# Patient Record
Sex: MALE | Race: BLACK OR AFRICAN AMERICAN | Employment: FULL TIME | URBAN - METROPOLITAN AREA
[De-identification: names, ages, dates, MRNs, and addresses within clinical notes are randomized per-mention and may not be internally consistent; named-entity substitution may affect disease eponyms.]

---

## 2021-10-20 ENCOUNTER — TELEPHONE (OUTPATIENT)
Dept: FAMILY MEDICINE CLINIC | Facility: CLINIC | Age: 51
End: 2021-10-20

## 2021-10-28 ENCOUNTER — OFFICE VISIT (OUTPATIENT)
Dept: FAMILY MEDICINE CLINIC | Facility: CLINIC | Age: 51
End: 2021-10-28
Payer: COMMERCIAL

## 2021-10-28 VITALS
WEIGHT: 315 LBS | HEART RATE: 63 BPM | SYSTOLIC BLOOD PRESSURE: 120 MMHG | DIASTOLIC BLOOD PRESSURE: 80 MMHG | BODY MASS INDEX: 39.17 KG/M2 | HEIGHT: 75 IN

## 2021-10-28 DIAGNOSIS — G47.33 OBSTRUCTIVE SLEEP APNEA: ICD-10-CM

## 2021-10-28 DIAGNOSIS — I10 BENIGN ESSENTIAL HYPERTENSION: ICD-10-CM

## 2021-10-28 DIAGNOSIS — M19.90 ARTHRITIS: ICD-10-CM

## 2021-10-28 DIAGNOSIS — I10 PRIMARY HYPERTENSION: Primary | ICD-10-CM

## 2021-10-28 DIAGNOSIS — E66.01 OBESITY, MORBID, BMI 40.0-49.9 (HCC): ICD-10-CM

## 2021-10-28 DIAGNOSIS — E78.2 MIXED HYPERLIPIDEMIA: ICD-10-CM

## 2021-10-28 PROCEDURE — 3008F BODY MASS INDEX DOCD: CPT | Performed by: FAMILY MEDICINE

## 2021-10-28 PROCEDURE — 3725F SCREEN DEPRESSION PERFORMED: CPT | Performed by: FAMILY MEDICINE

## 2021-10-28 PROCEDURE — 99204 OFFICE O/P NEW MOD 45 MIN: CPT | Performed by: FAMILY MEDICINE

## 2021-10-28 RX ORDER — SPIRONOLACTONE 25 MG/1
25 TABLET ORAL DAILY
Qty: 30 TABLET | Refills: 5 | Status: SHIPPED | OUTPATIENT
Start: 2021-10-28 | End: 2021-10-28 | Stop reason: SDUPTHER

## 2021-10-28 RX ORDER — LOSARTAN POTASSIUM 100 MG/1
100 TABLET ORAL 2 TIMES DAILY
Qty: 60 TABLET | Refills: 5 | Status: SHIPPED | OUTPATIENT
Start: 2021-10-28 | End: 2021-10-28 | Stop reason: SDUPTHER

## 2021-10-28 RX ORDER — SPIRONOLACTONE 25 MG/1
25 TABLET ORAL DAILY
COMMUNITY
End: 2021-10-28 | Stop reason: SDUPTHER

## 2021-10-28 RX ORDER — LOSARTAN POTASSIUM 100 MG/1
25 TABLET ORAL DAILY
COMMUNITY
End: 2021-10-28 | Stop reason: SDUPTHER

## 2021-10-28 RX ORDER — PRAVASTATIN SODIUM 20 MG
20 TABLET ORAL DAILY
Qty: 30 TABLET | Refills: 5 | Status: SHIPPED | OUTPATIENT
Start: 2021-10-28 | End: 2021-10-28 | Stop reason: SDUPTHER

## 2021-10-28 RX ORDER — DILTIAZEM HYDROCHLORIDE 240 MG/1
240 CAPSULE, COATED, EXTENDED RELEASE ORAL 2 TIMES DAILY
Qty: 60 CAPSULE | Refills: 5 | Status: SHIPPED | OUTPATIENT
Start: 2021-10-28 | End: 2021-11-17 | Stop reason: SDUPTHER

## 2021-10-28 RX ORDER — METOPROLOL SUCCINATE 25 MG/1
TABLET, EXTENDED RELEASE ORAL
COMMUNITY
Start: 2014-06-27 | End: 2021-10-28 | Stop reason: SDUPTHER

## 2021-10-28 RX ORDER — PRAVASTATIN SODIUM 20 MG
20 TABLET ORAL DAILY
COMMUNITY
End: 2021-10-28 | Stop reason: SDUPTHER

## 2021-10-28 RX ORDER — DILTIAZEM HYDROCHLORIDE 240 MG/1
240 CAPSULE, COATED, EXTENDED RELEASE ORAL 2 TIMES DAILY
Qty: 60 CAPSULE | Refills: 5 | Status: SHIPPED | OUTPATIENT
Start: 2021-10-28 | End: 2021-10-28 | Stop reason: SDUPTHER

## 2021-10-28 RX ORDER — METOPROLOL SUCCINATE 50 MG/1
50 TABLET, EXTENDED RELEASE ORAL DAILY
Qty: 30 TABLET | Refills: 5 | Status: SHIPPED | OUTPATIENT
Start: 2021-10-28 | End: 2021-10-28 | Stop reason: SDUPTHER

## 2021-10-28 RX ORDER — LOSARTAN POTASSIUM 100 MG/1
100 TABLET ORAL 2 TIMES DAILY
Qty: 60 TABLET | Refills: 5 | Status: SHIPPED | OUTPATIENT
Start: 2021-10-28 | End: 2021-11-17 | Stop reason: SDUPTHER

## 2021-10-28 RX ORDER — SPIRONOLACTONE 25 MG/1
25 TABLET ORAL DAILY
Qty: 30 TABLET | Refills: 5 | Status: SHIPPED | OUTPATIENT
Start: 2021-10-28 | End: 2021-11-17 | Stop reason: SDUPTHER

## 2021-10-28 RX ORDER — DILTIAZEM HYDROCHLORIDE 240 MG/1
240 CAPSULE, COATED, EXTENDED RELEASE ORAL DAILY
COMMUNITY
End: 2021-10-28 | Stop reason: SDUPTHER

## 2021-10-28 RX ORDER — PRAVASTATIN SODIUM 20 MG
20 TABLET ORAL DAILY
Qty: 30 TABLET | Refills: 5 | Status: SHIPPED | OUTPATIENT
Start: 2021-10-28 | End: 2021-11-17 | Stop reason: SDUPTHER

## 2021-10-28 RX ORDER — METOPROLOL SUCCINATE 50 MG/1
50 TABLET, EXTENDED RELEASE ORAL DAILY
Qty: 30 TABLET | Refills: 5 | Status: SHIPPED | OUTPATIENT
Start: 2021-10-28 | End: 2021-11-17 | Stop reason: SDUPTHER

## 2021-10-28 RX ORDER — METOPROLOL SUCCINATE 50 MG/1
50 TABLET, EXTENDED RELEASE ORAL DAILY
COMMUNITY
End: 2021-10-28

## 2021-11-04 ENCOUNTER — APPOINTMENT (OUTPATIENT)
Dept: LAB | Facility: HOSPITAL | Age: 51
End: 2021-11-04
Payer: COMMERCIAL

## 2021-11-04 LAB
25(OH)D3 SERPL-MCNC: 15.8 NG/ML (ref 30–100)
ALBUMIN SERPL BCP-MCNC: 3 G/DL (ref 3.5–5)
ALP SERPL-CCNC: 103 U/L (ref 46–116)
ALT SERPL W P-5'-P-CCNC: 22 U/L (ref 12–78)
ANION GAP SERPL CALCULATED.3IONS-SCNC: 3 MMOL/L (ref 4–13)
AST SERPL W P-5'-P-CCNC: 12 U/L (ref 5–45)
BASOPHILS # BLD AUTO: 0.05 THOUSANDS/ΜL (ref 0–0.1)
BASOPHILS NFR BLD AUTO: 1 % (ref 0–1)
BILIRUB SERPL-MCNC: 0.66 MG/DL (ref 0.2–1)
BILIRUB UR QL STRIP: NEGATIVE
BUN SERPL-MCNC: 11 MG/DL (ref 5–25)
CALCIUM ALBUM COR SERPL-MCNC: 10.1 MG/DL (ref 8.3–10.1)
CALCIUM SERPL-MCNC: 9.3 MG/DL (ref 8.3–10.1)
CHLORIDE SERPL-SCNC: 105 MMOL/L (ref 100–108)
CHOLEST SERPL-MCNC: 171 MG/DL (ref 50–200)
CLARITY UR: CLEAR
CO2 SERPL-SCNC: 26 MMOL/L (ref 21–32)
COLOR UR: YELLOW
CREAT SERPL-MCNC: 0.89 MG/DL (ref 0.6–1.3)
EOSINOPHIL # BLD AUTO: 0.21 THOUSAND/ΜL (ref 0–0.61)
EOSINOPHIL NFR BLD AUTO: 2 % (ref 0–6)
ERYTHROCYTE [DISTWIDTH] IN BLOOD BY AUTOMATED COUNT: 14.9 % (ref 11.6–15.1)
EST. AVERAGE GLUCOSE BLD GHB EST-MCNC: 120 MG/DL
GFR SERPL CREATININE-BSD FRML MDRD: 113 ML/MIN/1.73SQ M
GLUCOSE P FAST SERPL-MCNC: 109 MG/DL (ref 65–99)
GLUCOSE UR STRIP-MCNC: NEGATIVE MG/DL
HBA1C MFR BLD: 5.8 %
HCT VFR BLD AUTO: 42 % (ref 36.5–49.3)
HDLC SERPL-MCNC: 38 MG/DL
HGB BLD-MCNC: 13.4 G/DL (ref 12–17)
HGB UR QL STRIP.AUTO: NEGATIVE
IMM GRANULOCYTES # BLD AUTO: 0.04 THOUSAND/UL (ref 0–0.2)
IMM GRANULOCYTES NFR BLD AUTO: 0 % (ref 0–2)
KETONES UR STRIP-MCNC: NEGATIVE MG/DL
LDLC SERPL CALC-MCNC: 116 MG/DL (ref 0–100)
LEUKOCYTE ESTERASE UR QL STRIP: NEGATIVE
LYMPHOCYTES # BLD AUTO: 2.48 THOUSANDS/ΜL (ref 0.6–4.47)
LYMPHOCYTES NFR BLD AUTO: 27 % (ref 14–44)
MCH RBC QN AUTO: 27.7 PG (ref 26.8–34.3)
MCHC RBC AUTO-ENTMCNC: 31.9 G/DL (ref 31.4–37.4)
MCV RBC AUTO: 87 FL (ref 82–98)
MONOCYTES # BLD AUTO: 0.76 THOUSAND/ΜL (ref 0.17–1.22)
MONOCYTES NFR BLD AUTO: 8 % (ref 4–12)
NEUTROPHILS # BLD AUTO: 5.53 THOUSANDS/ΜL (ref 1.85–7.62)
NEUTS SEG NFR BLD AUTO: 62 % (ref 43–75)
NITRITE UR QL STRIP: NEGATIVE
NONHDLC SERPL-MCNC: 133 MG/DL
NRBC BLD AUTO-RTO: 0 /100 WBCS
PH UR STRIP.AUTO: 6 [PH]
PLATELET # BLD AUTO: 357 THOUSANDS/UL (ref 149–390)
PMV BLD AUTO: 9.6 FL (ref 8.9–12.7)
POTASSIUM SERPL-SCNC: 4 MMOL/L (ref 3.5–5.3)
PROT SERPL-MCNC: 7.7 G/DL (ref 6.4–8.2)
PROT UR STRIP-MCNC: NEGATIVE MG/DL
RBC # BLD AUTO: 4.84 MILLION/UL (ref 3.88–5.62)
SODIUM SERPL-SCNC: 134 MMOL/L (ref 136–145)
SP GR UR STRIP.AUTO: 1.02 (ref 1–1.03)
TRIGL SERPL-MCNC: 83 MG/DL
TSH SERPL DL<=0.05 MIU/L-ACNC: 1.23 UIU/ML (ref 0.36–3.74)
URATE SERPL-MCNC: 5.5 MG/DL (ref 4.2–8)
UROBILINOGEN UR QL STRIP.AUTO: 1 E.U./DL
WBC # BLD AUTO: 9.07 THOUSAND/UL (ref 4.31–10.16)

## 2021-11-04 PROCEDURE — 80053 COMPREHEN METABOLIC PANEL: CPT | Performed by: FAMILY MEDICINE

## 2021-11-04 PROCEDURE — 80061 LIPID PANEL: CPT | Performed by: FAMILY MEDICINE

## 2021-11-04 PROCEDURE — 85025 COMPLETE CBC W/AUTO DIFF WBC: CPT | Performed by: FAMILY MEDICINE

## 2021-11-04 PROCEDURE — 36415 COLL VENOUS BLD VENIPUNCTURE: CPT | Performed by: FAMILY MEDICINE

## 2021-11-04 PROCEDURE — 82306 VITAMIN D 25 HYDROXY: CPT | Performed by: FAMILY MEDICINE

## 2021-11-04 PROCEDURE — 83036 HEMOGLOBIN GLYCOSYLATED A1C: CPT | Performed by: FAMILY MEDICINE

## 2021-11-04 PROCEDURE — 84443 ASSAY THYROID STIM HORMONE: CPT | Performed by: FAMILY MEDICINE

## 2021-11-04 PROCEDURE — 81003 URINALYSIS AUTO W/O SCOPE: CPT | Performed by: FAMILY MEDICINE

## 2021-11-04 PROCEDURE — 84550 ASSAY OF BLOOD/URIC ACID: CPT | Performed by: FAMILY MEDICINE

## 2021-11-17 ENCOUNTER — TELEPHONE (OUTPATIENT)
Dept: FAMILY MEDICINE CLINIC | Facility: CLINIC | Age: 51
End: 2021-11-17

## 2021-11-17 DIAGNOSIS — I10 PRIMARY HYPERTENSION: ICD-10-CM

## 2021-11-17 DIAGNOSIS — E78.2 MIXED HYPERLIPIDEMIA: ICD-10-CM

## 2021-11-17 RX ORDER — METOPROLOL SUCCINATE 50 MG/1
50 TABLET, EXTENDED RELEASE ORAL DAILY
Qty: 30 TABLET | Refills: 5 | Status: SHIPPED | OUTPATIENT
Start: 2021-11-17 | End: 2021-12-07 | Stop reason: SDUPTHER

## 2021-11-17 RX ORDER — SPIRONOLACTONE 25 MG/1
25 TABLET ORAL DAILY
Qty: 30 TABLET | Refills: 5 | Status: SHIPPED | OUTPATIENT
Start: 2021-11-17 | End: 2021-12-07 | Stop reason: SDUPTHER

## 2021-11-17 RX ORDER — LOSARTAN POTASSIUM 100 MG/1
100 TABLET ORAL 2 TIMES DAILY
Qty: 60 TABLET | Refills: 5 | Status: SHIPPED | OUTPATIENT
Start: 2021-11-17 | End: 2021-11-29

## 2021-11-17 RX ORDER — DILTIAZEM HYDROCHLORIDE 240 MG/1
240 CAPSULE, COATED, EXTENDED RELEASE ORAL 2 TIMES DAILY
Qty: 60 CAPSULE | Refills: 5 | Status: SHIPPED | OUTPATIENT
Start: 2021-11-17 | End: 2021-12-07 | Stop reason: SDUPTHER

## 2021-11-17 RX ORDER — PRAVASTATIN SODIUM 20 MG
20 TABLET ORAL DAILY
Qty: 30 TABLET | Refills: 5 | Status: SHIPPED | OUTPATIENT
Start: 2021-11-17 | End: 2021-12-07 | Stop reason: SDUPTHER

## 2021-11-29 DIAGNOSIS — I10 PRIMARY HYPERTENSION: ICD-10-CM

## 2021-11-29 RX ORDER — LOSARTAN POTASSIUM 100 MG/1
TABLET ORAL
Qty: 60 TABLET | Refills: 5 | Status: SHIPPED | OUTPATIENT
Start: 2021-11-29 | End: 2021-12-07 | Stop reason: SDUPTHER

## 2021-12-07 ENCOUNTER — OFFICE VISIT (OUTPATIENT)
Dept: FAMILY MEDICINE CLINIC | Facility: CLINIC | Age: 51
End: 2021-12-07
Payer: COMMERCIAL

## 2021-12-07 VITALS
WEIGHT: 315 LBS | HEART RATE: 90 BPM | TEMPERATURE: 97 F | SYSTOLIC BLOOD PRESSURE: 130 MMHG | BODY MASS INDEX: 39.17 KG/M2 | DIASTOLIC BLOOD PRESSURE: 90 MMHG | OXYGEN SATURATION: 98 % | HEIGHT: 75 IN

## 2021-12-07 DIAGNOSIS — I10 PRIMARY HYPERTENSION: ICD-10-CM

## 2021-12-07 DIAGNOSIS — Z00.00 ANNUAL PHYSICAL EXAM: ICD-10-CM

## 2021-12-07 DIAGNOSIS — Z23 FLU VACCINE NEED: Primary | ICD-10-CM

## 2021-12-07 DIAGNOSIS — Z12.12 SCREENING FOR COLORECTAL CANCER: ICD-10-CM

## 2021-12-07 DIAGNOSIS — Z11.59 NEED FOR HEPATITIS C SCREENING TEST: ICD-10-CM

## 2021-12-07 DIAGNOSIS — G25.81 RESTLESS LEG: ICD-10-CM

## 2021-12-07 DIAGNOSIS — N40.1 BENIGN PROSTATIC HYPERPLASIA WITH URINARY FREQUENCY: ICD-10-CM

## 2021-12-07 DIAGNOSIS — E78.2 MIXED HYPERLIPIDEMIA: ICD-10-CM

## 2021-12-07 DIAGNOSIS — E55.9 VITAMIN D DEFICIENCY: ICD-10-CM

## 2021-12-07 DIAGNOSIS — G47.33 OBSTRUCTIVE SLEEP APNEA: ICD-10-CM

## 2021-12-07 DIAGNOSIS — R35.0 BENIGN PROSTATIC HYPERPLASIA WITH URINARY FREQUENCY: ICD-10-CM

## 2021-12-07 DIAGNOSIS — Z11.4 SCREENING FOR HIV (HUMAN IMMUNODEFICIENCY VIRUS): ICD-10-CM

## 2021-12-07 DIAGNOSIS — Z12.11 SCREENING FOR COLORECTAL CANCER: ICD-10-CM

## 2021-12-07 PROCEDURE — 99396 PREV VISIT EST AGE 40-64: CPT | Performed by: FAMILY MEDICINE

## 2021-12-07 PROCEDURE — 3075F SYST BP GE 130 - 139MM HG: CPT | Performed by: FAMILY MEDICINE

## 2021-12-07 PROCEDURE — 1036F TOBACCO NON-USER: CPT | Performed by: FAMILY MEDICINE

## 2021-12-07 PROCEDURE — 90682 RIV4 VACC RECOMBINANT DNA IM: CPT

## 2021-12-07 PROCEDURE — 3008F BODY MASS INDEX DOCD: CPT | Performed by: FAMILY MEDICINE

## 2021-12-07 PROCEDURE — 90471 IMMUNIZATION ADMIN: CPT

## 2021-12-07 PROCEDURE — 3080F DIAST BP >= 90 MM HG: CPT | Performed by: FAMILY MEDICINE

## 2021-12-07 RX ORDER — DILTIAZEM HYDROCHLORIDE 240 MG/1
240 CAPSULE, COATED, EXTENDED RELEASE ORAL 2 TIMES DAILY
Qty: 60 CAPSULE | Refills: 5 | Status: SHIPPED | OUTPATIENT
Start: 2021-12-07 | End: 2021-12-14 | Stop reason: SDUPTHER

## 2021-12-07 RX ORDER — SPIRONOLACTONE 25 MG/1
25 TABLET ORAL DAILY
Qty: 30 TABLET | Refills: 5 | Status: SHIPPED | OUTPATIENT
Start: 2021-12-07 | End: 2022-01-06 | Stop reason: SDUPTHER

## 2021-12-07 RX ORDER — METOPROLOL SUCCINATE 50 MG/1
50 TABLET, EXTENDED RELEASE ORAL DAILY
Qty: 30 TABLET | Refills: 5 | Status: SHIPPED | OUTPATIENT
Start: 2021-12-07 | End: 2022-01-06 | Stop reason: SDUPTHER

## 2021-12-07 RX ORDER — ERGOCALCIFEROL 1.25 MG/1
50000 CAPSULE ORAL WEEKLY
Qty: 4 CAPSULE | Refills: 3 | Status: SHIPPED | OUTPATIENT
Start: 2021-12-07 | End: 2022-01-06 | Stop reason: SDUPTHER

## 2021-12-07 RX ORDER — PRAVASTATIN SODIUM 20 MG
20 TABLET ORAL DAILY
Qty: 30 TABLET | Refills: 5 | Status: SHIPPED | OUTPATIENT
Start: 2021-12-07 | End: 2022-01-06 | Stop reason: SDUPTHER

## 2021-12-07 RX ORDER — LOSARTAN POTASSIUM 100 MG/1
100 TABLET ORAL 2 TIMES DAILY
Qty: 60 TABLET | Refills: 5 | Status: SHIPPED | OUTPATIENT
Start: 2021-12-07 | End: 2022-01-06 | Stop reason: SDUPTHER

## 2021-12-07 RX ORDER — GABAPENTIN 300 MG/1
300 CAPSULE ORAL
Qty: 30 CAPSULE | Refills: 5 | Status: SHIPPED | OUTPATIENT
Start: 2021-12-07 | End: 2022-01-06 | Stop reason: ALTCHOICE

## 2021-12-11 ENCOUNTER — APPOINTMENT (OUTPATIENT)
Dept: LAB | Facility: HOSPITAL | Age: 51
End: 2021-12-11
Payer: COMMERCIAL

## 2021-12-11 DIAGNOSIS — R35.0 BENIGN PROSTATIC HYPERPLASIA WITH URINARY FREQUENCY: ICD-10-CM

## 2021-12-11 DIAGNOSIS — Z11.59 NEED FOR HEPATITIS C SCREENING TEST: ICD-10-CM

## 2021-12-11 DIAGNOSIS — N40.1 BENIGN PROSTATIC HYPERPLASIA WITH URINARY FREQUENCY: ICD-10-CM

## 2021-12-11 DIAGNOSIS — Z11.4 SCREENING FOR HIV (HUMAN IMMUNODEFICIENCY VIRUS): ICD-10-CM

## 2021-12-11 LAB — HCV AB SER QL: NORMAL

## 2021-12-11 PROCEDURE — 87389 HIV-1 AG W/HIV-1&-2 AB AG IA: CPT

## 2021-12-11 PROCEDURE — 84403 ASSAY OF TOTAL TESTOSTERONE: CPT

## 2021-12-11 PROCEDURE — 84402 ASSAY OF FREE TESTOSTERONE: CPT

## 2021-12-11 PROCEDURE — 36415 COLL VENOUS BLD VENIPUNCTURE: CPT

## 2021-12-11 PROCEDURE — 86803 HEPATITIS C AB TEST: CPT

## 2021-12-13 LAB
TESTOST FREE SERPL-MCNC: 6.7 PG/ML (ref 7.2–24)
TESTOST SERPL-MCNC: 263 NG/DL (ref 264–916)

## 2021-12-14 DIAGNOSIS — I10 PRIMARY HYPERTENSION: ICD-10-CM

## 2021-12-14 LAB — HIV 1+2 AB+HIV1 P24 AG SERPL QL IA: NORMAL

## 2021-12-14 RX ORDER — DILTIAZEM HYDROCHLORIDE 240 MG/1
240 CAPSULE, COATED, EXTENDED RELEASE ORAL DAILY
Qty: 30 CAPSULE | Refills: 5 | Status: SHIPPED | OUTPATIENT
Start: 2021-12-14 | End: 2022-01-06 | Stop reason: SDUPTHER

## 2022-01-06 ENCOUNTER — OFFICE VISIT (OUTPATIENT)
Dept: FAMILY MEDICINE CLINIC | Facility: CLINIC | Age: 52
End: 2022-01-06
Payer: COMMERCIAL

## 2022-01-06 VITALS
SYSTOLIC BLOOD PRESSURE: 150 MMHG | BODY MASS INDEX: 39.17 KG/M2 | HEIGHT: 75 IN | DIASTOLIC BLOOD PRESSURE: 90 MMHG | WEIGHT: 315 LBS | TEMPERATURE: 97.8 F

## 2022-01-06 DIAGNOSIS — I10 PRIMARY HYPERTENSION: ICD-10-CM

## 2022-01-06 DIAGNOSIS — E29.1 HYPOGONADISM IN MALE: Primary | ICD-10-CM

## 2022-01-06 DIAGNOSIS — E66.01 OBESITY, MORBID, BMI 40.0-49.9 (HCC): ICD-10-CM

## 2022-01-06 DIAGNOSIS — E78.2 MIXED HYPERLIPIDEMIA: ICD-10-CM

## 2022-01-06 DIAGNOSIS — E55.9 VITAMIN D DEFICIENCY: ICD-10-CM

## 2022-01-06 DIAGNOSIS — G47.33 OBSTRUCTIVE SLEEP APNEA: ICD-10-CM

## 2022-01-06 PROCEDURE — 3008F BODY MASS INDEX DOCD: CPT | Performed by: FAMILY MEDICINE

## 2022-01-06 PROCEDURE — 99214 OFFICE O/P EST MOD 30 MIN: CPT | Performed by: FAMILY MEDICINE

## 2022-01-06 RX ORDER — DILTIAZEM HYDROCHLORIDE 240 MG/1
240 CAPSULE, COATED, EXTENDED RELEASE ORAL DAILY
Qty: 30 CAPSULE | Refills: 5 | Status: SHIPPED | OUTPATIENT
Start: 2022-01-06 | End: 2022-07-11

## 2022-01-06 RX ORDER — PRAVASTATIN SODIUM 20 MG
20 TABLET ORAL DAILY
Qty: 30 TABLET | Refills: 5 | Status: SHIPPED | OUTPATIENT
Start: 2022-01-06 | End: 2022-07-11 | Stop reason: SDUPTHER

## 2022-01-06 RX ORDER — ERGOCALCIFEROL 1.25 MG/1
50000 CAPSULE ORAL WEEKLY
Qty: 4 CAPSULE | Refills: 3 | Status: SHIPPED | OUTPATIENT
Start: 2022-01-06 | End: 2022-04-07 | Stop reason: SDUPTHER

## 2022-01-06 RX ORDER — LOSARTAN POTASSIUM 100 MG/1
100 TABLET ORAL 2 TIMES DAILY
Qty: 60 TABLET | Refills: 5 | Status: SHIPPED | OUTPATIENT
Start: 2022-01-06 | End: 2022-02-09

## 2022-01-06 RX ORDER — SPIRONOLACTONE 25 MG/1
25 TABLET ORAL DAILY
Qty: 30 TABLET | Refills: 5 | Status: SHIPPED | OUTPATIENT
Start: 2022-01-06 | End: 2022-07-11 | Stop reason: SDUPTHER

## 2022-01-06 RX ORDER — METOPROLOL SUCCINATE 50 MG/1
50 TABLET, EXTENDED RELEASE ORAL DAILY
Qty: 30 TABLET | Refills: 5 | Status: SHIPPED | OUTPATIENT
Start: 2022-01-06 | End: 2022-07-11 | Stop reason: SDUPTHER

## 2022-01-06 NOTE — PROGRESS NOTES
Assessment/Plan:  We discussed his lab work and his symptoms  Encourage him to continue current therapy continue vitamin-D  I added testosterone patch daily  Will recheck labs in 3 months follow-up at that time  Diagnoses and all orders for this visit:    Hypogonadism in male  -     Testosterone 4 MG/24HR PT24; Place 1 patch on the skin daily  -     Comprehensive metabolic panel; Future  -     PSA Total, Diagnostic  -     Testosterone, free, total; Future    Primary hypertension  -     diltiazem (CARDIZEM CD) 240 mg 24 hr capsule; Take 1 capsule (240 mg total) by mouth daily  -     losartan (COZAAR) 100 MG tablet; Take 1 tablet (100 mg total) by mouth 2 (two) times a day  -     metoprolol succinate (TOPROL-XL) 50 mg 24 hr tablet; Take 1 tablet (50 mg total) by mouth daily  -     spironolactone (ALDACTONE) 25 mg tablet; Take 1 tablet (25 mg total) by mouth daily    Vitamin D deficiency  -     ergocalciferol (VITAMIN D2) 50,000 units; Take 1 capsule (50,000 Units total) by mouth once a week    Mixed hyperlipidemia  -     pravastatin (PRAVACHOL) 20 mg tablet; Take 1 tablet (20 mg total) by mouth daily    Obstructive sleep apnea    Obesity, morbid, BMI 40 0-49 9 ([facility])            Subjective:        Patient ID: Cheri Swain is a 46 y o  male  Patient presents with: Follow-up: lab work for testosterone             The following portions of the patient's history were reviewed and updated as appropriate: allergies, current medications, past family history, past medical history, past social history, past surgical history and problem list       Review of Systems   Constitutional: Positive for fatigue  HENT: Negative  Eyes: Negative  Respiratory: Negative  Cardiovascular: Negative  Gastrointestinal: Negative  Endocrine: Negative  Genitourinary: Negative  Musculoskeletal: Negative  Skin: Negative  Allergic/Immunologic: Negative  Neurological: Positive for weakness  Hematological: Negative  Psychiatric/Behavioral: Negative  All other systems reviewed and are negative  Objective:      BMI Counseling: Body mass index is 46 15 kg/m²  The BMI is above normal  Nutrition recommendations include decreasing portion sizes, encouraging healthy choices of fruits and vegetables, decreasing fast food intake, consuming healthier snacks, limiting drinks that contain sugar, moderation in carbohydrate intake, increasing intake of lean protein, reducing intake of saturated and trans fat and reducing intake of cholesterol  Exercise recommendations include moderate physical activity 150 minutes/week  No pharmacotherapy was ordered  Rationale for BMI follow-up plan is due to patient being overweight or obese  /90   Temp 97 8 °F (36 6 °C)   Ht 6' 3" (1 905 m)   Wt (!) 167 kg (369 lb 3 2 oz)   BMI 46 15 kg/m²          Physical Exam  Vitals and nursing note reviewed  Constitutional:       Appearance: He is well-developed  HENT:      Head: Normocephalic and atraumatic  Right Ear: External ear normal       Left Ear: External ear normal       Nose: Nose normal    Eyes:      Conjunctiva/sclera: Conjunctivae normal       Pupils: Pupils are equal, round, and reactive to light  Cardiovascular:      Rate and Rhythm: Normal rate and regular rhythm  Heart sounds: Normal heart sounds  Pulmonary:      Effort: Pulmonary effort is normal       Breath sounds: Normal breath sounds  Abdominal:      General: Bowel sounds are normal       Palpations: Abdomen is soft  Musculoskeletal:         General: Normal range of motion  Cervical back: Normal range of motion and neck supple  Skin:     General: Skin is warm and dry  Neurological:      Mental Status: He is alert and oriented to person, place, and time  Deep Tendon Reflexes: Reflexes are normal and symmetric     Psychiatric:         Behavior: Behavior normal

## 2022-01-11 ENCOUNTER — TELEPHONE (OUTPATIENT)
Dept: FAMILY MEDICINE CLINIC | Facility: CLINIC | Age: 52
End: 2022-01-11

## 2022-01-11 DIAGNOSIS — E29.1 HYPOGONADISM IN MALE: Primary | ICD-10-CM

## 2022-01-11 RX ORDER — TESTOSTERONE 12.5 MG/1.25G
1 GEL TOPICAL DAILY
Qty: 75 G | Refills: 5 | Status: SHIPPED | OUTPATIENT
Start: 2022-01-11 | End: 2022-03-09 | Stop reason: SDUPTHER

## 2022-01-11 NOTE — TELEPHONE ENCOUNTER
Patients' insurance does not cover Androderm 4mg/24hr patches; Dr María Elena Boone changed RX to AndroGel Pump

## 2022-02-03 ENCOUNTER — TELEPHONE (OUTPATIENT)
Dept: FAMILY MEDICINE CLINIC | Facility: CLINIC | Age: 52
End: 2022-02-03

## 2022-02-03 NOTE — TELEPHONE ENCOUNTER
Medication wa denied due to quality was more than limit of 30 tablets per 30 days; awaiting new quantity or alternative medication fr Dr Nhung Harrington

## 2022-02-08 ENCOUNTER — OFFICE VISIT (OUTPATIENT)
Dept: UROLOGY | Facility: CLINIC | Age: 52
End: 2022-02-08
Payer: COMMERCIAL

## 2022-02-08 VITALS
DIASTOLIC BLOOD PRESSURE: 80 MMHG | HEIGHT: 75 IN | SYSTOLIC BLOOD PRESSURE: 130 MMHG | BODY MASS INDEX: 39.17 KG/M2 | WEIGHT: 315 LBS | HEART RATE: 107 BPM

## 2022-02-08 DIAGNOSIS — Z87.448 H/O URETHRAL STRICTURE: Primary | ICD-10-CM

## 2022-02-08 DIAGNOSIS — Z12.5 PROSTATE CANCER SCREENING: ICD-10-CM

## 2022-02-08 DIAGNOSIS — N40.1 BENIGN PROSTATIC HYPERPLASIA WITH URINARY FREQUENCY: ICD-10-CM

## 2022-02-08 DIAGNOSIS — R35.0 BENIGN PROSTATIC HYPERPLASIA WITH URINARY FREQUENCY: ICD-10-CM

## 2022-02-08 LAB
POST-VOID RESIDUAL VOLUME, ML POC: 25 ML
SL AMB  POCT GLUCOSE, UA: NORMAL
SL AMB LEUKOCYTE ESTERASE,UA: NORMAL
SL AMB POCT BILIRUBIN,UA: NORMAL
SL AMB POCT BLOOD,UA: NORMAL
SL AMB POCT CLARITY,UA: CLEAR
SL AMB POCT COLOR,UA: YELLOW
SL AMB POCT KETONES,UA: NORMAL
SL AMB POCT NITRITE,UA: NORMAL
SL AMB POCT PH,UA: NORMAL
SL AMB POCT SPECIFIC GRAVITY,UA: 1.01
SL AMB POCT URINE PROTEIN: NORMAL
SL AMB POCT UROBILINOGEN: NORMAL

## 2022-02-08 PROCEDURE — 51798 US URINE CAPACITY MEASURE: CPT | Performed by: NURSE PRACTITIONER

## 2022-02-08 PROCEDURE — 81002 URINALYSIS NONAUTO W/O SCOPE: CPT | Performed by: NURSE PRACTITIONER

## 2022-02-08 PROCEDURE — 99202 OFFICE O/P NEW SF 15 MIN: CPT | Performed by: NURSE PRACTITIONER

## 2022-02-08 NOTE — ASSESSMENT & PLAN NOTE
· Declines medications due to possible side effects  · Schedule ultrasound kidney bladder PVR  · PVR 25 mL  · Urine dip unremarkable

## 2022-02-08 NOTE — PATIENT INSTRUCTIONS
BLADDER HEALTH    WHAT IS CONSIDERED NORMAL?  The average bladder can hold about 2 cups of urine before it needs to be emptied   The normal range of voiding urine is 6 to 8 times during a 24 hour period  As we get older, our bladder capacity can get smaller and we may need to pass urine more frequently but usually not more than every 2 hours   Urine should flow easily without discomfort in a good, steady stream until the bladder is empty  No pushing or straining is necessary to empty the bladder   An urge is a signal that you feel as the bladder stretches to fill with urine  Urges can be felt even if the bladder is not full  Urges are not commands to go to the toilet, merely a signal and can be controlled  WHAT ARE GOOD BLADDER HABITS?  Take your time when emptying your bladder  Dont strain or push to empty your bladder  Make sure you empty your bladder completely each time you pass urine  Do not rush the process   Consistently ignoring the urge to go (waiting more than 4 hours between toileting) or urinating too infrequently may be convenient but not healthy for your bladder   Avoid going to the toilet just in case or more often than every 2 hours  It is usually not necessary to go when you feel the first urge  Try to go only when your bladder is full  Urgency and frequency of urination can be improved by retraining the bladder and spacing your fluid intake throughout the day  Practice good toilet habits  Dont let your bladder control your life  TIPS TO MAINTAIN GOOD BLADDER HABITS   Maintain a good fluid intake  Depending on your body size and environment, drink 6 -8 cups (8 oz each) of fluid per day unless otherwise advised by your doctor  Not enough fluid creates a foul odor and dark color of the urine     Limit the amount of caffeine (coffee, cola, chocolate or tea) and citrus foods that you consume as these foods can be associated with increased sensation of urinary urgency and frequency   Limit the amount of alcohol you drink  Alcohol increases urine production and makes it difficult for the brain to coordinate bladder control   Avoid constipation by maintaining a balanced diet of dietary fiber   Cigarette smoking is also irritating to the bladder surface and is associated with bladder cancer  In addition, the coughing associated with smoking may lead to increased incontinent episodes because of the increased pressure  HOW DIET CAN AFFECT YOUR BLADDER  Although there is no particular "diet" that can cure bladder control, there are certain dietary suggestions you can use to help control the problem  There are 2 points to consider when evaluating how your habits and diet may affect your bladder:    1  Foods and fluids can irritate the bladder  Some foods and beverages are thought to contribute to bladder leakage and irritability  However their effect on the bladder is not completely understood and you may want to see if eliminating one or all of these items improves your bladder control  If you are unable to give them up completely, it is recommended that you use the following items in moderation:   Acidic beverages and foods (orange juice, grapefruit juice, lemonade etc)   Alcoholic beverages   Vinegar   Coffee (regular and decaf)   Tea (regular and decaf)   Caffeinated beverages   Carbonated beverages          2  Drinking enough and the right kinds of fluids  Many people with bladder control issues decrease their intake of liquids in hope that they will need to urinate less frequently or have less urinary leakage   You should not restrict fluids to control your bladder  While a decrease in liquid intake does result in a decrease in the volume of urine, the smaller amount of urine may be more highly concentrated         Highly concentrated, dark yellow urine is irritating to the bladder surface and may actually cause you to go to the bathroom more frequently   It also encourages the growth of bacteria, which may lead to infections resulting in incontinence   Substitutions for Bladder Irritants: water is always the best beverage choice  Grape and apple juice are thirst quenchers are good selections and are not as irritating to the bladder   o Low acid fruits:  Pears, apricots, papaya, watermelon  o For coffee drinkers: KAVA®, Postum®, Nick®, Kaffree Kalani®  o For tea drinkers:  non-citrus or herbal and sun brewed tea  BEHAVIORAL THERAPY FOR IMPROVED BLADDER CONTROL      1  Urge Control Techniques       A  Stop whatever you are doing and concentrate on jake your pelvic floor muscles  B   Contract your pelvic floor muscles repetitively (as in your "flick" exercises)  C   Once the urgency has subsided, realize that sometimes the urgency is because you have a             full bladder and have to urinate  Other times the urgency is a false signal and you do not have a full bladder  D  If you have urgency triggered by running water, "key in the door," getting up from a sitting position, etc , contract your pelvic floor muscles prior to       initiating the activity  2   Daily Fluid Intake       A  Avoid drinking too little (less than 3 cups/day) or drinking too much (more than 6             cups/day)  B   Avoid caffeinated beverages  C   If voiding frequently at night, limit your liquid intake after 7 p m  3   Bowel Regularity--Constipation Makes Bladder Symptoms Worse       A  Drink enough liquids, eat plenty of high fiber food  B  Exercise       C  Avoid laxatives and enemas on a regular basis, this decreases the bowel's normal function  4   Voiding Schedules       A  Empty your bladder at 1½ to 2 hour intervals even if you may not have the urge to urinate             at that time    You may gradually increase the time between voidings to 30  minutes, until you can comfortably void every 3 hours        B  If you are voiding more frequently than every 1½ to 2 hours, resist the urge to go  by using urge control techniques (described in 1 )  Overactive Bladder   WHAT YOU NEED TO KNOW:   What is overactive bladder? Overactive bladder is a sudden urge to urinate that is difficult for you to control  It occurs when the muscles of the bladder contract (tighten) more than normal  This causes a frequent or sudden need to urinate  You usually have to urinate more than 8 times in 24 hours  You may need to get up more than once in the middle of the night to urinate  You may also leak urine before you are able to make it to the bathroom  What increases my risk for overactive bladder? Your risk for overactive bladder increases as you get older  Previous vaginal birth, chronic constipation, and diabetes increase your risk  Obesity, nerve injury, stroke, and spinal cord problems also increase your risk  How is overactive bladder diagnosed? Your healthcare provider will ask about your symptoms, medical history, and current medicines  He or she will examine your pelvic area and abdomen to look for problems that may be causing your symptoms  You may need blood and imaging tests to help find the cause of your symptoms  You may be asked to keep a log of your urination patterns  Write down the number of times you urinate over 24 hours, the amount, and if you have urine leakage  How is overactive bladder managed? · Train your bladder  Go to the bathroom at set times, such as every 2 hours, even if you do not feel the urge to go  You can also try to hold your urine when you feel the urge to go  For example, hold your urine for 5 minutes when you feel the urge to go  As that becomes easier, hold your urine for 10 minutes  Work up to every 3 or 4 hours to help control your bladder  · Limit liquids as directed  This may help decrease the amount you urinate   Ask how much liquid to drink each day and which liquids are best for you  Avoid liquids several hours before you go to sleep  Limit caffeine and alcohol  · Exercise regularly and maintain a healthy weight  Ask your healthcare provider how much you should weigh and about the best exercise plan for you  Extra weight puts pressure on your bladder and may make your symptoms worse  Ask him or her to help you create a weight loss plan if you are overweight  · Do pelvic muscle exercises often  Your pelvic muscles help you stop urinating  Squeeze these muscles tightly for 5 seconds, then relax for 5 seconds  Gradually work up to squeezing for 10 seconds  Do 3 sets of 15 repetitions a day, or as directed  This will help strengthen your pelvic muscles and improve bladder control  How is overactive bladder treated? The following treatments may be done if other methods are not working:  · Medicines  may be given to relax your bladder and decrease urination  · Sacral nerve stimulation sends electrical signals to your sacral nerve through a small device implanted under your skin  Your sacral nerve controls your bladder, sphincter, and pelvic floor muscles  · Surgery  may be done if all other treatments do not help you control your bladder  When should I call my doctor? · Your urine is pink, or you notice blood in your urine  · You have pain with urination  · You continue to have symptoms even after you take your medicine  · You have questions or concerns about your condition or care  CARE AGREEMENT:   You have the right to help plan your care  Learn about your health condition and how it may be treated  Discuss treatment options with your healthcare providers to decide what care you want to receive  You always have the right to refuse treatment  The above information is an  only  It is not intended as medical advice for individual conditions or treatments   Talk to your doctor, nurse or pharmacist before following any medical regimen to see if it is safe and effective for you  © Copyright [employer] 2021 Information is for End User's use only and may not be sold, redistributed or otherwise used for commercial purposes  All illustrations and images included in CareNotes® are the copyrighted property of [employer]  or Natanael Alas      Prostate specific antigen measurement   GENERAL INFORMATION:  What is this test?  This test measures the amount of prostate specific antigen (PSA) in blood  This test may be used when benign prostatic hyperplasia (BPH) is suspected  It may also be used to screen for prostate cancer, to help diagnose prostate cancer when it is suspected, and to monitor prostate cancer after treatment  What are other names for this test?  · PSA measurement  · PSA - Prostate-specific antigen level  · PSA - Serum prostate specific antigen level  · tPSA measurement - Total prostate specific antigen measurement  What are related tests? · Rectal examination  · Transrectal biopsy of prostate  Why do I need this test?  Laboratory tests may be done for many reasons  Tests are performed for routine health screenings or if a disease or toxicity is suspected  Lab tests may be used to determine if a medical condition is improving or worsening  Lab tests may also be used to measure the success or failure of a medication or treatment plan  Lab tests may be ordered for professional or legal reasons  You may need this test if you have:   · BPH - Benign prostatic hypertrophy  · Prostate cancer  When and how often should I have this test?  When and how often laboratory tests are done may depend on many factors  The timing of laboratory tests may rely on the results or completion of other tests, procedures, or treatments  Lab tests may be performed immediately in an emergency, or tests may be delayed as a condition is treated or monitored  A test may be suggested or become necessary when certain signs or symptoms appear    Due to changes in the way your body naturally functions through the course of a day, lab tests may need to be performed at a certain time of day  If you have prepared for a test by changing your food or fluid intake, lab tests may be timed in accordance with those changes  Timing of tests may be based on increased and decreased levels of medications, drugs or other substances in the body  The age or gender of the person being tested may affect when and how often a lab test is required  Chronic or progressive conditions may need ongoing monitoring through the use of lab tests  Conditions that worsen and improve may also need frequent monitoring  Certain tests may be repeated to obtain a series of results, or tests may need to be repeated to confirm or disprove results  Timing and frequency of lab tests may vary if they are performed for professional or legal reasons  How should I get ready for the test?  Before having blood collected, tell the person drawing your blood if you are allergic to latex  Tell the healthcare worker if you have a medical condition or are using a medication or supplement that causes excessive bleeding  Also tell the healthcare worker if you have felt nauseated, lightheaded, or have fainted while having blood drawn in the past   How is the test done? When a blood sample from a vein is needed, a vein in your arm is usually selected  A tourniquet (large rubber strap) may be secured above the vein  The skin over the vein will be cleaned, and a needle will be inserted  You will be asked to hold very still while your blood is collected  Blood will be collected into one or more tubes, and the tourniquet will be removed  When enough blood has been collected, the healthcare worker will take the needle out  How will the test feel? The amount of discomfort you feel will depend on many factors, including your sensitivity to pain   Communicate how you are feeling with the person doing the test  Inform the person doing the test if you feel that you cannot continue with the test   During a blood draw, you may feel mild discomfort at the location where the blood sample is being collected  What should I do after the test?  After a blood sample is collected from your vein, a bandage, cotton ball, or gauze may be placed on the area where the needle was inserted  You may be asked to apply pressure to the area  Avoid strenuous exercise immediately after your blood draw  Contact your healthcare worker if you feel pain or see redness, swelling, or discharge from the puncture site  What are the risks? Blood: During a blood draw, a hematoma (blood-filled bump under the skin) or slight bleeding from the puncture site may occur  After a blood draw, a bruise or infection may occur at the puncture site  The person doing this test may need to perform it more than once  Talk to your healthcare worker if you have any concerns about the risks of this test   What are normal results for this test?  Laboratory test results may vary depending on your age, gender, health history, the method used for the test, and many other factors  If your results are different from the results suggested below, this may not mean that you have a disease  Contact your healthcare worker if you have any questions  The following are considered to be normal results for this test:  · Males, ?36years of age: 0-2 ng/mL (0-2 mcg/L) :  · Males, >36years of age: 0-4 ng/mL (0-4 mcg/L)  · Females: <0 5 ng/mL (<0 5 mcg/L) : What might affect my test results?   Drug Therapy Use:  Some medications may affect the results of the test  These medications include:  · Finasteride (Oral route, Tablet)  · Indium In 111 Capromab Pendetide (Intravenous route, Kit)  Ejaculation and digital rectal exam can cause an insignificant rise in PSA levels while prostate biopsy and cystoscopy can cause substantial increases; PSA testing should be delayed until 3 to 4 weeks after these procedures  Exercise, infection, and some medications can also cause a rise in PSA levels  Finasteride will lower PSA by approximately 50%   What follow up should I do after this test?  Ask your healthcare worker how you will be informed of the test results  You may be asked to call for results, schedule an appointment to discuss results, or notified of results by mail  Follow up care varies depending on many factors related to your test  Sometimes there is no follow up after you have been notified of test results  At other times follow up may be suggested or necessary  Some examples of follow up care include changes to medication or treatment plans, referral to a specialist, more or less frequent monitoring, and additional tests or procedures  Talk with your healthcare worker about any concerns or questions you have regarding follow up care or instructions  Where can I get more information? Related Companies   ? American U[employer] Association - https://www wilder org/  org  ? National Kidney and Urologic Diseases Information Clearinghouse - http://kidney  niddk nih gov/    CARE AGREEMENT:   You have the right to help plan your care  To help with this plan, you must learn about your health condition and how it may be treated  You can then discuss treatment options with your caregivers  Work with them to decide what care may be used to treat you  You always have the right to refuse treatment  © Copyright [employer] 2021  Information is for End User's use only and may not be sold, redistributed or otherwise used for commercial purposes  The above information is an  only  It is not intended as a substitute for medical advice for your individual conditions or treatments  Talk to your doctor, nurse or pharmacist before following any medical regimen to see if it is safe and effective for you

## 2022-02-08 NOTE — PROGRESS NOTES
Assessment and plan:     Prostate cancer screening  · PSA 2 weeks  · Follow up 1 year for routine prostate cancer screening    H/O urethral stricture  · Prior dilatation  · Declines cystoscopy    Benign prostatic hyperplasia with urinary frequency  · Declines medications due to possible side effects  · Schedule ultrasound kidney bladder PVR  · PVR 25 mL  · Urine dip unremarkable    Follow up 3 months for recheck    APRIL Moffett    History of Present Illness     Marco Chambers is a 46 y o  new patient who presents for urinary urgency and frequency  He has nocturia x 3-4  He urinates at least 20 times a day since 2010  This has been ongoing for a few years  His symptoms are about the same  He is on spironolactone  His a1c is 5 8    Denies any kidney stones  Has not been on medications in the past for this  He drinks:  1 coffee  4 bottles of water a day  2 sodas    He has sleep apnea and uses cpap  We discussed trial of flomax however he is not interested in trying this due to side effects  He has a hx of urethral blockage/stricture and a cystoscopy in [place] in 2019  He is not interested it this  IPSS Questionnaire (AUA-7): Over the past month    1)  How often have you had a sensation of not emptying your bladder completely after you finish urinating? 2 - Less than half the time   2)  How often have you had to urinate again less than two hours after you finished urinating? 4 - More than half the time   3)  How often have you found you stopped and started again several times when you urinated? 2 - Less than half the time   4) How difficult have you found it to postpone urination? 4 - More than half the time   5) How often have you had a weak urinary stream?  0 - Not at all   6) How often have you had to push or strain to begin urination? 0 - Not at all   7) How many times did you most typically get up to urinate from the time you went to bed until the time you got up in the morning?   3 - 3 times   Total score:  0-7 mildly symptomatic    8-19 moderately symptomatic    20-35 severely symptomatic     Laboratory     Lab Results   Component Value Date    BUN 11 11/04/2021    CREATININE 0 89 11/04/2021       No components found for: GFR    Lab Results   Component Value Date    CALCIUM 9 3 11/04/2021    K 4 0 11/04/2021    CO2 26 11/04/2021     11/04/2021       Lab Results   Component Value Date    WBC 9 07 11/04/2021    HGB 13 4 11/04/2021    HCT 42 0 11/04/2021    MCV 87 11/04/2021     11/04/2021       No results found for: PSA    Recent Results (from the past 1 hour(s))   POCT Measure PVR    Collection Time: 02/08/22  3:45 PM   Result Value Ref Range    POST-VOID RESIDUAL VOLUME, ML POC 25 mL   POCT urine dip    Collection Time: 02/08/22  3:46 PM   Result Value Ref Range    LEUKOCYTE ESTERASE,UA n     NITRITE,UA n     SL AMB POCT UROBILINOGEN n     POCT URINE PROTEIN n      PH,UA n     BLOOD,UA n     SPECIFIC GRAVITY,UA 1 010     KETONES,UA n     BILIRUBIN,UA n     GLUCOSE, UA n      COLOR,UA yellow     CLARITY,UA clear        @RESULT(URINEMICROSCOPIC)@    @RESULT(URINECULTURE)@    Radiology       Review of Systems     Review of Systems   Constitutional: Negative for activity change, appetite change, chills, fatigue, fever and unexpected weight change  HENT: Negative for facial swelling  Eyes: Negative for discharge  Respiratory: Negative  Negative for cough and shortness of breath  Cardiovascular: Negative for chest pain and leg swelling  Gastrointestinal: Negative  Negative for abdominal distention, abdominal pain, constipation, diarrhea, nausea and vomiting  Endocrine: Negative  Genitourinary: Positive for frequency and urgency  Negative for decreased urine volume, difficulty urinating, dysuria, enuresis, flank pain, genital sores and hematuria  Post void dribbling   Musculoskeletal: Negative for back pain and myalgias  Skin: Negative for pallor and rash  Allergic/Immunologic: Negative  Negative for immunocompromised state  Neurological: Negative for facial asymmetry and speech difficulty  Psychiatric/Behavioral: Negative for agitation and confusion  Allergies     No Known Allergies    Physical Exam     Physical Exam  Constitutional:       General: He is not in acute distress  Appearance: Normal appearance  He is obese  He is not ill-appearing, toxic-appearing or diaphoretic  HENT:      Head: Normocephalic and atraumatic  Eyes:      General: No scleral icterus  Cardiovascular:      Rate and Rhythm: Normal rate  Pulmonary:      Effort: Pulmonary effort is normal  No respiratory distress  Abdominal:      General: Abdomen is flat  There is no distension  Palpations: Abdomen is soft  Tenderness: There is no abdominal tenderness  There is no right CVA tenderness, left CVA tenderness, guarding or rebound  Genitourinary:     Penis: Circumcised  No hypospadias, erythema, tenderness, discharge, swelling or lesions  Testes:         Right: Mass, tenderness or swelling not present  Right testis is descended  Left: Mass, tenderness or swelling not present  Left testis is descended  Epididymis:      Right: Not inflamed  No tenderness  Left: Not inflamed  No tenderness  Comments: Difficult to appreciate prostate due to body habitus  Musculoskeletal:         General: No swelling  Cervical back: Normal range of motion  Lymphadenopathy:      Lower Body: No right inguinal adenopathy  No left inguinal adenopathy  Skin:     General: Skin is warm and dry  Coloration: Skin is not jaundiced or pale  Findings: No rash  Neurological:      General: No focal deficit present  Mental Status: He is alert and oriented to person, place, and time  Gait: Gait normal    Psychiatric:         Mood and Affect: Mood normal          Behavior: Behavior normal          Thought Content:  Thought content normal  Judgment: Judgment normal          Vital Signs     Vitals:    02/08/22 1501   BP: 130/80   Pulse: (!) 107   Weight: (!) 166 kg (367 lb)   Height: 6' 3" (1 905 m)       Current Medications       Current Outpatient Medications:     diltiazem (CARDIZEM CD) 240 mg 24 hr capsule, Take 1 capsule (240 mg total) by mouth daily, Disp: 30 capsule, Rfl: 5    ergocalciferol (VITAMIN D2) 50,000 units, Take 1 capsule (50,000 Units total) by mouth once a week, Disp: 4 capsule, Rfl: 3    losartan (COZAAR) 100 MG tablet, Take 1 tablet (100 mg total) by mouth 2 (two) times a day, Disp: 60 tablet, Rfl: 5    metoprolol succinate (TOPROL-XL) 50 mg 24 hr tablet, Take 1 tablet (50 mg total) by mouth daily, Disp: 30 tablet, Rfl: 5    pravastatin (PRAVACHOL) 20 mg tablet, Take 1 tablet (20 mg total) by mouth daily, Disp: 30 tablet, Rfl: 5    spironolactone (ALDACTONE) 25 mg tablet, Take 1 tablet (25 mg total) by mouth daily, Disp: 30 tablet, Rfl: 5    Testosterone 12 5 MG/ACT (1%) GEL, Apply 1 mg topically daily, Disp: 75 g, Rfl: 5    Testosterone 4 MG/24HR PT24, Place 1 patch on the skin daily, Disp: 30 patch, Rfl: 2    Active Problems     Patient Active Problem List   Diagnosis    Hypertension    Arthritis    Hyperlipemia    Obesity, morbid, BMI 40 0-49 9 (HCC)    Benign essential hypertension    Obstructive sleep apnea    Benign prostatic hyperplasia with urinary frequency    Prostate cancer screening    H/O urethral stricture       Past Medical History     Past Medical History:   Diagnosis Date    Allergic     Anxiety     Arthritis     Asthma     Depression     Hyperlipemia     Hypertension     Obesity        Surgical History     Past Surgical History:   Procedure Laterality Date    TONSILLECTOMY      URETHRAL DILATION  2019       Family History     Family History   Problem Relation Age of Onset    Hypertension Mother     Hyperlipidemia Mother     Hypertension Father     Heart disease Father    Mitra Sifuentes Hyperlipidemia Father        Social History     Social History     Social History     Tobacco Use   Smoking Status Former Smoker    Types: Cigarettes   Smokeless Tobacco Never Used       Past Surgical History:   Procedure Laterality Date    TONSILLECTOMY      URETHRAL DILATION  2019         The following portions of the patient's history were reviewed and updated as appropriate: allergies, current medications, past family history, past medical history, past social history, past surgical history and problem list    Please note :  Voice dictation software has been used to create this document  There may be inadvertent transcription errors      [id number] [address]

## 2022-02-09 DIAGNOSIS — I10 PRIMARY HYPERTENSION: Primary | ICD-10-CM

## 2022-02-09 RX ORDER — LOSARTAN POTASSIUM 100 MG/1
100 TABLET ORAL DAILY
Qty: 30 TABLET | Refills: 5 | Status: SHIPPED | OUTPATIENT
Start: 2022-02-09 | End: 2022-07-11 | Stop reason: SDUPTHER

## 2022-02-14 ENCOUNTER — HOSPITAL ENCOUNTER (OUTPATIENT)
Dept: ULTRASOUND IMAGING | Facility: MEDICAL CENTER | Age: 52
Discharge: HOME | End: 2022-02-14
Payer: COMMERCIAL

## 2022-02-14 DIAGNOSIS — R35.0 BENIGN PROSTATIC HYPERPLASIA WITH URINARY FREQUENCY: ICD-10-CM

## 2022-02-14 DIAGNOSIS — N40.1 BENIGN PROSTATIC HYPERPLASIA WITH URINARY FREQUENCY: ICD-10-CM

## 2022-02-14 PROCEDURE — 76770 US EXAM ABDO BACK WALL COMP: CPT

## 2022-02-18 ENCOUNTER — TELEPHONE (OUTPATIENT)
Dept: UROLOGY | Facility: CLINIC | Age: 52
End: 2022-02-18

## 2022-03-03 ENCOUNTER — TELEPHONE (OUTPATIENT)
Dept: FAMILY MEDICINE CLINIC | Facility: CLINIC | Age: 52
End: 2022-03-03

## 2022-03-03 NOTE — TELEPHONE ENCOUNTER
This usually comes from his sleep specialist however we can discuss this at his visit with me in 5 days

## 2022-03-07 ENCOUNTER — TELEPHONE (OUTPATIENT)
Dept: FAMILY MEDICINE CLINIC | Facility: CLINIC | Age: 52
End: 2022-03-07

## 2022-03-09 ENCOUNTER — OFFICE VISIT (OUTPATIENT)
Dept: FAMILY MEDICINE CLINIC | Facility: CLINIC | Age: 52
End: 2022-03-09
Payer: COMMERCIAL

## 2022-03-09 ENCOUNTER — TELEPHONE (OUTPATIENT)
Dept: FAMILY MEDICINE CLINIC | Facility: CLINIC | Age: 52
End: 2022-03-09

## 2022-03-09 VITALS
BODY MASS INDEX: 39.17 KG/M2 | HEIGHT: 75 IN | SYSTOLIC BLOOD PRESSURE: 136 MMHG | WEIGHT: 315 LBS | DIASTOLIC BLOOD PRESSURE: 80 MMHG | TEMPERATURE: 97.6 F

## 2022-03-09 DIAGNOSIS — R35.0 BENIGN PROSTATIC HYPERPLASIA WITH URINARY FREQUENCY: ICD-10-CM

## 2022-03-09 DIAGNOSIS — E29.1 HYPOGONADISM IN MALE: ICD-10-CM

## 2022-03-09 DIAGNOSIS — E66.01 OBESITY, MORBID, BMI 40.0-49.9 (HCC): ICD-10-CM

## 2022-03-09 DIAGNOSIS — N40.1 BENIGN PROSTATIC HYPERPLASIA WITH URINARY FREQUENCY: ICD-10-CM

## 2022-03-09 DIAGNOSIS — E55.9 VITAMIN D DEFICIENCY: ICD-10-CM

## 2022-03-09 DIAGNOSIS — Z12.5 PROSTATE CANCER SCREENING: ICD-10-CM

## 2022-03-09 DIAGNOSIS — G47.33 OBSTRUCTIVE SLEEP APNEA: Primary | ICD-10-CM

## 2022-03-09 DIAGNOSIS — E78.2 MIXED HYPERLIPIDEMIA: ICD-10-CM

## 2022-03-09 DIAGNOSIS — M19.90 ARTHRITIS: ICD-10-CM

## 2022-03-09 DIAGNOSIS — I10 BENIGN ESSENTIAL HYPERTENSION: ICD-10-CM

## 2022-03-09 PROCEDURE — 99215 OFFICE O/P EST HI 40 MIN: CPT | Performed by: FAMILY MEDICINE

## 2022-03-09 RX ORDER — TESTOSTERONE 12.5 MG/1.25G
1 GEL TOPICAL DAILY
Qty: 75 G | Refills: 5 | Status: SHIPPED | OUTPATIENT
Start: 2022-03-09 | End: 2022-07-11 | Stop reason: SDUPTHER

## 2022-03-09 NOTE — PROGRESS NOTES
Assessment/Plan:    Continue current therapy  I did refill his testosterone  Also wrote to replace his CPAP  He will follow-up with Urology  Will see him back here in 4 months  Diagnoses and all orders for this visit:    Obstructive sleep apnea  -     CPAP Auto New DME    Hypogonadism in male  -     Testosterone 12 5 MG/ACT (1%) GEL; Apply 1 mg topically daily  -     TSH, 3rd generation with Free T4 reflex    Benign essential hypertension  -     Lipid panel  -     Comprehensive metabolic panel  -     CBC and differential  -     TSH, 3rd generation with Free T4 reflex    Mixed hyperlipidemia  -     Lipid panel    Arthritis    Benign prostatic hyperplasia with urinary frequency    Prostate cancer screening    Obesity, morbid, BMI 40 0-49 9 ([facility])    Vitamin D deficiency  -     Vitamin D 25 hydroxy            Subjective:        Patient ID: Raine Blue is a 46 y o  male  He is in today for follow-up  He has been feeling well  He did see urology  He needs to replace his CPAP  were working with the insurance company to have that done  Also he needs a refill on his testosterone hopefully we can get that through his pharmacy benefit provider  The following portions of the patient's history were reviewed and updated as appropriate: allergies, current medications, past family history, past medical history, past social history, past surgical history and problem list       Review of Systems   Constitutional: Negative  HENT: Negative  Eyes: Negative  Respiratory: Negative  Cardiovascular: Negative  Gastrointestinal: Negative  Endocrine: Negative  Genitourinary: Negative  Musculoskeletal: Positive for arthralgias  He does complain of right shoulder pain and fingers going numb  He does also complain of hand cramping up is out times  Skin: Negative  Allergic/Immunologic: Negative  Neurological: Negative  Hematological: Negative  Psychiatric/Behavioral: Negative  All other systems reviewed and are negative  Objective:             /80   Temp 97 6 °F (36 4 °C)   Ht 6' 3" (1 905 m)   Wt (!) 170 kg (374 lb)   BMI 46 75 kg/m²          Physical Exam  Vitals and nursing note reviewed  Constitutional:       Appearance: He is well-developed  HENT:      Head: Normocephalic and atraumatic  Right Ear: External ear normal       Left Ear: External ear normal       Nose: Nose normal    Eyes:      Conjunctiva/sclera: Conjunctivae normal       Pupils: Pupils are equal, round, and reactive to light  Cardiovascular:      Rate and Rhythm: Normal rate and regular rhythm  Heart sounds: Normal heart sounds  Pulmonary:      Effort: Pulmonary effort is normal       Breath sounds: Normal breath sounds  Abdominal:      General: Bowel sounds are normal       Palpations: Abdomen is soft  Musculoskeletal:         General: Normal range of motion  Cervical back: Normal range of motion and neck supple  Skin:     General: Skin is warm and dry  Neurological:      General: No focal deficit present  Mental Status: He is alert and oriented to person, place, and time  Cranial Nerves: No cranial nerve deficit  Deep Tendon Reflexes: Reflexes are normal and symmetric     Psychiatric:         Mood and Affect: Mood normal          Behavior: Behavior normal

## 2022-03-11 ENCOUNTER — TELEPHONE (OUTPATIENT)
Dept: FAMILY MEDICINE CLINIC | Facility: CLINIC | Age: 52
End: 2022-03-11

## 2022-03-11 NOTE — TELEPHONE ENCOUNTER
Patient was denied for Testosterone 12 5 mg/act 1% gel and need insurance formulary Androgel generic form but still needs a PA which was place today 03/11/22- decision  Should be within 24 hours

## 2022-03-17 ENCOUNTER — TELEPHONE (OUTPATIENT)
Dept: FAMILY MEDICINE CLINIC | Facility: CLINIC | Age: 52
End: 2022-03-17

## 2022-03-17 NOTE — TELEPHONE ENCOUNTER
Patient was approved 03/17/2022 for Testosterone Transdermal medication; patient was notified and also pharmacy

## 2022-03-20 ENCOUNTER — ANESTHESIA EVENT (OUTPATIENT)
Dept: ANESTHESIOLOGY | Facility: HOSPITAL | Age: 52
End: 2022-03-20

## 2022-03-20 ENCOUNTER — ANESTHESIA (OUTPATIENT)
Dept: ANESTHESIOLOGY | Facility: HOSPITAL | Age: 52
End: 2022-03-20

## 2022-03-21 ENCOUNTER — ANESTHESIA EVENT (OUTPATIENT)
Dept: GASTROENTEROLOGY | Facility: HOSPITAL | Age: 52
End: 2022-03-21

## 2022-03-21 ENCOUNTER — HOSPITAL ENCOUNTER (OUTPATIENT)
Dept: GASTROENTEROLOGY | Facility: HOSPITAL | Age: 52
Setting detail: OUTPATIENT SURGERY
Discharge: HOME | End: 2022-03-21
Attending: COLON & RECTAL SURGERY | Admitting: COLON & RECTAL SURGERY
Payer: COMMERCIAL

## 2022-03-21 ENCOUNTER — TELEPHONE (OUTPATIENT)
Dept: UROLOGY | Facility: CLINIC | Age: 52
End: 2022-03-21

## 2022-03-21 ENCOUNTER — ANESTHESIA (OUTPATIENT)
Dept: GASTROENTEROLOGY | Facility: HOSPITAL | Age: 52
End: 2022-03-21

## 2022-03-21 ENCOUNTER — LAB (OUTPATIENT)
Dept: LAB | Facility: HOSPITAL | Age: 52
End: 2022-03-21
Payer: COMMERCIAL

## 2022-03-21 VITALS
RESPIRATION RATE: 18 BRPM | HEIGHT: 75 IN | BODY MASS INDEX: 39.17 KG/M2 | WEIGHT: 315 LBS | HEART RATE: 67 BPM | SYSTOLIC BLOOD PRESSURE: 117 MMHG | OXYGEN SATURATION: 100 % | TEMPERATURE: 96.8 F | DIASTOLIC BLOOD PRESSURE: 66 MMHG

## 2022-03-21 DIAGNOSIS — Z12.11 SCREENING FOR COLON CANCER: ICD-10-CM

## 2022-03-21 DIAGNOSIS — E29.1 HYPOGONADISM IN MALE: ICD-10-CM

## 2022-03-21 DIAGNOSIS — Z12.5 PROSTATE CANCER SCREENING: ICD-10-CM

## 2022-03-21 LAB
25(OH)D3 SERPL-MCNC: 47.1 NG/ML (ref 30–100)
ALBUMIN SERPL BCP-MCNC: 3.4 G/DL (ref 3.5–5)
ALP SERPL-CCNC: 98 U/L (ref 46–116)
ALT SERPL W P-5'-P-CCNC: 29 U/L (ref 12–78)
ANION GAP SERPL CALCULATED.3IONS-SCNC: 4 MMOL/L (ref 4–13)
AST SERPL W P-5'-P-CCNC: 15 U/L (ref 5–45)
BASOPHILS # BLD AUTO: 0.04 THOUSANDS/ΜL (ref 0–0.1)
BASOPHILS NFR BLD AUTO: 0 % (ref 0–1)
BILIRUB SERPL-MCNC: 0.78 MG/DL (ref 0.2–1)
BUN SERPL-MCNC: 10 MG/DL (ref 5–25)
CALCIUM ALBUM COR SERPL-MCNC: 10.5 MG/DL (ref 8.3–10.1)
CALCIUM SERPL-MCNC: 10 MG/DL (ref 8.3–10.1)
CHLORIDE SERPL-SCNC: 104 MMOL/L (ref 100–108)
CHOLEST SERPL-MCNC: 182 MG/DL
CO2 SERPL-SCNC: 26 MMOL/L (ref 21–32)
CREAT SERPL-MCNC: 0.94 MG/DL (ref 0.6–1.3)
EOSINOPHIL # BLD AUTO: 0.18 THOUSAND/ΜL (ref 0–0.61)
EOSINOPHIL NFR BLD AUTO: 2 % (ref 0–6)
ERYTHROCYTE [DISTWIDTH] IN BLOOD BY AUTOMATED COUNT: 15.9 % (ref 11.6–15.1)
GFR SERPL CREATININE-BSD FRML MDRD: 92 ML/MIN/1.73SQ M
GLUCOSE P FAST SERPL-MCNC: 110 MG/DL (ref 65–99)
HCT VFR BLD AUTO: 45.4 % (ref 36.5–49.3)
HDLC SERPL-MCNC: 37 MG/DL
HGB BLD-MCNC: 14.8 G/DL (ref 12–17)
IMM GRANULOCYTES # BLD AUTO: 0.05 THOUSAND/UL (ref 0–0.2)
IMM GRANULOCYTES NFR BLD AUTO: 1 % (ref 0–2)
LDLC SERPL CALC-MCNC: 117 MG/DL (ref 0–100)
LYMPHOCYTES # BLD AUTO: 2.2 THOUSANDS/ΜL (ref 0.6–4.47)
LYMPHOCYTES NFR BLD AUTO: 23 % (ref 14–44)
MCH RBC QN AUTO: 26.2 PG (ref 26.8–34.3)
MCHC RBC AUTO-ENTMCNC: 32.6 G/DL (ref 31.4–37.4)
MCV RBC AUTO: 81 FL (ref 82–98)
MONOCYTES # BLD AUTO: 0.78 THOUSAND/ΜL (ref 0.17–1.22)
MONOCYTES NFR BLD AUTO: 8 % (ref 4–12)
NEUTROPHILS # BLD AUTO: 6.29 THOUSANDS/ΜL (ref 1.85–7.62)
NEUTS SEG NFR BLD AUTO: 66 % (ref 43–75)
NONHDLC SERPL-MCNC: 145 MG/DL
NRBC BLD AUTO-RTO: 0 /100 WBCS
PLATELET # BLD AUTO: 411 THOUSANDS/UL (ref 149–390)
PMV BLD AUTO: 9.1 FL (ref 8.9–12.7)
POTASSIUM SERPL-SCNC: 4.2 MMOL/L (ref 3.5–5.3)
PROT SERPL-MCNC: 8.4 G/DL (ref 6.4–8.2)
PSA SERPL-MCNC: 4.6 NG/ML (ref 0–4)
PSA SERPL-MCNC: 4.8 NG/ML (ref 0–4)
RBC # BLD AUTO: 5.64 MILLION/UL (ref 3.88–5.62)
SODIUM SERPL-SCNC: 134 MMOL/L (ref 136–145)
TRIGL SERPL-MCNC: 142 MG/DL
TSH SERPL DL<=0.05 MIU/L-ACNC: 1.65 UIU/ML (ref 0.36–3.74)
WBC # BLD AUTO: 9.54 THOUSAND/UL (ref 4.31–10.16)

## 2022-03-21 PROCEDURE — 36415 COLL VENOUS BLD VENIPUNCTURE: CPT | Performed by: FAMILY MEDICINE

## 2022-03-21 PROCEDURE — 85025 COMPLETE CBC W/AUTO DIFF WBC: CPT | Performed by: FAMILY MEDICINE

## 2022-03-21 PROCEDURE — 45378 DIAGNOSTIC COLONOSCOPY: CPT | Performed by: COLON & RECTAL SURGERY

## 2022-03-21 PROCEDURE — 80053 COMPREHEN METABOLIC PANEL: CPT | Performed by: FAMILY MEDICINE

## 2022-03-21 PROCEDURE — 84402 ASSAY OF FREE TESTOSTERONE: CPT

## 2022-03-21 PROCEDURE — 82306 VITAMIN D 25 HYDROXY: CPT | Performed by: FAMILY MEDICINE

## 2022-03-21 PROCEDURE — 84443 ASSAY THYROID STIM HORMONE: CPT | Performed by: FAMILY MEDICINE

## 2022-03-21 PROCEDURE — G0103 PSA SCREENING: HCPCS

## 2022-03-21 PROCEDURE — 80061 LIPID PANEL: CPT | Performed by: FAMILY MEDICINE

## 2022-03-21 PROCEDURE — 84403 ASSAY OF TOTAL TESTOSTERONE: CPT

## 2022-03-21 PROCEDURE — 99202 OFFICE O/P NEW SF 15 MIN: CPT | Performed by: COLON & RECTAL SURGERY

## 2022-03-21 RX ORDER — PROPOFOL 10 MG/ML
INJECTION, EMULSION INTRAVENOUS CONTINUOUS PRN
Status: DISCONTINUED | OUTPATIENT
Start: 2022-03-21 | End: 2022-03-21

## 2022-03-21 RX ORDER — GLYCOPYRROLATE 0.2 MG/ML
INJECTION INTRAMUSCULAR; INTRAVENOUS AS NEEDED
Status: DISCONTINUED | OUTPATIENT
Start: 2022-03-21 | End: 2022-03-21

## 2022-03-21 RX ORDER — LIDOCAINE HYDROCHLORIDE 20 MG/ML
INJECTION, SOLUTION EPIDURAL; INFILTRATION; INTRACAUDAL; PERINEURAL AS NEEDED
Status: DISCONTINUED | OUTPATIENT
Start: 2022-03-21 | End: 2022-03-21

## 2022-03-21 RX ORDER — SODIUM CHLORIDE 9 MG/ML
INJECTION, SOLUTION INTRAVENOUS CONTINUOUS PRN
Status: DISCONTINUED | OUTPATIENT
Start: 2022-03-21 | End: 2022-03-21

## 2022-03-21 RX ORDER — PROPOFOL 10 MG/ML
INJECTION, EMULSION INTRAVENOUS AS NEEDED
Status: DISCONTINUED | OUTPATIENT
Start: 2022-03-21 | End: 2022-03-21

## 2022-03-21 RX ADMIN — GLYCOPYRROLATE 0.1 MG: 0.2 INJECTION, SOLUTION INTRAMUSCULAR; INTRAVENOUS at 09:05

## 2022-03-21 RX ADMIN — LIDOCAINE HYDROCHLORIDE 40 MG: 20 INJECTION, SOLUTION EPIDURAL; INFILTRATION; INTRACAUDAL; PERINEURAL at 09:05

## 2022-03-21 RX ADMIN — PROPOFOL 120 MCG/KG/MIN: 10 INJECTION, EMULSION INTRAVENOUS at 09:05

## 2022-03-21 RX ADMIN — SODIUM CHLORIDE: 0.9 INJECTION, SOLUTION INTRAVENOUS at 08:50

## 2022-03-21 RX ADMIN — PROPOFOL 100 MG: 10 INJECTION, EMULSION INTRAVENOUS at 09:05

## 2022-03-21 RX ADMIN — LIDOCAINE HYDROCHLORIDE 20 MG: 20 INJECTION, SOLUTION EPIDURAL; INFILTRATION; INTRACAUDAL; PERINEURAL at 09:20

## 2022-03-21 NOTE — TELEPHONE ENCOUNTER
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

## 2022-03-21 NOTE — TELEPHONE ENCOUNTER
Called and left voicemail message for patient with elevated PSA results and to have blood work repeated in 4 weeks  Advised that orders were placed for PSA  Also advised to avoid cycle riding/ejaculation for 72hrs prior to blood work  Office number provided if patient has any questions

## 2022-03-21 NOTE — TELEPHONE ENCOUNTER
Patient calling back to speak with Mirna Elaine regarding test results, wanted to speak with her regarding lvm, has questions, please call pt

## 2022-03-21 NOTE — RESULT ENCOUNTER NOTE
Please let patient know his PSA was elevated at 4 8  I would like to repeat this in 4 weeks  Orders are in    He should make sure he does not ride a bicycle, motorcycle, tractor or ejaculate in the 72 hours prior to going for blood work

## 2022-03-21 NOTE — ANESTHESIA PREPROCEDURE EVALUATION
Procedure:  COLONOSCOPY    Relevant Problems   CARDIO   (+) Benign essential hypertension   (+) Hyperlipemia   (+) Hypertension      MUSCULOSKELETAL   (+) Arthritis      NEURO/PSYCH   (+) H/O urethral stricture      PULMONARY   (+) Obstructive sleep apnea        Physical Exam    Airway    Mallampati score: I  TM Distance: >3 FB  Neck ROM: full     Dental       Cardiovascular  Cardiovascular exam normal    Pulmonary  Pulmonary exam normal     Other Findings        Anesthesia Plan  ASA Score- 3     Anesthesia Type- IV sedation with anesthesia with ASA Monitors  Additional Monitors:   Airway Plan:           Plan Factors-Exercise tolerance (METS): >4 METS  Chart reviewed  Patient summary reviewed  Patient is a current smoker  Obstructive sleep apnea risk education given perioperatively  Induction- intravenous  Postoperative Plan-     Informed Consent- Anesthetic plan and risks discussed with patient  I personally reviewed this patient with the CRNA  Discussed and agreed on the Anesthesia Plan with the CRNA  Dara Soulier

## 2022-03-23 LAB
TESTOST FREE SERPL-MCNC: 11.5 PG/ML (ref 7.2–24)
TESTOST SERPL-MCNC: 306 NG/DL (ref 264–916)

## 2022-04-07 ENCOUNTER — TELEMEDICINE (OUTPATIENT)
Dept: SLEEP CENTER | Facility: CLINIC | Age: 52
End: 2022-04-07
Payer: COMMERCIAL

## 2022-04-07 VITALS — HEIGHT: 75 IN | BODY MASS INDEX: 39.17 KG/M2 | WEIGHT: 315 LBS

## 2022-04-07 DIAGNOSIS — E55.9 VITAMIN D DEFICIENCY: ICD-10-CM

## 2022-04-07 DIAGNOSIS — G47.33 OBSTRUCTIVE SLEEP APNEA: Primary | ICD-10-CM

## 2022-04-07 DIAGNOSIS — I10 PRIMARY HYPERTENSION: ICD-10-CM

## 2022-04-07 DIAGNOSIS — E66.01 OBESITY, MORBID, BMI 40.0-49.9 (HCC): ICD-10-CM

## 2022-04-07 PROCEDURE — 99244 OFF/OP CNSLTJ NEW/EST MOD 40: CPT | Performed by: PSYCHIATRY & NEUROLOGY

## 2022-04-07 RX ORDER — ERGOCALCIFEROL 1.25 MG/1
50000 CAPSULE ORAL WEEKLY
Qty: 4 CAPSULE | Refills: 0 | Status: SHIPPED | OUTPATIENT
Start: 2022-04-07 | End: 2022-05-17

## 2022-04-07 NOTE — LETTER
April 7, 2022     Michael Verde, [address] [address]    Patient: Haim Best   YOB: 1970   Date of Visit: 4/7/2022       Dear Dr Mary Garcia: Thank you for referring Haim Best to me for evaluation  Below are my notes for this consultation  If you have questions, please do not hesitate to call me  I look forward to following your patient along with you  Sincerely,        Mily Garcia MD        CC: No Recipients  Mily Garcia MD  4/7/2022 10:16 AM  Sign when Signing Visit    Virtual Regular Visit    Verification of patient location:    Patient is located in the following state in which I hold an active license [place]      Assessment/Plan:    Problem List Items Addressed This Visit        Respiratory    Obstructive sleep apnea               Reason for visit is   Chief Complaint   Patient presents with    Virtual Regular Visit        Encounter provider Mily Garcia MD    Provider located at [address]  [address] [facility],[address] [facility] [id number] [phone]      Recent Visits  No visits were found meeting these conditions  Showing recent visits within past 7 days and meeting all other requirements  Today's Visits  Date Type Provider Dept   04/07/22 1660 S  Nayely Adair MD [facility] [place]   Showing today's visits and meeting all other requirements  Future Appointments  No visits were found meeting these conditions  Showing future appointments within next 150 days and meeting all other requirements       The patient was identified by name and date of birth  Haim Best was informed that this is a telemedicine visit and that the visit is being conducted through [address] [place] [address] Now and patient was informed that this is a secure, HIPAA-compliant platform  He agrees to proceed     My office door was closed  No one else was in the room    He acknowledged consent and understanding of privacy and security of the video platform  The patient has agreed to participate and understands they can discontinue the visit at any time  Patient is aware this is a billable service  Sleep Medicine Consultation Note    HPI:  Mr Andrew Tubbs is a 46 y o  male seen at the request of Zander Moy DO for advice regarding suspected sleep disordered breathing  The patient stated that he has SHANDA and he was diagnosed in 2008 at [facility]  He was given a CPAP and has been on it since  He has his current machine for the last 8 years  The machine is not working like it should as it is not putting out the pressure it should  He took it to [facility] patient where he got his machine and they told him they cannot repair his machine as it is too old and there are not parts for it  He does not know his pressure, but believes its 13cm  He has to use it nightly as he cannot sleep without it  Without the CPAP he will flail about and falls off of the bed and wakes up ever 10 mins as he is unable to breathe  Without the CPAP he will snore, he stops breathing, he gasps for air, and wont stop moving  Since COVID he put on 30 lbs (now 360lbs)  In 2008 he weighed 270-280lbs  If he doesn't use his CPAP he is tired and will fall asleep "anywhere " He will nod off while driving  He has to use his CPAP nightly  He has nasal pillows and he thinks it works very well for him  With his CPAP he snores now as the machine doesn't work well       Please see below for continuation of the HPI:      Sleep Pattern:  -Location: bedroom   -Bed/Recliner/Wedge: bed  -Bed Partner: yes  -HOB: flat  -# of pillows under head: 2  -Position: side  -Bedtime: 8-830pm  -Lights out: same time  -Environmental: No lights/TV  -Latency: same time  -Awakenings: 3-4   -Reason: void   -Duration: mins  -Wake time: 5am   -Alarm: yes  -Rise time: same time  -Days off: tries to sleep in, but cannot  -Shift Work: M-F days  -Patient's estimate of total sleep time: 6h      Questionnaires:   Sitting and reading: High chance of dozing  Watching TV: High chance of dozing  Sitting, inactive in a public place (e g  a theatre or a meeting): High chance of dozing  As a passenger in a car for an hour without a break: High chance of dozing  Lying down to rest in the afternoon when circumstances permit: Moderate chance of dozing  Sitting and talking to someone: Moderate chance of dozing  Sitting quietly after a lunch without alcohol: High chance of dozing  In a car, while stopped for a few minutes in traffic: Moderate chance of dozing  Total score: 21      Sleep Review of Symptoms:  -Parasomnias:  --Sleep Walking: no  --Dream Enactment: no  --Bruxism: yes  -Motor:  --RLS: yes  --PLMS: yes  -Narcolepsy:  --Hallucinations: yes  --Paralysis: no  --Cataplexy: no    Childhood Sleep History: snoring    Prior Sleep Studies/Evaluations:  See HPI    Family History:  Family history of sleep disorders: "whole family has SHANDA"    Patient Active Problem List   Diagnosis    Hypertension    Arthritis    Hyperlipemia    Obesity, morbid, BMI 40 0-49 9 ([facility] [address] )    Benign essential hypertension    Obstructive sleep apnea    Benign prostatic hyperplasia with urinary frequency    Prostate cancer screening    H/O urethral stricture    Vitamin D deficiency     Past Medical History:   Diagnosis Date    Allergic     Anxiety     Arthritis     Asthma     CPAP (continuous positive airway pressure) dependence     Depression     Hyperlipemia     Hypertension     Obesity          --> Denies any other cardiopulmonary disease  --> Seizure hx: denies  --> Head injury with LOC: denies  --> Supplemental Oxygen Use: denies    Labs     Results for Carlitos Schofield (MRN [id number]) as of 4/7/2022 09:42   Ref   Range 3/21/2022 07:42   Sodium Latest Ref Range: 136 - 145 mmol/L 134 (L)   Potassium Latest Ref Range: 3 5 - 5 3 mmol/L 4 2   Chloride Latest Ref Range: 100 - 108 mmol/L 104   CO2 Latest Ref Range: 21 - 32 mmol/L 26   Anion Gap Latest Ref Range: 4 - 13 mmol/L 4   BUN Latest Ref Range: 5 - 25 mg/dL 10   Creatinine Latest Ref Range: 0 60 - 1 30 mg/dL 0 94   GLUCOSE FASTING Latest Ref Range: 65 - 99 mg/dL 110 (H)   Calcium Latest Ref Range: 8 3 - 10 1 mg/dL 10 0   CORRECTED CALCIUM Latest Ref Range: 8 3 - 10 1 mg/dL 10 5 (H)   AST Latest Ref Range: 5 - 45 U/L 15   ALT Latest Ref Range: 12 - 78 U/L 29   Alkaline Phosphatase Latest Ref Range: 46 - 116 U/L 98   Total Protein Latest Ref Range: 6 4 - 8 2 g/dL 8 4 (H)   Albumin Latest Ref Range: 3 5 - 5 0 g/dL 3 4 (L)   TOTAL BILIRUBIN Latest Ref Range: 0 20 - 1 00 mg/dL 0 78   eGFR Latest Units: ml/min/1 73sq m 92   Cholesterol Latest Ref Range: See Comment mg/dL 182   Triglycerides Latest Ref Range: See Comment mg/dL 142   HDL Latest Ref Range: >=40 mg/dL 37 (L)   Non-HDL Cholesterol Latest Units: mg/dl 145   LDL Calculated Latest Ref Range: 0 - 100 mg/dL 117 (H)   Vit D, 25-Hydroxy Latest Ref Range: 30 0 - 100 0 ng/mL 47 1   PSA, Total Latest Ref Range: 0 0 - 4 0 ng/mL 4 6 (H)   WBC Latest Ref Range: 4 31 - 10 16 Thousand/uL 9 54   Red Blood Cell Count Latest Ref Range: 3 88 - 5 62 Million/uL 5 64 (H)   Hemoglobin Latest Ref Range: 12 0 - 17 0 g/dL 14 8   HCT Latest Ref Range: 36 5 - 49 3 % 45 4   MCV Latest Ref Range: 82 - 98 fL 81 (L)   MCH Latest Ref Range: 26 8 - 34 3 pg 26 2 (L)   MCHC Latest Ref Range: 31 4 - 37 4 g/dL 32 6   RDW Latest Ref Range: 11 6 - 15 1 % 15 9 (H)   Platelet Count Latest Ref Range: 149 - 390 Thousands/uL 411 (H)   MPV Latest Ref Range: 8 9 - 12 7 fL 9 1   nRBC Latest Units: /100 WBCs 0   Neutrophils % Latest Ref Range: 43 - 75 % 66   Immat GRANS % Latest Ref Range: 0 - 2 % 1   Lymphocytes Relative Latest Ref Range: 14 - 44 % 23   Monocytes Relative Latest Ref Range: 4 - 12 % 8   Eosinophils Latest Ref Range: 0 - 6 % 2   Basophils Relative Latest Ref Range: 0 - 1 % 0   Immature Grans Absolute Latest Ref Range: 0 00 - 0 20 Thousand/uL 0 05 Absolute Neutrophils Latest Ref Range: 1 85 - 7 62 Thousands/µL 6 29   Lymphocytes Absolute Latest Ref Range: 0 60 - 4 47 Thousands/µL 2 20   Absolute Monocytes Latest Ref Range: 0 17 - 1 22 Thousand/µL 0 78   Absolute Eosinophils Latest Ref Range: 0 00 - 0 61 Thousand/µL 0 18   Basophils Absolute Latest Ref Range: 0 00 - 0 10 Thousands/µL 0 04   Testosterone, Total, LC/MS Latest Ref Range: 264 - 916 ng/dL 306   TESTOSTERONE FREE Latest Ref Range: 7 2 - 24 0 pg/mL 11 5   TSH 3RD GENERATON Latest Ref Range: 0 358 - 3 740 uIU/mL 1 650       Past Surgical History:   Procedure Laterality Date    HEMORRHOID SURGERY      TONSILLECTOMY      URETHRAL DILATION  2019         Current Outpatient Medications   Medication Sig Dispense Refill    diltiazem (CARDIZEM CD) 240 mg 24 hr capsule Take 1 capsule (240 mg total) by mouth daily 30 capsule 5    ergocalciferol (VITAMIN D2) 50,000 units Take 1 capsule (50,000 Units total) by mouth once a week 4 capsule 3    losartan (COZAAR) 100 MG tablet Take 1 tablet (100 mg total) by mouth daily 30 tablet 5    metoprolol succinate (TOPROL-XL) 50 mg 24 hr tablet Take 1 tablet (50 mg total) by mouth daily 30 tablet 5    pravastatin (PRAVACHOL) 20 mg tablet Take 1 tablet (20 mg total) by mouth daily 30 tablet 5    spironolactone (ALDACTONE) 25 mg tablet Take 1 tablet (25 mg total) by mouth daily 30 tablet 5    Testosterone 12 5 MG/ACT (1%) GEL Apply 1 mg topically daily 75 g 5    Testosterone 4 MG/24HR PT24 Place 1 patch on the skin daily (Patient not taking: Reported on 3/9/2022 ) 30 patch 2     No current facility-administered medications for this visit  Social History:  -Employment: [employer] worker  -Smoking: vapes, 1 container 2 weeks  -Caffeine: 1 cup of coffee a day     -Alcohol: no  -THC: no  -OTC/Supplements/herbals: no  -Illicits:  denies  -Family: lives with girlfriend      Review of Systems      Genitourinary need to urinate more than twice a night   Cardiology ankle/leg swelling   Gastrointestinal frequent heartburn/acid reflux   Neurology awaken with headache, need to move extremities, poor concentration or confusion,  and balance problems   Constitutional fatigue, excessive sweating at night and weight change   Integumentary rash or dry skin   Psychiatry aggressiveness or irritability and mood change   Musculoskeletal joint pain, muscle aches and legs twitching/jerking   Pulmonary shortness of breath with activity, chest tightness, wheezing, frequent cough, snoring and difficulty breathing when lying flat    ENT throat clearing   Endocrine excessive thirst and frequent urination   Hematological none           MSE:  -Alert and appropriate: alert, calm, cooperative  -Oriented to person, place and time:  name, age, location, day/date/mon/yr  -Behavior: good, sustained eye contact  -Speech: Unremarkable rate/rhythm/volume  -Mood: "tired"  -Affect: constricted  -Thought Processes: linear, logical, goal directed  PE:  Body mass index is 45 kg/m²  Vitals:    04/07/22 0957   Weight: (!) 163 kg (360 lb)   Height: 6' 3" (1 905 m)       -General:  In NAD    -Eyes: Conjunctival injection: none     -EOM: grossly intact   -Eyelid hooding: yes    -ENT: MP: 4/4   -Facial deformity: no retrognathia   -Hard palate: high arch   -Soft palate:  crowding   -Gums and teeth: normal dentition   -Tongue:  Scalloping   -Nares:  Patent    -Neck/Lymphatics:    -Circumference: 21 "    -Cardiac:    - LE edema over shins:mild swelling appreciated by patient    -Pulm: -Respirations: unlaboured         -Neuro: No resting tremor     -Musculoskeletal: Gait and stance: normal turning and ambulation; unremarkable  Assessment:  Mr Titus Umaña is a 46 y o  male who is seen to evaluate for treatment of obstructive sleep apnea  The patient has reported severe SHANDA with PLMD and has been using CPAP since 2008   His current machine is 6years old and is broken beyond repair as it is not putting out enough pressure and his DME told him they cannot fix it and he needs a new one  He has also put on a lot of weight and needs his pressure needs reassessed  Has observed apneas, snoring, nocturnal gasping, daytime tiredness/sleepiness, non-restorative sleep, and nocturia in the context of a narrow airway, large neck girth, obesity, and allergies  The pathophysiology of, the reasons to treat and treatment options for obstructive sleep apnea were all reviewed with the patient today  Discussed the testing options and reviewed the benefits and downsides of both, patient opted for the in lab testing with split to CPAP once dx found at AHI 15 or greater  He is amenable to treatment with PAP therapy  Discussed keeping nasal passages clear, abstaining from alcohol, and other sedating drugs at night- which will worsen symptoms of SHANDA  --History provided by: patient   --Records reviewed: in chart      Recommendations:  1) split study for AHI>15   2) Driving safety was reviewed with patient  If the patient feels too sleepy to drive he knows not to drive  If he becomes sleepy while driving he will pull over and nap  3) Follow-up after initiation of treatment  4) Call with any questions or concerns  All questions answered for the patient, who indicated understanding and agreed with the plan  Mary Ann Nolasco MD  Psychiatry/ Sleep medicine      I spent 40 minutes with patient today in which greater than 50% of the time was spent in counseling/coordination of care regarding treatment    VIRTUAL VISIT [address] verbally agrees to participate in [employer]  Pt is aware that [employer] could be limited without vital signs or the ability to perform a full hands-on physical Leopoldo Lieberman understands he or the provider may request at any time to terminate the video visit and request the patient to seek care or treatment in person

## 2022-04-07 NOTE — PROGRESS NOTES
Virtual Regular Visit    Verification of patient location:    Patient is located in the following state in which I hold an active license [place]      Assessment/Plan:    Problem List Items Addressed This Visit        Respiratory    Obstructive sleep apnea               Reason for visit is   Chief Complaint   Patient presents with    Virtual Regular Visit        Encounter provider Atif Obando MD    Provider located at [facility] [id number] [facility] [address] [facility] [place] [address] [phone]      Recent Visits  No visits were found meeting these conditions  Showing recent visits within past 7 days and meeting all other requirements  Today's Visits  Date Type Provider Dept   04/07/22 1660 S  Nayely Adair MD [facility] [place]   Showing today's visits and meeting all other requirements  Future Appointments  No visits were found meeting these conditions  Showing future appointments within next 150 days and meeting all other requirements       The patient was identified by name and date of birth  Mami Chiu was informed that this is a telemedicine visit and that the visit is being conducted through [address] [place] [address] [facility] and patient was informed that this is a secure, HIPAA-compliant platform  He agrees to proceed     My office door was closed  No one else was in the room  He acknowledged consent and understanding of privacy and security of the video platform  The patient has agreed to participate and understands they can discontinue the visit at any time  Patient is aware this is a billable service  Sleep Medicine Consultation Note    HPI:  Mr Mami Chiu is a 46 y o  male seen at the request of Kade Obando DO for advice regarding suspected sleep disordered breathing  The patient stated that he has SHANDA and he was diagnosed in 2008 at [facility]  He was given a CPAP and has been on it since  He has his current machine for the last 8 years   The machine is not working like it should as it is not putting out the pressure it should  He took it to [facility] where he got his machine and they told him they cannot repair his machine as it is too old and there are not parts for it  He does not know his pressure, but believes its 13cm  He has to use it nightly as he cannot sleep without it  Without the CPAP he will flail about and falls off of the bed and wakes up ever 10 mins as he is unable to breathe  Without the CPAP he will snore, he stops breathing, he gasps for air, and wont stop moving  Since COVID he put on 30 lbs (now 360lbs)  In 2008 he weighed 270-280lbs  If he doesn't use his CPAP he is tired and will fall asleep "anywhere " He will nod off while driving  He has to use his CPAP nightly  He has nasal pillows and he thinks it works very well for him  With his CPAP he snores now as the machine doesn't work well  Please see below for continuation of the HPI:      Sleep Pattern:  -Location: bedroom   -Bed/Recliner/Wedge: bed  -Bed Partner: yes  -HOB: flat  -# of pillows under head: 2  -Position: side  -Bedtime: 8-830pm  -Lights out: same time  -Environmental: No lights/TV  -Latency: same time  -Awakenings: 3-4   -Reason: void   -Duration: mins  -Wake time: 5am   -Alarm: yes  -Rise time: same time  -Days off: tries to sleep in, but cannot  -Shift Work: M-F days  -Patient's estimate of total sleep time: 6h      Questionnaires:   Sitting and reading: High chance of dozing  Watching TV: High chance of dozing  Sitting, inactive in a public place (e g  a theatre or a meeting): High chance of dozing  As a passenger in a car for an hour without a break: High chance of dozing  Lying down to rest in the afternoon when circumstances permit: Moderate chance of dozing  Sitting and talking to someone:  Moderate chance of dozing  Sitting quietly after a lunch without alcohol: High chance of dozing  In a car, while stopped for a few minutes in traffic: Moderate chance of dozing  Total score: 21      Sleep Review of Symptoms:  -Parasomnias:  --Sleep Walking: no  --Dream Enactment: no  --Bruxism: yes  -Motor:  --RLS: yes  --PLMS: yes  -Narcolepsy:  --Hallucinations: yes  --Paralysis: no  --Cataplexy: no    Childhood Sleep History: snoring    Prior Sleep Studies/Evaluations:  See HPI    Family History:  Family history of sleep disorders: "whole family has SHANDA"    Patient Active Problem List   Diagnosis    Hypertension    Arthritis    Hyperlipemia    Obesity, morbid, BMI 40 0-49 9 ([facility] [address] )    Benign essential hypertension    Obstructive sleep apnea    Benign prostatic hyperplasia with urinary frequency    Prostate cancer screening    H/O urethral stricture    Vitamin D deficiency     Past Medical History:   Diagnosis Date    Allergic     Anxiety     Arthritis     Asthma     CPAP (continuous positive airway pressure) dependence     Depression     Hyperlipemia     Hypertension     Obesity          --> Denies any other cardiopulmonary disease  --> Seizure hx: denies  --> Head injury with LOC: denies  --> Supplemental Oxygen Use: denies    Labs     Results for Shahzad Mast (MRN [id number]) as of 4/7/2022 09:42   Ref   Range 3/21/2022 07:42   Sodium Latest Ref Range: 136 - 145 mmol/L 134 (L)   Potassium Latest Ref Range: 3 5 - 5 3 mmol/L 4 2   Chloride Latest Ref Range: 100 - 108 mmol/L 104   CO2 Latest Ref Range: 21 - 32 mmol/L 26   Anion Gap Latest Ref Range: 4 - 13 mmol/L 4   BUN Latest Ref Range: 5 - 25 mg/dL 10   Creatinine Latest Ref Range: 0 60 - 1 30 mg/dL 0 94   GLUCOSE FASTING Latest Ref Range: 65 - 99 mg/dL 110 (H)   Calcium Latest Ref Range: 8 3 - 10 1 mg/dL 10 0   CORRECTED CALCIUM Latest Ref Range: 8 3 - 10 1 mg/dL 10 5 (H)   AST Latest Ref Range: 5 - 45 U/L 15   ALT Latest Ref Range: 12 - 78 U/L 29   Alkaline Phosphatase Latest Ref Range: 46 - 116 U/L 98   Total Protein Latest Ref Range: 6 4 - 8 2 g/dL 8 4 (H)   Albumin Latest Ref Range: 3 5 - 5 0 g/dL 3 4 (L)   TOTAL BILIRUBIN Latest Ref Range: 0 20 - 1 00 mg/dL 0 78   eGFR Latest Units: ml/min/1 73sq m 92   Cholesterol Latest Ref Range: See Comment mg/dL 182   Triglycerides Latest Ref Range: See Comment mg/dL 142   HDL Latest Ref Range: >=40 mg/dL 37 (L)   Non-HDL Cholesterol Latest Units: mg/dl 145   LDL Calculated Latest Ref Range: 0 - 100 mg/dL 117 (H)   Vit D, 25-Hydroxy Latest Ref Range: 30 0 - 100 0 ng/mL 47 1   PSA, Total Latest Ref Range: 0 0 - 4 0 ng/mL 4 6 (H)   WBC Latest Ref Range: 4 31 - 10 16 Thousand/uL 9 54   Red Blood Cell Count Latest Ref Range: 3 88 - 5 62 Million/uL 5 64 (H)   Hemoglobin Latest Ref Range: 12 0 - 17 0 g/dL 14 8   HCT Latest Ref Range: 36 5 - 49 3 % 45 4   MCV Latest Ref Range: 82 - 98 fL 81 (L)   MCH Latest Ref Range: 26 8 - 34 3 pg 26 2 (L)   MCHC Latest Ref Range: 31 4 - 37 4 g/dL 32 6   RDW Latest Ref Range: 11 6 - 15 1 % 15 9 (H)   Platelet Count Latest Ref Range: 149 - 390 Thousands/uL 411 (H)   MPV Latest Ref Range: 8 9 - 12 7 fL 9 1   nRBC Latest Units: /100 WBCs 0   Neutrophils % Latest Ref Range: 43 - 75 % 66   Immat GRANS % Latest Ref Range: 0 - 2 % 1   Lymphocytes Relative Latest Ref Range: 14 - 44 % 23   Monocytes Relative Latest Ref Range: 4 - 12 % 8   Eosinophils Latest Ref Range: 0 - 6 % 2   Basophils Relative Latest Ref Range: 0 - 1 % 0   Immature Grans Absolute Latest Ref Range: 0 00 - 0 20 Thousand/uL 0 05   Absolute Neutrophils Latest Ref Range: 1 85 - 7 62 Thousands/µL 6 29   Lymphocytes Absolute Latest Ref Range: 0 60 - 4 47 Thousands/µL 2 20   Absolute Monocytes Latest Ref Range: 0 17 - 1 22 Thousand/µL 0 78   Absolute Eosinophils Latest Ref Range: 0 00 - 0 61 Thousand/µL 0 18   Basophils Absolute Latest Ref Range: 0 00 - 0 10 Thousands/µL 0 04   Testosterone, Total, LC/MS Latest Ref Range: 264 - 916 ng/dL 306   TESTOSTERONE FREE Latest Ref Range: 7 2 - 24 0 pg/mL 11 5   TSH 3RD GENERATON Latest Ref Range: 0 358 - 3 740 uIU/mL 1 650       Past Surgical History:   Procedure Laterality Date    HEMORRHOID SURGERY      TONSILLECTOMY      URETHRAL DILATION  2019         Current Outpatient Medications   Medication Sig Dispense Refill    diltiazem (CARDIZEM CD) 240 mg 24 hr capsule Take 1 capsule (240 mg total) by mouth daily 30 capsule 5    ergocalciferol (VITAMIN D2) 50,000 units Take 1 capsule (50,000 Units total) by mouth once a week 4 capsule 3    losartan (COZAAR) 100 MG tablet Take 1 tablet (100 mg total) by mouth daily 30 tablet 5    metoprolol succinate (TOPROL-XL) 50 mg 24 hr tablet Take 1 tablet (50 mg total) by mouth daily 30 tablet 5    pravastatin (PRAVACHOL) 20 mg tablet Take 1 tablet (20 mg total) by mouth daily 30 tablet 5    spironolactone (ALDACTONE) 25 mg tablet Take 1 tablet (25 mg total) by mouth daily 30 tablet 5    Testosterone 12 5 MG/ACT (1%) GEL Apply 1 mg topically daily 75 g 5    Testosterone 4 MG/24HR PT24 Place 1 patch on the skin daily (Patient not taking: Reported on 3/9/2022 ) 30 patch 2     No current facility-administered medications for this visit  Social History:  -Employment: [employer] worker  -Smoking: vapes, 1 container 2 weeks  -Caffeine: 1 cup of coffee a day     -Alcohol: no  -THC: no  -OTC/Supplements/herbals: no  -Illicits:  denies  -Family: lives with girlfriend      Review of Systems      Genitourinary need to urinate more than twice a night   Cardiology ankle/leg swelling   Gastrointestinal frequent heartburn/acid reflux   Neurology awaken with headache, need to move extremities, poor concentration or confusion,  and balance problems   Constitutional fatigue, excessive sweating at night and weight change   Integumentary rash or dry skin   Psychiatry aggressiveness or irritability and mood change   Musculoskeletal joint pain, muscle aches and legs twitching/jerking   Pulmonary shortness of breath with activity, chest tightness, wheezing, frequent cough, snoring and difficulty breathing when lying flat    ENT throat clearing   Endocrine excessive thirst and frequent urination   Hematological none           MSE:  -Alert and appropriate: alert, calm, cooperative  -Oriented to person, place and time:  name, age, location, day/date/mon/yr  -Behavior: good, sustained eye contact  -Speech: Unremarkable rate/rhythm/volume  -Mood: "tired"  -Affect: constricted  -Thought Processes: linear, logical, goal directed  PE:  Body mass index is 45 kg/m²  Vitals:    04/07/22 0957   Weight: (!) 163 kg (360 lb)   Height: 6' 3" (1 905 m)       -General:  In NAD    -Eyes: Conjunctival injection: none     -EOM: grossly intact   -Eyelid hooding: yes    -ENT: MP: 4/4   -Facial deformity: no retrognathia   -Hard palate: high arch   -Soft palate:  crowding   -Gums and teeth: normal dentition   -Tongue:  Scalloping   -Nares:  Patent    -Neck/Lymphatics:    -Circumference: 21 "    -Cardiac:    - LE edema over shins:mild swelling appreciated by patient    -Pulm: -Respirations: unlaboured         -Neuro: No resting tremor     -Musculoskeletal: Gait and stance: normal turning and ambulation; unremarkable  Assessment:  Mr Nathaly Bean is a 46 y o  male who is seen to evaluate for treatment of obstructive sleep apnea  The patient has reported severe SHANDA with [facility] and has been using CPAP since 2008  His current machine is 6years old and is broken beyond repair as it is not putting out enough pressure and his DME told him they cannot fix it and he needs a new one  He has also put on a lot of weight and needs his pressure needs reassessed  Has observed apneas, snoring, nocturnal gasping, daytime tiredness/sleepiness, non-restorative sleep, and nocturia in the context of a narrow airway, large neck girth, obesity, and allergies  The pathophysiology of, the reasons to treat and treatment options for obstructive sleep apnea were all reviewed with the patient today    Discussed the testing options and reviewed the benefits and downsides of both, patient opted for the in lab testing with split to CPAP once dx found at AHI 15 or greater  He is amenable to treatment with PAP therapy  Discussed keeping nasal passages clear, abstaining from alcohol, and other sedating drugs at night- which will worsen symptoms of SHANDA  --History provided by: patient   --Records reviewed: in chart      Recommendations:  1) split study for AHI>15   2) Driving safety was reviewed with patient  If the patient feels too sleepy to drive he knows not to drive  If he becomes sleepy while driving he will pull over and nap  3) Follow-up after initiation of treatment  4) Call with any questions or concerns  All questions answered for the patient, who indicated understanding and agreed with the plan  Timothy Stern MD  Psychiatry/ Sleep medicine      I spent 40 minutes with patient today in which greater than 50% of the time was spent in counseling/coordination of care regarding treatment    VIRTUAL VISIT [address] verbally agrees to participate in [employer]  Pt is aware that [employer] could be limited without vital signs or the ability to perform a full hands-on physical Clayton Winn understands he or the provider may request at any time to terminate the video visit and request the patient to seek care or treatment in person

## 2022-04-07 NOTE — PATIENT INSTRUCTIONS
Recommendations:  1) split study for AHI>15   2) Driving safety was reviewed with patient  If the patient feels too sleepy to drive he knows not to drive  If he becomes sleepy while driving he will pull over and nap  3) Follow-up after initiation of treatment  4) Call with any questions or concerns

## 2022-04-13 ENCOUNTER — HOSPITAL ENCOUNTER (OUTPATIENT)
Dept: SLEEP CENTER | Facility: CLINIC | Age: 52
Discharge: HOME | End: 2022-04-13
Payer: COMMERCIAL

## 2022-04-13 DIAGNOSIS — E66.01 OBESITY, MORBID, BMI 40.0-49.9 (HCC): ICD-10-CM

## 2022-04-13 DIAGNOSIS — G47.33 OBSTRUCTIVE SLEEP APNEA: ICD-10-CM

## 2022-04-13 DIAGNOSIS — I10 PRIMARY HYPERTENSION: ICD-10-CM

## 2022-04-13 PROCEDURE — 95811 POLYSOM 6/>YRS CPAP 4/> PARM: CPT

## 2022-04-14 ENCOUNTER — OFFICE VISIT (OUTPATIENT)
Dept: FAMILY MEDICINE CLINIC | Facility: CLINIC | Age: 52
End: 2022-04-14

## 2022-04-14 VITALS
BODY MASS INDEX: 39.17 KG/M2 | HEIGHT: 75 IN | OXYGEN SATURATION: 98 % | TEMPERATURE: 98.3 F | SYSTOLIC BLOOD PRESSURE: 120 MMHG | HEART RATE: 80 BPM | DIASTOLIC BLOOD PRESSURE: 80 MMHG | WEIGHT: 315 LBS

## 2022-04-14 DIAGNOSIS — I10 BENIGN ESSENTIAL HYPERTENSION: Primary | ICD-10-CM

## 2022-04-14 NOTE — PROGRESS NOTES
Sleep Study Documentation    Pre-Sleep Study       Sleep testing procedure explained to patient:YES    Patient napped prior to study:NO    Caffeine:Dayshift worker after 12PM   Caffeine use:YES- coffee  6 ounces and ice tea  12 ounces    Alcohol:Dayshift workers after 5PM: Alcohol use:NO    Typical day for patient:YES       Study Documentation    Sleep Study Indications: EDS, nocturnal choking    Sleep Study: Split Optimal PAP pressure: 14  Leak:Small  Snore:Eliminated  REM Obtained:yes  Supplemental O2: no    Minimum SaO2 90  Baseline SaO2 94  PAP mask tried (list all)[employer] Simplus Full face, Large  PAP mask choice (final)Resmed Lamb LT, Nasal pillows, Large  PAP mask type:pillows  PAP pressure at which snoring was eliminated 13  Minimum SaO2 at final PAP pressure 94  Mode of Therapy:CPAP    CPAP changed to BiPAP:No    EKG abnormalities: no     EEG abnormalities: no    Sleep Study Recorded < 2 hours: N/A    Sleep Study Recorded > 2 hours but incomplete study: N/A    Sleep Study Recorded 6 hours but no sleep obtained: NO    Patient classification: employed       Post-Sleep Study    Medication used at bedtime or during sleep study:NO    Patient reports time it took to fall asleep:30 to 60 minutes    Patient reports waking up during study:3 or more times  Patient reports returning to sleep in 10 to 30 minutes  Patient reports sleeping 2 to 4 hours without dreaming  Patient reports sleep during study:better than usual    Patient rated sleepiness: Somewhat sleepy or tired    PAP treatment:yes: Post PAP treatment patient reports feeling better and  would wear PAP mask at home

## 2022-04-19 ENCOUNTER — OFFICE VISIT (OUTPATIENT)
Dept: FAMILY MEDICINE CLINIC | Facility: CLINIC | Age: 52
End: 2022-04-19
Payer: COMMERCIAL

## 2022-04-19 VITALS
BODY MASS INDEX: 39.17 KG/M2 | SYSTOLIC BLOOD PRESSURE: 172 MMHG | TEMPERATURE: 97.7 F | OXYGEN SATURATION: 95 % | WEIGHT: 315 LBS | HEART RATE: 86 BPM | DIASTOLIC BLOOD PRESSURE: 96 MMHG | HEIGHT: 75 IN

## 2022-04-19 DIAGNOSIS — R35.0 BENIGN PROSTATIC HYPERPLASIA WITH URINARY FREQUENCY: ICD-10-CM

## 2022-04-19 DIAGNOSIS — G47.33 OBSTRUCTIVE SLEEP APNEA: ICD-10-CM

## 2022-04-19 DIAGNOSIS — I10 BENIGN ESSENTIAL HYPERTENSION: Primary | ICD-10-CM

## 2022-04-19 DIAGNOSIS — N40.1 BENIGN PROSTATIC HYPERPLASIA WITH URINARY FREQUENCY: ICD-10-CM

## 2022-04-19 DIAGNOSIS — E66.01 OBESITY, MORBID, BMI 40.0-49.9 (HCC): ICD-10-CM

## 2022-04-19 DIAGNOSIS — M25.552 LEFT HIP PAIN: ICD-10-CM

## 2022-04-19 DIAGNOSIS — E78.2 MIXED HYPERLIPIDEMIA: ICD-10-CM

## 2022-04-19 DIAGNOSIS — E55.9 VITAMIN D DEFICIENCY: ICD-10-CM

## 2022-04-19 PROCEDURE — 99214 OFFICE O/P EST MOD 30 MIN: CPT | Performed by: FAMILY MEDICINE

## 2022-04-19 RX ORDER — AMOXICILLIN 500 MG/1
500 CAPSULE ORAL 3 TIMES DAILY
COMMUNITY
Start: 2022-04-13

## 2022-04-19 RX ORDER — CYCLOBENZAPRINE HCL 10 MG
10 TABLET ORAL
Qty: 30 TABLET | Refills: 0 | Status: SHIPPED | OUTPATIENT
Start: 2022-04-19 | End: 2022-05-17

## 2022-04-19 RX ORDER — DICLOFENAC SODIUM 75 MG/1
75 TABLET, DELAYED RELEASE ORAL 2 TIMES DAILY
Qty: 60 TABLET | Refills: 1 | Status: SHIPPED | OUTPATIENT
Start: 2022-04-19

## 2022-04-19 RX ORDER — IBUPROFEN 800 MG/1
800 TABLET ORAL EVERY 8 HOURS PRN
COMMUNITY
Start: 2022-04-13

## 2022-04-19 NOTE — PROGRESS NOTES
Assessment/Plan:  His blood pressure is elevated today  It may because he is not sleeping well  Hopefully we get his sleep better and eating properly the blood pressure will come down  Will continue current therapy for now  He will follow-up with sleep medicine  Will treat the hip inflammation with an anti-inflammatory muscle relaxant  Follow-up in 3 months sooner if needed  Diagnoses and all orders for this visit:    Benign essential hypertension    Obstructive sleep apnea  -     Ambulatory Referral to Sleep Medicine; Future    Mixed hyperlipidemia    Obesity, morbid, BMI 40 0-49 9 (HCC)    Vitamin D deficiency    Benign prostatic hyperplasia with urinary frequency    Left hip pain  -     cyclobenzaprine (FLEXERIL) 10 mg tablet; Take 1 tablet (10 mg total) by mouth daily at bedtime  -     diclofenac (VOLTAREN) 75 mg EC tablet; Take 1 tablet (75 mg total) by mouth 2 (two) times a day    Other orders  -     amoxicillin (AMOXIL) 500 mg capsule; Take 500 mg by mouth 3 (three) times a day  -     ibuprofen (MOTRIN) 800 mg tablet; Take 800 mg by mouth every 8 (eight) hours as needed            Subjective:        Patient ID: Precious Myrick is a 46 y o  male  Patient presents with: Follow-up: lab review  Leg Pain: ongoing left leg pain keeps him awake at night    He is in today for follow-up  He apparently did have a sleep study but has not heard about follow-up yet  He admits to some dietary indiscretion  He did have some lab work done recently  He does follow up with Urology  The following portions of the patient's history were reviewed and updated as appropriate: allergies, current medications, past family history, past medical history, past social history, past surgical history and problem list       Review of Systems   Constitutional: Negative  HENT: Negative  Eyes: Negative  Respiratory: Negative  Cardiovascular: Negative  Gastrointestinal: Negative      Endocrine: Negative  Genitourinary: Negative  Musculoskeletal: Positive for arthralgias  Complains of left upper lateral leg pain and hip pain that wakes him at night  Skin: Negative  Allergic/Immunologic: Negative  Neurological: Negative  Hematological: Negative  Psychiatric/Behavioral: Negative  All other systems reviewed and are negative  Objective:             BP (!) 172/96 (BP Location: Right arm, Patient Position: Sitting, Cuff Size: Large)   Pulse 86   Temp 97 7 °F (36 5 °C) (Tympanic)   Ht 6' 3" (1 905 m)   Wt (!) 173 kg (381 lb)   SpO2 95%   BMI 47 62 kg/m²          Physical Exam  Vitals and nursing note reviewed  Constitutional:       Appearance: He is well-developed  HENT:      Head: Normocephalic and atraumatic  Right Ear: External ear normal       Left Ear: External ear normal       Nose: Nose normal    Eyes:      Conjunctiva/sclera: Conjunctivae normal       Pupils: Pupils are equal, round, and reactive to light  Cardiovascular:      Rate and Rhythm: Normal rate and regular rhythm  Heart sounds: Normal heart sounds  Pulmonary:      Effort: Pulmonary effort is normal       Breath sounds: Normal breath sounds  Abdominal:      General: Bowel sounds are normal       Palpations: Abdomen is soft  Musculoskeletal:      Cervical back: Normal range of motion and neck supple  Right upper leg: No swelling, edema, deformity, tenderness or bony tenderness  Comments: He has pain in the lateral gluteus on the left side  Has good range of motion neurovascular is intact  Skin:     General: Skin is warm and dry  Neurological:      Mental Status: He is alert and oriented to person, place, and time  Deep Tendon Reflexes: Reflexes are normal and symmetric     Psychiatric:         Behavior: Behavior normal

## 2022-04-21 ENCOUNTER — LAB (OUTPATIENT)
Dept: LAB | Facility: HOSPITAL | Age: 52
End: 2022-04-21
Payer: COMMERCIAL

## 2022-04-21 DIAGNOSIS — R97.20 ELEVATED PSA: ICD-10-CM

## 2022-04-21 PROCEDURE — 84153 ASSAY OF PSA TOTAL: CPT

## 2022-04-21 PROCEDURE — 84154 ASSAY OF PSA FREE: CPT

## 2022-04-21 PROCEDURE — 36415 COLL VENOUS BLD VENIPUNCTURE: CPT

## 2022-04-22 ENCOUNTER — TELEPHONE (OUTPATIENT)
Dept: SLEEP CENTER | Facility: CLINIC | Age: 52
End: 2022-04-22

## 2022-04-22 NOTE — TELEPHONE ENCOUNTER
Dr Derrek Meza patient saw Dr Garciela Koroma on 4/7/2022  You read sleep study and recommend CPAP treatment  Will you write RX for CPAP patient can then be scheduled for set up and compliance with you

## 2022-04-23 LAB
PSA FREE MFR SERPL: 14 %
PSA FREE SERPL-MCNC: 0.42 NG/ML
PSA SERPL-MCNC: 3 NG/ML (ref 0–4)

## 2022-04-25 NOTE — RESULT ENCOUNTER NOTE
Patient has upcoming appointment May 11th  I gave him the option to adjust his appointment for 3 months from now with a repeat PSA prior  He will call if he would like to do that    Or you can call him to see if he wants to adjust it

## 2022-04-26 ENCOUNTER — TELEPHONE (OUTPATIENT)
Dept: SLEEP CENTER | Facility: CLINIC | Age: 52
End: 2022-04-26

## 2022-04-26 NOTE — TELEPHONE ENCOUNTER
----- Message from Elina Traore MD sent at 4/25/2022  4:18 PM EDT -----  Please schedule clinic follow-up and set up

## 2022-04-26 NOTE — TELEPHONE ENCOUNTER
Spoke with the patient advised sleep study shows severe SHANDA   Patient scheduled for DME set up and compliance appointments     RX for DME and clinicals sent to [facility]

## 2022-04-26 NOTE — TELEPHONE ENCOUNTER
----- Message from Jerrold Paget, MD sent at 4/25/2022  4:18 PM EDT -----  Please schedule clinic follow-up and set up

## 2022-05-11 ENCOUNTER — TELEPHONE (OUTPATIENT)
Dept: SLEEP CENTER | Facility: CLINIC | Age: 52
End: 2022-05-11

## 2022-05-11 ENCOUNTER — OFFICE VISIT (OUTPATIENT)
Dept: UROLOGY | Facility: CLINIC | Age: 52
End: 2022-05-11
Payer: COMMERCIAL

## 2022-05-11 VITALS
DIASTOLIC BLOOD PRESSURE: 80 MMHG | HEIGHT: 75 IN | SYSTOLIC BLOOD PRESSURE: 130 MMHG | BODY MASS INDEX: 39.17 KG/M2 | WEIGHT: 315 LBS

## 2022-05-11 DIAGNOSIS — R97.20 ELEVATED PSA: ICD-10-CM

## 2022-05-11 DIAGNOSIS — Z87.448 H/O URETHRAL STRICTURE: ICD-10-CM

## 2022-05-11 DIAGNOSIS — R35.0 BENIGN PROSTATIC HYPERPLASIA WITH URINARY FREQUENCY: Primary | ICD-10-CM

## 2022-05-11 DIAGNOSIS — Z12.5 PROSTATE CANCER SCREENING: ICD-10-CM

## 2022-05-11 DIAGNOSIS — N40.1 BENIGN PROSTATIC HYPERPLASIA WITH URINARY FREQUENCY: Primary | ICD-10-CM

## 2022-05-11 LAB — POST-VOID RESIDUAL VOLUME, ML POC: 27 ML

## 2022-05-11 PROCEDURE — 51798 US URINE CAPACITY MEASURE: CPT | Performed by: NURSE PRACTITIONER

## 2022-05-11 PROCEDURE — 99213 OFFICE O/P EST LOW 20 MIN: CPT | Performed by: NURSE PRACTITIONER

## 2022-05-11 NOTE — ASSESSMENT & PLAN NOTE
· Declines medications due to possible side effects  · ultrasound kidney bladder unremarkable  · PVR 27 mL  · Follow up 1 year

## 2022-05-11 NOTE — PROGRESS NOTES
Assessment and plan:     H/O urethral stricture  · Prior dilatation  · Does not feel like he has one currently  · Declines cystoscopy    Prostate cancer screening  · PSA 3 0  · Follow up 1 year for routine prostate cancer screening    Benign prostatic hyperplasia with urinary frequency  · Declines medications due to possible side effects  · ultrasound kidney bladder unremarkable  · PVR 27 mL  · Follow up 1 year    Elevated PSA  · PSA 4 8  · Repeat 3 0  · Recheck psa in 6 months to trend it  · Follow up 6 months      APRIL Griffin    History of Present Illness     Steven Zavala is a 46 y o  male presents for 3 month follow-up  He initially established care with me 02/08/2022 for urinary urgency and frequency  He reported nocturia times 3-4  He also stated he urinates at least 20 times a day since 2010; he said this was at the most   He does take spironolactone and is A1c is 5 8  At night time he has intermittent stream, only if he was holding it for awhile  During the day it is a straight flow  He can not quantify how many times a day he urinates  Denies any kidney stones  Has not been on medications in the past for this  He drinks:  1 coffee  4 bottles of water a day  2 sodas (sprite)     He has sleep apnea and uses cpap      We discussed trial of flomax however he is not interested in trying this due to side effects  He has a hx of urethral blockage/stricture and a cystoscopy in [place] in 2019  He is not interested it this  He underwent routine prostate cancer screening  After his previous visit his PSA came back at 4 8  This was repeated 1 month later and came down to 3 0  We will continue to monitor his PSA at this time  I do not see any other PSAs on file in care everywhere or [facility]      Laboratory     Lab Results   Component Value Date    BUN 10 03/21/2022    CREATININE 0 94 03/21/2022       No components found for: GFR    Lab Results   Component Value Date    CALCIUM 10 0 03/21/2022 K 4 2 03/21/2022    CO2 26 03/21/2022     03/21/2022       Lab Results   Component Value Date    WBC 9 54 03/21/2022    HGB 14 8 03/21/2022    HCT 45 4 03/21/2022    MCV 81 (L) 03/21/2022     (H) 03/21/2022       Lab Results   Component Value Date    PSA 3 0 04/21/2022    PSA 4 6 (H) 03/21/2022    PSA 4 8 (H) 03/21/2022       Recent Results (from the past 1 hour(s))   POCT Measure PVR    Collection Time: 05/11/22  3:48 PM   Result Value Ref Range    POST-VOID RESIDUAL VOLUME, ML POC 27 mL       @RESULT(URINEMICROSCOPIC)@    @RESULT(URINECULTURE)@    Radiology     RENAL ULTRASOUND 02/14/2022     INDICATION:   N40 1: Benign prostatic hyperplasia with lower urinary tract symptoms  R35 0: Frequency of micturition      COMPARISON: None     TECHNIQUE:   Ultrasound of the retroperitoneum was performed with a curvilinear transducer utilizing volumetric sweeps and still imaging techniques       FINDINGS:     KIDNEYS:  Symmetric and normal size  Right kidney:  11 4 x 6 0 x 6 8 cm  Left kidney:  12 5 x 5 7 x 7 1 cm      Right kidney  Normal echogenicity and contour  No suspicious masses detected  No hydronephrosis  No shadowing calculi  No perinephric fluid collections      Left kidney  Normal echogenicity and contour  No suspicious masses detected  No hydronephrosis  No shadowing calculi  No perinephric fluid collections      URETERS:  Nonvisualized      BLADDER:   Normally distended  No focal thickening or mass lesions  Bilateral ureteral jets detected  Prevoid volume of 108 mL in post void volume of 16 mL  Prostate measures 4 1 x 3 7 x 3 9 cm       IMPRESSION:     Unremarkable kidneys  Post void volume of 16 mL  Review of Systems     Review of Systems   Constitutional: Negative for activity change, appetite change, chills, fatigue, fever and unexpected weight change  HENT: Negative for facial swelling  Eyes: Negative for discharge  Respiratory: Negative    Negative for cough and shortness of breath  Cardiovascular: Negative for chest pain and leg swelling  Gastrointestinal: Negative  Negative for abdominal distention, abdominal pain, constipation, diarrhea, nausea and vomiting  Endocrine: Negative  Genitourinary: Positive for frequency  Negative for decreased urine volume, difficulty urinating, dysuria, enuresis, flank pain, genital sores, hematuria and urgency  Musculoskeletal: Negative for back pain and myalgias  Skin: Negative for pallor and rash  Allergic/Immunologic: Negative  Negative for immunocompromised state  Neurological: Negative for facial asymmetry and speech difficulty  Psychiatric/Behavioral: Negative for agitation and confusion  Allergies     No Known Allergies    Physical Exam     Physical Exam  Vitals reviewed  Constitutional:       General: He is not in acute distress  Appearance: Normal appearance  He is obese  He is not ill-appearing, toxic-appearing or diaphoretic  HENT:      Head: Normocephalic and atraumatic  Eyes:      General: No scleral icterus  Cardiovascular:      Rate and Rhythm: Normal rate  Pulmonary:      Effort: Pulmonary effort is normal  No respiratory distress  Abdominal:      General: Abdomen is flat  There is no distension  Palpations: Abdomen is soft  Musculoskeletal:         General: No swelling  Cervical back: Normal range of motion  Skin:     General: Skin is warm and dry  Coloration: Skin is not jaundiced or pale  Findings: No rash  Neurological:      General: No focal deficit present  Mental Status: He is alert and oriented to person, place, and time  Gait: Gait normal    Psychiatric:         Mood and Affect: Mood normal          Behavior: Behavior normal          Thought Content:  Thought content normal          Judgment: Judgment normal          Vital Signs     Vitals:    05/11/22 1542   BP: 130/80   Weight: (!) 170 kg (375 lb)   Height: 6' 3" (1 905 m) Current Medications       Current Outpatient Medications:     amoxicillin (AMOXIL) 500 mg capsule, Take 500 mg by mouth 3 (three) times a day, Disp: , Rfl:     cyclobenzaprine (FLEXERIL) 10 mg tablet, Take 1 tablet (10 mg total) by mouth daily at bedtime, Disp: 30 tablet, Rfl: 0    diclofenac (VOLTAREN) 75 mg EC tablet, Take 1 tablet (75 mg total) by mouth 2 (two) times a day, Disp: 60 tablet, Rfl: 1    diltiazem (CARDIZEM CD) 240 mg 24 hr capsule, Take 1 capsule (240 mg total) by mouth daily, Disp: 30 capsule, Rfl: 5    ergocalciferol (VITAMIN D2) 50,000 units, Take 1 capsule (50,000 Units total) by mouth once a week, Disp: 4 capsule, Rfl: 0    ibuprofen (MOTRIN) 800 mg tablet, Take 800 mg by mouth every 8 (eight) hours as needed, Disp: , Rfl:     losartan (COZAAR) 100 MG tablet, Take 1 tablet (100 mg total) by mouth daily, Disp: 30 tablet, Rfl: 5    metoprolol succinate (TOPROL-XL) 50 mg 24 hr tablet, Take 1 tablet (50 mg total) by mouth daily, Disp: 30 tablet, Rfl: 5    pravastatin (PRAVACHOL) 20 mg tablet, Take 1 tablet (20 mg total) by mouth daily, Disp: 30 tablet, Rfl: 5    spironolactone (ALDACTONE) 25 mg tablet, Take 1 tablet (25 mg total) by mouth daily, Disp: 30 tablet, Rfl: 5    Testosterone 12 5 MG/ACT (1%) GEL, Apply 1 mg topically daily, Disp: 75 g, Rfl: 5    Testosterone 4 MG/24HR PT24, Place 1 patch on the skin daily (Patient not taking: Reported on 3/9/2022 ), Disp: 30 patch, Rfl: 2    Active Problems     Patient Active Problem List   Diagnosis    Hypertension    Arthritis    Hyperlipemia    Obesity, morbid, BMI 40 0-49 9 (HCC)    Benign essential hypertension    Obstructive sleep apnea    Benign prostatic hyperplasia with urinary frequency    Prostate cancer screening    H/O urethral stricture    Vitamin D deficiency    Elevated PSA       Past Medical History     Past Medical History:   Diagnosis Date    Allergic     Anxiety     Arthritis     Asthma     CPAP (continuous positive airway pressure) dependence     Depression     Hyperlipemia     Hypertension     Obesity        Surgical History     Past Surgical History:   Procedure Laterality Date    HEMORRHOID SURGERY      TONSILLECTOMY      URETHRAL DILATION  2019       Family History     Family History   Problem Relation Age of Onset    Hypertension Mother     Hyperlipidemia Mother     Hypertension Father     Heart disease Father     Hyperlipidemia Father        Social History     Social History     Social History     Tobacco Use   Smoking Status Former Smoker    Types: Cigarettes   Smokeless Tobacco Never Used       Past Surgical History:   Procedure Laterality Date    HEMORRHOID SURGERY      TONSILLECTOMY      URETHRAL DILATION  2019         The following portions of the patient's history were reviewed and updated as appropriate: allergies, current medications, past family history, past medical history, past social history, past surgical history and problem list    Please note :  Voice dictation software has been used to create this document  There may be inadvertent transcription errors      [id number] [address]

## 2022-05-17 DIAGNOSIS — M25.552 LEFT HIP PAIN: ICD-10-CM

## 2022-05-17 DIAGNOSIS — E55.9 VITAMIN D DEFICIENCY: ICD-10-CM

## 2022-05-17 RX ORDER — ERGOCALCIFEROL 1.25 MG/1
CAPSULE ORAL
Qty: 4 CAPSULE | Refills: 0 | Status: SHIPPED | OUTPATIENT
Start: 2022-05-17 | End: 2022-06-10

## 2022-05-17 RX ORDER — CYCLOBENZAPRINE HCL 10 MG
TABLET ORAL
Qty: 30 TABLET | Refills: 0 | Status: SHIPPED | OUTPATIENT
Start: 2022-05-17 | End: 2022-07-11 | Stop reason: SDUPTHER

## 2022-06-10 DIAGNOSIS — E55.9 VITAMIN D DEFICIENCY: ICD-10-CM

## 2022-06-10 RX ORDER — ERGOCALCIFEROL 1.25 MG/1
CAPSULE ORAL
Qty: 4 CAPSULE | Refills: 0 | Status: SHIPPED | OUTPATIENT
Start: 2022-06-10 | End: 2022-07-11

## 2022-07-10 DIAGNOSIS — E55.9 VITAMIN D DEFICIENCY: ICD-10-CM

## 2022-07-10 DIAGNOSIS — I10 PRIMARY HYPERTENSION: ICD-10-CM

## 2022-07-11 ENCOUNTER — OFFICE VISIT (OUTPATIENT)
Dept: FAMILY MEDICINE CLINIC | Facility: CLINIC | Age: 52
End: 2022-07-11
Payer: COMMERCIAL

## 2022-07-11 VITALS
HEIGHT: 75 IN | OXYGEN SATURATION: 97 % | SYSTOLIC BLOOD PRESSURE: 130 MMHG | WEIGHT: 315 LBS | DIASTOLIC BLOOD PRESSURE: 80 MMHG | TEMPERATURE: 97.4 F | HEART RATE: 97 BPM | BODY MASS INDEX: 39.17 KG/M2

## 2022-07-11 DIAGNOSIS — G47.33 OBSTRUCTIVE SLEEP APNEA: ICD-10-CM

## 2022-07-11 DIAGNOSIS — E29.1 HYPOGONADISM IN MALE: ICD-10-CM

## 2022-07-11 DIAGNOSIS — L30.9 DERMATITIS: ICD-10-CM

## 2022-07-11 DIAGNOSIS — M25.552 LEFT HIP PAIN: ICD-10-CM

## 2022-07-11 DIAGNOSIS — E66.01 OBESITY, MORBID, BMI 40.0-49.9 (HCC): ICD-10-CM

## 2022-07-11 DIAGNOSIS — I10 PRIMARY HYPERTENSION: ICD-10-CM

## 2022-07-11 DIAGNOSIS — E78.2 MIXED HYPERLIPIDEMIA: ICD-10-CM

## 2022-07-11 DIAGNOSIS — I10 BENIGN ESSENTIAL HYPERTENSION: Primary | ICD-10-CM

## 2022-07-11 DIAGNOSIS — E55.9 VITAMIN D DEFICIENCY: ICD-10-CM

## 2022-07-11 PROCEDURE — 99214 OFFICE O/P EST MOD 30 MIN: CPT | Performed by: FAMILY MEDICINE

## 2022-07-11 RX ORDER — ERGOCALCIFEROL 1.25 MG/1
CAPSULE ORAL
Qty: 4 CAPSULE | Refills: 0 | Status: SHIPPED | OUTPATIENT
Start: 2022-07-11 | End: 2022-07-11 | Stop reason: SDUPTHER

## 2022-07-11 RX ORDER — ERGOCALCIFEROL 1.25 MG/1
50000 CAPSULE ORAL WEEKLY
Qty: 4 CAPSULE | Refills: 3 | Status: SHIPPED | OUTPATIENT
Start: 2022-07-11

## 2022-07-11 RX ORDER — CYCLOBENZAPRINE HCL 10 MG
10 TABLET ORAL
Qty: 30 TABLET | Refills: 0 | Status: SHIPPED | OUTPATIENT
Start: 2022-07-11

## 2022-07-11 RX ORDER — TESTOSTERONE 12.5 MG/1.25G
1 GEL TOPICAL DAILY
Qty: 75 G | Refills: 5 | Status: SHIPPED | OUTPATIENT
Start: 2022-07-11 | End: 2022-07-15 | Stop reason: SDUPTHER

## 2022-07-11 RX ORDER — METOPROLOL SUCCINATE 50 MG/1
50 TABLET, EXTENDED RELEASE ORAL DAILY
Qty: 30 TABLET | Refills: 5 | Status: SHIPPED | OUTPATIENT
Start: 2022-07-11 | End: 2023-01-07

## 2022-07-11 RX ORDER — DILTIAZEM HYDROCHLORIDE 240 MG/1
CAPSULE, COATED, EXTENDED RELEASE ORAL
Qty: 30 CAPSULE | Refills: 5 | Status: SHIPPED | OUTPATIENT
Start: 2022-07-11

## 2022-07-11 RX ORDER — SPIRONOLACTONE 25 MG/1
25 TABLET ORAL DAILY
Qty: 30 TABLET | Refills: 5 | Status: SHIPPED | OUTPATIENT
Start: 2022-07-11 | End: 2023-01-07

## 2022-07-11 RX ORDER — LOSARTAN POTASSIUM 100 MG/1
100 TABLET ORAL DAILY
Qty: 30 TABLET | Refills: 5 | Status: SHIPPED | OUTPATIENT
Start: 2022-07-11 | End: 2023-01-07

## 2022-07-11 RX ORDER — PRAVASTATIN SODIUM 20 MG
20 TABLET ORAL DAILY
Qty: 30 TABLET | Refills: 5 | Status: SHIPPED | OUTPATIENT
Start: 2022-07-11 | End: 2023-01-07

## 2022-07-11 NOTE — PROGRESS NOTES
Assessment/Plan:  Continue current therapy  Will get him set up for weight management referral   Follow-up here in 4 months sooner if needed  Diagnoses and all orders for this visit:    Benign essential hypertension  -     Lipid panel  -     Hemoglobin A1C  -     Comprehensive metabolic panel  -     CBC and differential  -     Vitamin D 25 hydroxy    Obstructive sleep apnea    Mixed hyperlipidemia  -     pravastatin (PRAVACHOL) 20 mg tablet; Take 1 tablet (20 mg total) by mouth daily  -     Lipid panel    Obesity, morbid, BMI 40 0-49 9 ([facility] )  -     Ambulatory Referral to Weight Management; Future    Dermatitis  -     camphor-menthol (SARNA) lotion; Apply topically as needed for itching    Hypogonadism in male  -     Testosterone 12 5 MG/ACT (1%) GEL; Apply 1 mg topically daily  -     Testosterone, free, total; Future    Left hip pain  -     cyclobenzaprine (FLEXERIL) 10 mg tablet; Take 1 tablet (10 mg total) by mouth daily at bedtime    Primary hypertension  -     losartan (COZAAR) 100 MG tablet; Take 1 tablet (100 mg total) by mouth daily  -     metoprolol succinate (TOPROL-XL) 50 mg 24 hr tablet; Take 1 tablet (50 mg total) by mouth daily  -     spironolactone (ALDACTONE) 25 mg tablet; Take 1 tablet (25 mg total) by mouth daily    Vitamin D deficiency  -     ergocalciferol (VITAMIN D2) 50,000 units; Take 1 capsule (50,000 Units total) by mouth once a week  -     Vitamin D 25 hydroxy          Subjective:        Patient ID: Bryan Thorne is a 46 y o  male  He is in today for follow-up  He does complain of persistent back pain  He is also interested in losing weight and considering bariatric surgery  The following portions of the patient's history were reviewed and updated as appropriate: allergies, current medications, past family history, past medical history, past social history, past surgical history and problem list       Review of Systems   Constitutional: Negative  HENT: Negative  Eyes: Negative  Respiratory: Negative  Cardiovascular: Negative  Gastrointestinal: Negative  Endocrine: Negative  Genitourinary: Negative  Musculoskeletal: Positive for back pain  Skin: Negative  Allergic/Immunologic: Negative  Neurological: Negative  Hematological: Negative  Psychiatric/Behavioral: Negative  All other systems reviewed and are negative  Objective:        Depression Screening and Follow-up Plan: Patient was screened for depression during today's encounter  They screened negative with a PHQ-2 score of 0           /80   Pulse 97   Temp (!) 97 4 °F (36 3 °C)   Ht 6' 3" (1 905 m)   Wt (!) 171 kg (376 lb)   SpO2 97%   BMI 47 00 kg/m²          Physical Exam  Vitals and nursing note reviewed  Constitutional:       Appearance: He is well-developed  HENT:      Head: Normocephalic and atraumatic  Eyes:      Pupils: Pupils are equal, round, and reactive to light  Cardiovascular:      Rate and Rhythm: Normal rate  Pulmonary:      Effort: Pulmonary effort is normal       Breath sounds: No wheezing  Abdominal:      General: Abdomen is flat  Palpations: Abdomen is soft  Musculoskeletal:      Cervical back: Normal range of motion and neck supple  Lymphadenopathy:      Cervical: No cervical adenopathy  Skin:     General: Skin is warm  Neurological:      General: No focal deficit present  Mental Status: He is alert and oriented to person, place, and time     Psychiatric:         Mood and Affect: Mood normal

## 2022-07-15 DIAGNOSIS — E29.1 HYPOGONADISM IN MALE: ICD-10-CM

## 2022-07-15 NOTE — TELEPHONE ENCOUNTER
Pharmacy called regarding this medication     This medication comes in pump form and they would like to know how many pumps as far as directions to be given

## 2022-07-15 NOTE — TELEPHONE ENCOUNTER
Order was placed for medication of Testerone; decision will be with in or before 24 hours  PA# [id number]

## 2022-07-18 ENCOUNTER — TELEPHONE (OUTPATIENT)
Dept: FAMILY MEDICINE CLINIC | Facility: CLINIC | Age: 52
End: 2022-07-18

## 2022-07-18 RX ORDER — TESTOSTERONE 12.5 MG/1.25G
1 GEL TOPICAL DAILY
Qty: 75 G | Refills: 5 | Status: SHIPPED | OUTPATIENT
Start: 2022-07-18 | End: 2022-08-11 | Stop reason: SDUPTHER

## 2022-07-19 ENCOUNTER — TELEPHONE (OUTPATIENT)
Dept: FAMILY MEDICINE CLINIC | Facility: CLINIC | Age: 52
End: 2022-07-19

## 2022-07-19 NOTE — TELEPHONE ENCOUNTER
Place a PA request today for testosterone medication  Patient called today and I informed him that the request was completed and awaiting response with in the next two - three days

## 2022-07-22 ENCOUNTER — OFFICE VISIT (OUTPATIENT)
Dept: SLEEP CENTER | Facility: CLINIC | Age: 52
End: 2022-07-22
Payer: COMMERCIAL

## 2022-07-22 VITALS
WEIGHT: 315 LBS | HEART RATE: 101 BPM | SYSTOLIC BLOOD PRESSURE: 149 MMHG | HEIGHT: 75 IN | BODY MASS INDEX: 39.17 KG/M2 | DIASTOLIC BLOOD PRESSURE: 99 MMHG

## 2022-07-22 DIAGNOSIS — E66.01 MORBID OBESITY (HCC): ICD-10-CM

## 2022-07-22 DIAGNOSIS — G47.33 OBSTRUCTIVE SLEEP APNEA: Primary | ICD-10-CM

## 2022-07-22 DIAGNOSIS — E29.1 HYPOGONADISM IN MALE: ICD-10-CM

## 2022-07-22 DIAGNOSIS — I10 PRIMARY HYPERTENSION: ICD-10-CM

## 2022-07-22 PROCEDURE — 99214 OFFICE O/P EST MOD 30 MIN: CPT | Performed by: INTERNAL MEDICINE

## 2022-07-22 NOTE — PROGRESS NOTES
Follow-Up Note - Lana   46 y o  male  :1970  HYQ:[id number]  DOS:2022    CC: I saw this patient for follow-up in clinic today for Sleep disordered breathing, Coexisting Sleep and Medical Problems  Patient had a split sleep study and is here to review results and to adjust therapy  He was diagnosed with obstructive sleep apnea in the past and has been using CPAP for over 10 years  The diagnostic portion demonstrated: AHI of 33 per hour, and had no during stage REM  Minimum oxygen saturation was 90 %  During the therapeutic portion of the study, his sleep disordered breathing was adequately remediated with nasal CPAP at  14 cm H2O  PFSH, Problem List, Medications & Allergies were reviewed in EMR  Interval changes: none reported  He  has a past medical history of Allergic, Anxiety, Arthritis, Asthma, CPAP (continuous positive airway pressure) dependence, Depression, Hyperlipemia, Hypertension, and Obesity  He has a current medication list which includes the following prescription(s): amoxicillin, camphor-menthol, cyclobenzaprine, diclofenac, diltiazem, ergocalciferol, ibuprofen, losartan, metoprolol succinate, pravastatin, spironolactone, and testosterone  PHYSIOLOGICAL DATA REVIEW AND INTERPRETATION:    using PAP > 4 hours/night 100%  Estimated ZEV 0 2/hour with pressure of 14cm H2O @90th/95th percentile; Patient has not been using ozone based devices to sanitize the machine  SUBJECTIVE: Regarding use of PAP, Fredy Espinoza reports:   · no adverse effects: ; has not noticed any fibres or foreign material in air line  · He is benefiting from use: sleeping better   Sleep Routine: Fredy Espinoza has to arise around 4:00 p m  but reports getting <8 hrs sleep  ; he has no difficulty initiating or maintaining sleep   He arises before alarm most days and feels refreshed  Fredy Espinoza denies Excessive Daytime Sleepiness,    He rated himself at Total score: 0 /24 on the [place] Sleepiness Scale  Habits: reports that he has quit smoking  His smoking use included cigarettes  He has never used smokeless tobacco ,  reports previous alcohol use ,  reports no history of drug use , Caffeine use:limited ; Exercise routine: none but is physically active in his job  ROS: reviewed & as attached  Significant for around 10 lb weight reduction since his last visit  He reported no nasal, respiratory or cardiac symptoms  EXAM: /99 (BP Location: Left arm, Patient Position: Sitting, Cuff Size: Large)   Pulse 101   Ht 6' 3" (1 905 m)   Wt (!) 166 kg (365 lb)   BMI 45 62 kg/m²     Wt Readings from Last 3 Encounters:   07/22/22 (!) 166 kg (365 lb)   07/11/22 (!) 171 kg (376 lb)   05/11/22 (!) 170 kg (375 lb)      Patient is well groomed; well appearing  CNS: Alert, orientated, clear & coherent speech  Psych: cooperativeand in no distress  Mental state:appears normal   H&N: EOMI; NC/AT:no facial pressure marks, no rashes  Skin/Extrem: col & hydration normal; no edema  Resp: Respiratory effort is normal    IMPRESSION: Problem List Items & Comorbidities Addressed this Visit    1  Obstructive sleep apnea  Ambulatory Referral to Sleep Medicine    PAP DME Resupply/Reorder   2  Primary hypertension     3  Morbid obesity ([address] )     4  Hypogonadism in male         PLAN:  1  I reviewed results of prior studies and physiologic data with the patient  2  I discussed treatment options with risks and benefits  3  Treatment with  PAP is medically necessary and Martha Dominion is agreable to continue use  4  Care of equipment, methods to improve comfort using PAP and importance of compliance with therapy were discussed  5  Pressure setting: continue 14 cmH2O     6  Rx provided to replace  supplies and Care coordinated with DME provider  7  He monitors his blood pressure at home and it is generally in a good range  8  Discussed strategies for weight reduction      9  Follow-up is advised in 1 year or sooner if needed to monitor progress, compliance and to adjust therapy  Thank you for allowing me to participate in the care of this patient  Sincerely,    Authenticated electronically by Terri Nice MD on 27/10/24   Board Certified Specialist     Portions of the record may have been created with voice recognition software  Occasional wrong word or "sound a like" substitutions may have occurred due to the inherent limitations of voice recognition software  There may also be notations and random deletions of words or characters from malfunctioning software  Read the chart carefully and recognize, using context, where substitutions/deletions have occurred

## 2022-07-22 NOTE — PATIENT INSTRUCTIONS

## 2022-07-22 NOTE — PROGRESS NOTES
Review of Systems      Genitourinary need to urinate more than twice a night   Cardiology none   Gastrointestinal none   Neurology none   Constitutional none   Integumentary rash or dry skin   Psychiatry none   Musculoskeletal none   Pulmonary none   ENT none   Endocrine none   Hematological none

## 2022-07-25 ENCOUNTER — TELEPHONE (OUTPATIENT)
Dept: SLEEP CENTER | Facility: CLINIC | Age: 52
End: 2022-07-25

## 2022-07-28 ENCOUNTER — TELEPHONE (OUTPATIENT)
Dept: FAMILY MEDICINE CLINIC | Facility: CLINIC | Age: 52
End: 2022-07-28

## 2022-07-28 NOTE — TELEPHONE ENCOUNTER
Call PA Pharmacy today and was told that due to there were no written instructions supply with request the case was closed  I had the case re-open today 07/27/22 with New PA ID# of [id number]  A Decision will be main with in the next 24 hours or less  I then notified patient with update on his testosterone medication today also

## 2022-08-08 ENCOUNTER — TELEPHONE (OUTPATIENT)
Dept: FAMILY MEDICINE CLINIC | Facility: CLINIC | Age: 52
End: 2022-08-08

## 2022-08-08 NOTE — TELEPHONE ENCOUNTER
Patient called today and requested a script for Testosterone 1 62 Gel Pump in which he will use his RX Coupon; patient also request change in pharmacy which from now on to be [facility] on [address] in [place] [facility]

## 2022-08-11 DIAGNOSIS — E29.1 HYPOGONADISM IN MALE: ICD-10-CM

## 2022-08-11 RX ORDER — TESTOSTERONE 12.5 MG/1.25G
1 GEL TOPICAL DAILY
Qty: 75 G | Refills: 5 | Status: SHIPPED | OUTPATIENT
Start: 2022-08-11

## 2022-08-12 DIAGNOSIS — E29.1 HYPOGONADISM IN MALE: Primary | ICD-10-CM

## 2022-08-13 RX ORDER — TESTOSTERONE 20.25 MG/1.25G
GEL TOPICAL
Qty: 88 G | Refills: 0 | Status: SHIPPED | OUTPATIENT
Start: 2022-08-13 | End: 2022-09-02

## 2022-09-09 NOTE — TELEPHONE ENCOUNTER
Per Patient he gets his rx through [facility]  No P  A  Needed for this medication as long as the patient use [facility]

## 2022-09-12 ENCOUNTER — CONSULT (OUTPATIENT)
Dept: BARIATRICS | Facility: CLINIC | Age: 52
End: 2022-09-12
Payer: COMMERCIAL

## 2022-09-12 VITALS
WEIGHT: 315 LBS | HEIGHT: 73 IN | HEART RATE: 94 BPM | BODY MASS INDEX: 41.75 KG/M2 | SYSTOLIC BLOOD PRESSURE: 136 MMHG | DIASTOLIC BLOOD PRESSURE: 80 MMHG | RESPIRATION RATE: 16 BRPM

## 2022-09-12 DIAGNOSIS — E78.2 MIXED HYPERLIPIDEMIA: ICD-10-CM

## 2022-09-12 DIAGNOSIS — G47.33 OBSTRUCTIVE SLEEP APNEA: ICD-10-CM

## 2022-09-12 DIAGNOSIS — I10 BENIGN ESSENTIAL HYPERTENSION: ICD-10-CM

## 2022-09-12 DIAGNOSIS — E66.01 OBESITY, MORBID, BMI 40.0-49.9 (HCC): Primary | ICD-10-CM

## 2022-09-12 PROCEDURE — 99243 OFF/OP CNSLTJ NEW/EST LOW 30: CPT | Performed by: PHYSICIAN ASSISTANT

## 2022-09-12 RX ORDER — THIAMINE HCL 100 MG
TABLET ORAL
COMMUNITY

## 2022-09-12 NOTE — PROGRESS NOTES
-Discussed options of Healthy[facility]-Intensive Lifestyle Intervention Program, Very Low Calorie Diet-VLCD, Conservative Program, Radha-En-Y Gastric Bypass and Vertical Sleeve Gastrectomy and the role of weight loss medications   -Initial weight loss goal of 5-10% weight loss for improved health  -Screening labs ordered per PCP back in July encouraged to get done   -Patient is interested in surgery - will watch seminar and let us know his decision     HTN  - on 3 antihypertensives, continue management with prescribing provider      HLD   - continue statin     SHANDA  - wears cpap     Assessment/Plan:      Goals:  Food log (ie ) www myfitnesspal com,sparkpeople  com,loseit com,calorieking  com,etc  baritastic  No sugary beverages  At least 64oz of water daily  Increase physical activity by 10 minutes daily  Gradually increase physical activity to a goal of 5 days per week for 30 minutes of MODERATE intensity PLUS 2 days per week of FULL BODY resistance training  5-10 servings of fruits and vegetables per day and 25-35 grams of dietary fiber per day, gradually increasing      Diagnoses and all orders for this visit:    Obesity, morbid, BMI 40 0-49 9 ([facility] )  -     Ambulatory Referral to Weight Management    Mixed hyperlipidemia    Obstructive sleep apnea    Benign essential hypertension    Other orders  -     Cyanocobalamin (Vitamin B-12) 2500 MCG SUBL; Place under the tongue  -     Multiple Vitamin (ONE-A-DAY MENS PO); Take by mouth        Subjective:   Chief Complaint   Patient presents with    Consult     MWM consult; waist 60in; goal wt 240; pt has sleep apnea     Patient ID: Mary Christianson  is a 45 y o  male with excess weight/obesity here to pursue weight management    Past Medical History:   Diagnosis Date    Allergic     Anxiety     Arthritis     Asthma     CPAP (continuous positive airway pressure) dependence     Depression     Hyperlipemia     Hypertension     Obesity      HPI:  Obesity/Excess Weight:  Severity: class III  Onset:  Since teenage years     Modifiers: Diet and Exercise  Contributing factors: poor food choices, insufficient physical activity   Associated symptoms: fatigue, increased shortness of breath and decreased mobility , joint pain   Colonoscopy-Completed 2022    Goals: 250 lbs   Occupation " works at [employer]: no  Alcohol: social  STOPBANG: has SHANDA - wears cpap     The following portions of the patient's history were reviewed and updated as appropriate: allergies, current medications, past family history, past medical history, past social history, past surgical history and problem list     Review of Systems   Constitutional: Negative  Respiratory: Negative  Cardiovascular: Negative  Gastrointestinal: Negative  Neurological: Negative  Psychiatric/Behavioral: Negative  Objective:    /80   Pulse 94   Resp 16   Ht 6' 0 5" (1 842 m)   Wt (!) 164 kg (361 lb 11 2 oz)   BMI 48 38 kg/m²     Physical Exam  Vitals and nursing note reviewed  Constitutional:       Appearance: Normal appearance  HENT:      Head: Normocephalic and atraumatic  Eyes:      Extraocular Movements: Extraocular movements intact  Pupils: Pupils are equal, round, and reactive to light  Cardiovascular:      Rate and Rhythm: Normal rate  Pulmonary:      Effort: Pulmonary effort is normal    Musculoskeletal:         General: Normal range of motion  Cervical back: Normal range of motion  Skin:     General: Skin is warm and dry  Neurological:      General: No focal deficit present  Mental Status: He is alert and oriented to person, place, and time     Psychiatric:         Mood and Affect: Mood normal

## 2022-10-12 PROBLEM — Z12.5 PROSTATE CANCER SCREENING: Status: RESOLVED | Noted: 2022-02-08 | Resolved: 2022-10-12

## 2022-10-13 ENCOUNTER — APPOINTMENT (OUTPATIENT)
Dept: LAB | Facility: CLINIC | Age: 52
End: 2022-10-13
Payer: COMMERCIAL

## 2022-10-13 DIAGNOSIS — E29.1 HYPOGONADISM IN MALE: ICD-10-CM

## 2022-10-13 DIAGNOSIS — Z11.1 SCREENING FOR TUBERCULOSIS: Primary | ICD-10-CM

## 2022-10-13 DIAGNOSIS — Z11.1 SCREENING FOR TUBERCULOSIS: ICD-10-CM

## 2022-10-13 LAB
25(OH)D3 SERPL-MCNC: 40.2 NG/ML (ref 30–100)
ALBUMIN SERPL BCP-MCNC: 3.2 G/DL (ref 3.5–5)
ALP SERPL-CCNC: 103 U/L (ref 46–116)
ALT SERPL W P-5'-P-CCNC: 23 U/L (ref 12–78)
ANION GAP SERPL CALCULATED.3IONS-SCNC: 2 MMOL/L (ref 4–13)
AST SERPL W P-5'-P-CCNC: 8 U/L (ref 5–45)
BASOPHILS # BLD AUTO: 0.04 THOUSANDS/ΜL (ref 0–0.1)
BASOPHILS NFR BLD AUTO: 1 % (ref 0–1)
BILIRUB SERPL-MCNC: 0.41 MG/DL (ref 0.2–1)
BUN SERPL-MCNC: 9 MG/DL (ref 5–25)
CALCIUM ALBUM COR SERPL-MCNC: 10.7 MG/DL (ref 8.3–10.1)
CALCIUM SERPL-MCNC: 10.1 MG/DL (ref 8.3–10.1)
CHLORIDE SERPL-SCNC: 104 MMOL/L (ref 96–108)
CHOLEST SERPL-MCNC: 182 MG/DL
CO2 SERPL-SCNC: 28 MMOL/L (ref 21–32)
CREAT SERPL-MCNC: 0.96 MG/DL (ref 0.6–1.3)
EOSINOPHIL # BLD AUTO: 0.24 THOUSAND/ΜL (ref 0–0.61)
EOSINOPHIL NFR BLD AUTO: 3 % (ref 0–6)
ERYTHROCYTE [DISTWIDTH] IN BLOOD BY AUTOMATED COUNT: 15.2 % (ref 11.6–15.1)
EST. AVERAGE GLUCOSE BLD GHB EST-MCNC: 128 MG/DL
GFR SERPL CREATININE-BSD FRML MDRD: 89 ML/MIN/1.73SQ M
GLUCOSE P FAST SERPL-MCNC: 100 MG/DL (ref 65–99)
HBA1C MFR BLD: 6.1 %
HCT VFR BLD AUTO: 44.2 % (ref 36.5–49.3)
HDLC SERPL-MCNC: 44 MG/DL
HGB BLD-MCNC: 13.6 G/DL (ref 12–17)
IMM GRANULOCYTES # BLD AUTO: 0.02 THOUSAND/UL (ref 0–0.2)
IMM GRANULOCYTES NFR BLD AUTO: 0 % (ref 0–2)
LDLC SERPL CALC-MCNC: 122 MG/DL (ref 0–100)
LYMPHOCYTES # BLD AUTO: 2.5 THOUSANDS/ΜL (ref 0.6–4.47)
LYMPHOCYTES NFR BLD AUTO: 30 % (ref 14–44)
MCH RBC QN AUTO: 26.6 PG (ref 26.8–34.3)
MCHC RBC AUTO-ENTMCNC: 30.8 G/DL (ref 31.4–37.4)
MCV RBC AUTO: 87 FL (ref 82–98)
MONOCYTES # BLD AUTO: 0.72 THOUSAND/ΜL (ref 0.17–1.22)
MONOCYTES NFR BLD AUTO: 9 % (ref 4–12)
NEUTROPHILS # BLD AUTO: 4.89 THOUSANDS/ΜL (ref 1.85–7.62)
NEUTS SEG NFR BLD AUTO: 57 % (ref 43–75)
NONHDLC SERPL-MCNC: 138 MG/DL
NRBC BLD AUTO-RTO: 0 /100 WBCS
PLATELET # BLD AUTO: 429 THOUSANDS/UL (ref 149–390)
PMV BLD AUTO: 9.4 FL (ref 8.9–12.7)
POTASSIUM SERPL-SCNC: 4.8 MMOL/L (ref 3.5–5.3)
PROT SERPL-MCNC: 8.2 G/DL (ref 6.4–8.4)
RBC # BLD AUTO: 5.11 MILLION/UL (ref 3.88–5.62)
SODIUM SERPL-SCNC: 134 MMOL/L (ref 135–147)
TRIGL SERPL-MCNC: 82 MG/DL
WBC # BLD AUTO: 8.41 THOUSAND/UL (ref 4.31–10.16)

## 2022-10-13 PROCEDURE — 82306 VITAMIN D 25 HYDROXY: CPT | Performed by: FAMILY MEDICINE

## 2022-10-13 PROCEDURE — 84402 ASSAY OF FREE TESTOSTERONE: CPT

## 2022-10-13 PROCEDURE — 84403 ASSAY OF TOTAL TESTOSTERONE: CPT

## 2022-10-13 PROCEDURE — 85025 COMPLETE CBC W/AUTO DIFF WBC: CPT | Performed by: FAMILY MEDICINE

## 2022-10-13 PROCEDURE — 80053 COMPREHEN METABOLIC PANEL: CPT | Performed by: FAMILY MEDICINE

## 2022-10-13 PROCEDURE — 86480 TB TEST CELL IMMUN MEASURE: CPT

## 2022-10-13 PROCEDURE — 36415 COLL VENOUS BLD VENIPUNCTURE: CPT | Performed by: FAMILY MEDICINE

## 2022-10-13 PROCEDURE — 83036 HEMOGLOBIN GLYCOSYLATED A1C: CPT | Performed by: FAMILY MEDICINE

## 2022-10-13 PROCEDURE — 80061 LIPID PANEL: CPT | Performed by: FAMILY MEDICINE

## 2022-10-15 LAB
GAMMA INTERFERON BACKGROUND BLD IA-ACNC: 0.03 IU/ML
M TB IFN-G BLD-IMP: NEGATIVE
M TB IFN-G CD4+ BCKGRND COR BLD-ACNC: 0 IU/ML
M TB IFN-G CD4+ BCKGRND COR BLD-ACNC: 0.01 IU/ML
MITOGEN IGNF BCKGRD COR BLD-ACNC: >10 IU/ML
TESTOST FREE SERPL-MCNC: 12.5 PG/ML (ref 7.2–24)
TESTOST SERPL-MCNC: 261 NG/DL (ref 264–916)

## 2022-10-31 ENCOUNTER — TELEPHONE (OUTPATIENT)
Dept: FAMILY MEDICINE CLINIC | Facility: CLINIC | Age: 52
End: 2022-10-31

## 2022-11-01 ENCOUNTER — TELEPHONE (OUTPATIENT)
Dept: FAMILY MEDICINE CLINIC | Facility: CLINIC | Age: 52
End: 2022-11-01

## 2022-11-01 NOTE — TELEPHONE ENCOUNTER
The above was completed today 11/01/22, faxed and also scanned to patients' chart; the original copy is file at

## 2022-12-06 ENCOUNTER — OFFICE VISIT (OUTPATIENT)
Dept: FAMILY MEDICINE CLINIC | Facility: CLINIC | Age: 52
End: 2022-12-06

## 2022-12-06 VITALS
WEIGHT: 315 LBS | BODY MASS INDEX: 39.17 KG/M2 | HEIGHT: 75 IN | TEMPERATURE: 97.6 F | HEART RATE: 73 BPM | SYSTOLIC BLOOD PRESSURE: 132 MMHG | OXYGEN SATURATION: 99 % | DIASTOLIC BLOOD PRESSURE: 80 MMHG

## 2022-12-06 DIAGNOSIS — E66.01 OBESITY, MORBID, BMI 40.0-49.9 (HCC): ICD-10-CM

## 2022-12-06 DIAGNOSIS — I10 PRIMARY HYPERTENSION: ICD-10-CM

## 2022-12-06 DIAGNOSIS — Z23 ENCOUNTER FOR IMMUNIZATION: ICD-10-CM

## 2022-12-06 DIAGNOSIS — E78.2 MIXED HYPERLIPIDEMIA: ICD-10-CM

## 2022-12-06 DIAGNOSIS — Z00.00 ANNUAL PHYSICAL EXAM: Primary | ICD-10-CM

## 2022-12-06 DIAGNOSIS — L30.9 DERMATITIS: ICD-10-CM

## 2022-12-06 DIAGNOSIS — R97.20 ELEVATED PSA: ICD-10-CM

## 2022-12-06 DIAGNOSIS — G47.33 OBSTRUCTIVE SLEEP APNEA: ICD-10-CM

## 2022-12-06 DIAGNOSIS — B35.1 ONYCHOMYCOSIS OF TOENAIL: ICD-10-CM

## 2022-12-06 NOTE — PATIENT INSTRUCTIONS
Wellness Visit for Adults   AMBULATORY CARE:   A wellness visit  is when you see your healthcare provider to get screened for health problems  Your healthcare provider will also give you advice on how to stay healthy  Write down your questions so you remember to ask them  Ask your healthcare provider how often you should have a wellness visit  What happens at a wellness visit:  Your healthcare provider will ask about your health, and your family history of health problems  This includes high blood pressure, heart disease, and cancer  He or she will ask if you have symptoms that concern you, if you smoke, and about your mood  You may also be asked about your intake of medicines, supplements, food, and alcohol  Any of the following may be done:  · Your weight  will be checked  Your height may also be checked so your body mass index (BMI) can be calculated  Your BMI shows if you are at a healthy weight  · Your blood pressure  and heart rate will be checked  Your temperature may also be checked  · Blood and urine tests  may be done  Blood tests may be done to check your cholesterol levels  Abnormal cholesterol levels increase your risk for heart disease and stroke  You may also need a blood or urine test to check for diabetes if you are at increased risk  Urine tests may be done to look for signs of an infection or kidney disease  · A physical exam  includes checking your heartbeat and lungs with a stethoscope  Your healthcare provider may also check your skin to look for sun damage  · Screening tests  may be recommended  A screening test is done to check for diseases that may not cause symptoms  The screening tests you may need depend on your age, gender, family history, and lifestyle habits  For example, colorectal screening may be recommended if you are 48years old or older  Screening tests you need if you are a woman:   · A Pap smear  is used to screen for cervical cancer   Pap smears are usually done every 3 to 5 years depending on your age  You may need them more often if you have had abnormal Pap smear test results in the past  Ask your healthcare provider how often you should have a Pap smear  · A mammogram  is an x-ray of your breasts to screen for breast cancer  Experts recommend mammograms every 2 years starting at age 48 years  You may need a mammogram at age 52 years or younger if you have an increased risk for breast cancer  Talk to your healthcare provider about when you should start having mammograms and how often you need them  Vaccines you may need:   · Get an influenza vaccine  every year  The influenza vaccine protects you from the flu  Several types of viruses cause the flu  The viruses change over time, so new vaccines are made each year  · Get a tetanus-diphtheria (Td) booster vaccine  every 10 years  This vaccine protects you against tetanus and diphtheria  Tetanus is a severe infection that may cause painful muscle spasms and lockjaw  Diphtheria is a severe bacterial infection that causes a thick covering in the back of your mouth and throat  · Get a human papillomavirus (HPV) vaccine  if you are female and aged 23 to 32 or male 23 to 24 and never received it  This vaccine protects you from HPV infection  HPV is the most common infection spread by sexual contact  HPV may also cause vaginal, penile, and anal cancers  · Get a pneumococcal vaccine  if you are aged 72 years or older  The pneumococcal vaccine is an injection given to protect you from pneumococcal disease  Pneumococcal disease is an infection caused by pneumococcal bacteria  The infection may cause pneumonia, meningitis, or an ear infection  · Get a shingles vaccine  if you are 60 or older, even if you have had shingles before  The shingles vaccine is an injection to protect you from the varicella-zoster virus  This is the same virus that causes chickenpox   Shingles is a painful rash that develops in people who had chickenpox or have been exposed to the virus  How to eat healthy:  My Plate is a model for planning healthy meals  It shows the types and amounts of foods that should go on your plate  Fruits and vegetables make up about half of your plate, and grains and protein make up the other half  A serving of dairy is included on the side of your plate  The amount of calories and serving sizes you need depends on your age, gender, weight, and height  Examples of healthy foods are listed below:  · Eat a variety of vegetables  such as dark green, red, and orange vegetables  You can also include canned vegetables low in sodium (salt) and frozen vegetables without added butter or sauces  · Eat a variety of fresh fruits , canned fruit in 100% juice, frozen fruit, and dried fruit  · Include whole grains  At least half of the grains you eat should be whole grains  Examples include whole-wheat bread, wheat pasta, brown rice, and whole-grain cereals such as oatmeal     · Eat a variety of protein foods such as seafood (fish and shellfish), lean meat, and poultry without skin (turkey and chicken)  Examples of lean meats include pork leg, shoulder, or tenderloin, and beef round, sirloin, tenderloin, and extra lean ground beef  Other protein foods include eggs and egg substitutes, beans, peas, soy products, nuts, and seeds  · Choose low-fat dairy products such as skim or 1% milk or low-fat yogurt, cheese, and cottage cheese  · Limit unhealthy fats  such as butter, hard margarine, and shortening  Exercise:  Exercise at least 30 minutes per day on most days of the week  Some examples of exercise include walking, biking, dancing, and swimming  You can also fit in more physical activity by taking the stairs instead of the elevator or parking farther away from stores  Include muscle strengthening activities 2 days each week  Regular exercise provides many health benefits   It helps you manage your weight, and decreases your risk for type 2 diabetes, heart disease, stroke, and high blood pressure  Exercise can also help improve your mood  Ask your healthcare provider about the best exercise plan for you  General health and safety guidelines:   · Do not smoke  Nicotine and other chemicals in cigarettes and cigars can cause lung damage  Ask your healthcare provider for information if you currently smoke and need help to quit  E-cigarettes or smokeless tobacco still contain nicotine  Talk to your healthcare provider before you use these products  · Limit alcohol  A drink of alcohol is 12 ounces of beer, 5 ounces of wine, or 1½ ounces of liquor  · Lose weight, if needed  Being overweight increases your risk of certain health conditions  These include heart disease, high blood pressure, type 2 diabetes, and certain types of cancer  · Protect your skin  Do not sunbathe or use tanning beds  Use sunscreen with a SPF 15 or higher  Apply sunscreen at least 15 minutes before you go outside  Reapply sunscreen every 2 hours  Wear protective clothing, hats, and sunglasses when you are outside  · Drive safely  Always wear your seatbelt  Make sure everyone in your car wears a seatbelt  A seatbelt can save your life if you are in an accident  Do not use your cell phone when you are driving  This could distract you and cause an accident  Pull over if you need to make a call or send a text message  · Practice safe sex  Use latex condoms if are sexually active and have more than one partner  Your healthcare provider may recommend screening tests for sexually transmitted infections (STIs)  · Wear helmets, lifejackets, and protective gear  Always wear a helmet when you ride a bike or motorcycle, go skiing, or play sports that could cause a head injury  Wear protective equipment when you play sports  Wear a lifejacket when you are on a boat or doing water sports      © Copyright [address] 2022 Information is for End User's use only and may not be sold, redistributed or otherwise used for commercial purposes  All illustrations and images included in CareNotes® are the copyrighted property of [employer] M [employer]  or Natanael Alas  The above information is an  only  It is not intended as medical advice for individual conditions or treatments  Talk to your doctor, nurse or pharmacist before following any medical regimen to see if it is safe and effective for you  Weight Management   AMBULATORY CARE:   Why it is important to manage your weight:  Being overweight increases your risk of health conditions such as heart disease, high blood pressure, type 2 diabetes, and certain types of cancer  It can also increase your risk for osteoarthritis, sleep apnea, and other respiratory problems  Aim for a slow, steady weight loss  Even a small amount of weight loss can lower your risk of health problems  Risks of being overweight:  Extra weight can cause many health problems, including the following:  · Diabetes (high blood sugar level)    · High blood pressure or high cholesterol    · Heart disease    · Stroke    · Gallbladder or liver disease    · Cancer of the colon, breast, prostate, liver, or kidney    · Sleep apnea    · Arthritis or gout    Screening  is done to check for health conditions before you have signs or symptoms  If you are 28to 79years old, your blood sugar level may be checked every 3 years for signs of prediabetes or diabetes  Your healthcare provider will check your blood pressure at each visit  High blood pressure can lead to a stroke or other problems  Your provider may check for signs of heart disease, cancer, or other health problems  How to lose weight safely:  A safe and healthy way to lose weight is to eat fewer calories and get regular exercise  · You can lose up about 1 pound a week by decreasing the number of calories you eat by 500 calories each day   You can decrease calories by eating smaller portion sizes or by cutting out high-calorie foods  Read labels to find out how many calories are in the foods you eat  · You can also burn calories with exercise such as walking, swimming, or biking  You will be more likely to keep weight off if you make these changes part of your lifestyle  Exercise at least 30 minutes per day on most days of the week  You can also fit in more physical activity by taking the stairs instead of the elevator or parking farther away from stores  Ask your healthcare provider about the best exercise plan for you  Healthy meal plan for weight management:  A healthy meal plan includes a variety of foods, contains fewer calories, and helps you stay healthy  A healthy meal plan includes the following:     · Eat whole-grain foods more often  A healthy meal plan should contain fiber  Fiber is the part of grains, fruits, and vegetables that is not broken down by your body  Whole-grain foods are healthy and provide extra fiber in your diet  Some examples of whole-grain foods are whole-wheat breads and pastas, oatmeal, brown rice, and bulgur  · Eat a variety of vegetables every day  Include dark, leafy greens such as spinach, kale, camacho greens, and mustard greens  Eat yellow and orange vegetables such as carrots, sweet potatoes, and winter squash  · Eat a variety of fruits every day  Choose fresh or canned fruit (canned in its own juice or light syrup) instead of juice  Fruit juice has very little or no fiber  · Eat low-fat dairy foods  Drink fat-free (skim) milk or 1% milk  Eat fat-free yogurt and low-fat cottage cheese  Try low-fat cheeses such as mozzarella and other reduced-fat cheeses  · Choose meat and other protein foods that are low in fat  Choose beans or other legumes such as split peas or lentils  Choose fish, skinless poultry (chicken or turkey), or lean cuts of red meat (beef or pork)   Before you cook meat or poultry, cut off any visible fat  · Use less fat and oil  Try baking foods instead of frying them  Add less fat, such as margarine, sour cream, regular salad dressing and mayonnaise to foods  Eat fewer high-fat foods  Some examples of high-fat foods include french fries, doughnuts, ice cream, and cakes  · Eat fewer sweets  Limit foods and drinks that are high in sugar  This includes candy, cookies, regular soda, and sweetened drinks  Ways to decrease calories:   · Eat smaller portions  ? Use a small plate with smaller servings  ? Do not eat second helpings  ? When you eat at a restaurant, ask for a box and place half of your meal in the box before you eat  ? Share an entrée with someone else  · Replace high-calorie snacks with healthy, low-calorie snacks  ? Choose fresh fruit, vegetables, fat-free rice cakes, or air-popped popcorn instead of potato chips, nuts, or chocolate  ? Choose water or calorie-free drinks instead of soda or sweetened drinks  · Do not shop for groceries when you are hungry  You may be more likely to make unhealthy food choices  Take a grocery list of healthy foods and shop after you have eaten  · Eat regular meals  Do not skip meals  Skipping meals can lead to overeating later in the day  This can make it harder for you to lose weight  Eat a healthy snack in place of a meal if you do not have time to eat a regular meal  Talk with a dietitian to help you create a meal plan and schedule that is right for you  Other things to consider as you try to lose weight:   · Be aware of situations that may give you the urge to overeat, such as eating while watching television  Find ways to avoid these situations  For example, read a book, go for a walk, or do crafts  · Meet with a weight loss support group or friends who are also trying to lose weight  This may help you stay motivated to continue working on your weight loss goals      © Copyright [employer] 2022 Information is for End User's use only and may not be sold, redistributed or otherwise used for commercial purposes  All illustrations and images included in CareNotes® are the copyrighted property of [employer]  or Natanael Alas  The above information is an  only  It is not intended as medical advice for individual conditions or treatments  Talk to your doctor, nurse or pharmacist before following any medical regimen to see if it is safe and effective for you

## 2022-12-06 NOTE — PROGRESS NOTES
ADULT ANNUAL PHYSICAL  [address]    NAME: Odette Styles  AGE: 45 y o  SEX: male  : 1970     DATE: 2022     Assessment and Plan:     Problem List Items Addressed This Visit        Respiratory    Obstructive sleep apnea       Cardiovascular and Mediastinum    Hypertension       Other    Hyperlipemia    Obesity, morbid, BMI 40 0-49 9 (HCC)    Elevated PSA   Other Visit Diagnoses     Annual physical exam    -  Primary    Encounter for immunization        Relevant Orders    FLUZONE: influenza vaccine, quadrivalent, 0 5 mL (Completed)    Dermatitis        Relevant Medications    ciclopirox (LOPROX) 0 77 % cream    Onychomycosis of toenail        Relevant Medications    ciclopirox (LOPROX) 0 77 % cream    Other Relevant Orders    Ambulatory Referral to Podiatry          Immunizations and preventive care screenings were discussed with patient today  Appropriate education was printed on patient's after visit summary  Discussed risks and benefits of prostate cancer screening  We discussed the controversial history of PSA screening for prostate cancer in the [place] as well as the risk of over detection and over treatment of prostate cancer by way of PSA screening  The patient understands that PSA blood testing is an imperfect way to screen for prostate cancer and that elevated PSA levels in the blood may also be caused by infection, inflammation, prostatic trauma or manipulation, urological procedures, or by benign prostatic enlargement  The role of the digital rectal examination in prostate cancer screening was also discussed and I discussed with him that there is large interobserver variability in the findings of digital rectal examination  Counseling:  Alcohol/drug use: discussed moderation in alcohol intake, the recommendations for healthy alcohol use, and avoidance of illicit drug use    Dental Health: discussed importance of regular tooth brushing, flossing, and dental visits  Injury prevention: discussed safety/seat belts, safety helmets, smoke detectors, carbon dioxide detectors, and smoking near bedding or upholstery  Sexual health: discussed sexually transmitted diseases, partner selection, use of condoms, avoidance of unintended pregnancy, and contraceptive alternatives  Exercise: the importance of regular exercise/physical activity was discussed  Recommend exercise 3-5 times per week for at least 30 minutes  Depression Screening and Follow-up Plan: Patient was screened for depression during today's encounter  They screened negative with a PHQ-2 score of 0  Return in 6 months (on 6/6/2023)  Chief Complaint:     Chief Complaint   Patient presents with   • Rash     Patient is complaining of a rash on his chest for months  Patient is using different over the counter medications with no relief  • Well Check     Annual physical   • Flu Vaccine     Fluzone given      History of Present Illness:     Adult Annual Physical   Patient here for a comprehensive physical exam  The patient reports no problems  Diet and Physical Activity  Diet/Nutrition: well balanced diet and consuming 3-5 servings of fruits/vegetables daily  Exercise: no formal exercise  Depression Screening  PHQ-2/9 Depression Screening    Little interest or pleasure in doing things: 0 - not at all  Feeling down, depressed, or hopeless: 0 - not at all  PHQ-2 Score: 0  PHQ-2 Interpretation: Negative depression screen       General Health  Sleep: sleeps well  Hearing: normal - bilateral   Vision: no vision problems  Dental: no dental visits for >1 year   Health  Symptoms include: none     Review of Systems:     Review of Systems   Constitutional: Negative  HENT: Negative  Eyes: Negative  Respiratory: Negative  Cardiovascular: Negative  Gastrointestinal: Negative  Endocrine: Negative  Genitourinary: Negative  Musculoskeletal: Negative  Skin: Positive for rash  Allergic/Immunologic: Negative  Neurological: Negative  Hematological: Negative  Psychiatric/Behavioral: Negative  All other systems reviewed and are negative       Past Medical History:     Past Medical History:   Diagnosis Date   • Allergic    • Anxiety    • Arthritis    • Asthma    • CPAP (continuous positive airway pressure) dependence    • Depression    • Hyperlipemia    • Hypertension    • Obesity       Past Surgical History:     Past Surgical History:   Procedure Laterality Date   • HEMORRHOID SURGERY     • TONSILLECTOMY     • URETHRAL DILATION  2019      Family History:     Family History   Problem Relation Age of Onset   • Hypertension Mother    • Hyperlipidemia Mother    • Heart disease Mother    • Hypertension Father    • Heart disease Father    • Hyperlipidemia Father    • COPD Sister    • Liver disease Sister       Social History:     Social History     Socioeconomic History   • Marital status: Single     Spouse name: None   • Number of children: None   • Years of education: None   • Highest education level: None   Occupational History   • Occupation: employed   Tobacco Use   • Smoking status: Former     Types: Cigarettes   • Smokeless tobacco: Never   Vaping Use   • Vaping Use: Every day   • Substances: Nicotine   Substance and Sexual Activity   • Alcohol use: Not Currently   • Drug use: Never   • Sexual activity: Yes     Partners: Female   Other Topics Concern   • None   Social History Narrative   • None     Social Determinants of Health     Financial Resource Strain: Not on file   Food Insecurity: Not on file   Transportation Needs: Not on file   Physical Activity: Not on file   Stress: Not on file   Social Connections: Not on file   Intimate Partner Violence: Not on file   Housing Stability: Not on file      Current Medications:     Current Outpatient Medications   Medication Sig Dispense Refill   • camphor-menthol (SARNA) lotion Apply topically as needed for itching 222 mL 0   • ciclopirox (LOPROX) 0 77 % cream Apply topically 2 (two) times a day 30 g 1   • Cyanocobalamin (Vitamin B-12) 2500 MCG SUBL Place under the tongue     • diltiazem (CARDIZEM CD) 240 mg 24 hr capsule TAKE 1 CAPSULE BY MOUTH DAILY 30 capsule 5   • ergocalciferol (VITAMIN D2) 50,000 units Take 1 capsule (50,000 Units total) by mouth once a week 4 capsule 3   • ibuprofen (MOTRIN) 800 mg tablet Take 800 mg by mouth every 8 (eight) hours as needed     • losartan (COZAAR) 100 MG tablet Take 1 tablet (100 mg total) by mouth daily 30 tablet 5   • metoprolol succinate (TOPROL-XL) 50 mg 24 hr tablet Take 1 tablet (50 mg total) by mouth daily 30 tablet 5   • Multiple Vitamin (ONE-A-DAY MENS PO) Take by mouth     • pravastatin (PRAVACHOL) 20 mg tablet Take 1 tablet (20 mg total) by mouth daily 30 tablet 5   • spironolactone (ALDACTONE) 25 mg tablet Take 1 tablet (25 mg total) by mouth daily 30 tablet 5   • testosterone (ANDROGEL) 1 62 % TD gel pump apply 1 pump to skin daily 75 g 5   • amoxicillin (AMOXIL) 500 mg capsule Take 500 mg by mouth 3 (three) times a day (Patient not taking: Reported on 12/6/2022)     • cyclobenzaprine (FLEXERIL) 10 mg tablet Take 1 tablet (10 mg total) by mouth daily at bedtime (Patient not taking: Reported on 9/12/2022) 30 tablet 0   • diclofenac (VOLTAREN) 75 mg EC tablet Take 1 tablet (75 mg total) by mouth 2 (two) times a day (Patient not taking: Reported on 9/12/2022) 60 tablet 1   • Testosterone 12 5 MG/ACT (1%) GEL Apply 1 mg topically daily (Patient not taking: Reported on 12/6/2022) 75 g 5     No current facility-administered medications for this visit        Allergies:     No Known Allergies   Physical Exam:     /80 (BP Location: Left arm, Patient Position: Sitting, Cuff Size: Adult)   Pulse 73   Temp 97 6 °F (36 4 °C) (Tympanic)   Ht 6' 3" (1 905 m)   Wt (!) 166 kg (367 lb)   SpO2 99%   BMI 45 87 kg/m²     Physical Exam  Vitals and nursing note reviewed  Constitutional:       Appearance: He is well-developed and well-nourished  HENT:      Head: Normocephalic  Nose: Nose normal    Eyes:      Extraocular Movements: EOM normal       Conjunctiva/sclera: Conjunctivae normal       Pupils: Pupils are equal, round, and reactive to light  Cardiovascular:      Rate and Rhythm: Normal rate  Pulmonary:      Effort: Pulmonary effort is normal       Breath sounds: Normal breath sounds  Abdominal:      General: Bowel sounds are normal       Palpations: Abdomen is soft  Musculoskeletal:         General: Normal range of motion  Cervical back: Normal range of motion and neck supple  Skin:     General: Skin is warm and dry  Findings: Rash present  Comments: Dry irritated skin both nipples  Neurological:      General: No focal deficit present  Mental Status: He is alert and oriented to person, place, and time  Psychiatric:         Mood and Affect: Mood and affect normal          Behavior: Behavior normal          Thought Content:  Thought content normal          Judgment: Judgment [place]  [id number] [address]

## 2022-12-07 ENCOUNTER — OFFICE VISIT (OUTPATIENT)
Dept: PODIATRY | Facility: CLINIC | Age: 52
End: 2022-12-07

## 2022-12-07 VITALS
DIASTOLIC BLOOD PRESSURE: 84 MMHG | WEIGHT: 315 LBS | HEIGHT: 75 IN | BODY MASS INDEX: 39.17 KG/M2 | SYSTOLIC BLOOD PRESSURE: 145 MMHG | HEART RATE: 80 BPM

## 2022-12-07 DIAGNOSIS — B35.1 ONYCHOMYCOSIS OF TOENAIL: ICD-10-CM

## 2022-12-07 DIAGNOSIS — L60.0 INGROWN NAIL: Primary | ICD-10-CM

## 2022-12-07 NOTE — LETTER
December 12, 2022     Vas[facility] Doming[facility], [address] [address]    Patient: Lenin Goetz   YOB: 1970   Date of Visit: 12/7/2022       Dear Dr Haylie Nieves: Thank you for referring Lenin Goetz to me for evaluation  Below are my notes for this consultation  If you have questions, please do not hesitate to call me  I look forward to following your patient along with you  Sincerely,        Donato Marie DPM        CC: No Recipients  JAMAL Vasquez [facility]  12/7/2022  7:49 PM  Signed  This patient was seen on 12/2/22  My role is Foot , Ankle, and Wound Specialist    SUBJECTIVE    Chief Complaint:  Ingrown nails    Patient ID: Lenin Goetz is a 45 y o  male  June Childress is here for the first time with a cc chronic ingrown nails of the medial and lateral hallux nails  This has gone on for years  He's tried to deal with this himself with "bathroom surgery" as he states it  The following portions of the patient's history were reviewed and updated as appropriate: allergies, current medications, past family history, past medical history, past social history, past surgical history and problem list     Review of Systems   Constitutional: Negative  Cardiovascular: Negative  Musculoskeletal: Positive for gait problem  OBJECTIVE      /84   Pulse 80   Ht 6' 3" (1 905 m)   Wt (!) 167 kg (369 lb)   BMI 46 12 kg/m²     Foot/Ankle Musculoskeletal Exam    Neurovascular    Right      Dorsalis pedis: 2+      Posterior tibial: 2+    Left      Dorsalis pedis: 2+      Posterior tibial: 2+    General  General additional comments: Pes planus deformity noted bilaterally  Physical Exam  Vitals and nursing note reviewed  Constitutional:       General: He is not in acute distress  Appearance: Normal appearance  He is not ill-appearing, toxic-appearing or diaphoretic  HENT:      Head: Normocephalic and atraumatic     Cardiovascular:      Pulses:           Dorsalis pedis pulses are 2+ on the right side and 2+ on the left side  Posterior tibial pulses are 2+ on the right side and 2+ on the left side  Pulmonary:      Effort: Pulmonary effort is normal       Breath sounds: Normal breath sounds  Musculoskeletal:         General: Tenderness present  Comments: Pes planus deformity noted bilaterally  Feet:      Right foot:      Protective Sensation: 10 sites tested  10 sites sensed  Left foot:      Protective Sensation: 10 sites tested  10 sites sensed  Skin:     Capillary Refill: Capillary refill takes less than 2 seconds  Comments: Pincer nail deformity noted bilateral hallux nails  No bacterial infection noted  Neurological:      General: No focal deficit present  Mental Status: He is oriented to person, place, and time  ASSESSMENT    Diagnoses and all orders for this visit:    Ingrown nail    Onychomycosis of toenail  -     Ambulatory Referral to Podiatry        Problem List Items Addressed This Visit        Musculoskeletal and Integument    Ingrown nail - Primary   Other Visit Diagnoses     Onychomycosis of toenail                  PLAN    I discussed options with him including palliative care vs  permanent surgical resection with phenol matrixectomy procedure  Pros and cons of both options discussed in depth  He wants to proceed with the latter  We schedule this in the office under local anesthesia

## 2022-12-08 NOTE — PROGRESS NOTES
This patient was seen on 12/2/22  My role is Foot , Ankle, and Wound Specialist    SUBJECTIVE    Chief Complaint:  Ingrown nails     Patient ID: Sigifredo Augustin is a 45 y o  male  Dewey Donis is here for the first time with a cc chronic ingrown nails of the medial and lateral hallux nails  This has gone on for years  He's tried to deal with this himself with "bathroom surgery" as he states it  The following portions of the patient's history were reviewed and updated as appropriate: allergies, current medications, past family history, past medical history, past social history, past surgical history and problem list     Review of Systems   Constitutional: Negative  Cardiovascular: Negative  Musculoskeletal: Positive for gait problem  OBJECTIVE      /84   Pulse 80   Ht 6' 3" (1 905 m)   Wt (!) 167 kg (369 lb)   BMI 46 12 kg/m²     Foot/Ankle Musculoskeletal Exam    Neurovascular    Right      Dorsalis pedis: 2+      Posterior tibial: 2+    Left      Dorsalis pedis: 2+      Posterior tibial: 2+    General  General additional comments: Pes planus deformity noted bilaterally  Physical Exam  Vitals and nursing note reviewed  Constitutional:       General: He is not in acute distress  Appearance: Normal appearance  He is not ill-appearing, toxic-appearing or diaphoretic  HENT:      Head: Normocephalic and atraumatic  Cardiovascular:      Pulses:           Dorsalis pedis pulses are 2+ on the right side and 2+ on the left side  Posterior tibial pulses are 2+ on the right side and 2+ on the left side  Pulmonary:      Effort: Pulmonary effort is normal       Breath sounds: Normal breath sounds  Musculoskeletal:         General: Tenderness present  Comments: Pes planus deformity noted bilaterally  Feet:      Right foot:      Protective Sensation: 10 sites tested  10 sites sensed  Left foot:      Protective Sensation: 10 sites tested  10 sites sensed     Skin: Capillary Refill: Capillary refill takes less than 2 seconds  Comments: Pincer nail deformity noted bilateral hallux nails  No bacterial infection noted  Neurological:      General: No focal deficit present  Mental Status: He is oriented to person, place, and time  ASSESSMENT     Diagnoses and all orders for this visit:    Ingrown nail    Onychomycosis of toenail  -     Ambulatory Referral to Podiatry         Problem List Items Addressed This Visit        Musculoskeletal and Integument    Ingrown nail - Primary   Other Visit Diagnoses     Onychomycosis of toenail                  PLAN    I discussed options with him including palliative care vs  permanent surgical resection with phenol matrixectomy procedure  Pros and cons of both options discussed in depth  He wants to proceed with the latter  We schedule this in the office under local anesthesia

## 2022-12-12 ENCOUNTER — OFFICE VISIT (OUTPATIENT)
Dept: URGENT CARE | Facility: MEDICAL CENTER | Age: 52
End: 2022-12-12

## 2022-12-12 ENCOUNTER — TELEPHONE (OUTPATIENT)
Dept: FAMILY MEDICINE CLINIC | Facility: CLINIC | Age: 52
End: 2022-12-12

## 2022-12-12 VITALS
OXYGEN SATURATION: 100 % | DIASTOLIC BLOOD PRESSURE: 92 MMHG | SYSTOLIC BLOOD PRESSURE: 158 MMHG | RESPIRATION RATE: 18 BRPM | HEART RATE: 86 BPM | TEMPERATURE: 98.7 F

## 2022-12-12 DIAGNOSIS — L30.9 DERMATITIS: Primary | ICD-10-CM

## 2022-12-12 DIAGNOSIS — L73.9 FOLLICULITIS: Primary | ICD-10-CM

## 2022-12-12 RX ORDER — HYDROXYZINE HYDROCHLORIDE 10 MG/1
10 TABLET, FILM COATED ORAL EVERY 6 HOURS PRN
Qty: 30 TABLET | Refills: 0 | Status: SHIPPED | OUTPATIENT
Start: 2022-12-12

## 2022-12-12 RX ORDER — DOXYCYCLINE 100 MG/1
100 TABLET ORAL 2 TIMES DAILY
Qty: 20 TABLET | Refills: 0 | Status: SHIPPED | OUTPATIENT
Start: 2022-12-12 | End: 2022-12-22

## 2022-12-12 NOTE — PROGRESS NOTES
[facility]        NAME: Jaret Garcia is a 45 y o  male  : 1970    MRN: [id number]  DATE: 2022  TIME: 4:18 PM    Assessment and Plan   Folliculitis [T80 7]  1  Folliculitis  doxycycline (ADOXA) 100 MG tablet    hydrOXYzine HCL (ATARAX) 10 mg tablet            Patient Instructions       Follow up with PCP in 3-5 days for nipple rash      Chief Complaint     Chief Complaint   Patient presents with   • Rash     Patient c/o rash on his back and chest  He reports being seen by his PCP on 2022 and started on ciclopirox (LOPROX) 0 77 % cream without improvement  History of Present Illness       Patient was seen by his PCP and given a cream 6 days ago for an area around his right nipple that is cracking and open and draining  He states this is not helping  He states that over the last few days he has noticed in at itchy rash that is red and bumping on his back that is separate  Rash  This is a new problem  The current episode started 1 to 4 weeks ago  The problem has been gradually worsening since onset  The affected locations include the back  The problem is moderate  The rash is characterized by itchiness and redness  It is unknown if there was an exposure to a precipitant  Associated symptoms include itching  Pertinent negatives include no anorexia, congestion, cough, fatigue or fever  The treatment provided no relief  Review of Systems   Review of Systems   Constitutional: Negative for fatigue and fever  HENT: Negative for congestion  Respiratory: Negative for cough  Gastrointestinal: Negative for anorexia  Skin: Positive for itching and rash  All other systems reviewed and are negative          Current Medications       Current Outpatient Medications:   •  doxycycline (ADOXA) 100 MG tablet, Take 1 tablet (100 mg total) by mouth 2 (two) times a day for 10 days, Disp: 20 tablet, Rfl: 0  •  hydrOXYzine HCL (ATARAX) 10 mg tablet, Take 1 tablet (10 mg total) by mouth every 6 (six) hours as needed for itching, Disp: 30 tablet, Rfl: 0  •  amoxicillin (AMOXIL) 500 mg capsule, Take 500 mg by mouth 3 (three) times a day (Patient not taking: Reported on 12/6/2022), Disp: , Rfl:   •  camphor-menthol (SARNA) lotion, Apply topically as needed for itching, Disp: 222 mL, Rfl: 0  •  ciclopirox (LOPROX) 0 77 % cream, Apply topically 2 (two) times a day, Disp: 30 g, Rfl: 1  •  Cyanocobalamin (Vitamin B-12) 2500 MCG SUBL, Place under the tongue, Disp: , Rfl:   •  cyclobenzaprine (FLEXERIL) 10 mg tablet, Take 1 tablet (10 mg total) by mouth daily at bedtime (Patient not taking: Reported on 9/12/2022), Disp: 30 tablet, Rfl: 0  •  diclofenac (VOLTAREN) 75 mg EC tablet, Take 1 tablet (75 mg total) by mouth 2 (two) times a day (Patient not taking: Reported on 9/12/2022), Disp: 60 tablet, Rfl: 1  •  diltiazem (CARDIZEM CD) 240 mg 24 hr capsule, TAKE 1 CAPSULE BY MOUTH DAILY, Disp: 30 capsule, Rfl: 5  •  ergocalciferol (VITAMIN D2) 50,000 units, Take 1 capsule (50,000 Units total) by mouth once a week, Disp: 4 capsule, Rfl: 3  •  ibuprofen (MOTRIN) 800 mg tablet, Take 800 mg by mouth every 8 (eight) hours as needed, Disp: , Rfl:   •  losartan (COZAAR) 100 MG tablet, Take 1 tablet (100 mg total) by mouth daily, Disp: 30 tablet, Rfl: 5  •  metoprolol succinate (TOPROL-XL) 50 mg 24 hr tablet, Take 1 tablet (50 mg total) by mouth daily, Disp: 30 tablet, Rfl: 5  •  Multiple Vitamin (ONE-A-DAY MENS PO), Take by mouth, Disp: , Rfl:   •  pravastatin (PRAVACHOL) 20 mg tablet, Take 1 tablet (20 mg total) by mouth daily, Disp: 30 tablet, Rfl: 5  •  spironolactone (ALDACTONE) 25 mg tablet, Take 1 tablet (25 mg total) by mouth daily, Disp: 30 tablet, Rfl: 5  •  testosterone (ANDROGEL) 1 62 % TD gel pump, apply 1 pump to skin daily, Disp: 75 g, Rfl: 5  •  Testosterone 12 5 MG/ACT (1%) GEL, Apply 1 mg topically daily (Patient not taking: Reported on 12/6/2022), Disp: 75 g, Rfl: 5    Current Allergies Allergies as of 12/12/2022   • (No Known Allergies)            The following portions of the patient's history were reviewed and updated as appropriate: allergies, current medications, past family history, past medical history, past social history, past surgical history and problem list      Past Medical History:   Diagnosis Date   • Allergic    • Anxiety    • Arthritis    • Asthma    • CPAP (continuous positive airway pressure) dependence    • Depression    • Hyperlipemia    • Hypertension    • Obesity        Past Surgical History:   Procedure Laterality Date   • HEMORRHOID SURGERY     • TONSILLECTOMY     • URETHRAL DILATION  2019       Family History   Problem Relation Age of Onset   • Hypertension Mother    • Hyperlipidemia Mother    • Heart disease Mother    • Hypertension Father    • Heart disease Father    • Hyperlipidemia Father    • COPD Sister    • Liver disease Sister          Medications have been verified  Objective   /92   Pulse 86   Temp 98 7 °F (37 1 °C)   Resp 18   SpO2 100%   No LMP for male patient  Physical Exam     Physical Exam  Vitals and nursing note reviewed  Constitutional:       Appearance: Normal appearance  He is obese  Cardiovascular:      Rate and Rhythm: Normal rate and regular rhythm  Pulses: Normal pulses  Heart sounds: Normal heart sounds  Pulmonary:      Effort: Pulmonary effort is normal       Breath sounds: Normal breath sounds  Neurological:      Mental Status: He is alert         Back erythematous raised papules that are pruritic

## 2022-12-12 NOTE — TELEPHONE ENCOUNTER
PATIENT CALLED HE SAID THE CREAM THAT WAS PRESCRIBE FOR RASH ON CHEST IS NOT WORKING, AND SAID IT WAS DISCUSSED THAT IF THE CREAM DIDN'T WORK YOU WOULD PUT AN ORDER IN FOR A DERMATOLOGIST, HE WOULD LIKE AN ORDER FOR THE DERMATOLOGIST

## 2022-12-18 DIAGNOSIS — E55.9 VITAMIN D DEFICIENCY: ICD-10-CM

## 2022-12-19 RX ORDER — ERGOCALCIFEROL 1.25 MG/1
CAPSULE ORAL
Qty: 4 CAPSULE | Refills: 0 | Status: SHIPPED | OUTPATIENT
Start: 2022-12-19

## 2023-01-10 ENCOUNTER — PROCEDURE VISIT (OUTPATIENT)
Dept: PODIATRY | Facility: CLINIC | Age: 53
End: 2023-01-10

## 2023-01-10 VITALS
SYSTOLIC BLOOD PRESSURE: 147 MMHG | DIASTOLIC BLOOD PRESSURE: 85 MMHG | WEIGHT: 315 LBS | BODY MASS INDEX: 39.17 KG/M2 | HEIGHT: 75 IN | HEART RATE: 82 BPM

## 2023-01-10 DIAGNOSIS — L60.0 INGROWN NAIL: Primary | ICD-10-CM

## 2023-01-10 RX ORDER — CLOBETASOL PROPIONATE 0.5 MG/G
CREAM TOPICAL
COMMUNITY
Start: 2022-12-13

## 2023-01-10 RX ORDER — FEXOFENADINE HCL 180 MG/1
TABLET ORAL
COMMUNITY
Start: 2022-12-13

## 2023-01-10 NOTE — PROGRESS NOTES
Date Patient Seen: 1/10/23       Subjective:     Chief Complaint:  Ingrown toenails     Patient ID: Marco Chambers is a 45 y o  male  Marquez Yarsani is here for the  cc chronic ingrown nails of the medial and lateral hallux nails for proposed phenol procedures  The following portions of the patient's history were reviewed and updated as appropriate: allergies, current medications, past family history, past medical history, past social history, past surgical history and problem list     Review of Systems   Constitutional: Negative  Cardiovascular: Negative  Musculoskeletal: Positive for gait problem  Objective:      /85   Pulse 82   Ht 6' 3" (1 905 m)   Wt (!) 168 kg (370 lb)   BMI 46 25 kg/m²        Physical Exam  Vitals and nursing note reviewed  Constitutional:       General: He is not in acute distress  Appearance: Normal appearance  He is not ill-appearing, toxic-appearing or diaphoretic  HENT:      Head: Normocephalic and atraumatic  Cardiovascular:      Pulses:           Dorsalis pedis pulses are 2+ on the right side and 2+ on the left side  Posterior tibial pulses are 2+ on the right side and 2+ on the left side  Pulmonary:      Effort: Pulmonary effort is normal       Breath sounds: Normal breath sounds  Musculoskeletal:         General: Tenderness present  Comments: Pes planus deformity noted bilaterally  Feet:      Right foot:      Protective Sensation: 10 sites tested  10 sites sensed  Left foot:      Protective Sensation: 10 sites tested  10 sites sensed  Skin:     Capillary Refill: Capillary refill takes less than 2 seconds  Comments: Pincer nail deformity noted bilateral hallux nails  No bacterial infection noted  Neurological:      General: No focal deficit present  Mental Status: He is oriented to person, place, and time              Diagnoses and all orders for this visit:    Ingrown nail    Other orders  -     triamcinolone (KENALOG) 0 1 % ointment  -     fexofenadine (ALLEGRA) 180 MG tablet  -     clobetasol (TEMOVATE) 0 05 % cream         Assessment:  Chronic incurvated hallux nail margins    Plan:  He wants to proceed with bilateral hallux medial and lateral hallux nail margin phenol procedures  Nail removal    Date/Time: 1/10/2023 9:20 AM  Performed by: Jane Pedro DPM  Authorized by: Jane Pedro DPM     Patient location:  [facility] Protocol:  Consent: Verbal consent obtained  Risks and benefits: risks, benefits and alternatives were discussed  Consent given by: patient  Time out: Immediately prior to procedure a "time out" was called to verify the correct patient, procedure, equipment, support staff and site/side marked as required  Timeout called at: 1/10/2023 9:20 AM   Patient understanding: patient states understanding of the procedure being performed  Patient identity confirmed: verbally with patient      Location:     Foot:  R big toe  Pre-procedure details:     Skin preparation:  Betadine    Preparation: Patient was prepped and draped in the usual sterile fashion    Anesthesia (see MAR for exact dosages): Anesthesia method:  Local infiltration    Local anesthetic:  Lidocaine 1% w/o epi  Nail Removal:     Nail removed:  Partial    Nail removed location: Medial and Lateral     Nail bed sutured: no      Removed nail replaced and anchored: no    Trephination:     Subungual hematoma drained: no    Ingrown nail:     Wedge excision of skin: no      Nail matrix removed or ablated:  Partial  Post-procedure details:     Dressing:  4x4 sterile gauze, antibiotic ointment and gauze roll    Patient tolerance of procedure: Tolerated well, no immediate complications  Nail removal    Date/Time: 1/10/2023 9:21 AM  Performed by: Jane Pedro DPM  Authorized by: Jane Pedro DPM     Patient location:  Clinic[facility] Protocol:  Consent: Verbal consent obtained    Risks and benefits: risks, benefits and alternatives were discussed  Consent given by: patient  Time out: Immediately prior to procedure a "time out" was called to verify the correct patient, procedure, equipment, support staff and site/side marked as required  Timeout called at: 1/10/2023 9:21 AM   Patient understanding: patient states understanding of the procedure being performed  Patient identity confirmed: verbally with patient      Location:     Foot:  L big toe  Pre-procedure details:     Skin preparation:  Betadine    Preparation: Patient was prepped and draped in the usual sterile fashion    Anesthesia (see MAR for exact dosages): Anesthesia method:  Local infiltration    Local anesthetic:  Lidocaine 1% w/o epi  Nail Removal:     Nail removed:  Partial    Nail removed location: Medial and lateral     Nail bed sutured: no      Removed nail replaced and anchored: no    Ingrown nail:     Wedge excision of skin: no      Nail matrix removed or ablated:  Partial  Post-procedure details:     Dressing:  4x4 sterile gauze, antibiotic ointment and gauze roll    Patient tolerance of procedure:   Tolerated well, no immediate complications

## 2023-02-01 ENCOUNTER — OFFICE VISIT (OUTPATIENT)
Dept: PODIATRY | Facility: CLINIC | Age: 53
End: 2023-02-01

## 2023-02-01 VITALS
BODY MASS INDEX: 39.17 KG/M2 | WEIGHT: 315 LBS | HEART RATE: 80 BPM | SYSTOLIC BLOOD PRESSURE: 145 MMHG | HEIGHT: 75 IN | DIASTOLIC BLOOD PRESSURE: 84 MMHG

## 2023-02-01 DIAGNOSIS — L60.0 INGROWN NAIL: Primary | ICD-10-CM

## 2023-02-01 NOTE — PROGRESS NOTES
This patient was seen on 2/1/23  My role is Foot , Ankle, and Wound Specialist    SUBJECTIVE    Chief Complaint:  S/P ingrown nail removal     Patient ID: Roselind Sacks is a 45 y o  male  [facility] is here s/p bilateral phenol procedures  He denies pain, drainage from the sites  His only complaint is chronic discoloration of the nails which is a separate issue  The following portions of the patient's history were reviewed and updated as appropriate: allergies, current medications, past family history, past medical history, past social history, past surgical history and problem list     Review of Systems   Constitutional: Negative  Cardiovascular: Negative  Musculoskeletal: Negative for gait problem  Skin: Positive for color change  OBJECTIVE      /84   Pulse 80   Ht 6' 3" (1 905 m)   Wt (!) 171 kg (377 lb)   BMI 47 12 kg/m²     Foot/Ankle Musculoskeletal Exam    Neurovascular    Right      Dorsalis pedis: 2+      Posterior tibial: 2+    Left      Dorsalis pedis: 2+      Posterior tibial: 2+    General  General additional comments: Pes planus deformity noted bilaterally  Physical Exam  Vitals and nursing note reviewed  Constitutional:       General: He is not in acute distress  Appearance: Normal appearance  He is not ill-appearing, toxic-appearing or diaphoretic  HENT:      Head: Normocephalic and atraumatic  Cardiovascular:      Pulses:           Dorsalis pedis pulses are 2+ on the right side and 2+ on the left side  Posterior tibial pulses are 2+ on the right side and 2+ on the left side  Pulmonary:      Effort: Pulmonary effort is normal       Breath sounds: Normal breath sounds  Musculoskeletal:         General: No tenderness  Comments: Pes planus deformity noted bilaterally  Feet:      Right foot:      Protective Sensation: 10 sites tested  10 sites sensed  Left foot:      Protective Sensation: 10 sites tested  10 sites sensed     Skin: Capillary Refill: Capillary refill takes less than 2 seconds  Comments: Pincer nail deformity noted bilateral hallux nails now s/p bilateral phenol procedures  The surgical sites are clean and dry  No bacterial infection noted  His toenails due have a yellow-brown discoloration and are thickened  Neurological:      General: No focal deficit present  Mental Status: He is oriented to person, place, and time  ASSESSMENT     Diagnoses and all orders for this visit:    Ingrown nail         Problem List Items Addressed This Visit        Musculoskeletal and Integument    Ingrown nail - Primary           PLAN  No further care needed for the healed phenol sites  I'll check him back in three months to ensure there is no regrowth  In the interim, I'm going to have him apply Penlac to the nails in an effort to reduce the nail thickness/discoloration

## 2023-02-02 DIAGNOSIS — E55.9 VITAMIN D DEFICIENCY: ICD-10-CM

## 2023-02-02 RX ORDER — ERGOCALCIFEROL 1.25 MG/1
CAPSULE ORAL
Qty: 4 CAPSULE | Refills: 0 | Status: SHIPPED | OUTPATIENT
Start: 2023-02-02

## 2023-02-24 ENCOUNTER — VBI (OUTPATIENT)
Dept: ADMINISTRATIVE | Facility: OTHER | Age: 53
End: 2023-02-24

## 2023-03-24 ENCOUNTER — OFFICE VISIT (OUTPATIENT)
Dept: FAMILY MEDICINE CLINIC | Facility: CLINIC | Age: 53
End: 2023-03-24

## 2023-03-24 VITALS
HEART RATE: 73 BPM | OXYGEN SATURATION: 98 % | HEIGHT: 75 IN | DIASTOLIC BLOOD PRESSURE: 86 MMHG | WEIGHT: 315 LBS | RESPIRATION RATE: 18 BRPM | TEMPERATURE: 98.6 F | SYSTOLIC BLOOD PRESSURE: 136 MMHG | BODY MASS INDEX: 39.17 KG/M2

## 2023-03-24 DIAGNOSIS — H69.81 DYSFUNCTION OF RIGHT EUSTACHIAN TUBE: ICD-10-CM

## 2023-03-24 DIAGNOSIS — I10 BENIGN ESSENTIAL HYPERTENSION: ICD-10-CM

## 2023-03-24 DIAGNOSIS — G47.33 OBSTRUCTIVE SLEEP APNEA: ICD-10-CM

## 2023-03-24 DIAGNOSIS — J06.9 ACUTE UPPER RESPIRATORY INFECTION: Primary | ICD-10-CM

## 2023-03-24 RX ORDER — FLUTICASONE PROPIONATE 50 MCG
1 SPRAY, SUSPENSION (ML) NASAL DAILY
Qty: 48 G | Refills: 1 | Status: SHIPPED | OUTPATIENT
Start: 2023-03-24

## 2023-03-24 NOTE — PROGRESS NOTES
Name: Callum Solares      : 1970      MRN: [id number]  Encounter Provider: Mckinley Servin PA-C  Encounter Date: 3/24/2023   Encounter department: [address] [place] [address]     1  Acute upper respiratory infection    2  Benign essential hypertension    3  Obstructive sleep apnea    4  Dysfunction of right eustachian tube  -     fluticasone (FLONASE) 50 mcg/act nasal spray; 1 spray into each nostril daily          -Increase clear liquids  - I did send a prescription for Flonase for his right ear eustachian tube dysfunction  - He did call [facility] about his broken CPAP that occurred this week  - He does not monitor his blood pressure at home so I did recommend he hold on any type of Sudafed products  He did try Coricidin in the past but he states it made him very lightheaded  - Continue with Mucinex DM as needed  - I did recommend follow-up with Dr Michele Macias  Follow-up sooner if any symptoms increase, go to the ER if needed    M*Modal software was used to dictate this note  It may contain errors with dictating incorrect words/spelling  Please contact provider directly for any questions  Subjective      Patient presents today for an acute visit for evaluation of upper respiratory symptoms that started over the last 2 to 3 days  He denies any fevers or chills  He states that his right ear feels blocked, no drainage or pain  He has taken Mucinex DM and Robitussin with minimal relief  Denies any shortness of breath  He does have nasal congestion and postnasal drip  He has not checked a COVID test   He does not want a COVID test   He states his CPAP machine broke this week  He has contacted [facility] to get a new one  He does not check his blood pressure at home  Review of Systems   Constitutional: Negative for chills and fever  HENT: Positive for congestion and rhinorrhea  Negative for ear pain and sore throat      Respiratory: Positive for cough  Negative for shortness of breath          Current Outpatient Medications on File Prior to Visit   Medication Sig   • clobetasol (TEMOVATE) 0 05 % cream    • Cyanocobalamin (Vitamin B-12) 2500 MCG SUBL Place under the tongue   • diltiazem (CARDIZEM CD) 240 mg 24 hr capsule TAKE ONE CAPSULE BY MOUTH ONCE DAILY   • ergocalciferol (VITAMIN D2) 50,000 units TAKE 1 CAPSULE BY MOUTH ONCE A WEEK   • losartan (COZAAR) 100 MG tablet TAKE 1 TABLET BY MOUTH ONCE DAILY   • metoprolol succinate (TOPROL-XL) 50 mg 24 hr tablet TAKE 1 TABLET BY MOUTH ONCE DAILY   • Multiple Vitamin (ONE-A-DAY MENS PO) Take by mouth   • spironolactone (ALDACTONE) 25 mg tablet TAKE ONE TABLET BY MOUTH ONCE DAILY   • testosterone (ANDROGEL) 1 62 % TD gel pump APPLY 1 PUMP TO SKIN DAILY   • amoxicillin (AMOXIL) 500 mg capsule Take 500 mg by mouth 3 (three) times a day (Patient not taking: Reported on 12/6/2022)   • camphor-menthol (SARNA) lotion Apply topically as needed for itching (Patient not taking: Reported on 3/24/2023)   • ciclopirox (LOPROX) 0 77 % cream Apply topically 2 (two) times a day (Patient not taking: Reported on 3/24/2023)   • ciclopirox (PENLAC) 8 % solution Apply topically daily at bedtime (Patient not taking: Reported on 3/24/2023)   • cyclobenzaprine (FLEXERIL) 10 mg tablet Take 1 tablet (10 mg total) by mouth daily at bedtime (Patient not taking: Reported on 9/12/2022)   • diclofenac (VOLTAREN) 75 mg EC tablet Take 1 tablet (75 mg total) by mouth 2 (two) times a day (Patient not taking: Reported on 9/12/2022)   • fexofenadine (ALLEGRA) 180 MG tablet  (Patient not taking: Reported on 3/24/2023)   • hydrOXYzine HCL (ATARAX) 10 mg tablet Take 1 tablet (10 mg total) by mouth every 6 (six) hours as needed for itching (Patient not taking: Reported on 3/24/2023)   • ibuprofen (MOTRIN) 800 mg tablet Take 800 mg by mouth every 8 (eight) hours as needed (Patient not taking: Reported on 3/24/2023)   • pravastatin (PRAVACHOL) 20 mg tablet Take 1 tablet (20 mg total) by mouth daily   • Testosterone 12 5 MG/ACT (1%) GEL Apply 1 mg topically daily (Patient not taking: Reported on 12/6/2022)   • triamcinolone (KENALOG) 0 1 % ointment  (Patient not taking: Reported on 3/24/2023)       Objective     /86 (BP Location: Left arm, Patient Position: Sitting, Cuff Size: Large)   Pulse 73   Temp 98 6 °F (37 °C) (Tympanic)   Resp 18   Ht 6' 3" (1 905 m)   Wt (!) 160 kg (352 lb)   SpO2 98%   BMI 44 00 kg/m²     Physical Exam  Vitals reviewed  Constitutional:       General: He is not in acute distress  Appearance: Normal appearance  He is not ill-appearing, toxic-appearing or diaphoretic  HENT:      Head: Normocephalic and atraumatic  Left Ear: Tympanic membrane normal  There is no impacted cerumen  Ears:      Comments: Reduced light reflex right TM but no erythema  Cardiovascular:      Rate and Rhythm: Normal rate and regular rhythm  Heart sounds: No murmur heard  Pulmonary:      Effort: Pulmonary effort is normal  No respiratory distress  Breath sounds: Normal breath sounds  No wheezing, rhonchi or rales  Musculoskeletal:      Cervical back: Neck supple  Neurological:      General: No focal deficit present  Mental Status: He is alert  Psychiatric:         Mood and Affect: Mood normal          Behavior: Behavior normal          Thought Content:  Thought content normal          Judgment: Judgment normal        Jaqueline Keith PA-C

## 2023-04-25 DIAGNOSIS — E78.2 MIXED HYPERLIPIDEMIA: ICD-10-CM

## 2023-04-25 DIAGNOSIS — E29.1 HYPOGONADISM IN MALE: ICD-10-CM

## 2023-04-26 DIAGNOSIS — E78.2 MIXED HYPERLIPIDEMIA: ICD-10-CM

## 2023-04-26 DIAGNOSIS — R97.20 ELEVATED PSA: ICD-10-CM

## 2023-04-26 DIAGNOSIS — I10 PRIMARY HYPERTENSION: Primary | ICD-10-CM

## 2023-04-26 DIAGNOSIS — R35.0 BENIGN PROSTATIC HYPERPLASIA WITH URINARY FREQUENCY: ICD-10-CM

## 2023-04-26 DIAGNOSIS — N40.1 BENIGN PROSTATIC HYPERPLASIA WITH URINARY FREQUENCY: ICD-10-CM

## 2023-04-26 RX ORDER — TESTOSTERONE 16.2 MG/G
GEL TRANSDERMAL
Qty: 75 G | Refills: 0 | Status: SHIPPED | OUTPATIENT
Start: 2023-04-26

## 2023-04-26 RX ORDER — PRAVASTATIN SODIUM 20 MG
TABLET ORAL
Qty: 30 TABLET | Refills: 0 | Status: SHIPPED | OUTPATIENT
Start: 2023-04-26

## 2023-05-04 ENCOUNTER — OFFICE VISIT (OUTPATIENT)
Dept: PODIATRY | Facility: CLINIC | Age: 53
End: 2023-05-04

## 2023-05-04 VITALS
HEART RATE: 76 BPM | WEIGHT: 315 LBS | HEIGHT: 75 IN | SYSTOLIC BLOOD PRESSURE: 141 MMHG | DIASTOLIC BLOOD PRESSURE: 85 MMHG | BODY MASS INDEX: 39.17 KG/M2

## 2023-05-04 DIAGNOSIS — L60.0 INGROWN NAIL: ICD-10-CM

## 2023-05-04 DIAGNOSIS — B35.1 ONYCHOMYCOSIS: Primary | ICD-10-CM

## 2023-05-04 NOTE — PROGRESS NOTES
"This patient was seen on 5/4/23  My role is Foot , Ankle, and Wound Specialist    SUBJECTIVE    Chief Complaint:  S/P phenol procedures     Patient ID: Winter Garcia is a 45 y o  male  Julieth Villagomez is here s/p bilateral phenol procedures  He denies pain, drainage from the sites  His only complaint is chronic discoloration of the nails which is a separate issue; he's using a topical antifungal and wants to know if her could go on \"pills\" instead  He does admit to regular alcohol consumption  The following portions of the patient's history were reviewed and updated as appropriate: allergies, current medications, past family history, past medical history, past social history, past surgical history and problem list     Review of Systems   Constitutional: Negative  Cardiovascular: Negative  Musculoskeletal: Negative for gait problem  Skin: Positive for color change  OBJECTIVE      /85   Pulse 76   Ht 6' 3\" (1 905 m)   Wt (!) 170 kg (375 lb)   BMI 46 87 kg/m²     Foot/Ankle Musculoskeletal Exam    Neurovascular    Right      Dorsalis pedis: 2+      Posterior tibial: 2+    Left      Dorsalis pedis: 2+      Posterior tibial: 2+    General  General additional comments: Pes planus deformity noted bilaterally  Physical Exam  Vitals and nursing note reviewed  Constitutional:       General: He is not in acute distress  Appearance: Normal appearance  He is not ill-appearing, toxic-appearing or diaphoretic  HENT:      Head: Normocephalic and atraumatic  Cardiovascular:      Pulses:           Dorsalis pedis pulses are 2+ on the right side and 2+ on the left side  Posterior tibial pulses are 2+ on the right side and 2+ on the left side  Pulmonary:      Effort: Pulmonary effort is normal       Breath sounds: Normal breath sounds  Musculoskeletal:         General: No tenderness  Comments: Pes planus deformity noted bilaterally     Feet:      Right foot:      Protective " Sensation: 10 sites tested  10 sites sensed  Left foot:      Protective Sensation: 10 sites tested  10 sites sensed  Skin:     Capillary Refill: Capillary refill takes less than 2 seconds  Comments: Pincer nail deformity noted bilateral hallux nails now s/p bilateral phenol procedures  The surgical sites are clean and dry  No bacterial infection noted  His toenails due have a yellow-brown discoloration and are thickened  Neurological:      General: No focal deficit present  Mental Status: He is oriented to person, place, and time  ASSESSMENT     Diagnoses and all orders for this visit:    Ingrown nail         Problem List Items Addressed This Visit        Musculoskeletal and Integument    Ingrown nail - Primary           PLAN  I am going to opt not to prescribe an oral antifungal in light of his unwillingness to commit to abstaining from alcohol during the treatment period  I am going to continue him on topical antifungals  I refilled the medication

## 2023-05-10 ENCOUNTER — OFFICE VISIT (OUTPATIENT)
Dept: FAMILY MEDICINE CLINIC | Facility: CLINIC | Age: 53
End: 2023-05-10

## 2023-05-10 VITALS
BODY MASS INDEX: 39.17 KG/M2 | SYSTOLIC BLOOD PRESSURE: 144 MMHG | HEIGHT: 75 IN | TEMPERATURE: 98 F | DIASTOLIC BLOOD PRESSURE: 90 MMHG | OXYGEN SATURATION: 98 % | HEART RATE: 100 BPM | WEIGHT: 315 LBS

## 2023-05-10 DIAGNOSIS — M19.90 ARTHRITIS: ICD-10-CM

## 2023-05-10 DIAGNOSIS — H69.81 DYSFUNCTION OF RIGHT EUSTACHIAN TUBE: ICD-10-CM

## 2023-05-10 DIAGNOSIS — T78.40XS ALLERGY, SEQUELA: Primary | ICD-10-CM

## 2023-05-10 DIAGNOSIS — E78.2 MIXED HYPERLIPIDEMIA: ICD-10-CM

## 2023-05-10 RX ORDER — FLUTICASONE PROPIONATE 50 MCG
1 SPRAY, SUSPENSION (ML) NASAL DAILY
Qty: 48 G | Refills: 1 | Status: SHIPPED | OUTPATIENT
Start: 2023-05-10

## 2023-05-10 RX ORDER — PRAVASTATIN SODIUM 20 MG
20 TABLET ORAL DAILY
Qty: 30 TABLET | Refills: 3 | Status: SHIPPED | OUTPATIENT
Start: 2023-05-10

## 2023-05-10 RX ORDER — MONTELUKAST SODIUM 10 MG/1
10 TABLET ORAL
Qty: 30 TABLET | Refills: 5 | Status: SHIPPED | OUTPATIENT
Start: 2023-05-10

## 2023-05-10 RX ORDER — OLOPATADINE HYDROCHLORIDE 1 MG/ML
1 SOLUTION/ DROPS OPHTHALMIC 2 TIMES DAILY
Qty: 5 ML | Refills: 5 | Status: SHIPPED | OUTPATIENT
Start: 2023-05-10

## 2023-05-10 RX ORDER — DICLOFENAC SODIUM 75 MG/1
75 TABLET, DELAYED RELEASE ORAL 2 TIMES DAILY
Qty: 60 TABLET | Refills: 3 | Status: SHIPPED | OUTPATIENT
Start: 2023-05-10

## 2023-05-10 NOTE — PROGRESS NOTES
Name: Raymond Fox      : 1970      MRN: [id number]  Encounter Provider: Kellen Rasmussen DO  Encounter Date: 5/10/2023   Encounter department: [address] [place] [address]  Allergy, sequela  -     montelukast (SINGULAIR) 10 mg tablet; Take 1 tablet (10 mg total) by mouth daily at bedtime  -     olopatadine (PATANOL) 0 1 % ophthalmic solution; Administer 1 drop to both eyes 2 (two) times a day    2  Mixed hyperlipidemia  -     pravastatin (PRAVACHOL) 20 mg tablet; Take 1 tablet (20 mg total) by mouth daily    3  Dysfunction of right eustachian tube  -     fluticasone (FLONASE) 50 mcg/act nasal spray; 1 spray into each nostril daily    4  Arthritis  -     diclofenac (VOLTAREN) 75 mg EC tablet; Take 1 tablet (75 mg total) by mouth 2 (two) times a day         Subjective      Patient presents with:  sneezing  eye irritation  Follow-up: Patient is here for a six month follow up  Patient states he is having trouble with allergies  Patient states his eyes are burning and itchy, he is sneezing frequently, and experiencing frequent fatigue  No additional concerns  Fatigue  Nasal Congestion        Review of Systems   Constitutional: Negative  HENT:        As noted in HPI  Eyes: Positive for discharge and itching  Respiratory: Positive for cough  Cardiovascular: Negative          Current Outpatient Medications on File Prior to Visit   Medication Sig   • Cyanocobalamin (Vitamin B-12) 2500 MCG SUBL Place under the tongue   • diltiazem (CARDIZEM CD) 240 mg 24 hr capsule TAKE ONE CAPSULE BY MOUTH ONCE DAILY   • ergocalciferol (VITAMIN D2) 50,000 units TAKE 1 CAPSULE BY MOUTH ONCE A WEEK   • losartan (COZAAR) 100 MG tablet TAKE 1 TABLET BY MOUTH ONCE DAILY   • metoprolol succinate (TOPROL-XL) 50 mg 24 hr tablet TAKE 1 TABLET BY MOUTH ONCE DAILY   • Multiple Vitamin (ONE-A-DAY MENS PO) Take by mouth   • testosterone (ANDROGEL) 1 62 % TD gel pump apply 1 pump to skin once daily "  • [DISCONTINUED] fluticasone (FLONASE) 50 mcg/act nasal spray 1 spray into each nostril daily   • [DISCONTINUED] pravastatin (PRAVACHOL) 20 mg tablet TAKE 1 TABLET BY MOUTH ONCE DAILY   • ciclopirox (PENLAC) 8 % solution Apply topically daily at bedtime (Patient not taking: Reported on 5/10/2023)   • clobetasol (TEMOVATE) 0 05 % cream  (Patient not taking: Reported on 5/10/2023)   • spironolactone (ALDACTONE) 25 mg tablet TAKE ONE TABLET BY MOUTH ONCE DAILY       Objective     /90 (BP Location: Left arm, Patient Position: Sitting, Cuff Size: Standard)   Pulse 100   Temp 98 °F (36 7 °C) (Tympanic)   Ht 6' 3\" (1 905 m)   Wt (!) 171 kg (377 lb 6 4 oz)   SpO2 98%   BMI 47 17 kg/m²     Physical Exam  Vitals and nursing note reviewed  Constitutional:       Appearance: He is well-developed  HENT:      Head: Normocephalic and atraumatic  Eyes:      Pupils: Pupils are equal, round, and reactive to light  Cardiovascular:      Rate and Rhythm: Normal rate  Pulmonary:      Effort: Pulmonary effort is normal       Breath sounds: No wheezing  Abdominal:      Palpations: Abdomen is soft  Musculoskeletal:      Cervical back: Normal range of motion and neck supple  Lymphadenopathy:      Cervical: No cervical adenopathy  Skin:     General: Skin is warm  Neurological:      Mental Status: He is alert and oriented to person, place, and time         Tran Francis DO  "

## 2023-05-18 ENCOUNTER — APPOINTMENT (OUTPATIENT)
Dept: LAB | Facility: HOSPITAL | Age: 53
End: 2023-05-18

## 2023-05-18 DIAGNOSIS — R35.0 BENIGN PROSTATIC HYPERPLASIA WITH URINARY FREQUENCY: ICD-10-CM

## 2023-05-18 DIAGNOSIS — N40.1 BENIGN PROSTATIC HYPERPLASIA WITH URINARY FREQUENCY: ICD-10-CM

## 2023-05-18 LAB
ALBUMIN SERPL BCP-MCNC: 3.8 G/DL (ref 3.5–5)
ALP SERPL-CCNC: 81 U/L (ref 34–104)
ALT SERPL W P-5'-P-CCNC: 16 U/L (ref 7–52)
ANION GAP SERPL CALCULATED.3IONS-SCNC: 6 MMOL/L (ref 4–13)
AST SERPL W P-5'-P-CCNC: 12 U/L (ref 13–39)
BASOPHILS # BLD AUTO: 0.05 THOUSANDS/ÂΜL (ref 0–0.1)
BASOPHILS NFR BLD AUTO: 1 % (ref 0–1)
BILIRUB SERPL-MCNC: 0.51 MG/DL (ref 0.2–1)
BUN SERPL-MCNC: 11 MG/DL (ref 5–25)
CALCIUM SERPL-MCNC: 9.8 MG/DL (ref 8.4–10.2)
CHLORIDE SERPL-SCNC: 103 MMOL/L (ref 96–108)
CHOLEST SERPL-MCNC: 182 MG/DL
CO2 SERPL-SCNC: 26 MMOL/L (ref 21–32)
CREAT SERPL-MCNC: 0.81 MG/DL (ref 0.6–1.3)
EOSINOPHIL # BLD AUTO: 0.21 THOUSAND/ÂΜL (ref 0–0.61)
EOSINOPHIL NFR BLD AUTO: 2 % (ref 0–6)
ERYTHROCYTE [DISTWIDTH] IN BLOOD BY AUTOMATED COUNT: 16.1 % (ref 11.6–15.1)
GFR SERPL CREATININE-BSD FRML MDRD: 100 ML/MIN/1.73SQ M
GLUCOSE P FAST SERPL-MCNC: 78 MG/DL (ref 65–99)
HCT VFR BLD AUTO: 42.9 % (ref 36.5–49.3)
HDLC SERPL-MCNC: 36 MG/DL
HGB BLD-MCNC: 13.6 G/DL (ref 12–17)
IMM GRANULOCYTES # BLD AUTO: 0.03 THOUSAND/UL (ref 0–0.2)
IMM GRANULOCYTES NFR BLD AUTO: 0 % (ref 0–2)
LDLC SERPL CALC-MCNC: 113 MG/DL (ref 0–100)
LYMPHOCYTES # BLD AUTO: 2.83 THOUSANDS/ÂΜL (ref 0.6–4.47)
LYMPHOCYTES NFR BLD AUTO: 32 % (ref 14–44)
MCH RBC QN AUTO: 26.4 PG (ref 26.8–34.3)
MCHC RBC AUTO-ENTMCNC: 31.7 G/DL (ref 31.4–37.4)
MCV RBC AUTO: 83 FL (ref 82–98)
MONOCYTES # BLD AUTO: 0.82 THOUSAND/ÂΜL (ref 0.17–1.22)
MONOCYTES NFR BLD AUTO: 9 % (ref 4–12)
NEUTROPHILS # BLD AUTO: 4.97 THOUSANDS/ÂΜL (ref 1.85–7.62)
NEUTS SEG NFR BLD AUTO: 56 % (ref 43–75)
NONHDLC SERPL-MCNC: 146 MG/DL
NRBC BLD AUTO-RTO: 0 /100 WBCS
PLATELET # BLD AUTO: 410 THOUSANDS/UL (ref 149–390)
PMV BLD AUTO: 9.2 FL (ref 8.9–12.7)
POTASSIUM SERPL-SCNC: 4 MMOL/L (ref 3.5–5.3)
PROT SERPL-MCNC: 7.9 G/DL (ref 6.4–8.4)
PSA SERPL-MCNC: 5.33 NG/ML (ref 0–4)
RBC # BLD AUTO: 5.15 MILLION/UL (ref 3.88–5.62)
SODIUM SERPL-SCNC: 135 MMOL/L (ref 135–147)
TRIGL SERPL-MCNC: 165 MG/DL
TSH SERPL DL<=0.05 MIU/L-ACNC: 1.04 UIU/ML (ref 0.45–4.5)
WBC # BLD AUTO: 8.91 THOUSAND/UL (ref 4.31–10.16)

## 2023-05-20 LAB
TESTOST FREE SERPL-MCNC: 9.7 PG/ML (ref 7.2–24)
TESTOST SERPL-MCNC: 327 NG/DL (ref 264–916)

## 2023-05-23 PROBLEM — J06.9 ACUTE UPPER RESPIRATORY INFECTION: Status: RESOLVED | Noted: 2023-03-24 | Resolved: 2023-05-23

## 2023-06-01 ENCOUNTER — OFFICE VISIT (OUTPATIENT)
Dept: FAMILY MEDICINE CLINIC | Facility: CLINIC | Age: 53
End: 2023-06-01

## 2023-06-01 VITALS
TEMPERATURE: 97.7 F | RESPIRATION RATE: 18 BRPM | HEART RATE: 98 BPM | SYSTOLIC BLOOD PRESSURE: 142 MMHG | WEIGHT: 315 LBS | BODY MASS INDEX: 39.17 KG/M2 | DIASTOLIC BLOOD PRESSURE: 88 MMHG | HEIGHT: 75 IN | OXYGEN SATURATION: 99 %

## 2023-06-01 DIAGNOSIS — E66.01 OBESITY, MORBID, BMI 40.0-49.9 (HCC): ICD-10-CM

## 2023-06-01 DIAGNOSIS — R35.0 BENIGN PROSTATIC HYPERPLASIA WITH URINARY FREQUENCY: ICD-10-CM

## 2023-06-01 DIAGNOSIS — E78.2 MIXED HYPERLIPIDEMIA: ICD-10-CM

## 2023-06-01 DIAGNOSIS — I10 BENIGN ESSENTIAL HYPERTENSION: Primary | ICD-10-CM

## 2023-06-01 DIAGNOSIS — G47.33 OBSTRUCTIVE SLEEP APNEA: ICD-10-CM

## 2023-06-01 DIAGNOSIS — R97.20 ELEVATED PSA: ICD-10-CM

## 2023-06-01 DIAGNOSIS — N40.1 BENIGN PROSTATIC HYPERPLASIA WITH URINARY FREQUENCY: ICD-10-CM

## 2023-06-01 NOTE — PROGRESS NOTES
Assessment/Plan:      Diagnoses and all orders for this visit:    Benign essential hypertension    Obstructive sleep apnea    Mixed hyperlipidemia    Obesity, morbid, BMI 40 0-49 9 (HCC)    Elevated PSA  -     Ambulatory Referral to Urology; Future    Benign prostatic hyperplasia with urinary frequency  -     Ambulatory Referral to Urology; Future          Subjective:     Patient ID: Altagracia Pastor is a 45 y o  male  Patient presents with: Follow-up: Patient is here for form to be filled for medical insurance  Review of Systems   Constitutional: Negative  HENT: Negative  Eyes: Negative  Respiratory: Negative  Cardiovascular: Negative  Gastrointestinal: Negative  Endocrine: Negative  Genitourinary: Negative  Musculoskeletal: Negative  Skin: Negative  Allergic/Immunologic: Negative  Neurological: Negative  Hematological: Negative  Psychiatric/Behavioral: Negative  All other systems reviewed and are negative  Objective:     Physical Exam  Vitals and nursing note reviewed  Constitutional:       Appearance: He is well-developed  HENT:      Head: Normocephalic and atraumatic  Eyes:      Pupils: Pupils are equal, round, and reactive to light  Cardiovascular:      Rate and Rhythm: Normal rate and regular rhythm  Pulses: Normal pulses  Heart sounds: Normal heart sounds  Pulmonary:      Effort: Pulmonary effort is normal       Breath sounds: No wheezing  Abdominal:      Palpations: Abdomen is soft  Musculoskeletal:      Cervical back: Normal range of motion and neck supple  Lymphadenopathy:      Cervical: No cervical adenopathy  Skin:     General: Skin is warm  Neurological:      General: No focal deficit present  Mental Status: He is alert and oriented to person, place, and time     Psychiatric:         Mood and Affect: Mood normal

## 2023-06-25 ENCOUNTER — HOSPITAL ENCOUNTER (INPATIENT)
Facility: HOSPITAL | Age: 53
LOS: 2 days | Discharge: HOME | DRG: 058 | End: 2023-06-27
Attending: EMERGENCY MEDICINE | Admitting: INTERNAL MEDICINE
Payer: COMMERCIAL

## 2023-06-25 ENCOUNTER — APPOINTMENT (EMERGENCY)
Dept: CT IMAGING | Facility: HOSPITAL | Age: 53
DRG: 058 | End: 2023-06-25
Payer: COMMERCIAL

## 2023-06-25 DIAGNOSIS — R29.90 STROKE-LIKE SYMPTOMS: ICD-10-CM

## 2023-06-25 DIAGNOSIS — M54.9 BACK PAIN: ICD-10-CM

## 2023-06-25 DIAGNOSIS — R20.0 RIGHT SIDED NUMBNESS: Primary | ICD-10-CM

## 2023-06-25 DIAGNOSIS — R29.898 RIGHT LEG WEAKNESS: ICD-10-CM

## 2023-06-25 LAB
2HR DELTA HS TROPONIN: -1 NG/L
4HR DELTA HS TROPONIN: -2 NG/L
ANION GAP SERPL CALCULATED.3IONS-SCNC: 5 MMOL/L
ATRIAL RATE: 74 BPM
ATRIAL RATE: 76 BPM
BACTERIA UR QL AUTO: NORMAL /HPF
BASOPHILS # BLD AUTO: 0.05 THOUSANDS/ÂΜL (ref 0–0.1)
BASOPHILS NFR BLD AUTO: 1 % (ref 0–1)
BILIRUB UR QL STRIP: NEGATIVE
BUN SERPL-MCNC: 9 MG/DL (ref 5–25)
CALCIUM SERPL-MCNC: 9.5 MG/DL (ref 8.4–10.2)
CARDIAC TROPONIN I PNL SERPL HS: 3 NG/L
CARDIAC TROPONIN I PNL SERPL HS: 4 NG/L
CARDIAC TROPONIN I PNL SERPL HS: 5 NG/L
CHLORIDE SERPL-SCNC: 101 MMOL/L (ref 96–108)
CLARITY UR: CLEAR
CO2 SERPL-SCNC: 29 MMOL/L (ref 21–32)
COLOR UR: YELLOW
CREAT SERPL-MCNC: 0.84 MG/DL (ref 0.6–1.3)
EOSINOPHIL # BLD AUTO: 0.26 THOUSAND/ÂΜL (ref 0–0.61)
EOSINOPHIL NFR BLD AUTO: 3 % (ref 0–6)
ERYTHROCYTE [DISTWIDTH] IN BLOOD BY AUTOMATED COUNT: 16 % (ref 11.6–15.1)
GFR SERPL CREATININE-BSD FRML MDRD: 99 ML/MIN/1.73SQ M
GLUCOSE SERPL-MCNC: 81 MG/DL (ref 65–140)
GLUCOSE UR STRIP-MCNC: NEGATIVE MG/DL
HCT VFR BLD AUTO: 46.5 % (ref 36.5–49.3)
HGB BLD-MCNC: 14.4 G/DL (ref 12–17)
HGB UR QL STRIP.AUTO: NEGATIVE
IMM GRANULOCYTES # BLD AUTO: 0.02 THOUSAND/UL (ref 0–0.2)
IMM GRANULOCYTES NFR BLD AUTO: 0 % (ref 0–2)
KETONES UR STRIP-MCNC: NEGATIVE MG/DL
LEUKOCYTE ESTERASE UR QL STRIP: NEGATIVE
LYMPHOCYTES # BLD AUTO: 2.67 THOUSANDS/ÂΜL (ref 0.6–4.47)
LYMPHOCYTES NFR BLD AUTO: 30 % (ref 14–44)
MCH RBC QN AUTO: 26.3 PG (ref 26.8–34.3)
MCHC RBC AUTO-ENTMCNC: 31 G/DL (ref 31.4–37.4)
MCV RBC AUTO: 85 FL (ref 82–98)
MONOCYTES # BLD AUTO: 0.84 THOUSAND/ÂΜL (ref 0.17–1.22)
MONOCYTES NFR BLD AUTO: 10 % (ref 4–12)
NEUTROPHILS # BLD AUTO: 4.94 THOUSANDS/ÂΜL (ref 1.85–7.62)
NEUTS SEG NFR BLD AUTO: 56 % (ref 43–75)
NITRITE UR QL STRIP: NEGATIVE
NON-SQ EPI CELLS URNS QL MICRO: NORMAL /HPF
NRBC BLD AUTO-RTO: 0 /100 WBCS
P AXIS: 11 DEGREES
P AXIS: 23 DEGREES
PH UR STRIP.AUTO: 7 [PH]
PLATELET # BLD AUTO: 402 THOUSANDS/UL (ref 149–390)
PMV BLD AUTO: 8.7 FL (ref 8.9–12.7)
POTASSIUM SERPL-SCNC: 4.1 MMOL/L (ref 3.5–5.3)
PR INTERVAL: 226 MS
PR INTERVAL: 230 MS
PROT UR STRIP-MCNC: ABNORMAL MG/DL
QRS AXIS: 15 DEGREES
QRS AXIS: 31 DEGREES
QRSD INTERVAL: 108 MS
QRSD INTERVAL: 112 MS
QT INTERVAL: 396 MS
QT INTERVAL: 400 MS
QTC INTERVAL: 444 MS
QTC INTERVAL: 445 MS
RBC # BLD AUTO: 5.47 MILLION/UL (ref 3.88–5.62)
RBC #/AREA URNS AUTO: NORMAL /HPF
SODIUM SERPL-SCNC: 135 MMOL/L (ref 135–147)
SP GR UR STRIP.AUTO: >=1.05 (ref 1–1.03)
T WAVE AXIS: -12 DEGREES
T WAVE AXIS: -19 DEGREES
UROBILINOGEN UR STRIP-ACNC: 6 MG/DL
VENTRICULAR RATE: 74 BPM
VENTRICULAR RATE: 76 BPM
WBC # BLD AUTO: 8.78 THOUSAND/UL (ref 4.31–10.16)
WBC #/AREA URNS AUTO: NORMAL /HPF

## 2023-06-25 PROCEDURE — G1004 CDSM NDSC: HCPCS

## 2023-06-25 PROCEDURE — 36415 COLL VENOUS BLD VENIPUNCTURE: CPT | Performed by: PHYSICIAN ASSISTANT

## 2023-06-25 PROCEDURE — 84484 ASSAY OF TROPONIN QUANT: CPT | Performed by: PHYSICIAN ASSISTANT

## 2023-06-25 PROCEDURE — 99223 1ST HOSP IP/OBS HIGH 75: CPT | Performed by: INTERNAL MEDICINE

## 2023-06-25 PROCEDURE — 70496 CT ANGIOGRAPHY HEAD: CPT

## 2023-06-25 PROCEDURE — 93010 ELECTROCARDIOGRAM REPORT: CPT | Performed by: INTERNAL MEDICINE

## 2023-06-25 PROCEDURE — 70498 CT ANGIOGRAPHY NECK: CPT

## 2023-06-25 PROCEDURE — 80048 BASIC METABOLIC PNL TOTAL CA: CPT | Performed by: PHYSICIAN ASSISTANT

## 2023-06-25 PROCEDURE — 99284 EMERGENCY DEPT VISIT MOD MDM: CPT

## 2023-06-25 PROCEDURE — 85025 COMPLETE CBC W/AUTO DIFF WBC: CPT | Performed by: PHYSICIAN ASSISTANT

## 2023-06-25 PROCEDURE — 93005 ELECTROCARDIOGRAM TRACING: CPT

## 2023-06-25 PROCEDURE — 70450 CT HEAD/BRAIN W/O DYE: CPT

## 2023-06-25 PROCEDURE — 81001 URINALYSIS AUTO W/SCOPE: CPT | Performed by: INTERNAL MEDICINE

## 2023-06-25 RX ORDER — FLUTICASONE PROPIONATE 50 MCG
1 SPRAY, SUSPENSION (ML) NASAL DAILY
Status: DISCONTINUED | OUTPATIENT
Start: 2023-06-26 | End: 2023-06-27 | Stop reason: HOSPADM

## 2023-06-25 RX ORDER — ATORVASTATIN CALCIUM 40 MG/1
40 TABLET, FILM COATED ORAL
Status: DISCONTINUED | OUTPATIENT
Start: 2023-06-26 | End: 2023-06-25

## 2023-06-25 RX ORDER — LIDOCAINE 50 MG/G
2 PATCH TOPICAL DAILY
Status: DISCONTINUED | OUTPATIENT
Start: 2023-06-25 | End: 2023-06-27 | Stop reason: HOSPADM

## 2023-06-25 RX ORDER — ONDANSETRON 2 MG/ML
4 INJECTION INTRAMUSCULAR; INTRAVENOUS EVERY 6 HOURS PRN
Status: DISCONTINUED | OUTPATIENT
Start: 2023-06-25 | End: 2023-06-27 | Stop reason: HOSPADM

## 2023-06-25 RX ORDER — DILTIAZEM HYDROCHLORIDE 120 MG/1
240 CAPSULE, COATED, EXTENDED RELEASE ORAL DAILY
Status: DISCONTINUED | OUTPATIENT
Start: 2023-06-26 | End: 2023-06-27 | Stop reason: HOSPADM

## 2023-06-25 RX ORDER — MONTELUKAST SODIUM 10 MG/1
10 TABLET ORAL
Status: DISCONTINUED | OUTPATIENT
Start: 2023-06-25 | End: 2023-06-27 | Stop reason: HOSPADM

## 2023-06-25 RX ORDER — ENOXAPARIN SODIUM 100 MG/ML
40 INJECTION SUBCUTANEOUS DAILY
Status: DISCONTINUED | OUTPATIENT
Start: 2023-06-26 | End: 2023-06-27 | Stop reason: HOSPADM

## 2023-06-25 RX ORDER — DOCUSATE SODIUM 100 MG/1
100 CAPSULE, LIQUID FILLED ORAL 2 TIMES DAILY
Status: DISCONTINUED | OUTPATIENT
Start: 2023-06-25 | End: 2023-06-27 | Stop reason: HOSPADM

## 2023-06-25 RX ORDER — ACETAMINOPHEN 325 MG/1
650 TABLET ORAL EVERY 4 HOURS PRN
Status: DISCONTINUED | OUTPATIENT
Start: 2023-06-25 | End: 2023-06-25

## 2023-06-25 RX ORDER — KETOTIFEN FUMARATE 0.35 MG/ML
1 SOLUTION/ DROPS OPHTHALMIC 2 TIMES DAILY
Status: DISCONTINUED | OUTPATIENT
Start: 2023-06-25 | End: 2023-06-27 | Stop reason: HOSPADM

## 2023-06-25 RX ORDER — METHOCARBAMOL 500 MG/1
750 TABLET, FILM COATED ORAL EVERY 8 HOURS SCHEDULED
Status: DISCONTINUED | OUTPATIENT
Start: 2023-06-25 | End: 2023-06-27 | Stop reason: HOSPADM

## 2023-06-25 RX ORDER — GABAPENTIN 100 MG/1
100 CAPSULE ORAL 3 TIMES DAILY
Status: DISCONTINUED | OUTPATIENT
Start: 2023-06-25 | End: 2023-06-27 | Stop reason: HOSPADM

## 2023-06-25 RX ORDER — HYDRALAZINE HYDROCHLORIDE 20 MG/ML
10 INJECTION INTRAMUSCULAR; INTRAVENOUS EVERY 6 HOURS PRN
Status: DISCONTINUED | OUTPATIENT
Start: 2023-06-25 | End: 2023-06-27 | Stop reason: HOSPADM

## 2023-06-25 RX ORDER — ACETAMINOPHEN 325 MG/1
975 TABLET ORAL EVERY 8 HOURS SCHEDULED
Status: DISCONTINUED | OUTPATIENT
Start: 2023-06-25 | End: 2023-06-27 | Stop reason: HOSPADM

## 2023-06-25 RX ORDER — ASPIRIN 81 MG/1
81 TABLET, CHEWABLE ORAL DAILY
Status: DISCONTINUED | OUTPATIENT
Start: 2023-06-26 | End: 2023-06-27 | Stop reason: HOSPADM

## 2023-06-25 RX ORDER — ASPIRIN 325 MG
325 TABLET ORAL ONCE
Status: COMPLETED | OUTPATIENT
Start: 2023-06-25 | End: 2023-06-25

## 2023-06-25 RX ORDER — ATORVASTATIN CALCIUM 40 MG/1
40 TABLET, FILM COATED ORAL EVERY EVENING
Status: DISCONTINUED | OUTPATIENT
Start: 2023-06-25 | End: 2023-06-27 | Stop reason: HOSPADM

## 2023-06-25 RX ADMIN — MONTELUKAST 10 MG: 10 TABLET, FILM COATED ORAL at 23:54

## 2023-06-25 RX ADMIN — GABAPENTIN 100 MG: 100 CAPSULE ORAL at 23:54

## 2023-06-25 RX ADMIN — ASPIRIN 325 MG ORAL TABLET 325 MG: 325 PILL ORAL at 16:52

## 2023-06-25 RX ADMIN — KETOTIFEN FUMARATE 1 DROP: 0.25 SOLUTION/ DROPS OPHTHALMIC at 23:56

## 2023-06-25 RX ADMIN — METHOCARBAMOL 750 MG: 500 TABLET ORAL at 23:54

## 2023-06-25 RX ADMIN — LIDOCAINE 2 PATCH: 700 PATCH TOPICAL at 23:54

## 2023-06-25 RX ADMIN — ACETAMINOPHEN 325MG 975 MG: 325 TABLET ORAL at 23:54

## 2023-06-25 RX ADMIN — ATORVASTATIN CALCIUM 40 MG: 40 TABLET, FILM COATED ORAL at 23:54

## 2023-06-25 RX ADMIN — IOHEXOL 100 ML: 350 INJECTION, SOLUTION INTRAVENOUS at 17:25

## 2023-06-25 NOTE — ASSESSMENT & PLAN NOTE
- pt presents symptoms of right-sided arm and leg numbness as well as right-sided leg weakness  -Have been present for several days, outside of the thrombolytic window  - CT head in ED shows no acute intracranial abnormalities  - /96 (Allow permissive HTN >160).   Keep BP < 220/110  - start ASA 81mg, high-dose statin (Lipitor 40mg qd)  - check MRI brain  - consider Echo with bubble  - PT/OT/speech  - admit on stroke pathway  -lipid panel, A1c, replete metabolites  -placed on telemetry  -consult to neurology

## 2023-06-25 NOTE — ASSESSMENT & PLAN NOTE
- pt with BMI of Body mass index is 47.62 kg/m².   - pt counseled on risks associated with obesity and it's contribution to other health issues  - advise portion control, healthy food choices, drinking water instead of juice/soda  - advise beginning light exercise program (i.e. Walking 30min 4-5x/wk)  - advise keeping food diary to help identify problem foods/times/stressors  - pt advised that weight loss of ~ 1lb/wk is a good goal and not to become frustrated if loss is slower

## 2023-06-25 NOTE — ED NOTES
Patient at [id number] [address] and RN will complete troponin and EKG when pt back in room     Denise Gonzalez RN  06/25/23 [id number]

## 2023-06-25 NOTE — ASSESSMENT & PLAN NOTE
Blood pressure highly elevated on admission, hold blood pressure medications to allow for permissive hypertension  Hydralazine as needed for SBP greater than 220

## 2023-06-25 NOTE — LETTER
[address] [facility] [address] [place] [id number]  Dept: [phone]    June 27, 2023     Patient: Teto Wilson   YOB: 1970   Date of Visit: 6/25/2023       To Whom it May Concern:    Teto Wilson is under my professional care. He was seen in the hospital from 6/25/2023 to 06/27/23. He may return to work on 6/28/23 without limitations. If you have any questions or concerns, please don't hesitate to call.          Sincerely,          Sixto Cobb PA-C

## 2023-06-25 NOTE — ED PROVIDER NOTES
History  Chief Complaint   Patient presents with   • Numbness     Right arm numbness x4 days. Denies injury      Cem Lynne is a 44 yo M presenting with with 4 days of R arm, R leg numbness as well as slight weakness of R leg. Reports symptoms began overnight and symptoms in R arm/R leg happened simultaneously. The numbness/weakness is unchanged since onset. No headache/neck pain at onset. Mild headache noted today diffusely. No visual changes. No facial numbness/weakness or difficulty with speech. No dizziness. Normal coordination noted. History provided by:  Patient   used: No        Prior to Admission Medications   Prescriptions Last Dose Informant Patient Reported? Taking?    Cyanocobalamin (Vitamin B-12) 2500 MCG SUBL   Yes No   Sig: Place under the tongue   Multiple Vitamin (ONE-A-DAY MENS PO)   Yes No   Sig: Take by mouth   ciclopirox (PENLAC) 8 % solution   No No   Sig: Apply topically daily at bedtime   Patient not taking: Reported on 5/10/2023   clobetasol (TEMOVATE) 0.05 % cream   Yes No   Patient not taking: Reported on 5/10/2023   diclofenac (VOLTAREN) 75 mg EC tablet   No No   Sig: Take 1 tablet (75 mg total) by mouth 2 (two) times a day   diltiazem (CARDIZEM CD) 240 mg 24 hr capsule   No No   Sig: TAKE ONE CAPSULE BY MOUTH ONCE DAILY   ergocalciferol (VITAMIN D2) 50,000 units   No No   Sig: TAKE 1 CAPSULE BY MOUTH ONCE A WEEK   fluticasone (FLONASE) 50 mcg/act nasal spray   No No   Si spray into each nostril daily   losartan (COZAAR) 100 MG tablet   No No   Sig: TAKE 1 TABLET BY MOUTH ONCE DAILY   metoprolol succinate (TOPROL-XL) 50 mg 24 hr tablet   No No   Sig: TAKE 1 TABLET BY MOUTH ONCE DAILY   montelukast (SINGULAIR) 10 mg tablet   No No   Sig: Take 1 tablet (10 mg total) by mouth daily at bedtime   olopatadine (PATANOL) 0.1 % ophthalmic solution   No No   Sig: Administer 1 drop to both eyes 2 (two) times a day   pravastatin (PRAVACHOL) 20 mg tablet   No No   Sig: Take 1 tablet (20 mg total) by mouth daily   spironolactone (ALDACTONE) 25 mg tablet   No No   Sig: TAKE ONE TABLET BY MOUTH ONCE DAILY   testosterone (ANDROGEL) 1.62 % TD gel pump   No No   Sig: apply 1 pump to skin once daily      Facility-Administered Medications: None       Past Medical History:   Diagnosis Date   • Allergic    • Anxiety    • Arthritis    • Asthma    • CPAP (continuous positive airway pressure) dependence    • Depression    • Hyperlipemia    • Hypertension    • Obesity        Past Surgical History:   Procedure Laterality Date   • HEMORRHOID SURGERY     • TONSILLECTOMY     • URETHRAL DILATION  2019       Family History   Problem Relation Age of Onset   • Hypertension Mother    • Hyperlipidemia Mother    • Heart disease Mother    • Hypertension Father    • Heart disease Father    • Hyperlipidemia Father    • COPD Sister    • Liver disease Sister      I have reviewed and agree with the history as documented. E-Cigarette/Vaping   • E-Cigarette Use Current Every Day User      E-Cigarette/Vaping Substances   • Nicotine Yes    • THC No    • CBD No    • Flavoring No    • Other No    • Unknown No      Social History     Tobacco Use   • Smoking status: Former     Types: Cigarettes   • Smokeless tobacco: Never   Vaping Use   • Vaping Use: Every day   • Substances: Nicotine   Substance Use Topics   • Alcohol use: Not Currently   • Drug use: Never       Review of Systems   Constitutional: Negative for chills and fever. HENT: Negative for congestion, rhinorrhea and sore throat. Eyes: Negative for pain and visual disturbance. Respiratory: Negative for cough, shortness of breath and wheezing. Cardiovascular: Negative for chest pain and palpitations. Gastrointestinal: Negative for abdominal pain, nausea and vomiting. Genitourinary: Negative for dysuria, frequency and urgency. Musculoskeletal: Negative for back pain, neck pain and neck stiffness. Skin: Negative for rash and wound.    Neurological: Positive for weakness, numbness and headaches. Negative for dizziness, speech difficulty and light-headedness. Physical Exam  Physical Exam  Constitutional:       General: He is not in acute distress. Appearance: He is well-developed. He is not diaphoretic. HENT:      Head: Normocephalic and atraumatic. Right Ear: External ear normal. No mastoid tenderness. Left Ear: External ear normal. No mastoid tenderness. Eyes:      Conjunctiva/sclera: Conjunctivae normal.      Pupils: Pupils are equal, round, and reactive to light. Cardiovascular:      Rate and Rhythm: Normal rate and regular rhythm. Heart sounds: Normal heart sounds. No murmur heard. No friction rub. No gallop. Pulmonary:      Effort: Pulmonary effort is normal. No respiratory distress. Breath sounds: Normal breath sounds. No wheezing. Abdominal:      General: There is no distension. Palpations: Abdomen is soft. Tenderness: There is no abdominal tenderness. Musculoskeletal:      Cervical back: Normal range of motion and neck supple. Lymphadenopathy:      Cervical: No cervical adenopathy. Skin:     General: Skin is warm and dry. Capillary Refill: Capillary refill takes less than 2 seconds. Findings: No erythema or rash. Neurological:      Mental Status: He is alert and oriented to person, place, and time. GCS: GCS eye subscore is 4. GCS verbal subscore is 5. GCS motor subscore is 6. Motor: No abnormal muscle tone. Coordination: Coordination normal.      Comments: AAOx3. Normal speech. EOM's intact. Intact visual fields. No CN deficits. 5/5 strength to bilateral UE's. 4/5 strength to RLE, 5/5 strength to LLE. Asymmetric sensation - diminished sensation to R arm and R leg compared with L side. Coordination intact. Psychiatric:         Behavior: Behavior normal.         Thought Content:  Thought content normal.         Judgment: Judgment normal.         Vital Signs  ED Triage Vitals [06/25/23 1350]   Temperature Pulse Respirations Blood Pressure SpO2   98.1 °F (36.7 °C) 81 20 147/85 98 %      Temp Source Heart Rate Source Patient Position - Orthostatic VS BP Location FiO2 (%)   Oral Monitor Sitting Right arm --      Pain Score       --           Vitals:    06/25/23 1350 06/25/23 1558   BP: 147/85 142/71   Pulse: 81 71   Patient Position - Orthostatic VS: Sitting Sitting         Visual Acuity  Visual Acuity    Flowsheet Row Most Recent Value   L Pupil Size (mm) 2   R Pupil Size (mm) 2          ED Medications  Medications   aspirin tablet 325 mg (325 mg Oral Given 6/25/23 1652)   iohexol (OMNIPAQUE) 350 MG/ML injection (SINGLE-DOSE) 100 mL (100 mL Intravenous Given 6/25/23 1725)       Diagnostic Studies  Results Reviewed     Procedure Component Value Units Date/Time    HS Troponin I 2hr [[id number]] Collected: 06/25/23 1723    Lab Status:  In process Specimen: Blood from Arm, Left Updated: 06/25/23 1726    HS Troponin I 4hr [[id number]]     Lab Status: No result Specimen: Blood     HS Troponin 0hr (reflex protocol) [[id number]]  (Normal) Collected: 06/25/23 1511    Lab Status: Final result Specimen: Blood from Arm, Left Updated: 06/25/23 1549     hs TnI 0hr 5 ng/L     Basic metabolic panel [[id number]] Collected: 06/25/23 1511    Lab Status: Final result Specimen: Blood from Arm, Left Updated: 06/25/23 1541     Sodium 135 mmol/L      Potassium 4.1 mmol/L      Chloride 101 mmol/L      CO2 29 mmol/L      ANION GAP 5 mmol/L      BUN 9 mg/dL      Creatinine 0.84 mg/dL      Glucose 81 mg/dL      Calcium 9.5 mg/dL      eGFR 99 ml/min/1.73sq m     Narrative:      [place] guidelines for Chronic Kidney Disease (CKD):   •  Stage 1 with normal or high GFR (GFR > 90 mL/min/1.73 square meters)  •  Stage 2 Mild CKD (GFR = 60-89 mL/min/1.73 square meters)  •  Stage 3A Moderate CKD (GFR = 45-59 mL/min/1.73 square meters)  •  Stage 3B Moderate CKD (GFR = 30-44 mL/min/1.73 square meters)  •  Stage 4 Severe CKD (GFR = 15-29 mL/min/1.73 square meters)  •  Stage 5 End Stage CKD (GFR <15 mL/min/1.73 square meters)  Note: GFR calculation is accurate only with a steady state creatinine    CBC and differential [[id number]]  (Abnormal) Collected: 06/25/23 1511    Lab Status: Final result Specimen: Blood from Arm, Left Updated: 06/25/23 1521     WBC 8.78 Thousand/uL      RBC 5.47 Million/uL      Hemoglobin 14.4 g/dL      Hematocrit 46.5 %      MCV 85 fL      MCH 26.3 pg      MCHC 31.0 g/dL      RDW 16.0 %      MPV 8.7 fL      Platelets 561 Thousands/uL      nRBC 0 /100 WBCs      Neutrophils Relative 56 %      Immat GRANS % 0 %      Lymphocytes Relative 30 %      Monocytes Relative 10 %      Eosinophils Relative 3 %      Basophils Relative 1 %      Neutrophils Absolute 4.94 Thousands/µL      Immature Grans Absolute 0.02 Thousand/uL      Lymphocytes Absolute 2.67 Thousands/µL      Monocytes Absolute 0.84 Thousand/µL      Eosinophils Absolute 0.26 Thousand/µL      Basophils Absolute 0.05 Thousands/µL                  CT head without contrast   Final Result by Amos Linares MD (06/25 1642)      1. No acute intracranial abnormality. 2.  Right mastoid effusion and fluid/debris within the right middle ear. Correlate for otomastoiditis. 3.  Left sided sinusitis as described.                   Workstation performed: [id number]         CTA head and neck with and without contrast    (Results Pending)              Procedures  ECG 12 Lead Documentation Only    Date/Time: 6/25/2023 3:15 PM    Performed by: Jessica Zabala PA-C  Authorized by: Jessica Zabala PA-C    Indications / Diagnosis:  Stroke symptoms  ECG reviewed by me, the ED Provider: yes    Patient location:  ED  Previous ECG:     Previous ECG:  Compared to current    Similarity:  No change  Interpretation:     Interpretation: abnormal    Rate:     ECG rate:  74    ECG rate assessment: normal    Rhythm:     Rhythm: sinus rhythm    Ectopy: Ectopy: none    QRS:     QRS axis:  Normal    QRS intervals: Wide  Conduction:     Conduction: abnormal      Abnormal conduction: 1st degree    ST segments:     ST segments:  Normal  T waves:     T waves: non-specific               ED Course                               SBIRT 22yo+    Flowsheet Row Most Recent Value   Initial Alcohol Screen: US AUDIT-C     1. How often do you have a drink containing alcohol? 0 Filed at: 06/25/2023 1403   2. How many drinks containing alcohol do you have on a typical day you are drinking? 0 Filed at: 06/25/2023 1403   3a. Male UNDER 65: How often do you have five or more drinks on one occasion? 0 Filed at: 06/25/2023 1403   3b. FEMALE Any Age, or MALE 65+: How often do you have 4 or more drinks on one occassion? 0 Filed at: 06/25/2023 1403   Audit-C Score 0 Filed at: 06/25/2023 1403   JESS: How many times in the past year have you. .. Used an illegal drug or used a prescription medication for non-medical reasons? Never Filed at: 06/25/2023 1403                    Medical Decision Making  R arm, R leg numbness and slight weakness to R leg x4 days. Otherwise asymptomatic. Outside window for treatment with TNK/TPA. Discussed with on call neuro, Dr. Mireille Steinberg recommends CT/CTA, admission for stroke pathway, full dose aspirin if no bleed on CT. Pt agreeable to plan. CT head without acute intracranial abnormality, is noted to have sinusitis which appears chronic by history. No ear pain or mastoid TTP despite CT findings. Will admit to [facility] for stroke pathway. Amount and/or Complexity of Data Reviewed  Labs: ordered. Radiology: ordered. Risk  OTC drugs. Prescription drug management. Decision regarding hospitalization.           Disposition  Final diagnoses:   Right sided numbness   Right leg weakness     Time reflects when diagnosis was documented in both MDM as applicable and the Disposition within this note     Time User Action Codes Description Comment    6/25/2023  5:04 PM Dileepcaritodo Bordenid Add [R20.0] Right sided numbness     6/25/2023  5:04 PM Dileepcarito Maid Add [W72.001] Right leg weakness       ED Disposition     ED Disposition   Admit    Condition   Stable    Date/Time   Sun Jun 25, 2023  5:03 PM    Comment   Case was discussed with Dr. Loreta Shah and the patient's admission status was agreed to be Admission Status: inpatient status to the service of Dr. Loreta Shah. Follow-up Information    None         Patient's Medications   Discharge Prescriptions    No medications on file       No discharge procedures on file.     PDMP Review       Value Time User    PDMP Reviewed  Yes 3/9/2022  8:32 AM Birana Angulo DO          ED Provider  Electronically Signed by           Josephina Fabry, PA-C  06/25/23 5648

## 2023-06-25 NOTE — ED NOTES
Patient transported to [facility]  06/25/23 7604

## 2023-06-26 ENCOUNTER — APPOINTMENT (INPATIENT)
Dept: MRI IMAGING | Facility: HOSPITAL | Age: 53
DRG: 058 | End: 2023-06-26
Attending: INTERNAL MEDICINE
Payer: COMMERCIAL

## 2023-06-26 PROBLEM — H65.191 ACUTE EFFUSION OF RIGHT EAR: Status: ACTIVE | Noted: 2023-06-26

## 2023-06-26 PROBLEM — R73.03 PREDIABETES: Status: ACTIVE | Noted: 2023-06-26

## 2023-06-26 LAB
ALBUMIN SERPL BCP-MCNC: 3.4 G/DL (ref 3.5–5)
ALP SERPL-CCNC: 73 U/L (ref 34–104)
ALT SERPL W P-5'-P-CCNC: 12 U/L (ref 7–52)
ANION GAP SERPL CALCULATED.3IONS-SCNC: 6 MMOL/L
AST SERPL W P-5'-P-CCNC: 10 U/L (ref 13–39)
BASOPHILS # BLD AUTO: 0.06 THOUSANDS/ÂΜL (ref 0–0.1)
BASOPHILS NFR BLD AUTO: 1 % (ref 0–1)
BILIRUB SERPL-MCNC: 0.33 MG/DL (ref 0.2–1)
BUN SERPL-MCNC: 14 MG/DL (ref 5–25)
CALCIUM ALBUM COR SERPL-MCNC: 9.4 MG/DL (ref 8.3–10.1)
CALCIUM SERPL-MCNC: 8.9 MG/DL (ref 8.4–10.2)
CHLORIDE SERPL-SCNC: 105 MMOL/L (ref 96–108)
CHOLEST SERPL-MCNC: 174 MG/DL
CO2 SERPL-SCNC: 26 MMOL/L (ref 21–32)
CREAT SERPL-MCNC: 0.9 MG/DL (ref 0.6–1.3)
EOSINOPHIL # BLD AUTO: 0.3 THOUSAND/ÂΜL (ref 0–0.61)
EOSINOPHIL NFR BLD AUTO: 4 % (ref 0–6)
ERYTHROCYTE [DISTWIDTH] IN BLOOD BY AUTOMATED COUNT: 16.1 % (ref 11.6–15.1)
EST. AVERAGE GLUCOSE BLD GHB EST-MCNC: 131 MG/DL
GFR SERPL CREATININE-BSD FRML MDRD: 96 ML/MIN/1.73SQ M
GLUCOSE SERPL-MCNC: 108 MG/DL (ref 65–140)
HBA1C MFR BLD: 6.2 %
HCT VFR BLD AUTO: 40.7 % (ref 36.5–49.3)
HDLC SERPL-MCNC: 33 MG/DL
HGB BLD-MCNC: 12.9 G/DL (ref 12–17)
IMM GRANULOCYTES # BLD AUTO: 0.04 THOUSAND/UL (ref 0–0.2)
IMM GRANULOCYTES NFR BLD AUTO: 1 % (ref 0–2)
LDLC SERPL CALC-MCNC: 110 MG/DL (ref 0–100)
LYMPHOCYTES # BLD AUTO: 2.66 THOUSANDS/ÂΜL (ref 0.6–4.47)
LYMPHOCYTES NFR BLD AUTO: 32 % (ref 14–44)
MAGNESIUM SERPL-MCNC: 2.2 MG/DL (ref 1.9–2.7)
MCH RBC QN AUTO: 27.1 PG (ref 26.8–34.3)
MCHC RBC AUTO-ENTMCNC: 31.7 G/DL (ref 31.4–37.4)
MCV RBC AUTO: 86 FL (ref 82–98)
MONOCYTES # BLD AUTO: 0.7 THOUSAND/ÂΜL (ref 0.17–1.22)
MONOCYTES NFR BLD AUTO: 9 % (ref 4–12)
NEUTROPHILS # BLD AUTO: 4.51 THOUSANDS/ÂΜL (ref 1.85–7.62)
NEUTS SEG NFR BLD AUTO: 53 % (ref 43–75)
NRBC BLD AUTO-RTO: 0 /100 WBCS
PHOSPHATE SERPL-MCNC: 3 MG/DL (ref 2.7–4.5)
PLATELET # BLD AUTO: 364 THOUSANDS/UL (ref 149–390)
PMV BLD AUTO: 9.4 FL (ref 8.9–12.7)
POTASSIUM SERPL-SCNC: 4.2 MMOL/L (ref 3.5–5.3)
PROT SERPL-MCNC: 6.9 G/DL (ref 6.4–8.4)
RBC # BLD AUTO: 4.76 MILLION/UL (ref 3.88–5.62)
SODIUM SERPL-SCNC: 137 MMOL/L (ref 135–147)
TRIGL SERPL-MCNC: 156 MG/DL
WBC # BLD AUTO: 8.27 THOUSAND/UL (ref 4.31–10.16)

## 2023-06-26 PROCEDURE — 84100 ASSAY OF PHOSPHORUS: CPT | Performed by: INTERNAL MEDICINE

## 2023-06-26 PROCEDURE — 97162 PT EVAL MOD COMPLEX 30 MIN: CPT

## 2023-06-26 PROCEDURE — 94002 VENT MGMT INPAT INIT DAY: CPT

## 2023-06-26 PROCEDURE — 85025 COMPLETE CBC W/AUTO DIFF WBC: CPT | Performed by: INTERNAL MEDICINE

## 2023-06-26 PROCEDURE — 80053 COMPREHEN METABOLIC PANEL: CPT | Performed by: INTERNAL MEDICINE

## 2023-06-26 PROCEDURE — 94760 N-INVAS EAR/PLS OXIMETRY 1: CPT

## 2023-06-26 PROCEDURE — 97165 OT EVAL LOW COMPLEX 30 MIN: CPT

## 2023-06-26 PROCEDURE — 70551 MRI BRAIN STEM W/O DYE: CPT

## 2023-06-26 PROCEDURE — 99232 SBSQ HOSP IP/OBS MODERATE 35: CPT | Performed by: PHYSICIAN ASSISTANT

## 2023-06-26 PROCEDURE — 94762 N-INVAS EAR/PLS OXIMTRY CONT: CPT

## 2023-06-26 PROCEDURE — 80061 LIPID PANEL: CPT | Performed by: INTERNAL MEDICINE

## 2023-06-26 PROCEDURE — 99255 IP/OBS CONSLTJ NEW/EST HI 80: CPT | Performed by: PSYCHIATRY & NEUROLOGY

## 2023-06-26 PROCEDURE — 83036 HEMOGLOBIN GLYCOSYLATED A1C: CPT | Performed by: INTERNAL MEDICINE

## 2023-06-26 PROCEDURE — 83735 ASSAY OF MAGNESIUM: CPT | Performed by: INTERNAL MEDICINE

## 2023-06-26 PROCEDURE — 92610 EVALUATE SWALLOWING FUNCTION: CPT

## 2023-06-26 RX ORDER — KETOROLAC TROMETHAMINE 30 MG/ML
15 INJECTION, SOLUTION INTRAMUSCULAR; INTRAVENOUS ONCE
Status: COMPLETED | OUTPATIENT
Start: 2023-06-26 | End: 2023-06-26

## 2023-06-26 RX ADMIN — GABAPENTIN 100 MG: 100 CAPSULE ORAL at 15:05

## 2023-06-26 RX ADMIN — GABAPENTIN 100 MG: 100 CAPSULE ORAL at 09:25

## 2023-06-26 RX ADMIN — ASPIRIN 81 MG 81 MG: 81 TABLET ORAL at 09:25

## 2023-06-26 RX ADMIN — KETOTIFEN FUMARATE 1 DROP: 0.25 SOLUTION/ DROPS OPHTHALMIC at 09:26

## 2023-06-26 RX ADMIN — METHOCARBAMOL 750 MG: 500 TABLET ORAL at 15:05

## 2023-06-26 RX ADMIN — KETOROLAC TROMETHAMINE 15 MG: 30 INJECTION, SOLUTION INTRAMUSCULAR; INTRAVENOUS at 15:18

## 2023-06-26 RX ADMIN — ENOXAPARIN SODIUM 40 MG: 40 INJECTION SUBCUTANEOUS at 09:26

## 2023-06-26 RX ADMIN — METHOCARBAMOL 750 MG: 500 TABLET ORAL at 06:24

## 2023-06-26 RX ADMIN — KETOTIFEN FUMARATE 1 DROP: 0.25 SOLUTION/ DROPS OPHTHALMIC at 18:02

## 2023-06-26 RX ADMIN — MONTELUKAST 10 MG: 10 TABLET, FILM COATED ORAL at 21:22

## 2023-06-26 RX ADMIN — ACETAMINOPHEN 325MG 975 MG: 325 TABLET ORAL at 15:05

## 2023-06-26 RX ADMIN — DILTIAZEM HYDROCHLORIDE 240 MG: 120 CAPSULE, COATED, EXTENDED RELEASE ORAL at 09:25

## 2023-06-26 RX ADMIN — ATORVASTATIN CALCIUM 40 MG: 40 TABLET, FILM COATED ORAL at 18:02

## 2023-06-26 RX ADMIN — ACETAMINOPHEN 325MG 975 MG: 325 TABLET ORAL at 06:24

## 2023-06-26 RX ADMIN — DOCUSATE SODIUM 100 MG: 100 CAPSULE, LIQUID FILLED ORAL at 09:25

## 2023-06-26 RX ADMIN — KETOROLAC TROMETHAMINE 15 MG: 30 INJECTION, SOLUTION INTRAMUSCULAR; INTRAVENOUS at 06:24

## 2023-06-26 RX ADMIN — LIDOCAINE 2 PATCH: 700 PATCH TOPICAL at 09:26

## 2023-06-26 RX ADMIN — FLUTICASONE PROPIONATE 1 SPRAY: 50 SPRAY, METERED NASAL at 09:25

## 2023-06-26 NOTE — CASE MANAGEMENT
Case Management Assessment & Discharge Planning Note    Patient name Arjun Pete  Location [place] [id number] [facility] [place] [address]/E4  0043-* MRN [id number]  : 1970 Date 2023       Current Admission Date: 2023  Current Admission Diagnosis:Stroke-like symptoms   Patient Active Problem List    Diagnosis Date Noted   • Right mastoid effusion 2023   • Prediabetes 2023   • Stroke-like symptoms 2023   • Back pain 2023   • Asthma    • Onychomycosis 2023   • Ingrown nail 2022   • Elevated PSA 2022   • Vitamin D deficiency 2022   • Benign prostatic hyperplasia with urinary frequency 2022   • H/O urethral stricture 2022   • Hypertension    • Arthritis    • Hyperlipemia    • Obesity, morbid, BMI 40.0-49.9 ([address]) 2017   • Benign essential hypertension 2015   • Obstructive sleep apnea 2014      LOS (days): 1  Geometric Mean LOS (GMLOS) (days):   Days to GMLOS:     OBJECTIVE:    Risk of Unplanned Readmission Score: 8.17         Current admission status: Inpatient       Preferred Pharmacy:   [facility] [id number] [facility], [address] [facility], [place]  [address] [facility]  Phone: [phone] Fax: [phone]    Primary Care Provider: Briana Angulo DO    Primary Insurance: [address] [place] [address]  Secondary Insurance:     ASSESSMENT:  Paulino Proxies    There are no active Health Care Proxies on file. Readmission Root Cause  30 Day Readmission: No    Patient Information  Admitted from[de-identified] Home  Mental Status: Alert  During Assessment patient was accompanied by: Not accompanied during assessment  Assessment information provided by[de-identified] Patient  Primary Caregiver: Self  Support Systems: Francois Grace of Residence: [address] do you live in?: [place] entry access options.  Select all that apply.: Stairs  Number of steps to enter home.: 4  Type of Current Residence: 2 story home  Upon entering residence, is there a bedroom on the main floor (no further steps)?: Yes  Upon entering residence, is there a bathroom on the main floor (no further steps)?: Yes  In the last 12 months, was there a time when you were not able to pay the mortgage or rent on time?: No  In the last 12 months, how many places have you lived?: 1  In the last 12 months, was there a time when you did not have a steady place to sleep or slept in a shelter (including now)?: No  Living Arrangements: Lives Alone  Is patient a ?: No    Activities of Daily Living Prior to Admission  Functional Status: Independent  Completes ADLs independently?: Yes  Ambulates independently?: Yes  Does patient use assisted devices?: No  Does patient currently own DME?: No  Does patient have a history of Outpatient Therapy (PT/OT)?: No  Does the patient have a history of Short-Term Rehab?: No  Does patient have a history of HHC?: No  Does patient currently have [facility]?: No         Patient Information Continued  Income Source: Employed  Does patient have prescription coverage?: Yes  Within the past 12 months, you worried that your food would run out before you got the money to buy more.: Never true  Within the past 12 months, the food you bought just didn't last and you didn't have money to get more.: Never true  Food insecurity resource given?: Refused  Does patient have a history of substance abuse?: No  Does patient have a history of Mental Health Diagnosis?: No    PHQ 2/9 Screening   Reviewed PHQ 2/9 Depression Screening Score?: No    Means of Transportation  Means of Transport to Appts[de-identified] Drives Self  In the past 12 months, has lack of transportation kept you from medical appointments or from getting medications?: No  In the past 12 months, has lack of transportation kept you from meetings, work, or from getting things needed for daily living?: No  Was application for public transport provided?: Refused        DISCHARGE DETAILS:    Discharge planning discussed with[de-identified] pt  Freedom of Choice: Yes  Comments - Freedom of Choice: Pt will return back to previous environment once medically cleared  CM contacted family/caregiver?: Yes  Were Treatment Team discharge recommendations reviewed with patient/caregiver?: Yes  Did patient/caregiver verbalize understanding of patient care needs?: Yes  Were patient/caregiver advised of the risks associated with not following Treatment Team discharge recommendations?: Yes    Contacts  Patient Contacts: Mirian Rene (Other)   )  Relationship to Patient[de-identified] Friend  Contact Method: Phone  Phone Number: [phone] (L)  Reason/Outcome: Continuity of Care, Discharge Planning         DME Referral Provided  Referral made for DME?: No

## 2023-06-26 NOTE — ASSESSMENT & PLAN NOTE
· Complains of lower back pain; says he has had right-sided sciatica in the past  · Not maintained on any medications prior to admission, would recommend outpatient follow-up with spine and pain specialist as well as weight loss which would significantly improve his back pain  · Continue supportive care

## 2023-06-26 NOTE — OCCUPATIONAL THERAPY NOTE
Occupational Therapy Evaluation     Patient Name: Kareem Rojas  [id number] Date: 6/26/2023  Problem List  Principal Problem:    Stroke-like symptoms  Active Problems:    Hyperlipemia    Obesity, morbid, BMI 40.0-49.9 ([facility])    Benign essential hypertension    Obstructive sleep apnea    Benign prostatic hyperplasia with urinary frequency    Asthma    Back pain    Past Medical History  Past Medical History:   Diagnosis Date    Allergic     Anxiety     Arthritis     Asthma     CPAP (continuous positive airway pressure) dependence     Depression     Hyperlipemia     Hypertension     Obesity      Past Surgical History  Past Surgical History:   Procedure Laterality Date    HEMORRHOID SURGERY      TONSILLECTOMY      URETHRAL DILATION  2019 06/26/23 0918   OT Last Visit   OT Visit Date 06/26/23   Note Type   Note type Evaluation   Pain Assessment   Pain Assessment Tool 0-10   Pain Score 8   Pain Location/Orientation Location: Back;Orientation: Bilateral   Patient's Stated Pain Goal No pain   Hospital Pain Intervention(s) Repositioned; Ambulation/increased activity; Emotional support; Rest   Multiple Pain Sites No   Restrictions/Precautions   Weight Bearing Precautions Per Order No   Other Precautions Pain   Home Living   Type of Home Apartment  (3rd floor, 2 flights to enter)   Home Layout One level;Stairs to enter with rails   Bathroom Shower/Tub Tub/shower unit   Bathroom Toilet Standard   Bathroom Accessibility Accessible   Additional Comments Pt lives with S.O. in a 3rd floor apt with 2 FOS to enter. Prior Function   Level of Vel[place] Independent with ADLs; Independent with functional mobility; Independent with IADLS   Lives With Significant other   Receives Help From Family   IADLs Independent with driving; Independent with meal prep; Independent with medication management   Falls in the last 6 months 0   Vocational Full time employment   Comments At baseline, pt was I w/ ADLs, IADLs, and functional transfers/mobility w/o use of AD. (+) . FT employed. Denies falls PTA. Lifestyle   Autonomy At baseline, pt was I w/ ADLs, IADLs, and functional transfers/mobility w/o use of AD. (+) . FT employed. Denies falls PTA. Reciprocal Relationships S.O. Service to Others FT employed- [employer]   ADL   Where Assessed Edge of bed   Eating Assistance [address] [phone] 5  Supervision/Setup   [address] 6  Modified independent   [address] 5  Supervision/Setup   Toileting Assistance  5  Supervision/Setup   Additional Comments Pt denies concerns regarding ADLs, reports functioning near baseline level of performance   Bed Mobility   Supine to Sit 6  Modified independent   Additional items HOB elevated; Increased time required   Sit to Supine 6  Modified independent   Additional items HOB elevated; Increased time required   Additional Comments Pt lying supine at end of session. Call bell and phone within reach. All needs met and pt reports no further questions for OT at this time. Transfers   Sit to Stand 6  Modified independent   Additional items Increased time required; Bedrails   Stand to Sit 6  Modified independent   Additional items Increased time required   Functional Mobility   Functional Mobility 5  Supervision   Additional Comments Assist x1 w/o use of AD   Balance   Static Sitting Normal   Dynamic Sitting Good   Static Standing Fair +   Dynamic Standing Fair   Ambulatory Fair   Activity Tolerance   Activity Tolerance Patient tolerated treatment well   Medical Staff Made [place], PT   Nurse Made Aware yes; Adama Cohen RN   RUE Assessment   RUE Assessment WFL  (4+/5 throughout)   LUE Assessment   LUE Assessment WFL  (4+/5 throughout)   Hand Function   Gross Motor Coordination Functional   Fine Motor Coordination Functional   Sensation   Light Touch Partial deficits in the RUE;Partial deficits in the RLE   Proprioception   Proprioception No apparent deficits   Vision - Complex Assessment   Acuity Able to read clock/calendar on wall without difficulty; Able to read employee name badge without difficulty   Psychosocial   Psychosocial (WDL) WDL   Perception   Inattention/Neglect Appears intact   Cognition   Overall Cognitive Status WFL   Arousal/Participation Alert; Cooperative   Attention Within functional limits   Orientation Level Oriented X4   Memory Within functional limits   Following Commands Follows all commands and directions without difficulty   Assessment   Prognosis Good   Assessment Pt is a 45 y.o. male seen for OT evaluation s/p adm to [address] on 6/25/2023 w/ Stroke-like symptoms . CT head: No acute intracranial abnormality. MRI brain pending. Comorbidities affecting pt’s functional performance include a significant PMH of HTN, BPH, HLD, SHANDA. Pt with active OT orders and activity orders for Up and OOB as tolerated. Pt lives with S.O. in a 3rd floor apt with 2 FOS to enter. At baseline, pt was I w/ ADLs, IADLs, and functional transfers/mobility w/o use of AD. (+) . FT employed. Denies falls PTA. Upon evaluation, pt currently functioning at an overall Supervision-Mod I level for ADLs, Mod I for bed mobility, Mod I for transfers, and Supervision for functional mobility 2* the following deficits impacting occupational performance: decreased balance, impaired sensation and increased pain. Pt with the following personal factors of: [place] home environment and fall risk . Despite above mentioned deficits and personal factors, pt is functioning near baseline level of performance. Limited ADL deficits. No further acute OT needs identified at this time. Recommend continued mobilization with hospital staff and restorative program while in the hospital to increase pt’s endurance and strength upon D/C.  From OT standpoint, recommend D/C home with family support when medically cleared. No OT needs. D/C pt from OT caseload at this time. Goals   Patient Goals To get my MRI and go home   Plan   OT Frequency Eval only  (D/C OT)   Recommendation   OT Discharge Recommendation No rehabilitation needs   Additional Comments  The patient's raw score on the AM-PAC Daily Activity Inpatient Short Form is 21. A raw score of greater than or equal to 19 suggests the patient may benefit from discharge to home. Please refer to the recommendation of the Occupational Therapist for safe discharge planning.    AM-PAC Daily Activity Inpatient   Lower Body Dressing 3   Bathing 3   Toileting 3   Upper Body Dressing 4   Grooming 4   Eating 4   Daily Activity Raw Score 21   Daily Activity Standardized Score (Calc for Raw Score >=11) 44.27   AM-PAC Applied Cognition Inpatient   Following a Speech/Presentation 4   Understanding Ordinary Conversation 4   Taking Medications 4   Remembering Where Things Are Placed or Put Away 4   Remembering List of 4-5 Errands 4   Taking Care of Complicated Tasks 4   Applied Cognition Raw Score 24   Applied Cognition Standardized Score 62.21        Tyson Rodriguez, OTR/L

## 2023-06-26 NOTE — UTILIZATION REVIEW
Initial Clinical Review    Admission: Date/Time/Statement:   Admission Orders (From admission, onward)     Ordered        06/25/23 1705  INPATIENT ADMISSION  Once                      Orders Placed This Encounter   Procedures   • INPATIENT ADMISSION     Standing Status:   Standing     Number of Occurrences:   1     Order Specific Question:   Level of Care     Answer:   Med Surg [16]     Order Specific Question:   Estimated length of stay     Answer:   More than 2 Midnights     Order Specific Question:   Certification     Answer:   I certify that inpatient services are medically necessary for this patient for a duration of greater than two midnights. See H&P and MD Progress Notes for additional information about the patient's course of treatment. ED Arrival Information     Expected   -    Arrival   6/25/2023 13:44    Acuity   Urgent            Means of arrival   Walk-In    Escorted by   Self    Service   Hospitalist    Admission type   Emergency            Arrival complaint   rt arm pain           Chief Complaint   Patient presents with   • Numbness     Right arm numbness x4 days. Denies injury        Initial Presentation: 45 y.o. male to ED presents for right arm and right leg numbness and tingling and right leg weakness x4 days. Per pt, right arm from the shoulder to the fingertips on the lateral side is numb; reports pins and needle sensation from the right proximal thigh to below the right knee. Also reports right leg weakness although exam revealed right arm weakness. Concern for right-sided mastoiditis. C/o mild pain and has tried cleaning his right ear in the past. Chronic low back pain. PMH for of hypertension, BPH, hyperlipidemia, SHANDA, Asthma. Admit Inpatient level of care for Stroke-like symptoms and Back pain. Outside of the thrombolytic window. Stroke Workup and treat. MRI Brain. Start ASA 81mg, high-dose statin (Lipitor 40mg qd). Lipid panel, A1c, replete metabolites. Tele monitoring.  Neurology consult. Pain control. Date: 6/26   Day 2:   Progress notes; Right mastoid effusion. MRI Brain and Echo pending. Continue aspirin 81 mg and statin. Neurology consult pending. ENT consult. CT noting right mastoid effusion and fluid/debris within the right middle ear correlate for otomastoiditis. Pt c/o very mild right ear pain, history of dysfunction of right eustachian tube. Pt still c/o some numbness eight right and right thigh unchanged. Have some chronic fullness in his right ear and some right ear pain. Puts his ear pod in right ear only. Neurology cons; Stroke-like symptoms. Stroke workup. MRI Brain and Echo pending. Continue aspirin 81 mg daily. Statin daily. Continue tele monitoring. Frequent neuro checks. No need for permissive hypertension as symptoms have been present > 24 hours. On exam; Decreased facial sensation on the right side. Decreased light touch, vibration, temperature and pinprick sensation throughout right upper and lower extremity (described as 80%/100%).   Intact pinprick sensation to right side of face         ED Triage Vitals   Temperature Pulse Respirations Blood Pressure SpO2   06/25/23 1350 06/25/23 1350 06/25/23 1350 06/25/23 1350 06/25/23 1350   98.1 °F (36.7 °C) 81 20 147/85 98 %      Temp Source Heart Rate Source Patient Position - Orthostatic VS BP Location FiO2 (%)   06/25/23 1350 06/25/23 1350 06/25/23 1350 06/25/23 1350 06/26/23 0345   Oral Monitor Sitting Right arm 21      Pain Score       06/25/23 1839       No Pain          Wt Readings from Last 1 Encounters:   06/25/23 (!) 173 kg (380 lb 15.3 oz)     Additional Vital Signs:     06/26/23 0723 97.7 °F (36.5 °C) 71 18 140/72 100 98 % -- None (Room air) -- Lying   06/26/23 0630 -- -- -- 136/73 88 -- -- -- -- Lying   06/26/23 0430 -- 69 -- 147/98 108 -- -- -- -- Lying   06/26/23 0345 -- -- -- -- -- 96 % 21 CPAP Nasal mask --     06/25/23 2328 98 °F (36.7 °C) 80 18 145/90 103 98 % -- None (Room air) -- Lying   06/25/23 21:15:21 -- 76 -- 159/89 112 98 % -- -- -- --   06/25/23 20:17:54 -- 77 -- 121/83 96 100 % -- None (Room air) -- Lying   06/25/23 20:04:16 -- 82 -- 133/93 106 97 % -- None (Room air) -- Lying   06/25/23 18:31:24 97.3 °F (36.3 °C)   Abnormal  92 18 173/96   Abnormal  122 98 % -- -- -- --     Pertinent Labs/Diagnostic Test Results:   CTA head and neck with and without contrast   Final Result by Chino Espinoza MD (06/25 9633)      1. Patent major vasculature of the [place] without high-grade stenosis. No aneurysm. 2.  No stenosis or dissection of cervical carotid arteries. 3.  Unfortunately cannot reliably evaluate the origin, V1, and proximal V2 segments of the bilateral vertebral arteries due to artifactually distorted image quality in the lower neck and suboptimal contrast bolus timing. The remainder of cervical    vertebral arteries are unremarkable. 4.  Redemonstrated right mastoid effusion and fluid/debris within the right middle ear. Correlate for otomastoiditis. Workstation performed: [id number]         CT head without contrast   Final Result by Chino Espinoza MD (06/25 4142)      1. No acute intracranial abnormality. 2.  Right mastoid effusion and fluid/debris within the right middle ear. Correlate for otomastoiditis. 3.  Left sided sinusitis as described. Workstation performed: [id number]         MRI Brain (6/26) - 1. No MR evidence of acute ischemia. 2. Sinusitis with right mastoid effusion.          Results from last 7 days   Lab Units 06/26/23  0440 06/25/23  1511   WBC Thousand/uL 8.27 8.78   HEMOGLOBIN g/dL 12.9 14.4   HEMATOCRIT % 40.7 46.5   PLATELETS Thousands/uL 364 402*   NEUTROS ABS Thousands/µL 4.51 4.94         Results from last 7 days   Lab Units 06/26/23  0440 06/25/23  1511   SODIUM mmol/L 137 135   POTASSIUM mmol/L 4.2 4.1   CHLORIDE mmol/L 105 101   CO2 mmol/L 26 29   ANION GAP mmol/L 6 5   BUN mg/dL 14 9   CREATININE mg/dL 0.90 0.84   EGFR ml/min/1.73sq m 96 99   CALCIUM mg/dL 8.9 9.5   MAGNESIUM mg/dL 2.2  --    PHOSPHORUS mg/dL 3.0  --      Results from last 7 days   Lab Units 06/26/23  0440   AST U/L 10*   ALT U/L 12   ALK PHOS U/L 73   TOTAL PROTEIN g/dL 6.9   ALBUMIN g/dL 3.4*   TOTAL BILIRUBIN mg/dL 0.33         Results from last 7 days   Lab Units 06/26/23  0440 06/25/23  1511   GLUCOSE RANDOM mg/dL 108 81         Results from last 7 days   Lab Units 06/26/23  0440   HEMOGLOBIN A1C % 6.2*   EAG mg/dl 131         Results from last 7 days   Lab Units 06/25/23  1908 06/25/23  1723 06/25/23  1511   HS TNI 0HR ng/L  --   --  5   HS TNI 2HR ng/L  --  4  --    HSTNI D2 ng/L  --  -1  --    HS TNI 4HR ng/L 3  --   --    HSTNI D4 ng/L -2  --   --          Results from last 7 days   Lab Units 06/25/23 2033   CLARITY UA  Clear   COLOR UA  Yellow   SPEC GRAV UA  >=1.050*   PH UA  7.0   GLUCOSE UA mg/dl Negative   KETONES UA mg/dl Negative   BLOOD UA  Negative   PROTEIN UA mg/dl 50 (1+)*   NITRITE UA  Negative   BILIRUBIN UA  Negative   UROBILINOGEN UA (BE) mg/dl 6.0*   LEUKOCYTES UA  Negative   WBC UA /hpf None Seen   RBC UA /hpf 1-2   BACTERIA UA /hpf None Seen   EPITHELIAL CELLS WET PREP /hpf None Seen       ED Treatment:   Medication Administration from 06/25/2023 1344 to 06/25/2023 1825       Date/Time Order Dose Route Action     06/25/2023 1652 EDT aspirin tablet 325 mg 325 mg Oral Given     06/25/2023 1725 EDT iohexol (OMNIPAQUE) 350 MG/ML injection (SINGLE-DOSE) 100 mL 100 mL Intravenous Given        Past Medical History:   Diagnosis Date   • Allergic    • Anxiety    • Arthritis    • Asthma    • CPAP (continuous positive airway pressure) dependence    • Depression    • Hyperlipemia    • Hypertension    • Obesity      Present on Admission:  • Benign essential hypertension  • Benign prostatic hyperplasia with urinary frequency  • Hyperlipemia  • Obstructive sleep apnea  • Obesity, morbid, BMI 40.0-49.9 ([facility])  • Asthma  • Stroke-like symptoms  • Back pain      Admitting Diagnosis: Numbness [R20.0]  Right leg weakness [R29.898]  Right sided numbness [R20.0]  Age/Sex: 45 y.o. male     Admission Orders:  Scheduled Medications:  acetaminophen, 975 mg, Oral, Q8H JANESSA  aspirin, 81 mg, Oral, Daily  atorvastatin, 40 mg, Oral, QPM  diltiazem, 240 mg, Oral, Daily  docusate sodium, 100 mg, Oral, BID  enoxaparin, 40 mg, Subcutaneous, Daily  fluticasone, 1 spray, Nasal, Daily  gabapentin, 100 mg, Oral, TID  ketotifen, 1 drop, Both Eyes, BID  lidocaine, 2 patch, Topical, Daily  methocarbamol, 750 mg, Oral, Q8H JANESSA  montelukast, 10 mg, Oral, HS      Continuous IV Infusions: None     PRN Meds:  hydrALAZINE, 10 mg, Intravenous, Q6H PRN  ondansetron, 4 mg, Intravenous, Q6H PRN      Neuro checks Every 1 hour x 4 hours, then every 2 hours x 8 hours, then every 4 hours x 72 hours  IP CONSULT TO ENT  IP CONSULT TO NEUROLOGY  IP CONSULT TO CASE MANAGEMENT    Network Utilization Review Department  ATTENTION: Please call with any questions or concerns to [phone] and carefully listen to the prompts so that you are directed to the right person. All voicemails are confidential.  Adrienne Dumont all requests for admission clinical reviews, approved or denied determinations and any other requests to dedicated fax number below belonging to the campus where the patient is receiving treatment.  List of dedicated fax numbers for the Facilities:  [place] DENIALS (Administrative/Medical Necessity) [phone]   [address] [facility] (Maternity/NICU/Pediatrics) [phone]   [address] [place] [address] 1000 [facility] [phone]   [address] [place] [address] [address] [place] [address] [place] [id number] [address] [phone]   [address] [place] [address]  [facility] [phone]

## 2023-06-26 NOTE — PLAN OF CARE
Problem: Potential for Falls  Goal: Patient will remain free of falls  Description: INTERVENTIONS:  - Educate patient/family on patient safety including physical limitations  - Instruct patient to call for assistance with activity   - Consult OT/PT to assist with strengthening/mobility   - Keep Call bell within reach  - Keep bed low and locked with side rails adjusted as appropriate  - Keep care items and personal belongings within reach  - Initiate and maintain comfort rounds  - Make Fall Risk Sign visible to staff  - Offer Toileting every 2 Hours, in advance of need  - Initiate/Maintain bed alarm  - Apply yellow socks and bracelet for high fall risk patients  - Consider moving patient to room near nurses station  Outcome: Progressing     Problem: PAIN - ADULT  Goal: Verbalizes/displays adequate comfort level or baseline comfort level  Description: Interventions:  - Encourage patient to monitor pain and request assistance  - Assess pain using appropriate pain scale  - Administer analgesics based on type and severity of pain and evaluate response  - Implement non-pharmacological measures as appropriate and evaluate response  - Consider cultural and social influences on pain and pain management  - Notify physician/advanced practitioner if interventions unsuccessful or patient reports new pain  Outcome: Progressing     Problem: SAFETY ADULT  Goal: Patient will remain free of falls  Description: INTERVENTIONS:  - Educate patient/family on patient safety including physical limitations  - Instruct patient to call for assistance with activity   - Consult OT/PT to assist with strengthening/mobility   - Keep Call bell within reach  - Keep bed low and locked with side rails adjusted as appropriate  - Keep care items and personal belongings within reach  - Initiate and maintain comfort rounds  - Make Fall Risk Sign visible to staff  - Offer Toileting every 2 Hours, in advance of need  - Initiate/Maintain bed alarm  - Apply yellow socks and bracelet for high fall risk patients  - Consider moving patient to room near nurses station  Outcome: Progressing  Goal: Maintain or return to baseline ADL function  Description: INTERVENTIONS:  -  Assess patient's ability to carry out ADLs; assess patient's baseline for ADL function and identify physical deficits which impact ability to perform ADLs (bathing, care of mouth/teeth, toileting, grooming, dressing, etc.)  - Assess/evaluate cause of self-care deficits   - Assess range of motion  - Assess patient's mobility; develop plan if impaired  - Assess patient's need for assistive devices and provide as appropriate  - Encourage maximum independence but intervene and supervise when necessary  - Involve family in performance of ADLs  - Assess for home care needs following discharge   - Consider OT consult to assist with ADL evaluation and planning for discharge  - Provide patient education as appropriate  Outcome: Progressing  Goal: Maintains/Returns to pre admission functional level  Description: INTERVENTIONS:  - Perform BMAT or MOVE assessment daily.   - Set and communicate daily mobility goal to care team and patient/family/caregiver. - Collaborate with rehabilitation services on mobility goals if consulted  - Perform Range of Motion 4 times a day. - Reposition patient every 2 hours.   - Dangle patient 4 times a day  - Stand patient 4 times a day  - Ambulate patient 4 times a day  - Out of bed to chair 4 times a day   - Out of bed for meals 4 times a day  - Out of bed for toileting  - Record patient progress and toleration of activity level   Outcome: Progressing     Problem: DISCHARGE PLANNING  Goal: Discharge to home or other facility with appropriate resources  Description: INTERVENTIONS:  - Identify barriers to discharge w/patient and caregiver  - Arrange for needed discharge resources and transportation as appropriate  - Identify discharge learning needs (meds, wound care, etc.)  - Arrange for interpretive services to assist at discharge as needed  - Refer to Case Management Department for coordinating discharge planning if the patient needs post-hospital services based on physician/advanced practitioner order or complex needs related to functional status, cognitive ability, or social support system  Outcome: Progressing     Problem: Knowledge Deficit  Goal: Patient/family/caregiver demonstrates understanding of disease process, treatment plan, medications, and discharge instructions  Description: Complete learning assessment and assess knowledge base.   Interventions:  - Provide teaching at level of understanding  - Provide teaching via preferred learning methods  Outcome: Progressing

## 2023-06-26 NOTE — PROGRESS NOTES
[facility]  Progress Note  Name: Tiera Townsend  MRN: [id number]  Unit/Bed#: E4 -01 I Date of Admission: 6/25/2023   Date of Service: 6/26/2023 I Hospital Day: 1    Assessment/Plan   * Stroke-like symptoms  Assessment & Plan  · Patient admitted on stroke pathway with right-sided arm and leg numbness  · CT head: No acute intracranial abnormality  · CTA head/neck: Patent major vasculature of the [place] without high-grade stenosis  · MRI brain and echocardiogram pending  · Given aspirin load on arrival, continue aspirin 81 mg daily and Lipitor 40 mg daily  · Permissive hypertension  · Neurology consult pending    Prediabetes  Assessment & Plan  · A1c 6.2%  · Would benefit from weight loss and dietary modifications    Right mastoid effusion  Assessment & Plan  · CT noting right mastoid effusion and fluid/debris within the right middle ear correlate for otomastoiditis   · Very mild right ear pain, does have a history of dysfunction of right eustachian tube  · No evidence of acute infection at this time  · Appreciate ENT consult recommendations recommending outpatient follow-up    Back pain  Assessment & Plan  · Complains of lower back pain; says he has had right-sided sciatica in the past  · Not maintained on any medications prior to admission, would recommend outpatient follow-up with spine and pain specialist as well as weight loss which would significantly improve his back pain  · Continue supportive care    Asthma  Assessment & Plan  · Not in acute exacerbation, continue singular 10 mg daily    Benign prostatic hyperplasia with urinary frequency  Assessment & Plan  Monitor intake/output on retention protocol    Obstructive sleep apnea  Assessment & Plan  CPAP nightly    Benign essential hypertension  Assessment & Plan  Allow for permissive hypertension on stroke pathway    Obesity, morbid, BMI 40.0-49.9 ([address])  Assessment & Plan  - pt with BMI of Body mass index is 47.62 kg/m². Would benefit from weight loss      Hyperlipemia  Assessment & Plan  Continue statin therapy with Lipitor 40 mg daily               VTE Pharmacologic Prophylaxis: VTE Score: 3 Moderate Risk (Score 3-4) - Pharmacological DVT Prophylaxis Ordered: enoxaparin (Lovenox). Patient Centered Rounds: I performed bedside rounds with nursing staff today. Discussions with Specialists or Other Care Team Provider: will review neuro recs    Education and Discussions with Family / Patient: Patient declined call to . Total Time Spent on Date of Encounter in care of patient: 25 minutes This time was spent on one or more of the following: performing physical exam; counseling and coordination of care; obtaining or reviewing history; documenting in the medical record; reviewing/ordering tests, medications or procedures; communicating with other healthcare professionals and discussing with patient's family/caregivers. Current Length of Stay: 1 day(s)  Current Patient Status: Inpatient   Certification Statement: The patient will continue to require additional inpatient hospital stay due to stroke workup  Discharge Plan: Anticipate discharge tomorrow to discharge location to be determined pending rehab evaluations. Code Status: Level 1 - Full Code    Subjective:   Doing well. Still with some numbness eight right and right thigh unchange. No other complaints. Does have some chronic fullness in his right ear and some right ear pain. Puts his ear pod in right ear only. ENT eval to follow. No fevers, chills     Objective:     Vitals:   Temp (24hrs), Av.8 °F (36.6 °C), Min:97.3 °F (36.3 °C), Max:98.1 °F (36.7 °C)    Temp:  [97.3 °F (36.3 °C)-98.1 °F (36.7 °C)] 97.7 °F (36.5 °C)  HR:  [67-96] 74  Resp:  [16-20] 18  BP: (117-173)/(62-98) 152/72  SpO2:  [96 %-100 %] 98 %  Body mass index is 47.62 kg/m².      Input and Output Summary (last 24 hours):   No intake or output data in the 24 hours ending 23 1200    Physical Exam:   Physical Exam  Vitals and nursing note reviewed. Constitutional:       General: He is not in acute distress. Appearance: He is obese. Cardiovascular:      Rate and Rhythm: Normal rate and regular rhythm. Pulmonary:      Effort: Pulmonary effort is normal.      Breath sounds: Normal breath sounds. Abdominal:      General: Bowel sounds are normal.      Palpations: Abdomen is soft. Musculoskeletal:         General: Tenderness (mild around right ear and when pulling ear laterally) present. Skin:     General: Skin is warm. Neurological:      Mental Status: He is alert.       Comments: Speech clear, thought process intact, reports decreased sensation RUE and right thigh, strength intact   Psychiatric:         Mood and Affect: Mood normal.          Additional Data:     Labs:  Results from last 7 days   Lab Units 06/26/23  0440   WBC Thousand/uL 8.27   HEMOGLOBIN g/dL 12.9   HEMATOCRIT % 40.7   PLATELETS Thousands/uL 364   NEUTROS PCT % 53   LYMPHS PCT % 32   MONOS PCT % 9   EOS PCT % 4     Results from last 7 days   Lab Units 06/26/23  0440   SODIUM mmol/L 137   POTASSIUM mmol/L 4.2   CHLORIDE mmol/L 105   CO2 mmol/L 26   BUN mg/dL 14   CREATININE mg/dL 0.90   ANION GAP mmol/L 6   CALCIUM mg/dL 8.9   ALBUMIN g/dL 3.4*   TOTAL BILIRUBIN mg/dL 0.33   ALK PHOS U/L 73   ALT U/L 12   AST U/L 10*   GLUCOSE RANDOM mg/dL 108             Results from last 7 days   Lab Units 06/26/23  0440   HEMOGLOBIN A1C % 6.2*           Lines/Drains:  Invasive Devices     Peripheral Intravenous Line  Duration           Peripheral IV 03/21/22 Right Antecubital 462 days    Peripheral IV 06/25/23 Left Antecubital <1 day                  Telemetry:  Telemetry Orders (From admission, onward)             24 Hour Telemetry Monitoring  (ED Bridging Orders Panel)  Continuous x 24 Hours (Telem)        Question:  Reason for 24 Hour Telemetry  Answer:  TIA/Suspected CVA/ Confirmed CVA Imaging: Reviewed radiology reports from this admission including: CT head    Recent Cultures (last 7 days):         Last 24 Hours Medication List:   Current Facility-Administered Medications   Medication Dose Route Frequency Provider Last Rate   • acetaminophen  975 mg Oral Counts include 234 beds at the [facility] Yesenia Sebastian MD     • aspirin  81 mg Oral Daily Yesenia Sebastian MD     • atorvastatin  40 mg Oral QPM Yesenia Sebastian MD     • diltiazem  240 mg Oral Daily Yesenia Sebastian MD     • docusate sodium  100 mg Oral BID Yesenia Sebastian MD     • enoxaparin  40 mg Subcutaneous Daily Yesenia Sebastian MD     • fluticasone  1 spray Nasal Daily Yesenia Sebastian MD     • gabapentin  100 mg Oral TID Yesenia Sebastian MD     • hydrALAZINE  10 mg Intravenous Q6H PRN Yesenia Sebastian MD     • ketotifen  1 drop Both Eyes BID Yesenia Sebastian MD     • lidocaine  2 patch Topical Daily Yesenia Sebastian MD     • methocarbamol  750 mg Oral Counts include 234 beds at the [facility] Yesenia Sebastian MD     • montelukast  10 mg Oral HS Yesenia Sebastian MD     • ondansetron  4 mg Intravenous Q6H PRN Yesenia Sebastian MD          Today, Patient Was Seen By: Omer Gan PA-C    **Please Note: This note may have been constructed using a voice recognition system. **

## 2023-06-26 NOTE — ASSESSMENT & PLAN NOTE
· CT noting right mastoid effusion and fluid/debris within the right middle ear correlate for otomastoiditis   · Appreciate ENT consult recommendations recommending outpatient follow-up

## 2023-06-26 NOTE — ASSESSMENT & PLAN NOTE
· Patient admitted on stroke pathway with right-sided arm and leg numbness  · CT head: No acute intracranial abnormality  · CTA head/neck: Patent major vasculature of the [place] of [place] without high-grade stenosis  · MRI brain and echocardiogram pending  · Given aspirin load on arrival, continue aspirin 81 mg daily and Lipitor 40 mg daily  · Permissive hypertension  · Neurology consult pending

## 2023-06-26 NOTE — ASSESSMENT & PLAN NOTE
40-year-old male with HTN, BPH, HLD, SHANDA and obesity who presents with complaint of intermittent right sided numbness/paresthesias x 4 days. BP on arrival 147/85. Workup:  - CTH revealed right mastoid effusion with fluid/debris within the right ear and left-sided sinusitis. - CTA head/neck grossly unrevealing, however per report "unfortunately cannot reliably evaluate the origin, V1 and proximal V2 segments of bilateral vertebral arteries due to artifact". - MRI brain wo completed with no evidence of acute stroke. - Echo pending  - Lipid panel: Cholesterol 174,   - Hemoglobin A1c 6.2H    Neuro exam reveals decreased sensation throughout right side. Plan detailed below. Plan:  - Stroke pathway  - Loaded with Aspirin 325 mg x1. Currently on Aspirin 81 mg was not taken this at home.   - Statin  - No need for permissive hypertension as symptoms have been present > 24 hours  - Euglycemic, normothermic goal  - Continue telemetry  - PT/OT/ST  - Secondary risk factor modification.   - Stroke education  - Frequent neuro checks. Continue to monitor and notify neurology with any changes.  - STAT CT head for any acute change in neuro exam  - Medical management and supportive care per primary team. Correction of any metabolic or infectious disturbances. Plan discussed with Attending Neurologist, please see attestation for further input/recommendations.

## 2023-06-26 NOTE — QUICK NOTE
Needs transport to [facility] for MRI. Patient is agreeable   Notified CM and CC. Called MRI at [facility], able to accommodate at 4 pm today   CM and CC working on RN and transport. Addendum:   Transport set for 3:20 pm with nurse arranged for Mri at 4 pm. Called and confirmed again with [facility] department.

## 2023-06-26 NOTE — PHYSICAL THERAPY NOTE
Physical Therapy Evaluation     Patient's Name: Dejuan Jane    Admitting Diagnosis  Numbness [R20.0]  Right leg weakness [R29.898]  Right sided numbness [R20.0]    Problem List  Patient Active Problem List   Diagnosis   • Hypertension   • Arthritis   • Hyperlipemia   • Obesity, morbid, BMI 40.0-49.9 ([facility])   • Benign essential hypertension   • Obstructive sleep apnea   • Benign prostatic hyperplasia with urinary frequency   • H/O urethral stricture   • Vitamin D deficiency   • Elevated PSA   • Ingrown nail   • Onychomycosis   • Asthma   • Stroke-like symptoms   • Back pain       Past Medical History  Past Medical History:   Diagnosis Date   • Allergic    • Anxiety    • Arthritis    • Asthma    • CPAP (continuous positive airway pressure) dependence    • Depression    • Hyperlipemia    • Hypertension    • Obesity        Past Surgical History  Past Surgical History:   Procedure Laterality Date   • HEMORRHOID SURGERY     • TONSILLECTOMY     • URETHRAL DILATION  2019 06/26/23 0907   PT Last Visit   PT Visit Date 06/26/23   Note Type   Note type Evaluation   Pain Assessment   Pain Assessment Tool 0-10   Pain Score 8   Pain Location/Orientation Location: Back;Orientation: Bilateral   Restrictions/Precautions   Weight Bearing Precautions Per Order No   Other Precautions Pain   Home Living   Type of Home Apartment  (3rd floor, 2 flights to enter)   Home Layout One level;Performs ADLs on one level; Able to live on main level with bedroom/bathroom;Stairs to enter with rails   Bathroom Shower/Tub Tub/shower unit   [address]   (denies DME)   Prior Function   Level of [place] Independent with functional mobility; Independent with ADLs; Independent with IADLS   Lives With Significant other   Receives Help From Family   IADLs Independent with driving; Independent with meal prep; Independent with medication management   Falls in the last 6 months 0 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 with R side weakness and numbness/tingling admitted 6/25/23. MRI pending   Family/Caregiver Present No   Cognition   Overall Cognitive Status WFL   Arousal/Participation Alert   Orientation Level Oriented X4   Memory Within functional limits   Following Commands Follows all commands and directions without difficulty   Subjective   Subjective "Do you think I could be experiencing this because Maria Vicotria been driving alot. Not as active?"   RLE Assessment   RLE Assessment X   Strength RLE   R Hip Flexion 4/5   R Knee Extension 4-/5   R Ankle Dorsiflexion 4-/5   R Knee Flexion 4/5   LLE Assessment   LLE Assessment X   Strength LLE   L Hip Flexion 5/5   L Knee Flexion 5/5   L Knee Extension 5/5   L Ankle Dorsiflexion 5/5   Coordination   Movements are Fluid and Coordinated 1   Sensation X  (Pt. reporting numbness in R UE and R quad)   Rapid Alternating Movements Intact   Bed Mobility   Supine to Sit 6  Modified independent   Additional items Increased time required   Sit to Supine 6  Modified independent   Additional items Increased time required   Transfers   Sit to Stand 6  Modified independent   Additional items Increased time required   Stand to Sit 6  Modified independent   Additional items Increased time required   Additional Comments Ambulation completed without AD. No s/sx of LOB noted. Ambulation/Elevation   Gait pattern Decreased foot clearance; Wide ZABRINA; Short stride  (shuffling, partially due to sandles)   Gait Assistance 5  Supervision   Additional items Verbal cues   Assistive Device None   Distance 100'   Stair Management Assistance Not tested  (d/t pt. strength and function no concerns with ability to navigate stairs)   Balance   Static Sitting Normal   Dynamic Sitting Good   Static Standing Fair   Dynamic Standing Fair +   Ambulatory Fair   Endurance Deficit   Endurance Deficit No   Activity Tolerance Activity Tolerance Patient tolerated treatment well   Nurse Made Aware RN ok to see   Assessment   Prognosis Good   Problem List Decreased strength;Obesity;Pain; Impaired sensation   Assessment Pt is 45 y.o. male seen for PT evaluation s/p admit to [address] on 6/25/2023 w/ Stroke-like symptoms. PT consulted to assess pt's functional mobility and d/c needs. Order placed for PT eval and tx, w/ activity order. Comorbidities affecting pt's physical performance at time of assessment include: Obesity, HTN, Asthma, LBP chronic, CVA like sx with R side weakness/numbness and tingling. PTA, pt was independent w/ all functional mobility w/ no AD, lives w/ SO  in 3rd  level apartment and works full time. Personal factors affecting pt at time of IE include: stairs to enter home and good support at home; ability to perform ADL/IADL without assist.. Please find objective findings from PT assessment regarding body systems outlined above with impairments and limitations including weakness, gait deviations, pain, altered sensation and SOB upon exertion. The following objective measures performed on IE also reveal limitations: AM-PAC 6-Clicks: 48/27. Pt's clinical presentation is currently evolving seen in pt's presentation. Pt to benefit from continued PT tx to address deficits as defined above and maximize level of functional independent mobility and consistency. From PT/mobility standpoint, recommendation at time of d/c would be home with outpatient rehabilitation for strength, endurance and LBP pending progress in order to facilitate return to PLOF. Recommend D/C acute care PT services due to high level of function at this time.    Barriers to Discharge None   Goals   Patient Goals to get my MRI and go home   Plan   PT Frequency Other (Comment)  (d/c acute care PT)   Recommendation   PT Discharge Recommendation Home with outpatient rehabilitation   AM-PAC Basic Mobility Inpatient   Turning in Flat Bed Without Bedrails 4   Lying on Back to Sitting on Edge of Flat Bed Without Bedrails 4   Moving Bed to Chair 4   Standing Up From Chair Using Arms 4   Walk in Room 3   Climb 3-5 Stairs With Railing 3   Basic Mobility Inpatient Raw Score 22   Basic Mobility Standardized Score 47.4   Highest Level Of Mobility   -HLM Goal 7: Walk 25 feet or more   JH-HLM Achieved 7: Walk 25 feet or more         Avani Burgess, PT

## 2023-06-26 NOTE — CASE MANAGEMENT
Case Management Discharge Planning Note    Patient name Yan Currie  Location [place] [address] /E4  0043-* MRN [id number]  : 1970 Date 2023       Current Admission Date: 2023  Current Admission Diagnosis:Stroke-like symptoms   Patient Active Problem List    Diagnosis Date Noted   • Right mastoid effusion 2023   • Prediabetes 2023   • Stroke-like symptoms 2023   • Back pain 2023   • Asthma    • Onychomycosis 2023   • Ingrown nail 2022   • Elevated PSA 2022   • Vitamin D deficiency 2022   • Benign prostatic hyperplasia with urinary frequency 2022   • H/O urethral stricture 2022   • Hypertension    • Arthritis    • Hyperlipemia    • Obesity, morbid, BMI 40.0-49.9 ([address]) 2017   • Benign essential hypertension 2015   • Obstructive sleep apnea 2014      LOS (days): 1  Geometric Mean LOS (GMLOS) (days):   Days to GMLOS:     OBJECTIVE:  Risk of Unplanned Readmission Score: 8.17         Current admission status: Inpatient   Preferred Pharmacy:   [facility], [place] [address] [facility], [address] [place] [address]  [address] [place] [address]  Phone: [phone] Fax: [phone]    Primary Care Provider: Magdalene Lawler DO    Primary Insurance: [facility]  Secondary Insurance:     DISCHARGE DETAILS:  JANNIE was notified that pt will need round trip from [address] [place] [address] to [facility] for MRI appointment at [email or web address] set up interlink transportation from [employer] to [facility] through [email or web address].    Transportation timing  are as followed:   Patient is a ALS transportation due to cardiac Monitoring   [facility] to [address] [phone] Awarded 3:20pm EDT     Charu Cue to [employer] E[employer] CONTACT STATUS TRANSPORT ETA   [facility] [phone] Awarded 5:00pm EDT     JANNIE completed MN form and gave it to the Nurse. CM department will continue to follow through pt's discharge.

## 2023-06-26 NOTE — PLAN OF CARE
Problem: Potential for Falls  Goal: Patient will remain free of falls  Description: INTERVENTIONS:  - Educate patient/family on patient safety including physical limitations  - Instruct patient to call for assistance with activity   - Consult OT/PT to assist with strengthening/mobility   - Keep Call bell within reach  - Keep bed low and locked with side rails adjusted as appropriate  - Keep care items and personal belongings within reach  - Initiate and maintain comfort rounds  - Make Fall Risk Sign visible to staff  - Offer Toileting every  Hours, in advance of need  - Initiate/Maintain alarm  - Obtain necessary fall risk management equipment:   - Apply yellow socks and bracelet for high fall risk patients  - Consider moving patient to room near nurses station  Outcome: Progressing     Problem: PAIN - ADULT  Goal: Verbalizes/displays adequate comfort level or baseline comfort level  Description: Interventions:  - Encourage patient to monitor pain and request assistance  - Assess pain using appropriate pain scale  - Administer analgesics based on type and severity of pain and evaluate response  - Implement non-pharmacological measures as appropriate and evaluate response  - Consider cultural and social influences on pain and pain management  - Notify physician/advanced practitioner if interventions unsuccessful or patient reports new pain  Outcome: Progressing     Problem: SAFETY ADULT  Goal: Patient will remain free of falls  Description: INTERVENTIONS:  - Educate patient/family on patient safety including physical limitations  - Instruct patient to call for assistance with activity   - Consult OT/PT to assist with strengthening/mobility   - Keep Call bell within reach  - Keep bed low and locked with side rails adjusted as appropriate  - Keep care items and personal belongings within reach  - Initiate and maintain comfort rounds  - Make Fall Risk Sign visible to staff  - Offer Toileting every  Hours, in advance of need  - Initiate/Maintain alarm  - Obtain necessary fall risk management equipment: - Apply yellow socks and bracelet for high fall risk patients  - Consider moving patient to room near nurses station  Outcome: Progressing  Goal: Maintain or return to baseline ADL function  Description: INTERVENTIONS:  -  Assess patient's ability to carry out ADLs; assess patient's baseline for ADL function and identify physical deficits which impact ability to perform ADLs (bathing, care of mouth/teeth, toileting, grooming, dressing, etc.)  - Assess/evaluate cause of self-care deficits   - Assess range of motion  - Assess patient's mobility; develop plan if impaired  - Assess patient's need for assistive devices and provide as appropriate  - Encourage maximum independence but intervene and supervise when necessary  - Involve family in performance of ADLs  - Assess for home care needs following discharge   - Consider OT consult to assist with ADL evaluation and planning for discharge  - Provide patient education as appropriate  Outcome: Progressing  Goal: Maintains/Returns to pre admission functional level  Description: INTERVENTIONS:  - Perform BMAT or MOVE assessment daily.   - Set and communicate daily mobility goal to care team and patient/family/caregiver. - Collaborate with rehabilitation services on mobility goals if consulted  - Perform Range of Motion  times a day. - Reposition patient every  hours.   - Dangle patient  times a day  - Stand patient  times a day  - Ambulate patient  times a day  - Out of bed to chair mes a day   - Out of bed for meals times a day  - Out of bed for toileting  - Record patient progress and toleration of activity level   Outcome: Progressing     Problem: DISCHARGE PLANNING  Goal: Discharge to home or other facility with appropriate resources  Description: INTERVENTIONS:  - Identify barriers to discharge w/patient and caregiver  - Arrange for needed discharge resources and transportation as appropriate  - Identify discharge learning needs (meds, wound care, etc.)  - Arrange for interpretive services to assist at discharge as needed  - Refer to Case Management Department for coordinating discharge planning if the patient needs post-hospital services based on physician/advanced practitioner order or complex needs related to functional status, cognitive ability, or social support system  Outcome: Progressing     Problem: Knowledge Deficit  Goal: Patient/family/caregiver demonstrates understanding of disease process, treatment plan, medications, and discharge instructions  Description: Complete learning assessment and assess knowledge base.   Interventions:  - Provide teaching at level of understanding  - Provide teaching via preferred learning methods  Outcome: Progressing

## 2023-06-26 NOTE — ASSESSMENT & PLAN NOTE
Complains of lower back pain; says he has had right-sided sciatica in the past  Currently takes nothing  Around-the-clock Tylenol  Aqua K-pad  Gabapentin 100 mg 3 times daily  Robaxin 750 3 times daily

## 2023-06-26 NOTE — H&P
[facility]  H&P  Name: Herbie Banerjee 45 y.o. male I MRN: [id number]  Unit/Bed#: E5 -01 I Date of Admission: 6/25/2023   Date of Service: 6/25/2023 I Hospital Day: 0      Assessment/Plan   * Stroke-like symptoms  Assessment & Plan  - pt presents symptoms of right-sided arm and leg numbness as well as right-sided leg weakness  -Have been present for several days, outside of the thrombolytic window  - CT head in ED shows no acute intracranial abnormalities  - /96 (Allow permissive HTN >160). Keep BP < 220/110  - start ASA 81mg, high-dose statin (Lipitor 40mg qd)  - check MRI brain  - consider Echo with bubble  - PT/OT/speech  - admit on stroke pathway  -lipid panel, A1c, replete metabolites  -placed on telemetry  -consult to neurology      Back pain  Assessment & Plan  Complains of lower back pain; says he has had right-sided sciatica in the past  Currently takes nothing  Around-the-clock Tylenol  Aqua K-pad  Gabapentin 100 mg 3 times daily  Robaxin 750 3 times daily    Asthma  Assessment & Plan  98% SPO2 on room air; not in acute exacerbation    Benign prostatic hyperplasia with urinary frequency  Assessment & Plan  Ins and outs; urinary retention protocol    Obstructive sleep apnea  Assessment & Plan  CPAP nightly    Benign essential hypertension  Assessment & Plan  Blood pressure highly elevated on admission, hold blood pressure medications to allow for permissive hypertension  Hydralazine as needed for SBP greater than 220    Obesity, morbid, BMI 40.0-49.9 ([facility])  Assessment & Plan  - pt with BMI of Body mass index is 47.62 kg/m².   - pt counseled on risks associated with obesity and it's contribution to other health issues  - advise portion control, healthy food choices, drinking water instead of juice/soda  - advise beginning light exercise program (i.e. Walking 30min 4-5x/wk)  - advise keeping food diary to help identify problem foods/times/stressors  - pt advised that weight loss of ~ 1lb/wk is a good goal and not to become frustrated if loss is slower      Hyperlipemia  Assessment & Plan  Continue statin therapy         VTE Prophylaxis: Enoxaparin (Lovenox)  / sequential compression device   Code Status: Full  POLST: POLST form is not discussed and not completed at this time. Discussion with family: Care plan discussed with patient who voiced understanding and agrees with recommendations. Anticipated Length of Stay:  Patient will be admitted on an Inpatient basis with an anticipated length of stay of greater than 2 midnights. Justification for Hospital Stay: Strokelike symptoms    Total Time for Visit, including Counseling / Coordination of Care: 60 minutes. Greater than 50% of this total time spent on direct patient counseling and coordination of care. Chief Complaint:   Right arm and leg numbness and tingling; right leg weakness    History of Present Illness:    Jocelyn Mehta is a 45 y.o. male with past medical history of hypertension, BPH, hyperlipidemia, SHANDA who presents with 4 days of right arm and right leg numbness and tingling and right leg weakness. Patient reports right arm from the shoulder to the fingertips on the lateral side is numb; reports pins and needle sensation from the right proximal thigh to below the right knee. Also reports right leg weakness although exam revealed right arm weakness. CT head in the ER had no acute intracranial abnormalities, but some concern for right-sided mastoiditis. Patient reports only mild pain and has tried cleaning his right ear in the past.  No problems eating, speaking, or articulating his jaw noted. Patient complains of chronic low back pain, but has no other acute complaints and will be admitted for stroke pathway. Review of Systems:    Review of Systems   Constitutional: Positive for activity change. Negative for chills and fever. HENT: Negative for ear pain and sore throat.     Eyes: Negative for pain and visual disturbance. Respiratory: Negative for cough and shortness of breath. Cardiovascular: Negative for chest pain and palpitations. Gastrointestinal: Negative for abdominal pain and vomiting. Genitourinary: Negative for dysuria and hematuria. Musculoskeletal: Positive for back pain. Negative for arthralgias. Skin: Negative for color change and rash. Neurological: Positive for weakness and numbness. Negative for seizures and syncope. All other systems reviewed and are negative. Past Medical and Surgical History:     Past Medical History:   Diagnosis Date   • Allergic    • Anxiety    • Arthritis    • Asthma    • CPAP (continuous positive airway pressure) dependence    • Depression    • Hyperlipemia    • Hypertension    • Obesity        Past Surgical History:   Procedure Laterality Date   • HEMORRHOID SURGERY     • TONSILLECTOMY     • URETHRAL DILATION  2019       Meds/Allergies:    Prior to Admission medications    Medication Sig Start Date End Date Taking?  Authorizing Provider   ciclopirox (PENLAC) 8 % solution Apply topically daily at bedtime  Patient not taking: Reported on 5/10/2023 5/4/23   Dagoberto Curtis DPM   clobetasol (TEMOVATE) 0.05 % cream  12/13/22   Historical Provider, MD   Cyanocobalamin (Vitamin B-12) 2500 MCG SUBL Place under the tongue    Historical Provider, MD   diclofenac (VOLTAREN) 75 mg EC tablet Take 1 tablet (75 mg total) by mouth 2 (two) times a day 5/10/23   Abena Lenz, DO   diltiazem (CARDIZEM CD) 240 mg 24 hr capsule TAKE ONE CAPSULE BY MOUTH ONCE DAILY 3/16/23   Abena Lenz, DO   ergocalciferol (VITAMIN D2) 50,000 units TAKE 1 CAPSULE BY MOUTH ONCE A WEEK 3/16/23   Abena Lenz, DO   fluticasone [facility]) 50 mcg/act nasal spray 1 spray into each nostril daily 5/10/23   Abena Lenz, DO   losartan (COZAAR) 100 MG tablet TAKE 1 TABLET BY MOUTH ONCE DAILY 3/16/23   Abena Lenz, DO   metoprolol succinate (TOPROL-XL) 50 mg 24 hr tablet TAKE 1 TABLET BY MOUTH ONCE DAILY 3/16/23   Tammy Roy DO   montelukast (SINGULAIR) 10 mg tablet Take 1 tablet (10 mg total) by mouth daily at bedtime 5/10/23   Tammy Roy DO   Multiple Vitamin (ONE-A-DAY MENS PO) Take by mouth    Historical Provider, MD   olopatadine (PATANOL) 0.1 % ophthalmic solution Administer 1 drop to both eyes 2 (two) times a day 5/10/23   Tammy Roy DO   pravastatin (PRAVACHOL) 20 mg tablet Take 1 tablet (20 mg total) by mouth daily 5/10/23   Tammy Roy DO   spironolactone (ALDACTONE) 25 mg tablet TAKE ONE TABLET BY MOUTH ONCE DAILY 3/16/23   Tammy Roy DO   testosterone (ANDROGEL) 1.62 % TD gel pump apply 1 pump to skin once daily 4/26/23   Tammy Roy DO     I have reviewed home medications using allscripts.     Allergies: No Known Allergies    Social History:     Marital Status: Single   Occupation: Direct patient care  Patient Pre-hospital Living Situation: Independently with family  Patient Pre-hospital Level of Mobility: Full  Patient Pre-hospital Diet Restrictions: None  Substance Use History:   Social History     Substance and Sexual Activity   Alcohol Use Not Currently     Social History     Tobacco Use   Smoking Status Former   • Types: Cigarettes   • Passive exposure: Past   Smokeless Tobacco Never   Tobacco Comments    Patient uses e-cigarette     Social History     Substance and Sexual Activity   Drug Use Never       Family History:    Family History   Problem Relation Age of Onset   • Hypertension Mother    • Hyperlipidemia Mother    • Heart disease Mother    • Hypertension Father    • Heart disease Father    • Hyperlipidemia Father    • COPD Sister    • Liver disease Sister        Physical Exam:     Vitals:   Blood Pressure: 121/83 (06/25/23 2017)  Pulse: 77 (06/25/23 2017)  Temperature: (!) 97.3 °F (36.3 °C) (06/25/23 1831)  Temp Source: Oral (06/25/23 1350)  Respirations: 18 (06/25/23 1831)  Weight - Scale: (!) 173 kg (380 lb 15.3 oz) (06/25/23 1350)  SpO2: 100 % (06/25/23 2017)    Physical Exam  Vitals and nursing note reviewed. Constitutional:       General: He is not in acute distress. Appearance: He is well-developed. He is obese. HENT:      Head: Normocephalic and atraumatic. Mouth/Throat:      Comments: Mild swelling around right mastoid; no tenderness to palpation or drainage, calor, erythema noted  Eyes:      Conjunctiva/sclera: Conjunctivae normal.   Cardiovascular:      Rate and Rhythm: Normal rate and regular rhythm. Heart sounds: No murmur heard. Pulmonary:      Effort: Pulmonary effort is normal. No respiratory distress. Breath sounds: Normal breath sounds. Abdominal:      Palpations: Abdomen is soft. Tenderness: There is no abdominal tenderness. Musculoskeletal:         General: No swelling. Cervical back: Neck supple. Skin:     General: Skin is warm and dry. Neurological:      Mental Status: He is alert and oriented to person, place, and time. Cranial Nerves: No cranial nerve deficit. Comments: And oriented X3, CN II through XII grossly intact; right arm 4/5 strength on extension; 5/5 strength on flexion; all other extremities 5/5 strength   Psychiatric:         Mood and Affect: Mood normal.           Additional Data:     Lab Results: I have personally reviewed pertinent reports. Results from last 7 days   Lab Units 06/25/23  1511   WBC Thousand/uL 8.78   HEMOGLOBIN g/dL 14.4   HEMATOCRIT % 46.5   PLATELETS Thousands/uL 402*   NEUTROS PCT % 56   LYMPHS PCT % 30   MONOS PCT % 10   EOS PCT % 3     Results from last 7 days   Lab Units 06/25/23  1511   SODIUM mmol/L 135   POTASSIUM mmol/L 4.1   CHLORIDE mmol/L 101   CO2 mmol/L 29   BUN mg/dL 9   CREATININE mg/dL 0.84   ANION GAP mmol/L 5   CALCIUM mg/dL 9.5   GLUCOSE RANDOM mg/dL 81                       Imaging: I have personally reviewed pertinent reports.       CTA head and neck with and without contrast   Final Result by Kingsley Dash MD (06/25 4873) 1.  Patent major vasculature of the [place] of [place] without high-grade stenosis. No aneurysm. 2.  No stenosis or dissection of cervical carotid arteries. 3.  Unfortunately cannot reliably evaluate the origin, V1, and proximal V2 segments of the bilateral vertebral arteries due to artifactually distorted image quality in the lower neck and suboptimal contrast bolus timing. The remainder of cervical    vertebral arteries are unremarkable. 4.  Redemonstrated right mastoid effusion and fluid/debris within the right middle ear. Correlate for otomastoiditis. Workstation performed: [id number]         CT head without contrast   Final Result by Felicita Abreu MD (06/25 1642)      1. No acute intracranial abnormality. 2.  Right mastoid effusion and fluid/debris within the right middle ear. Correlate for otomastoiditis. 3.  Left sided sinusitis as described. Workstation performed: [id number]         MRI Inpatient Order    (Results Pending)       EKG, Pathology, and Other Studies Reviewed on Admission:   · EKG: Sinus rhythm with first-degree AV block    Allscripts / Epic Records Reviewed: Yes     ** Please Note: This note has been constructed using a voice recognition system.  **

## 2023-06-26 NOTE — CONSULTS
Consultation - Neurology   Eliud Noonan 45 y.o. male MRN: [id number]  Unit/Bed#: E4 -01 Encounter: [id number]      Assessment/Plan     * Stroke-like symptoms  Assessment & Plan  76-year-old male with HTN, BPH, HLD, SHANDA and obesity who presents with complaint of intermittent right sided numbness/paresthesias x 4 days. BP on arrival 147/85. Workup:  - CTH revealed right mastoid effusion with fluid/debris within the right ear and left-sided sinusitis. - CTA head/neck grossly unrevealing, however per report "unfortunately cannot reliably evaluate the origin, V1 and proximal V2 segments of bilateral vertebral arteries due to artifact". - MRI brain wo pending    - Due to patient body habitus, will need to have MRI completed at [facility]  - Echo pending  - Lipid panel: Cholesterol 174,   - Hemoglobin A1c pending    Neuro exam reveals decreased sensation throughout right side, along with some mild right-sided weakness. Plan detailed below. Plan:  - Stroke pathway  - Loaded with Aspirin 325 mg x1. Continue with Aspirin 81 mg daily  - Continue with Atorvastatin 40 mg daily  - No need for permissive hypertension as symptoms have been present > 24 hours  - Euglycemic, normothermic goal  - Continue telemetry  - PT/OT/ST  - Secondary risk factor modification.   - Stroke education  - Frequent neuro checks. Continue to monitor and notify neurology with any changes.  - STAT CT head for any acute change in neuro exam  - Medical management and supportive care per primary team. Correction of any metabolic or infectious disturbances. Plan discussed with Attending Neurologist, please see attestation for further input/recommendations. Recommendations for outpatient neurological follow up have yet to be determined.     History of Present Illness     Reason for Consult / Principal Problem: Strokelike symptoms  Hx and PE limited by: N/A  HPI: Eliud Noonan is a 45 y.o. male with sciatica, HTN, BPH, HLD, SHANDA and obesity who presents with complaint of intermittent right sided numbness x4 days. Patient reports his numbness started in his right shoulder and extended to his fingertips. Paresthesias also began in his right hip extending to his foot. Patient reports that his numbness/paresthesias are transient and typically last 5 to 10 minutes. Patient does not know of any identifying trigger (reports that they can occur while he is sleeping, sitting or walking). Patient denied dysarthria, aphasia, visual changes, facial droop. Patient reports that he does experience sciatica however reports that the paresthesias feels different than his normal sciatica pain. On arrival to ED, /85. CT head revealed right mastoid effusion with fluid/debris within the right ear and left-sided sinusitis. CTA head/neck grossly unrevealing, however per report "unfortunately cannot reliably evaluate the origin, V1 and proximal V2 segments of bilateral vertebral arteries due to artifact". Out of concern for stroke, patient was loaded with aspirin 325 mg x 1 and admitted to the hospital on the stroke pathway. ENT consulted for concerns of mastoiditis. Inpatient consult to Neurology  Consult performed by: APRIL Arias  Consult ordered by: Roman Valladares MD          Review of Systems   Constitutional: Negative for fatigue and fever. Eyes: Negative for photophobia and visual disturbance. Respiratory: Negative for cough and shortness of breath. Cardiovascular: Negative for chest pain and palpitations. Musculoskeletal: Negative for back pain, neck pain and neck stiffness. Skin: Negative for color change and pallor. Neurological: Positive for weakness and numbness. Negative for dizziness, tremors, seizures, syncope, facial asymmetry, speech difficulty, light-headedness and headaches. Psychiatric/Behavioral: Negative for confusion. The patient is not nervous/anxious.     All other systems reviewed and are negative. Historical Information   Past Medical History:   Diagnosis Date   • Allergic    • Anxiety    • Arthritis    • Asthma    • CPAP (continuous positive airway pressure) dependence    • Depression    • Hyperlipemia    • Hypertension    • Obesity      Past Surgical History:   Procedure Laterality Date   • HEMORRHOID SURGERY     • TONSILLECTOMY     • URETHRAL DILATION  2019     Social History   Social History     Substance and Sexual Activity   Alcohol Use Not Currently     Social History     Substance and Sexual Activity   Drug Use Never     E-Cigarette/Vaping   • E-Cigarette Use Current Every Day User      E-Cigarette/Vaping Substances   • Nicotine Yes    • THC No    • CBD No    • Flavoring No    • Other No    • Unknown No      Social History     Tobacco Use   Smoking Status Former   • Types: Cigarettes   • Passive exposure: Past   Smokeless Tobacco Never   Tobacco Comments    Patient uses e-cigarette     Family History:   Family History   Problem Relation Age of Onset   • Hypertension Mother    • Hyperlipidemia Mother    • Heart disease Mother    • Hypertension Father    • Heart disease Father    • Hyperlipidemia Father    • COPD Sister    • Liver disease Sister        Review of previous medical records was completed.      Meds/Allergies   all current active meds have been reviewed, current meds:   Current Facility-Administered Medications   Medication Dose Route Frequency   • acetaminophen (TYLENOL) tablet 975 mg  975 mg Oral Q8H [address]   • aspirin chewable tablet 81 mg  81 mg Oral Daily   • atorvastatin (LIPITOR) tablet 40 mg  40 mg Oral QPM   • diltiazem (CARDIZEM CD) 24 hr capsule 240 mg  240 mg Oral Daily   • docusate sodium (COLACE) capsule 100 mg  100 mg Oral BID   • enoxaparin (LOVENOX) subcutaneous injection 40 mg  40 mg Subcutaneous Daily   • fluticasone (FLONASE) 50 mcg/act nasal spray 1 spray  1 spray Nasal Daily   • gabapentin (NEURONTIN) capsule 100 mg  100 mg Oral TID   • hydrALAZINE (APRESOLINE) injection 10 mg  10 mg Intravenous Q6H PRN   • ketotifen (ZADITOR) 0.025 % ophthalmic solution 1 drop  1 drop Both Eyes BID   • lidocaine (LIDODERM) 5 % patch 2 patch  2 patch Topical Daily   • methocarbamol (ROBAXIN) tablet 750 mg  750 mg Oral Q8H [address]   • montelukast (SINGULAIR) tablet 10 mg  10 mg Oral HS   • ondansetron (ZOFRAN) injection 4 mg  4 mg Intravenous Q6H PRN    and PTA meds:   Prior to Admission Medications   Prescriptions Last Dose Informant Patient Reported? Taking?    Cyanocobalamin (Vitamin B-12) 2500 MCG SUBL   Yes No   Sig: Place under the tongue   Multiple Vitamin (ONE-A-DAY MENS PO)   Yes No   Sig: Take by mouth   ciclopirox (PENLAC) 8 % solution   No No   Sig: Apply topically daily at bedtime   Patient not taking: Reported on 5/10/2023   clobetasol (TEMOVATE) 0.05 % cream   Yes No   Patient not taking: Reported on 5/10/2023   diclofenac (VOLTAREN) 75 mg EC tablet   No No   Sig: Take 1 tablet (75 mg total) by mouth 2 (two) times a day   diltiazem (CARDIZEM CD) 240 mg 24 hr capsule   No No   Sig: TAKE ONE CAPSULE BY MOUTH ONCE DAILY   ergocalciferol (VITAMIN D2) 50,000 units   No No   Sig: TAKE 1 CAPSULE BY MOUTH ONCE A WEEK   fluticasone (FLONASE) 50 mcg/act nasal spray   No No   Si spray into each nostril daily   losartan (COZAAR) 100 MG tablet   No No   Sig: TAKE 1 TABLET BY MOUTH ONCE DAILY   metoprolol succinate (TOPROL-XL) 50 mg 24 hr tablet   No No   Sig: TAKE 1 TABLET BY MOUTH ONCE DAILY   montelukast (SINGULAIR) 10 mg tablet   No No   Sig: Take 1 tablet (10 mg total) by mouth daily at bedtime   olopatadine (PATANOL) 0.1 % ophthalmic solution   No No   Sig: Administer 1 drop to both eyes 2 (two) times a day   pravastatin (PRAVACHOL) 20 mg tablet   No No   Sig: Take 1 tablet (20 mg total) by mouth daily   spironolactone (ALDACTONE) 25 mg tablet   No No   Sig: TAKE ONE TABLET BY MOUTH ONCE DAILY   testosterone (ANDROGEL) 1.62 % TD gel pump   No No   Sig: apply 1 pump to skin once daily      Facility-Administered Medications: None       No Known Allergies    Objective   Vitals:Blood pressure 152/72, pulse 74, temperature 97.7 °F (36.5 °C), temperature source Temporal, resp. rate 18, weight (!) 173 kg (380 lb 15.3 oz), SpO2 98 %. ,Body mass index is 47.62 kg/m². No intake or output data in the 24 hours ending 06/26/23 1313    Invasive Devices: Invasive Devices     Peripheral Intravenous Line  Duration           Peripheral IV 03/21/22 Right Antecubital 462 days    Peripheral IV 06/25/23 Left Antecubital <1 day                Physical Exam  Vitals reviewed. Constitutional:       General: He is not in acute distress. Appearance: Normal appearance. He is obese. He is not ill-appearing. HENT:      Head: Normocephalic and atraumatic. Right Ear: External ear normal.      Left Ear: External ear normal.      Nose: Nose normal.      Mouth/Throat:      Mouth: Mucous membranes are moist.   Eyes:      General:         Right eye: No discharge. Left eye: No discharge. Extraocular Movements: Extraocular movements intact and EOM normal.      Conjunctiva/sclera: Conjunctivae normal.      Pupils: Pupils are equal, round, and reactive to light. Cardiovascular:      Rate and Rhythm: Normal rate. Pulmonary:      Effort: Pulmonary effort is normal. No respiratory distress. Musculoskeletal:         General: Normal range of motion. Cervical back: Normal range of motion. No rigidity. Right lower leg: No edema. Left lower leg: No edema. Skin:     General: Skin is warm and dry. Coloration: Skin is not jaundiced or pale. Neurological:      Mental Status: He is alert and oriented to person, place, and time.       Coordination: Finger-Nose-Finger Test normal.      Comments: See below   Psychiatric:         Mood and Affect: Mood normal.         Speech: Speech normal.         Behavior: Behavior normal.       Neurologic Exam     Mental Status   Oriented to person, place, and time. Follows 2 step commands. Attention: normal. Concentration: normal.   Speech: speech is normal   Level of consciousness: alert  Knowledge: good. Able to name object. Able to repeat. Normal comprehension. Cranial Nerves     CN II   Visual fields full to confrontation. CN III, IV, VI   Pupils are equal, round, and reactive to light. Extraocular motions are normal.   Nystagmus: none   Diplopia: none  Conjugate gaze: present    CN VII   Facial expression full, symmetric. CN VIII   CN VIII normal.     CN IX, X   CN IX normal.     CN XII   CN XII normal.     Decreased facial sensation on the right side     Motor Exam   Muscle bulk: normal  Overall muscle tone: normal  Right arm pronator drift: absent  Left arm pronator drift: absent  Left upper and lower extremity: 5/5 throughout    Right upper extremity: 5/5  strength, 4+5 deltoid, 5/5 triceps, 5-/5 biceps    Right lower extremity: 5/5 dorsi and plantarflexion, 5/5 quadriceps, 4+/5 hamstrings     Sensory Exam     Decreased light touch, vibration, temperature and pinprick sensation throughout right upper and lower extremity (described as 80%/100%). Intact pinprick sensation to right side of face     Gait, Coordination, and Reflexes     Coordination   Finger to nose coordination: normal    Tremor   Resting tremor: absent  Intention tremor: absent  Action tremor: absent      Lab Results:   I have personally reviewed pertinent reports.   , CBC:   Results from last 7 days   Lab Units 06/26/23  0440 06/25/23  1511   WBC Thousand/uL 8.27 8.78   RBC Million/uL 4.76 5.47   HEMOGLOBIN g/dL 12.9 14.4   HEMATOCRIT % 40.7 46.5   MCV fL 86 85   PLATELETS Thousands/uL 364 402*   , BMP/CMP:   Results from last 7 days   Lab Units 06/26/23  0440 06/25/23  1511   SODIUM mmol/L 137 135   POTASSIUM mmol/L 4.2 4.1   CHLORIDE mmol/L 105 101   CO2 mmol/L 26 29   BUN mg/dL 14 9   CREATININE mg/dL 0.90 0.84   CALCIUM mg/dL 8.9 9.5   AST U/L 10*  -- ALT U/L 12  --    ALK PHOS U/L 73  --    EGFR ml/min/1.73sq m 96 99    Lipid Profile:   Results from last 7 days   Lab Units 06/26/23  0440   HDL mg/dL 33*   LDL CALC mg/dL 110*   TRIGLYCERIDES mg/dL 156*    Urinalysis:   Results from last 7 days   Lab Units 06/25/23  2033   COLOR UA  Yellow   CLARITY UA  Clear   SPEC GRAV UA  >=1.050*   PH UA  7.0   LEUKOCYTES UA  Negative   NITRITE UA  Negative   GLUCOSE UA mg/dl Negative   KETONES UA mg/dl Negative   BILIRUBIN UA  Negative   BLOOD UA  Negative   Imaging Studies: I have personally reviewed pertinent reports. and I have personally reviewed pertinent films in PACS CT head, CTA head/neck  EKG, Pathology, and Other Studies: I have personally reviewed pertinent reports.    and I have personally reviewed pertinent films in PACS  VTE Prophylaxis: Enoxaparin (Lovenox)    Code Status: Level 1 - Full Code

## 2023-06-27 ENCOUNTER — APPOINTMENT (INPATIENT)
Dept: NON INVASIVE DIAGNOSTICS | Facility: HOSPITAL | Age: 53
DRG: 058 | End: 2023-06-27
Payer: COMMERCIAL

## 2023-06-27 VITALS
TEMPERATURE: 97.7 F | OXYGEN SATURATION: 97 % | DIASTOLIC BLOOD PRESSURE: 90 MMHG | HEIGHT: 75 IN | SYSTOLIC BLOOD PRESSURE: 151 MMHG | HEART RATE: 75 BPM | WEIGHT: 315 LBS | RESPIRATION RATE: 18 BRPM | BODY MASS INDEX: 39.17 KG/M2

## 2023-06-27 LAB
AORTIC ROOT: 2.9 CM
APICAL FOUR CHAMBER EJECTION FRACTION: 57 %
ASCENDING AORTA: 2.8 CM
E WAVE DECELERATION TIME: 168 MS
FRACTIONAL SHORTENING: 30 (ref 28–44)
INTERVENTRICULAR SEPTUM IN DIASTOLE (PARASTERNAL SHORT AXIS VIEW): 1.2 CM
INTERVENTRICULAR SEPTUM: 1.2 CM (ref 0.6–1.1)
LAAS-AP2: 25.5 CM2
LAAS-AP4: 21.6 CM2
LEFT ATRIUM SIZE: 4.4 CM
LEFT ATRIUM VOLUME (MOD BIPLANE): 78 ML
LEFT INTERNAL DIMENSION IN SYSTOLE: 4.2 CM (ref 2.1–4)
LEFT VENTRICULAR INTERNAL DIMENSION IN DIASTOLE: 6 CM (ref 3.5–6)
LEFT VENTRICULAR POSTERIOR WALL IN END DIASTOLE: 1.1 CM
LEFT VENTRICULAR STROKE VOLUME: 99 ML
LVSV (TEICH): 99 ML
MV E'TISSUE VEL-SEP: 9 CM/S
MV PEAK A VEL: 0.61 M/S
MV PEAK E VEL: 40 CM/S
MV STENOSIS PRESSURE HALF TIME: 49 MS
MV VALVE AREA P 1/2 METHOD: 4.49
RIGHT ATRIUM AREA SYSTOLE A4C: 12.2 CM2
RIGHT VENTRICLE ID DIMENSION: 2.9 CM
SL CV LEFT ATRIUM LENGTH A2C: 5.5 CM
SL CV LV EF: 55
SL CV PED ECHO LEFT VENTRICLE DIASTOLIC VOLUME (MOD BIPLANE) 2D: 179 ML
SL CV PED ECHO LEFT VENTRICLE SYSTOLIC VOLUME (MOD BIPLANE) 2D: 80 ML
TRICUSPID ANNULAR PLANE SYSTOLIC EXCURSION: 2.3 CM

## 2023-06-27 PROCEDURE — 93306 TTE W/DOPPLER COMPLETE: CPT

## 2023-06-27 PROCEDURE — 99232 SBSQ HOSP IP/OBS MODERATE 35: CPT | Performed by: PSYCHIATRY & NEUROLOGY

## 2023-06-27 PROCEDURE — 99239 HOSP IP/OBS DSCHRG MGMT >30: CPT | Performed by: PHYSICIAN ASSISTANT

## 2023-06-27 PROCEDURE — 93306 TTE W/DOPPLER COMPLETE: CPT | Performed by: STUDENT IN AN ORGANIZED HEALTH CARE EDUCATION/TRAINING PROGRAM

## 2023-06-27 RX ORDER — LOSARTAN POTASSIUM 50 MG/1
100 TABLET ORAL DAILY
Status: DISCONTINUED | OUTPATIENT
Start: 2023-06-27 | End: 2023-06-27 | Stop reason: HOSPADM

## 2023-06-27 RX ORDER — METOPROLOL SUCCINATE 50 MG/1
50 TABLET, EXTENDED RELEASE ORAL DAILY
Status: DISCONTINUED | OUTPATIENT
Start: 2023-06-27 | End: 2023-06-27 | Stop reason: HOSPADM

## 2023-06-27 RX ORDER — METHOCARBAMOL 750 MG/1
750 TABLET, FILM COATED ORAL EVERY 8 HOURS SCHEDULED
Qty: 20 TABLET | Refills: 0 | Status: SHIPPED | OUTPATIENT
Start: 2023-06-27

## 2023-06-27 RX ADMIN — LOSARTAN POTASSIUM 100 MG: 50 TABLET, FILM COATED ORAL at 08:39

## 2023-06-27 RX ADMIN — METOPROLOL SUCCINATE 50 MG: 50 TABLET, EXTENDED RELEASE ORAL at 08:39

## 2023-06-27 RX ADMIN — ASPIRIN 81 MG 81 MG: 81 TABLET ORAL at 08:39

## 2023-06-27 RX ADMIN — DILTIAZEM HYDROCHLORIDE 240 MG: 120 CAPSULE, COATED, EXTENDED RELEASE ORAL at 08:39

## 2023-06-27 RX ADMIN — FLUTICASONE PROPIONATE 1 SPRAY: 50 SPRAY, METERED NASAL at 08:40

## 2023-06-27 RX ADMIN — GABAPENTIN 100 MG: 100 CAPSULE ORAL at 08:39

## 2023-06-27 RX ADMIN — KETOTIFEN FUMARATE 1 DROP: 0.25 SOLUTION/ DROPS OPHTHALMIC at 08:40

## 2023-06-27 NOTE — PLAN OF CARE
Problem: Potential for Falls  Goal: Patient will remain free of falls  Description: INTERVENTIONS:  - Educate patient/family on patient safety including physical limitations  - Instruct patient to call for assistance with activity   - Consult OT/PT to assist with strengthening/mobility   - Keep Call bell within reach  - Keep bed low and locked with side rails adjusted as appropriate  - Keep care items and personal belongings within reach  - Initiate and maintain comfort rounds  - Make Fall Risk Sign visible to staff  - Offer Toileting every 2 Hours, in advance of need  - Initiate/Maintain alarm  - Obtain necessary fall risk management equipment:   - Apply yellow socks and bracelet for high fall risk patients  - Consider moving patient to room near nurses station  Outcome: Progressing     Problem: PAIN - ADULT  Goal: Verbalizes/displays adequate comfort level or baseline comfort level  Description: Interventions:  - Encourage patient to monitor pain and request assistance  - Assess pain using appropriate pain scale  - Administer analgesics based on type and severity of pain and evaluate response  - Implement non-pharmacological measures as appropriate and evaluate response  - Consider cultural and social influences on pain and pain management  - Notify physician/advanced practitioner if interventions unsuccessful or patient reports new pain  Outcome: Progressing     Problem: SAFETY ADULT  Goal: Patient will remain free of falls  Description: INTERVENTIONS:  - Educate patient/family on patient safety including physical limitations  - Instruct patient to call for assistance with activity   - Consult OT/PT to assist with strengthening/mobility   - Keep Call bell within reach  - Keep bed low and locked with side rails adjusted as appropriate  - Keep care items and personal belongings within reach  - Initiate and maintain comfort rounds  - Make Fall Risk Sign visible to staff  - Offer Toileting every 2 Hours, in advance of need  - Initiate/Maintain alarm  - Obtain necessary fall risk management equipment: 3  - Apply yellow socks and bracelet for high fall risk patients  - Consider moving patient to room near nurses station  Outcome: Progressing  Goal: Maintain or return to baseline ADL function  Description: INTERVENTIONS:  -  Assess patient's ability to carry out ADLs; assess patient's baseline for ADL function and identify physical deficits which impact ability to perform ADLs (bathing, care of mouth/teeth, toileting, grooming, dressing, etc.)  - Assess/evaluate cause of self-care deficits   - Assess range of motion  - Assess patient's mobility; develop plan if impaired  - Assess patient's need for assistive devices and provide as appropriate  - Encourage maximum independence but intervene and supervise when necessary  - Involve family in performance of ADLs  - Assess for home care needs following discharge   - Consider OT consult to assist with ADL evaluation and planning for discharge  - Provide patient education as appropriate  Outcome: Progressing  Goal: Maintains/Returns to pre admission functional level  Description: INTERVENTIONS:  - Perform BMAT or MOVE assessment daily.   - Set and communicate daily mobility goal to care team and patient/family/caregiver. - Collaborate with rehabilitation services on mobility goals if consulted  - Perform Range of Motion 3 times a day. - Reposition patient every 2 hours.   - Dangle patient 3 times a day  - Stand patient 3 times a day  - Ambulate patient 3 times a day  - Out of bed to chair 3 times a day   - Out of bed for meals 3 times a day  - Out of bed for toileting  - Record patient progress and toleration of activity level   Outcome: Progressing     Problem: DISCHARGE PLANNING  Goal: Discharge to home or other facility with appropriate resources  Description: INTERVENTIONS:  - Identify barriers to discharge w/patient and caregiver  - Arrange for needed discharge resources and transportation as appropriate  - Identify discharge learning needs (meds, wound care, etc.)  - Arrange for interpretive services to assist at discharge as needed  - Refer to Case Management Department for coordinating discharge planning if the patient needs post-hospital services based on physician/advanced practitioner order or complex needs related to functional status, cognitive ability, or social support system  Outcome: Progressing     Problem: Knowledge Deficit  Goal: Patient/family/caregiver demonstrates understanding of disease process, treatment plan, medications, and discharge instructions  Description: Complete learning assessment and assess knowledge base.   Interventions:  - Provide teaching at level of understanding  - Provide teaching via preferred learning methods  Outcome: Progressing

## 2023-06-27 NOTE — ASSESSMENT & PLAN NOTE
· Complains of lower back pain; says he has had right-sided sciatica in the past.  No red flag symptoms  · Not maintained on any medications prior to admission, would recommend outpatient follow-up with spine and pain specialist as well as weight loss which would significantly improve his back pain  · Continue supportive care  · Prescription given for Robaxin as needed

## 2023-06-27 NOTE — ASSESSMENT & PLAN NOTE
Goal for normotension, continue home regimen follow-up with PCP outpatient, continue low-sodium diet

## 2023-06-27 NOTE — PROGRESS NOTES
Progress Note - Neurology   Arjun Pete 45 y.o. male MRN: [id number]  Unit/Bed#: E4 -01 Encounter: [id number]    Addendum - reviewed with attending, less likely TIA or vascular in nature, no need for aspirin from neuro-perspective. No further inpatient recommendations, can be followed by neurology as an out patient. Assessment/Plan   * Stroke-like symptoms  Assessment & Plan  61-year-old male with HTN, BPH, HLD, SHANDA and obesity who presents with complaint of intermittent right sided numbness/paresthesias x 4 days. BP on arrival 147/85. Workup:  - CTH revealed right mastoid effusion with fluid/debris within the right ear and left-sided sinusitis. - CTA head/neck grossly unrevealing, however per report "unfortunately cannot reliably evaluate the origin, V1 and proximal V2 segments of bilateral vertebral arteries due to artifact". - MRI brain wo completed with no evidence of acute stroke. - Echo pending  - Lipid panel: Cholesterol 174,   - Hemoglobin A1c 6.2H    Neuro exam reveals decreased sensation throughout right side. Plan detailed below. Plan:  - Stroke pathway  - Loaded with Aspirin 325 mg x1. Currently on Aspirin 81 mg was not taken this at home.   - Statin  - No need for permissive hypertension as symptoms have been present > 24 hours  - Euglycemic, normothermic goal  - Continue telemetry  - PT/OT/ST  - Secondary risk factor modification.   - Stroke education  - Frequent neuro checks. Continue to monitor and notify neurology with any changes.  - STAT CT head for any acute change in neuro exam  - Medical management and supportive care per primary team. Correction of any metabolic or infectious disturbances. Plan discussed with Attending Neurologist, please see attestation for further input/recommendations. Arjun Pete will need follow up in in 4 weeks with general attending or advance practitioner. He will not require outpatient neurological testing, at this time. Subjective:   Neurological follow up. Patient reports that he has been having intermittent numbness of his right upper extremity and lower extremity for the last 4 days, it waxes and wanes. This will affect the entire extremity of the right upper extremity and right lower extremity. There is also weakness/numbness associated with this. To testing there is no weakness, noted. He reports it is about the same as yesterday. ROS:  12 point ROS as per HPI. Vitals: Blood pressure 151/90, pulse 75, temperature 97.7 °F (36.5 °C), temperature source Temporal, resp. rate 18, height 6' 3" (1.905 m), weight (!) 173 kg (380 lb 15.3 oz), SpO2 97 %. ,Body mass index is 47.67 kg/m². Current Facility-Administered Medications   Medication Dose Route Frequency Provider Last Rate   • acetaminophen  975 mg Oral [facility] Nagi Robison MD     • aspirin  81 mg Oral Daily Nagi Robison MD     • atorvastatin  40 mg Oral QPM Nagi Robison MD     • diltiazem  240 mg Oral Daily Nagi Robison MD     • docusate sodium  100 mg Oral BID Nagi Robison MD     • enoxaparin  40 mg Subcutaneous Daily Nagi Robison MD     • fluticasone  1 spray Nasal Daily Nagi Robison MD     • gabapentin  100 mg Oral TID Nagi Robison MD     • hydrALAZINE  10 mg Intravenous Q6H PRN Nagi Robison MD     • ketotifen  1 drop Both Eyes BID Nagi Robison MD     • lidocaine  2 patch Topical Daily Nagi Robison MD     • losartan  100 mg Oral Daily Karlene Sanford PA-C     • methocarbamol  750 mg Oral [facility] Nagi Robison MD     • metoprolol succinate  50 mg Oral Daily Suzie Fong PA-C     • montelukast  10 mg Oral HS Nagi Robison MD     • ondansetron  4 mg Intravenous Q6H PRN Nagi Robison MD         Physical Exam:   Neurological  Mental status - the patient is awake alert oriented x 3, with no evidence of aphasia or dysarthria, patient is able to follow simple commands, is able to follow complex commands. No paraphasic errors noted.   Cranial nerves 2 through 12 are intact, decreased facial sensation to the right side  Motor - 5/5 upper extremities and lower extremities, without drift, normal tone and bulk  No tremor or fixed deficit noted  Rapid movements are equal bilaterally  Sensation -decreased to touch, temperature and throughout the right upper and lower extremity compared to the left. Coordination - no ataxia noted on finger-to-nose   Toes are downgoing bilaterally  No evidence of clonus or myoclonus or tremor. Reflexes are normal - 1 in the uppers and lowers. No clonus or up going toes. Lab, Imaging and other studies:   Procedure: MRI brain wo contrast    Result Date: 6/26/2023  Narrative: MRI BRAIN WITHOUT CONTRAST INDICATION: Right arm numbness x4 days Please also, if possible, comment on possible right side mastoiditis. COMPARISON:   None. TECHNIQUE:  Multiplanar, multisequence imaging of the brain was performed. IMAGE QUALITY:  Diagnostic. FINDINGS: BRAIN PARENCHYMA:  There is no discrete mass, mass effect or midline shift. There is no intracranial hemorrhage. There is no evidence of acute infarction and diffusion imaging is unremarkable. There are no white matter changes in the cerebral hemispheres. VENTRICLES:  Normal for the patient's age. SELLA AND PITUITARY GLAND:  Normal. ORBITS:  Normal. PARANASAL SINUSES: Complete left maxillary sinusitis/opacification with obstruction of the left ostiomeatal unit. Left anterior ethmoid air and left frontal sinus near complete opacification. Right mastoid effusion. VASCULATURE:  Evaluation of the major intracranial vasculature demonstrates appropriate flow voids. CALVARIUM AND SKULL BASE:  Normal. EXTRACRANIAL SOFT TISSUES:  Normal.     Impression: 1. No MR evidence of acute ischemia. 2. Sinusitis with right mastoid effusion.  Workstation performed: [id number]     Procedure: CTA head and neck with and without contrast    Result Date: 6/25/2023  Narrative: CTA NECK AND BRAIN WITH CONTRAST INDICATION: Neuro deficit, acute, stroke suspected R arm, R leg numbness, R leg weakness x4 days. Neurology recommends addition of CTA COMPARISON:   Same day earlier head CT TECHNIQUE: Post contrast imaging was performed after administration of iodinated contrast through the neck and brain. Post contrast axial 0.625 mm images timed to opacify the arterial system. 3D rendering was performed on an independent workstation. MIP reconstructions performed. Coronal reconstructions were performed of the noncontrast portion of the brain. Radiation dose length product (DLP) for this visit:  573 mGy-cm . This examination, like all CT scans performed in the [facility], was performed utilizing techniques to minimize radiation dose exposure, including the use of iterative reconstruction and automated exposure control. IV Contrast:  100 mL of iohexol (OMNIPAQUE) IMAGE QUALITY:   Diagnostic FINDINGS: CERVICAL VASCULATURE AORTIC ARCH AND GREAT VESSELS: Aortic arch and great vessel origins are unremarkable. RIGHT VERTEBRAL ARTERY CERVICAL SEGMENT: Difficult to evaluate the origin, V1 and proximal V2 segments due to artifactually distorted image quality and suboptimal contrast bolus timing. The remainder vessel is patent throughout the neck without high-grade stenosis. LEFT VERTEBRAL ARTERY CERVICAL SEGMENT:Difficult to evaluate the origin, V1 and proximal V2 segments due to artifactually distorted image quality and suboptimal contrast bolus timing. The remainder vessel is patent throughout the neck without high-grade stenosis. RIGHT EXTRACRANIAL CAROTID SEGMENT:The carotid bifurcation and cervical internal carotid artery are unremarkable. No stenosis or dissection. LEFT EXTRACRANIAL CAROTID SEGMENT: The carotid bifurcation and cervical internal carotid artery are unremarkable. No stenosis or dissection.  INTRACRANIAL VASCULATURE INTERNAL CAROTID ARTERIES: There is moderate atherosclerotic calcification of bilateral cavernous ICAs. Normal ophthalmic artery origins. ANTERIOR CIRCULATION:  Normal A1 segments. Bilateral anterior cerebral arteries are unremarkable. Normal anterior comminuting artery. MIDDLE CEREBRAL ARTERY CIRCULATION: Bilateral M1 segments and major M2 branches are patent without high-grade stenosis. DISTAL VERTEBRAL ARTERIES:  Distal vertebral arteries are patent without high-grade stenosis. Posterior inferior cerebellar artery origins are patent. BASILAR ARTERY:  Basilar artery is unremarkable. Normal superior cerebellar arteries. POSTERIOR CEREBRAL ARTERIES: . Bilateral posterior cerebral arteries are patent without high-grade stenosis. Absent/hypoplastic posterior communicating arteries. DURAL VENOUS SINUSES:  Unremarkable. NON VASCULAR ANATOMY BONY STRUCTURES: No acute or aggressive appearing osseous abnormality. Prominent anterior bridging osteophytes in the mid to lower cervical spine. Redemonstrated right mastoid effusion and fluid/debris within the right middle ear. SOFT TISSUES OF THE NECK:  Unremarkable. THORACIC INLET:  Unremarkable. Impression: 1. Patent major vasculature of the [place] without high-grade stenosis. No aneurysm. 2.  No stenosis or dissection of cervical carotid arteries. 3.  Unfortunately cannot reliably evaluate the origin, V1, and proximal V2 segments of the bilateral vertebral arteries due to artifactually distorted image quality in the lower neck and suboptimal contrast bolus timing. The remainder of cervical vertebral arteries are unremarkable. 4.  Redemonstrated right mastoid effusion and fluid/debris within the right middle ear. Correlate for otomastoiditis. Workstation performed: [id number]     Procedure: CT head without contrast    Result Date: 6/25/2023  Narrative: CT BRAIN - WITHOUT CONTRAST INDICATION:   Four days of R arm, R leg numbness. . COMPARISON: Head CT 4/12/2015 TECHNIQUE:  CT examination of the brain was performed.   Multiplanar 2D reformatted images were created from the source data. Radiation dose length product (DLP) for this visit:  914.88 mGy-cm . This examination, like all CT scans performed in the [facility], was performed utilizing techniques to minimize radiation dose exposure, including the use of iterative  reconstruction and automated exposure control. IMAGE QUALITY:  Diagnostic. FINDINGS: PARENCHYMA:  No intracranial mass, mass effect or midline shift. No CT signs of acute infarction. No acute parenchymal hemorrhage. VENTRICLES AND EXTRA-AXIAL SPACES:  Normal for the patient's age. VISUALIZED ORBITS: Normal visualized orbits. PARANASAL SINUSES: Complete opacification of left maxillary and frontal sinuses. Near complete opacification of the left ethmoidal air cells. CALVARIUM AND EXTRACRANIAL SOFT TISSUES:  Normal. Right mastoid effusion and fluid/debris within the right middle ear. Impression: 1. No acute intracranial abnormality. 2.  Right mastoid effusion and fluid/debris within the right middle ear. Correlate for otomastoiditis. 3.  Left sided sinusitis as described. Workstation performed: [id number]     Counseling / Coordination of Care  Reviewed case with neurology attending, history and physical examination, labs and imaging completed, plan of care as per attending physician. Please see attestation for further details. Examined alongside attending physician.

## 2023-06-27 NOTE — DISCHARGE SUMMARY
[address] [facility]  Discharge- [facility] 1970, 45 y.o. male MRN: [id number]  Unit/Bed#: E4 -01 Encounter: [id number]  Primary Care Provider: Jessie Ramos DO   Date and time admitted to hospital: 6/25/2023  1:54 PM    * Stroke-like symptoms  Assessment & Plan  · Patient admitted on stroke pathway with right-sided arm and leg numbness  · CT head: No acute intracranial abnormality  · CTA head/neck: Patent major vasculature of the [place] without high-grade stenosis  · MRI brain negative for acute ischemia  · No suspicion for TIA per neurology  · Okay for discharge from neurology standpoint  · Recommend outpatient follow-up with neurology for EMG as needed for ongoing symptoms  · Echocardiogram is pending at this time we will need follow-up with PCP outpatient    Prediabetes  Assessment & Plan  · A1c 6.2%  · Would benefit from weight loss and dietary modifications    Right mastoid effusion  Assessment & Plan  · CT noting right mastoid effusion and fluid/debris within the right middle ear correlate for otomastoiditis   · Appreciate ENT consult, they chart reviewed and discussed with us recommending outpatient follow-up    Back pain  Assessment & Plan  · Complains of lower back pain; says he has had right-sided sciatica in the past.  No red flag symptoms  · Not maintained on any medications prior to admission, would recommend outpatient follow-up with spine and pain specialist as well as weight loss which would significantly improve his back pain  · Continue supportive care  · Prescription given for Robaxin as needed    Asthma  Assessment & Plan  · Not in acute exacerbation, continue singular 10 mg daily    Benign prostatic hyperplasia with urinary frequency  Assessment & Plan  No retention noted  Follow-up outpatient    Obstructive sleep apnea  Assessment & Plan  CPAP nightly    Benign essential hypertension  Assessment & Plan  Goal for normotension, continue home regimen follow-up with PCP outpatient, continue low-sodium diet    Obesity, morbid, BMI 40.0-49.9 ([address])  Assessment & Plan  - pt with BMI of Body mass index is 47.5 kg/m². Would benefit from weight loss      Hyperlipemia  Assessment & Plan  Continue pravastatin      Medical Problems     Resolved Problems  Date Reviewed: 6/27/2023   None       Discharging Physician / Practitioner: Jeanne Hussein PA-C  PCP: Dale Sanchez DO  Admission Date:   Admission Orders (From admission, onward)     Ordered        06/25/23 [place]  Once                      Discharge Date: 06/27/23    Consultations During Hospital Stay:  · neurology    Procedures Performed:   · Echo - pending report follow up with PCP outpatient     Significant Findings / Test Results:   · MRI brain   FINDINGS:     BRAIN PARENCHYMA:  There is no discrete mass, mass effect or midline shift. There is no intracranial hemorrhage. There is no evidence of acute infarction and diffusion imaging is unremarkable. There are no white matter changes in the cerebral   hemispheres.     VENTRICLES:  Normal for the patient's age.     SELLA AND PITUITARY GLAND:  Normal.     ORBITS:  Normal.     PARANASAL SINUSES: Complete left maxillary sinusitis/opacification with obstruction of the left ostiomeatal unit.     Left anterior ethmoid air and left frontal sinus near complete opacification.     Right mastoid effusion.     VASCULATURE:  Evaluation of the major intracranial vasculature demonstrates appropriate flow voids.     CALVARIUM AND SKULL BASE:  Normal.     EXTRACRANIAL SOFT TISSUES:  Normal.     IMPRESSION:        1. No MR evidence of acute ischemia. 2. Sinusitis with right mastoid effusion.     · CTA head/neck     FINDINGS:     CERVICAL VASCULATURE  AORTIC ARCH AND GREAT VESSELS: Aortic arch and great vessel origins are unremarkable.     RIGHT VERTEBRAL ARTERY CERVICAL SEGMENT: Difficult to evaluate the origin, V1 and proximal V2 segments due to artifactually distorted image quality and suboptimal contrast bolus timing. The remainder vessel is patent throughout the neck without   high-grade stenosis.     LEFT VERTEBRAL ARTERY CERVICAL SEGMENT:Difficult to evaluate the origin, V1 and proximal V2 segments due to artifactually distorted image quality and suboptimal contrast bolus timing. The remainder vessel is patent throughout the neck without high-grade   stenosis.       RIGHT EXTRACRANIAL CAROTID SEGMENT:The carotid bifurcation and cervical internal carotid artery are unremarkable. No stenosis or dissection.        LEFT EXTRACRANIAL CAROTID SEGMENT: The carotid bifurcation and cervical internal carotid artery are unremarkable. No stenosis or dissection.           INTRACRANIAL VASCULATURE  INTERNAL CAROTID ARTERIES: There is moderate atherosclerotic calcification of bilateral cavernous ICAs. Normal ophthalmic artery origins.     ANTERIOR CIRCULATION:  Normal A1 segments. Bilateral anterior cerebral arteries are unremarkable. Normal anterior comminuting artery.     MIDDLE CEREBRAL ARTERY CIRCULATION: Bilateral M1 segments and major M2 branches are patent without high-grade stenosis.     DISTAL VERTEBRAL ARTERIES:  Distal vertebral arteries are patent without high-grade stenosis. Posterior inferior cerebellar artery origins are patent.     BASILAR ARTERY:  Basilar artery is unremarkable. Normal superior cerebellar arteries.        POSTERIOR CEREBRAL ARTERIES: . Bilateral posterior cerebral arteries are patent without high-grade stenosis. Absent/hypoplastic posterior communicating arteries.     DURAL VENOUS SINUSES:  Unremarkable.        NON VASCULAR ANATOMY     BONY STRUCTURES: No acute or aggressive appearing osseous abnormality.  Prominent anterior bridging osteophytes in the mid to lower cervical spine.     Redemonstrated right mastoid effusion and fluid/debris within the right middle ear.     SOFT TISSUES OF THE NECK:  Unremarkable.     THORACIC INLET: Unremarkable.        IMPRESSION:     1. Patent major vasculature of the [place] without high-grade stenosis. No aneurysm. 2.  No stenosis or dissection of cervical carotid arteries. 3.  Unfortunately cannot reliably evaluate the origin, V1, and proximal V2 segments of the bilateral vertebral arteries due to artifactually distorted image quality in the lower neck and suboptimal contrast bolus timing. The remainder of cervical   vertebral arteries are unremarkable. 4.  Redemonstrated right mastoid effusion and fluid/debris within the right middle ear. Correlate for otomastoiditis. · CT head   FINDINGS:     PARENCHYMA:  No intracranial mass, mass effect or midline shift. No CT signs of acute infarction. No acute parenchymal hemorrhage.     VENTRICLES AND EXTRA-AXIAL SPACES:  Normal for the patient's age.     VISUALIZED ORBITS: Normal visualized orbits.     PARANASAL SINUSES: Complete opacification of left maxillary and frontal sinuses. Near complete opacification of the left ethmoidal air cells.     CALVARIUM AND EXTRACRANIAL SOFT TISSUES:  Normal.     Right mastoid effusion and fluid/debris within the right middle ear.     IMPRESSION:     1. No acute intracranial abnormality. 2.  Right mastoid effusion and fluid/debris within the right middle ear. Correlate for otomastoiditis. 3.  Left sided sinusitis as described. · Lipid panel: , HDL 33, triglyceride 156, total cholesterol 174    Incidental Findings:   · none       Test Results Pending at Discharge (will require follow up):   · echo     Outpatient Tests Requested:  · ENT   · Spine and michael   · Neurology  · PCP     Complications:      Reason for Admission: Strokelike symptoms    Hospital Course:   Eliud Noonan is a 45 y.o. male patient who originally presented to the hospital on 6/25/2023 due to right arm weakness and numbness as well as right thigh numbness.   Patient was visiting people in the hospital and states due to the symptoms he wanted to get evaluated in the ER. He was admitted on stroke pathway. He was seen in consultation with neurology. CT head and MRI brain negative for acute ischemia. There was low suspicion for TIA per neurology. They do recommend outpatient follow-up for EMG for any ongoing symptoms. Patient also did have CT and MRI noting right mastoid effusion and fluid/debris within the right middle ear. Chart and case reviewed by ENT recommending outpatient follow-up. Please see above list of diagnoses and related plan for additional information. Condition at Discharge: stable    Discharge Day Visit / Exam:   Subjective: Feels good today no acute complaints. No worsening symptoms. No chest pain shortness of breath fevers chills headache lightheadedness or dizziness. Vitals: Blood Pressure: 151/90 (06/27/23 1109)  Pulse: 75 (06/27/23 1109)  Temperature: 97.7 °F (36.5 °C) (06/27/23 0742)  Temp Source: Temporal (06/27/23 0742)  Respirations: 18 (06/27/23 0742)  Height: 6' 3" (190.5 cm) (06/27/23 1109)  Weight - Scale: (!) 172 kg (380 lb) (06/27/23 1109)  SpO2: 97 % (06/27/23 0742)  Exam:   Physical Exam  Vitals and nursing note reviewed. Constitutional:       Appearance: He is obese. Cardiovascular:      Rate and Rhythm: Normal rate and regular rhythm. Pulmonary:      Effort: Pulmonary effort is normal.      Breath sounds: Normal breath sounds. Abdominal:      General: Bowel sounds are normal.      Palpations: Abdomen is soft. Musculoskeletal:      Right lower leg: No edema. Left lower leg: No edema. Skin:     General: Skin is warm. Neurological:      Mental Status: He is alert and oriented to person, place, and time. Mental status is at baseline. Comments: Admits to decreased sensation rigth UE and right thigh and pain RUE with movement.     Psychiatric:         Mood and Affect: Mood normal.            Discharge instructions/Information to patient and family:   See after visit summary for information provided to patient and family. Provisions for Follow-Up Care:  See after visit summary for information related to follow-up care and any pertinent home health orders. Disposition:   Home    Planned Readmission: none     Discharge Statement:  I spent 40 minutes discharging the patient. This time was spent on the day of discharge. I had direct contact with the patient on the day of discharge. Greater than 50% of the total time was spent examining patient, answering all patient questions, arranging and discussing plan of care with patient as well as directly providing post-discharge instructions. Additional time then spent on discharge activities. Discharge Medications:  See after visit summary for reconciled discharge medications provided to patient and/or family.       **Please Note: This note may have been constructed using a voice recognition system**

## 2023-06-27 NOTE — ASSESSMENT & PLAN NOTE
· Patient admitted on stroke pathway with right-sided arm and leg numbness  · CT head: No acute intracranial abnormality  · CTA head/neck: Patent major vasculature of the [place] without high-grade stenosis  · MRI brain negative for acute ischemia  · No suspicion for TIA per neurology  · Okay for discharge from neurology standpoint  · Recommend outpatient follow-up with neurology for EMG as needed for ongoing symptoms  · Echocardiogram is pending at this time we will need follow-up with PCP outpatient

## 2023-06-27 NOTE — ASSESSMENT & PLAN NOTE
· CT noting right mastoid effusion and fluid/debris within the right middle ear correlate for otomastoiditis   · Appreciate ENT consult, they chart reviewed and discussed with us recommending outpatient follow-up

## 2023-06-28 ENCOUNTER — TRANSITIONAL CARE MANAGEMENT (OUTPATIENT)
Dept: FAMILY MEDICINE CLINIC | Facility: CLINIC | Age: 53
End: 2023-06-28

## 2023-06-28 NOTE — UTILIZATION REVIEW
NOTIFICATION OF ADMISSION DISCHARGE   This is a Notification of Discharge from [address] [place] [address]. Please be advised that this patient has been discharge from our facility. Below you will find the admission and discharge date and time including the patient’s disposition. UTILIZATION REVIEW CONTACT:  Simi Lyons MA  Utilization   Network Utilization Review Department  Phone: [phone] x carefully listen to the prompts. All voicemails are confidential.  Email: [email or web address]     ADMISSION INFORMATION  PRESENTATION DATE: 6/25/2023  1:54 PM  OBERVATION ADMISSION DATE:   INPATIENT ADMISSION DATE: 6/25/23  5:05 PM   DISCHARGE DATE: 6/27/2023 12:44 PM   DISPOSITION:Home/Self Care    IMPORTANT INFORMATION:  Send all requests for admission clinical reviews, approved or denied determinations and any other requests to dedicated fax number below belonging to the campus where the patient is receiving treatment.  List of dedicated fax numbers:  [place] DENIALS (Administrative/Medical Necessity) [phone] [address] [facility] (Maternity/NICU/Pediatrics) [phone]   [facility] [phone]   [facility] [phone] [address] [facility] [phone] [address] [facility] [phone]   [facility] [phone]   [address] [place] [address] [address] [facility] [phone]   [address] [place] [address] [phone]   [address] [facility] [phone]   [address] [facility] [address] [facility] [phone]

## 2023-07-06 ENCOUNTER — TELEPHONE (OUTPATIENT)
Dept: NEUROLOGY | Facility: CLINIC | Age: 53
End: 2023-07-06

## 2023-07-06 NOTE — TELEPHONE ENCOUNTER
Patient is now scheduled with [facility], 9/8/2023, as he has requested only on a Friday, HFU slot. Also provided [employer] phone number as he's been locked out. Mailed appt card. LUCHO/ SL ALL / Emma- 6/27/2023. Glenn Yang will need follow up in in 4 weeks with general attending or advance practitioner.  He will not require outpatient neurological testing, at this time     Thank you,     Estevan Burgess

## 2023-07-11 ENCOUNTER — OFFICE VISIT (OUTPATIENT)
Dept: FAMILY MEDICINE CLINIC | Facility: CLINIC | Age: 53
End: 2023-07-11
Payer: COMMERCIAL

## 2023-07-11 VITALS
SYSTOLIC BLOOD PRESSURE: 138 MMHG | WEIGHT: 315 LBS | OXYGEN SATURATION: 97 % | HEART RATE: 95 BPM | HEIGHT: 75 IN | DIASTOLIC BLOOD PRESSURE: 84 MMHG | TEMPERATURE: 97.2 F | BODY MASS INDEX: 39.17 KG/M2

## 2023-07-11 DIAGNOSIS — J32.0 CHRONIC MAXILLARY SINUSITIS: ICD-10-CM

## 2023-07-11 DIAGNOSIS — Z76.89 ENCOUNTER FOR SUPPORT AND COORDINATION OF TRANSITION OF CARE: Primary | ICD-10-CM

## 2023-07-11 PROCEDURE — 99496 TRANSJ CARE MGMT HIGH F2F 7D: CPT | Performed by: FAMILY MEDICINE

## 2023-07-11 RX ORDER — AZELASTINE 1 MG/ML
2 SPRAY, METERED NASAL 2 TIMES DAILY
Qty: 30 ML | Refills: 5 | Status: SHIPPED | OUTPATIENT
Start: 2023-07-11

## 2023-07-11 NOTE — PROGRESS NOTES
Assessment & Plan     1. Encounter for support and coordination of transition of care    2. Chronic maxillary sinusitis  -     Ambulatory Referral to Otolaryngology; Future  -     azelastine (ASTELIN) 0.1 % nasal spray; 2 sprays into each nostril 2 (two) times a day Use in each nostril as directed      BMI Counseling: Body mass index is 47.5 kg/m². The BMI is above normal. Nutrition recommendations include decreasing portion sizes, encouraging healthy choices of fruits and vegetables, decreasing fast food intake, consuming healthier snacks, limiting drinks that contain sugar, moderation in carbohydrate intake, increasing intake of lean protein, reducing intake of saturated and trans fat and reducing intake of cholesterol. Exercise recommendations include moderate physical activity 150 minutes/week. No pharmacotherapy was ordered. Rationale for BMI follow-up plan is due to patient being overweight or obese. Subjective     Transitional Care Management Review:   Kareem Rojas is a 45 y.o. male here for TCM follow up. During the TCM phone call patient stated:  TCM Call     Date and time call was made  6/28/2023  9:57 AM    Patient was hospitialized at  [address] [place] [address]    Date of Admission  06/25/23    Date of discharge  06/27/23    Diagnosis  STROKE LIKE SYMPTOMS    Disposition  Home    Were the patients medications reviewed and updated  Yes    Current Symptoms  None      TCM Call     Post hospital issues  None    Should patient be enrolled in anticoag monitoring? No    Scheduled for follow up?   Yes    Did you obtain your prescribed medications  Yes    Do you need help managing your prescriptions or medications  No    Is transportation to your appointment needed  No    I have advised the patient to call PCP with any new or worsening symptoms  KIMBERLY MATHEW, [address] members    Support System  Family    The type of support provided  None    Do you have social support  Yes, as much as I need    Are you recieving any outpatient services  No    Are you recieving home care services  No    Are you using any community resources  No    Current waiver services  No    Have you fallen in the last 12 months  No    Interperter language line needed  No    Counseling  Patient        Patient presents with:  Transition of Care Management: Pt was hospitalized due to numbness on Rt arm and leg that's been going on for about 2 weeks. Hospital Course:   Real Herrera is a 45 y.o. male patient who originally presented to the hospital on 6/25/2023 due to right arm weakness and numbness as well as right thigh numbness. Patient was visiting people in the hospital and states due to the symptoms he wanted to get evaluated in the ER. He was admitted on stroke pathway. He was seen in consultation with neurology. CT head and MRI brain negative for acute ischemia. There was low suspicion for TIA per neurology. They do recommend outpatient follow-up for EMG for any ongoing symptoms.     Patient also did have CT and MRI noting right mastoid effusion and fluid/debris within the right middle ear. Chart and case reviewed by ENT recommending outpatient follow-up. Review of Systems   Constitutional: Negative. HENT: Negative. Eyes: Negative. Respiratory: Negative. Cardiovascular: Negative. Gastrointestinal: Negative. Endocrine: Negative. Genitourinary: Negative. Musculoskeletal: Negative. Skin: Negative. Allergic/Immunologic: Negative. Neurological: Negative. Hematological: Negative. Psychiatric/Behavioral: Negative. All other systems reviewed and are negative. Objective     /84 (BP Location: Right arm, Patient Position: Sitting, Cuff Size: Large)   Pulse 95   Temp (!) 97.2 °F (36.2 °C) (Tympanic)   Ht 6' 3" (1.905 m)   Wt (!) 172 kg (380 lb)   SpO2 97%   BMI 47.50 kg/m²      Physical Exam  Vitals and nursing note reviewed.    Constitutional:       Appearance: Normal appearance. He is well-developed. HENT:      Head: Normocephalic. Nose: Nose normal.   Eyes:      Conjunctiva/sclera: Conjunctivae normal.      Pupils: Pupils are equal, round, and reactive to light. Cardiovascular:      Rate and Rhythm: Normal rate. Pulmonary:      Effort: Pulmonary effort is normal.      Breath sounds: Normal breath sounds. Abdominal:      General: Bowel sounds are normal.      Palpations: Abdomen is soft. Musculoskeletal:         General: Normal range of motion. Cervical back: Normal range of motion and neck supple. Skin:     General: Skin is warm and dry. Neurological:      Mental Status: He is alert. Psychiatric:         Behavior: Behavior normal.         Thought Content:  Thought content normal.         Judgment: Judgment normal.       Medications have been reviewed by provider in current encounter    Melia Gamble DO

## 2023-08-18 DIAGNOSIS — I10 PRIMARY HYPERTENSION: ICD-10-CM

## 2023-08-18 DIAGNOSIS — E29.1 HYPOGONADISM IN MALE: ICD-10-CM

## 2023-08-18 DIAGNOSIS — E55.9 VITAMIN D DEFICIENCY: ICD-10-CM

## 2023-08-19 RX ORDER — LOSARTAN POTASSIUM 100 MG/1
100 TABLET ORAL DAILY
Qty: 30 TABLET | Refills: 5 | Status: SHIPPED | OUTPATIENT
Start: 2023-08-19

## 2023-08-19 RX ORDER — DILTIAZEM HYDROCHLORIDE 240 MG/1
CAPSULE, COATED, EXTENDED RELEASE ORAL
Qty: 30 CAPSULE | Refills: 5 | Status: SHIPPED | OUTPATIENT
Start: 2023-08-19

## 2023-08-19 RX ORDER — ERGOCALCIFEROL 1.25 MG/1
50000 CAPSULE ORAL WEEKLY
Qty: 4 CAPSULE | Refills: 0 | Status: SHIPPED | OUTPATIENT
Start: 2023-08-19

## 2023-08-19 RX ORDER — METOPROLOL SUCCINATE 50 MG/1
50 TABLET, EXTENDED RELEASE ORAL DAILY
Qty: 30 TABLET | Refills: 5 | Status: SHIPPED | OUTPATIENT
Start: 2023-08-19

## 2023-08-19 RX ORDER — TESTOSTERONE 20.25 MG/1.25G
GEL TOPICAL
Qty: 75 G | Refills: 0 | Status: SHIPPED | OUTPATIENT
Start: 2023-08-19

## 2023-08-19 RX ORDER — SPIRONOLACTONE 25 MG/1
TABLET ORAL
Qty: 30 TABLET | Refills: 5 | Status: SHIPPED | OUTPATIENT
Start: 2023-08-19

## 2023-08-25 PROBLEM — H65.191 ACUTE EFFUSION OF RIGHT EAR: Status: RESOLVED | Noted: 2023-06-26 | Resolved: 2023-08-25

## 2023-08-31 ENCOUNTER — TELEPHONE (OUTPATIENT)
Dept: FAMILY MEDICINE CLINIC | Facility: CLINIC | Age: 53
End: 2023-08-31

## 2023-08-31 DIAGNOSIS — M54.9 ACUTE BACK PAIN, UNSPECIFIED BACK LOCATION, UNSPECIFIED BACK PAIN LATERALITY: ICD-10-CM

## 2023-08-31 DIAGNOSIS — R29.90 STROKE-LIKE SYMPTOMS: Primary | ICD-10-CM

## 2023-08-31 NOTE — TELEPHONE ENCOUNTER
Netta Mora called from neurology requesting a referral to neurology. Referral generated with diagnoses they gave me, please sign off if you are in agreement. Thanks.

## 2023-09-08 ENCOUNTER — OFFICE VISIT (OUTPATIENT)
Dept: NEUROLOGY | Facility: CLINIC | Age: 53
End: 2023-09-08
Payer: COMMERCIAL

## 2023-09-08 VITALS
HEART RATE: 92 BPM | BODY MASS INDEX: 41.75 KG/M2 | HEIGHT: 73 IN | SYSTOLIC BLOOD PRESSURE: 162 MMHG | TEMPERATURE: 97.9 F | RESPIRATION RATE: 20 BRPM | DIASTOLIC BLOOD PRESSURE: 84 MMHG | WEIGHT: 315 LBS | OXYGEN SATURATION: 98 %

## 2023-09-08 DIAGNOSIS — M54.12 CERVICAL RADICULOPATHY: Primary | ICD-10-CM

## 2023-09-08 DIAGNOSIS — M54.9 ACUTE BACK PAIN, UNSPECIFIED BACK LOCATION, UNSPECIFIED BACK PAIN LATERALITY: ICD-10-CM

## 2023-09-08 DIAGNOSIS — M54.16 RADICULOPATHY, LUMBAR REGION: ICD-10-CM

## 2023-09-08 DIAGNOSIS — R29.90 STROKE-LIKE SYMPTOMS: ICD-10-CM

## 2023-09-08 PROCEDURE — 99213 OFFICE O/P EST LOW 20 MIN: CPT | Performed by: NURSE PRACTITIONER

## 2023-09-08 NOTE — LETTER
September 24, 2023     Patient: Sabino Calderon  YOB: 1970  Date of Visit: 9/8/2023      To Whom it May Concern:    Sabino Calderon is under my professional care. Patrica Ayala was seen in my office on 9/8/2023. Patrica Ayala may return to work on his next scheduled shift. If you have any questions or concerns, please don't hesitate to call.          Sincerely,          APRIL Alvarez

## 2023-09-08 NOTE — PATIENT INSTRUCTIONS
Make sure to take your medications regularly and reach out to primary care if you lose insurance so that you can get other options  I would suggest you get another evaluation with pain management  Make sure to follow up with ENT  Follow up as needed if any new/worsening symptoms

## 2023-09-08 NOTE — PROGRESS NOTES
Patient ID: Angelita Oppenheim is a 52 y.o. male. Assessment/Plan:  Patient Instructions:  Make sure to take your medications regularly and reach out to primary care if you lose insurance so that you can get other options  I would suggest you get another evaluation with pain management  Make sure to follow up with ENT  Follow up as needed if any new/worsening symptoms    We discussed his right sided "numbness" symptoms which seem consistent with some of his previous symptoms years ago when diagnosed with cervical/lumbar radiculopathy. No significant sensory or motor deficit on exam today; he had previous EMG/CT/MRI testing. He has had surgical consultation in the past and decided against surgery at the time. Currently his symptoms are intermittent. Per his report, in the past trial of gabapentin, lyrica etc did not provide relief but percocet and injections did. I suggest he follow up again with pain management. He had questions about supplements, specifically sea bingham supplement which he has been taking but is expensive; I see no reason for him to continue this and he states he will discontinue. Diagnoses and all orders for this visit:    Cervical radiculopathy    Stroke-like symptoms  -     Ambulatory Referral to Neurology    Acute back pain, unspecified back location, unspecified back pain laterality  -     Ambulatory Referral to Neurology  -     Ambulatory referral to Spine & Pain Management; Future    Radiculopathy, lumbar region  -     Ambulatory referral to Spine & Pain Management; Future          Subjective:    HPI  Angelita Oppenheim is a 52year old male with past medical history of HTN, hyperlipidemia, previous drug use, obesity, chronic sinusitis, asthma, chronic low back pain, prediabetes, SHANDA on cpap who presents for hospital follow up 6/25-6/27/2023 where he presented with right upper/lower extremity numbness. He states since discharge he still gets intermittent symptoms.  He denies weakness; he states he urinates frequently but denies other bowel/bladder complaints-namely no incontinence or saddle anesthesia. He has continued with regular cpap use. He states he was previously incarcerated from [id number] and symptoms worsened over that time period-thinks maybe this was related to sleeping on a harder surface. Currently he lives with his girlfriend and her son. He has been trying to make dietary changes-reduce sweets which he admits are his weakness. He is currently working as a  and has been able to work without difficulty. Hospital Workup:  - CTH revealed right mastoid effusion with fluid/debris within the right ear and left-sided sinusitis. - CTA head/neck grossly unrevealing, however per report "unfortunately cannot reliably evaluate the origin, V1 and proximal V2 segments of bilateral vertebral arteries due to artifact". - MRI brain wo completed with no evidence of acute stroke. - Echo  EF 55% no wall motion abnormality  - Lipid panel: Cholesterol 174,   - Hemoglobin A1c 6.2H    Previous workup-outside facility:  CT C spine 7/15/2014:  Impression:   1. The study is significantly limited by motion artifact and artifact   related to the patient's body habitus which limits evaluation of the   osseous structures below C5. Within the limitations of the exam,   there is no evidence of acute cervical spine fracture. Please   continue to follow the cervical spine trauma protocol. 2. Cervical spondylosis most prominent at C5-C6 and C6-C7. There is   significant central spinal canal stenosis at C6-C7. The following portions of the patient's history were reviewed and updated as appropriate: allergies, current medications, past family history, past medical history, past social history, past surgical history and problem list.         Objective:    Blood pressure 162/84, pulse 92, temperature 97.9 °F (36.6 °C), temperature source Temporal, resp.  rate 20, height 6' 1" (1.854 m), weight (!) 171 kg (377 lb), SpO2 98 %. He did not take bp medications today. Physical Exam  Vitals reviewed. Constitutional:       General: He is not in acute distress. Appearance: He is obese. HENT:      Head: Normocephalic and atraumatic. Right Ear: External ear normal.      Left Ear: External ear normal.      Mouth/Throat:      Mouth: Mucous membranes are moist.      Pharynx: Oropharynx is clear. Eyes:      General: Lids are normal.         Right eye: No discharge. Left eye: No discharge. Extraocular Movements: Extraocular movements intact. Pupils: Pupils are equal, round, and reactive to light. Cardiovascular:      Rate and Rhythm: Normal rate and regular rhythm. Pulses: Normal pulses. Heart sounds: Normal heart sounds. Pulmonary:      Effort: Pulmonary effort is normal.      Breath sounds: Normal breath sounds. Abdominal:      General: There is no distension. Musculoskeletal:      Cervical back: No tenderness. Right lower leg: No edema. Left lower leg: No edema. Skin:     Capillary Refill: Capillary refill takes less than 2 seconds. Findings: No rash. Neurological:      General: No focal deficit present. Mental Status: He is alert. Motor: Motor strength is normal.     Deep Tendon Reflexes:      Reflex Scores:       Brachioradialis reflexes are 1+ on the right side and 1+ on the left side. Patellar reflexes are 1+ on the right side and 1+ on the left side. Psychiatric:         Mood and Affect: Mood normal.         Speech: Speech normal.         Behavior: Behavior normal.         Neurological Exam  Mental Status  Alert. Oriented to person, place and time. Speech is normal. Language is fluent with no aphasia. Cranial Nerves  CN II: Visual acuity is normal. Visual fields full to confrontation. CN III, IV, VI: Extraocular movements intact bilaterally. Normal lids and orbits bilaterally.  Pupils equal round and reactive to light bilaterally. CN V: Facial sensation is normal.  CN VII: Full and symmetric facial movement. CN VIII: Hearing is normal.  CN IX, X: Palate elevates symmetrically. Normal gag reflex. CN XI: Shoulder shrug strength is normal.  CN XII: Tongue midline without atrophy or fasciculations. Motor  Normal muscle bulk throughout. Strength is 5/5 throughout all four extremities. Sensory  Light touch is normal in upper and lower extremities. Reflexes                                            Right                      Left  Brachioradialis                    1+                         1+  Patellar                                1+                         1+    Coordination  Right: Finger-to-nose normal.Left: Finger-to-nose normal.    Gait  Casual gait: Wide stance. Antalgic gait. Able to rise from chair without using arms. ROS:    Review of Systems   Constitutional: Negative for appetite change, fatigue and fever. HENT: Negative. Negative for hearing loss, tinnitus, trouble swallowing and voice change. Eyes: Negative. Negative for photophobia, pain and visual disturbance. Respiratory: Negative. Negative for shortness of breath. Cardiovascular: Negative. Negative for palpitations. Gastrointestinal: Negative. Negative for nausea and vomiting. Endocrine: Negative. Negative for cold intolerance. Genitourinary: Negative. Negative for dysuria, frequency and urgency. Musculoskeletal: Negative for back pain, gait problem, myalgias and neck pain. Skin: Negative. Negative for rash. Allergic/Immunologic: Negative. Neurological: Positive for numbness (R arm, R leg). Negative for dizziness, tremors, seizures, syncope, facial asymmetry, speech difficulty, weakness, light-headedness and headaches. Hematological: Negative. Does not bruise/bleed easily. Psychiatric/Behavioral: Positive for sleep disturbance (Shift change adjsutment ). Negative for confusion and hallucinations.    ROS was reviewed and updated as appropriate

## 2023-10-24 ENCOUNTER — TELEPHONE (OUTPATIENT)
Age: 53
End: 2023-10-24

## 2023-10-24 DIAGNOSIS — R97.20 ELEVATED PSA: Primary | ICD-10-CM

## 2023-10-24 NOTE — TELEPHONE ENCOUNTER
Patient last seen on 5/11/22 with Sonam Green in [facility]. Patient being referred back to urology due to his PSA on 5/18/23 being elevated. PSA resulted at 5.33     Scheduled patient for next available follow up on 2/15 with Natasha Barton in [facility]. Please advise if the patient should have another PSA prior to this visit and if this is an appropriate appt.     Patient can be reached at [phone]

## 2023-10-25 NOTE — TELEPHONE ENCOUNTER
i will order him a psa total/free now and follow results. i may want him to come sooner than february if persistent rise, possible MRI, possible biopsy. It may go back to 3.0 like it did last time. Will see, Stay tuned.     Lab Results   Component Value Date    PSA 5.33 (H) 05/18/2023    PSA 3.0 04/21/2022    PSA 4.6 (H) 03/21/2022    PSA 4.8 (H) 03/21/2022

## 2023-10-25 NOTE — TELEPHONE ENCOUNTER
Ryann Milligan and made him aware PSA total and free level was ordered. Patient advised on activities to avoid for at least 72 hours prior to PSA. Aware office will monitor for results and contact him to determine need for sooner office visit based on results of PSA.

## 2023-10-31 ENCOUNTER — APPOINTMENT (OUTPATIENT)
Dept: LAB | Age: 53
End: 2023-10-31
Payer: COMMERCIAL

## 2023-10-31 DIAGNOSIS — R97.20 ELEVATED PSA: ICD-10-CM

## 2023-10-31 PROCEDURE — 84153 ASSAY OF PSA TOTAL: CPT

## 2023-10-31 PROCEDURE — 84154 ASSAY OF PSA FREE: CPT

## 2023-10-31 PROCEDURE — 36415 COLL VENOUS BLD VENIPUNCTURE: CPT

## 2023-11-01 LAB
PSA FREE MFR SERPL: 10.6 %
PSA FREE SERPL-MCNC: 0.5 NG/ML
PSA SERPL-MCNC: 4.7 NG/ML (ref 0–4)

## 2023-11-02 DIAGNOSIS — R97.20 ELEVATED PSA: Primary | ICD-10-CM

## 2023-11-02 NOTE — TELEPHONE ENCOUNTER
psa remains high for his baseline and high for his age. i recommend MRI. we can use that for fusion biopsy.  await results and will schedule after that before february

## 2023-11-02 NOTE — TELEPHONE ENCOUNTER
Called and left VM for patient to please return call to discuss PSA results and recommendation for mpMRI with BMP prior.

## 2023-11-03 NOTE — TELEPHONE ENCOUNTER
Called and spoke with patient to discuss his elevated PSA result and plan for mpMRI. Advised order for MRI was placed in his chart and patient will contact central scheduling at [phone] to schedule imaging. He is aware to have BMP completed prior to the MRI and that office will contact him with MRI results after finalized to determine plan moving forward.

## 2023-11-07 ENCOUNTER — OFFICE VISIT (OUTPATIENT)
Dept: URGENT CARE | Age: 53
End: 2023-11-07
Payer: COMMERCIAL

## 2023-11-07 VITALS
HEART RATE: 78 BPM | SYSTOLIC BLOOD PRESSURE: 167 MMHG | OXYGEN SATURATION: 98 % | DIASTOLIC BLOOD PRESSURE: 85 MMHG | TEMPERATURE: 97.8 F | RESPIRATION RATE: 18 BRPM

## 2023-11-07 DIAGNOSIS — R11.0 NAUSEA: ICD-10-CM

## 2023-11-07 DIAGNOSIS — J01.41 ACUTE RECURRENT PANSINUSITIS: Primary | ICD-10-CM

## 2023-11-07 PROCEDURE — 99204 OFFICE O/P NEW MOD 45 MIN: CPT | Performed by: EMERGENCY MEDICINE

## 2023-11-07 RX ORDER — AMOXICILLIN AND CLAVULANATE POTASSIUM 875; 125 MG/1; MG/1
1 TABLET, FILM COATED ORAL 2 TIMES DAILY
Qty: 14 TABLET | Refills: 0 | Status: SHIPPED | OUTPATIENT
Start: 2023-11-07 | End: 2023-11-14

## 2023-11-07 RX ORDER — ONDANSETRON 8 MG/1
8 TABLET, ORALLY DISINTEGRATING ORAL EVERY 8 HOURS PRN
Qty: 15 TABLET | Refills: 0 | Status: SHIPPED | OUTPATIENT
Start: 2023-11-07 | End: 2023-11-12

## 2023-11-07 NOTE — PROGRESS NOTES
[facility]        NAME: Chela Dominique is a 52 y.o. male  : 1970    MRN: [id number]  DATE: 2023  TIME: 3:02 PM    Assessment and Plan   Acute recurrent pansinusitis [J01.41]  1. Acute recurrent pansinusitis  amoxicillin-clavulanate (AUGMENTIN) 875-125 mg per tablet        -MRI performed in 2023 demonstrated complete left maxillary sinus opacification with obstruction, right mastoid effusion, as well as near complete opacification of the left anterior ethmoid and frontal sinuses  -Patient endorsing predominantly symptoms of sinusitis to include frontal and maxillary sinus pressure as well as retro-orbital pressure without associated visual changes or focal neurological deficits  -Given prior findings, feel reasonable to treat for bacterial sinus infection with Augmentin at this time, will also give Zofran for endorsed nausea which is likely secondary to postnasal drip  -Advised patient to follow-up closely with his PCP and seek care in ED if symptoms worsen. Anticipated guidance regarding over-the-counter cold and flu medications and nasal decongestants to include Afrin and saline nasal spray  -All questions answered at bedside, patient is amenable to plan and voiced understanding    Patient Instructions       Follow up with PCP in 3-5 days. Proceed to  ER if symptoms worsen. Chief Complaint     Chief Complaint   Patient presents with    Cough     Cough congestion, body aches, fatigue. Sx since yesterday. Nothing OTC. History of Present Illness       69-year-old male with history of SHANDA, hypertension, recurrent sinusitis presents with chief complaint of 2 days of worsening frontal maxillary sinus pressure, postnasal drip, generalized body aches and fatigue. 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 He denies associated fever, visual field changes or pain with extraocular movements.   Denies chest pain or shortness of breath. Has been trying over-the-counter cough suppressants and decongestants with some relief. Denies associated numbness tingling or weakness in his extremities or speech changes. Denies ataxia. Sinusitis  This is a new problem. The current episode started in the past 7 days. The problem has been gradually worsening since onset. There has been no fever. His pain is at a severity of 5/10. The pain is moderate. Associated symptoms include congestion, headaches, sinus pressure and a sore throat. Pertinent negatives include no chills, coughing, diaphoresis, ear pain, hoarse voice, neck pain, shortness of breath, sneezing or swollen glands. Past treatments include oral decongestants and nasal decongestants. The treatment provided mild relief. Review of Systems   Review of Systems   Constitutional:  Negative for activity change, appetite change, chills, diaphoresis, fatigue, fever and unexpected weight change. HENT:  Positive for congestion, postnasal drip, rhinorrhea, sinus pressure, sinus pain and sore throat. Negative for dental problem, drooling, ear discharge, ear pain, facial swelling, hearing loss, hoarse voice, mouth sores, sneezing, tinnitus, trouble swallowing and voice change. Respiratory:  Negative for apnea, cough, choking, chest tightness, shortness of breath, wheezing and stridor. Cardiovascular:  Negative for palpitations. Musculoskeletal:  Negative for neck pain. Neurological:  Positive for headaches. Negative for dizziness, tremors, seizures, syncope, facial asymmetry, speech difficulty, weakness, light-headedness and numbness.          Current Medications       Current Outpatient Medications:     amoxicillin-clavulanate (AUGMENTIN) 875-125 mg per tablet, Take 1 tablet by mouth 2 (two) times a day for 7 days, Disp: 14 tablet, Rfl: 0    azelastine (ASTELIN) 0.1 % nasal spray, 2 sprays into each nostril 2 (two) times a day Use in each nostril as directed, Disp: 30 mL, Rfl: 5    BLACK CURRANT SEED OIL PO, Take by mouth, Disp: , Rfl:     Cyanocobalamin (Vitamin B-12) 2500 MCG SUBL, Place under the tongue, Disp: , Rfl:     diclofenac (VOLTAREN) 75 mg EC tablet, Take 1 tablet (75 mg total) by mouth 2 (two) times a day, Disp: 60 tablet, Rfl: 3    diltiazem (CARDIZEM CD) 240 mg 24 hr capsule, TAKE ONE CAPSULE BY MOUTH ONCE DAILY, Disp: 30 capsule, Rfl: 5    ergocalciferol (VITAMIN D2) 50,000 units, take one capsule by mouth once weekly, Disp: 4 capsule, Rfl: 0    fluticasone (FLONASE) 50 mcg/act nasal spray, 1 spray into each nostril daily, Disp: 48 g, Rfl: 1    losartan (COZAAR) 100 MG tablet, TAKE ONE TABLET BY MOUTH ONCE DAILY, Disp: 30 tablet, Rfl: 5    methocarbamol (ROBAXIN) 750 mg tablet, Take 1 tablet (750 mg total) by mouth every 8 (eight) hours, Disp: 20 tablet, Rfl: 0    metoprolol succinate (TOPROL-XL) 50 mg 24 hr tablet, TAKE ONE TABLET BY MOUTH ONCE DAILY, Disp: 30 tablet, Rfl: 5    montelukast (SINGULAIR) 10 mg tablet, Take 1 tablet (10 mg total) by mouth daily at bedtime, Disp: 30 tablet, Rfl: 5    Multiple Vitamin (ONE-A-DAY MENS PO), Take by mouth, Disp: , Rfl:     olopatadine (PATANOL) 0.1 % ophthalmic solution, Administer 1 drop to both eyes 2 (two) times a day, Disp: 5 mL, Rfl: 5    pravastatin (PRAVACHOL) 20 mg tablet, Take 1 tablet (20 mg total) by mouth daily, Disp: 30 tablet, Rfl: 3    spironolactone (ALDACTONE) 25 mg tablet, TAKE ONE TABLET BY MOUTH ONCE DAILY, Disp: 30 tablet, Rfl: 5    Testosterone 1.62 % GEL, apply 1 pump to skin once daily. , Disp: 75 g, Rfl: 0    Current Allergies     Allergies as of 11/07/2023    (No Known Allergies)            The following portions of the patient's history were reviewed and updated as appropriate: allergies, current medications, past family history, past medical history, past social history, past surgical history and problem list.     Past Medical History:   Diagnosis Date    Allergic     Anxiety     Arthritis Asthma     CPAP (continuous positive airway pressure) dependence     Depression     Hyperlipemia     Hypertension     Obesity        Past Surgical History:   Procedure Laterality Date    HEMORRHOID SURGERY      TONSILLECTOMY      URETHRAL DILATION  2019       Family History   Problem Relation Age of Onset    Hypertension Mother     Hyperlipidemia Mother     Heart disease Mother     Hypertension Father     Heart disease Father     Hyperlipidemia Father     COPD Sister     Liver disease Sister     Prostate cancer Maternal Uncle          Medications have been verified. Objective   /85   Pulse 78   Temp 97.8 °F (36.6 °C) (Tympanic)   Resp 18   SpO2 98%   No LMP for male patient. Physical Exam     Physical Exam  Vitals and nursing note reviewed. Constitutional:       General: He is not in acute distress. Appearance: Normal appearance. He is obese. He is not ill-appearing or toxic-appearing. HENT:      Head: Normocephalic and atraumatic. Right Ear: External ear normal.      Left Ear: External ear normal.      Nose: Mucosal edema, congestion and rhinorrhea present. Right Nostril: No foreign body, epistaxis, septal hematoma or occlusion. Left Nostril: No foreign body, epistaxis, septal hematoma or occlusion. Right Turbinates: Enlarged and swollen. Not pale. Left Turbinates: Enlarged and swollen. Not pale. Right Sinus: Maxillary sinus tenderness and frontal sinus tenderness present. Left Sinus: Maxillary sinus tenderness and frontal sinus tenderness present. Mouth/Throat:      Mouth: Mucous membranes are moist.      Pharynx: Oropharynx is clear. Posterior oropharyngeal erythema present. No oropharyngeal exudate. Tonsils: 0 on the right. 0 on the left. Eyes:      Extraocular Movements: Extraocular movements intact. Pupils: Pupils are equal, round, and reactive to light. Cardiovascular:      Rate and Rhythm: Normal rate and regular rhythm. Pulses: Normal pulses. Heart sounds: Normal heart sounds. Pulmonary:      Effort: Pulmonary effort is normal. No respiratory distress. Breath sounds: Normal breath sounds. No stridor. No wheezing, rhonchi or rales. Chest:      Chest wall: No tenderness. Abdominal:      General: Abdomen is flat. There is no distension. Musculoskeletal:         General: No swelling, tenderness, deformity or signs of injury. Cervical back: Neck supple. Right lower leg: No edema. Left lower leg: No edema. Skin:     General: Skin is warm and dry. Capillary Refill: Capillary refill takes less than 2 seconds. Coloration: Skin is not jaundiced or pale. Findings: No bruising, erythema, lesion or rash. Neurological:      General: No focal deficit present. Mental Status: He is alert and oriented to person, place, and time. Cranial Nerves: No cranial nerve deficit. Sensory: No sensory deficit. Motor: No weakness.       Coordination: Coordination normal.      Gait: Gait normal.   Psychiatric:         Mood and Affect: Mood normal.         Behavior: Behavior normal.

## 2023-11-07 NOTE — LETTER
November 7, 2023     Patient: Cassandra Aguila   YOB: 1970   Date of Visit: 11/7/2023       To Whom it May Concern:    Cassandra Aguila was seen in my clinic on 11/7/2023. He may return to work on 11/9/2023 . If you have any questions or concerns, please don't hesitate to call.          Sincerely,          Caro Sender, DO        CC: No Recipients

## 2023-11-08 DIAGNOSIS — E55.9 VITAMIN D DEFICIENCY: ICD-10-CM

## 2023-11-08 DIAGNOSIS — E29.1 HYPOGONADISM IN MALE: ICD-10-CM

## 2023-11-08 RX ORDER — ERGOCALCIFEROL 1.25 MG/1
50000 CAPSULE ORAL WEEKLY
Qty: 4 CAPSULE | Refills: 0 | Status: SHIPPED | OUTPATIENT
Start: 2023-11-08

## 2023-11-08 RX ORDER — TESTOSTERONE 20.25 MG/1.25G
GEL TOPICAL
Qty: 75 G | Refills: 0 | Status: SHIPPED | OUTPATIENT
Start: 2023-11-08

## 2023-11-16 ENCOUNTER — HOSPITAL ENCOUNTER (OUTPATIENT)
Facility: MEDICAL CENTER | Age: 53
Discharge: HOME | End: 2023-11-16
Payer: COMMERCIAL

## 2023-11-16 DIAGNOSIS — R97.20 ELEVATED PSA: ICD-10-CM

## 2023-11-16 PROCEDURE — 72197 MRI PELVIS W/O & W/DYE: CPT

## 2023-11-16 PROCEDURE — A9585 GADOBUTROL INJECTION: HCPCS | Performed by: FAMILY MEDICINE

## 2023-11-16 PROCEDURE — G1004 CDSM NDSC: HCPCS

## 2023-11-16 PROCEDURE — 76377 3D RENDER W/INTRP POSTPROCES: CPT

## 2023-11-16 RX ORDER — GADOBUTROL 604.72 MG/ML
17 INJECTION INTRAVENOUS
Status: COMPLETED | OUTPATIENT
Start: 2023-11-16 | End: 2023-11-16

## 2023-11-16 RX ADMIN — GADOBUTROL 17 ML: 604.72 INJECTION INTRAVENOUS at 12:49

## 2023-11-28 ENCOUNTER — TELEPHONE (OUTPATIENT)
Dept: UROLOGY | Facility: CLINIC | Age: 53
End: 2023-11-28

## 2023-11-28 NOTE — TELEPHONE ENCOUNTER
i called annmarie  no answer i left a voicemail    elevated psa 4-5 range  pirads 3 lesion  will needs fusion bx    please let one of us know when he returns call can review the plan

## 2023-12-01 ENCOUNTER — PREP FOR PROCEDURE (OUTPATIENT)
Dept: UROLOGY | Facility: CLINIC | Age: 53
End: 2023-12-01

## 2023-12-01 DIAGNOSIS — R97.20 ELEVATED PSA: Primary | ICD-10-CM

## 2023-12-01 NOTE — TELEPHONE ENCOUNTER
Called patient to discuss his results. I informed him that there was an intermediate lesion, PI-RADS 3, on his MRI and that this warrants further evaluation with a MRI fusion transperineal biopsy. I discussed the technique of transperineal biopsy and the risks including infection including sepsis, hematochezia, hematospermia, hematuria, and urinary retention. Will plan to have patient proceed to the OR for next available transperineal biopsy. I will place case request now.   Patient will need consent signed on the day of

## 2023-12-07 NOTE — TELEPHONE ENCOUNTER
Pt checking status previous message,I informed him the information was forwarded to [facility]

## 2023-12-08 NOTE — TELEPHONE ENCOUNTER
I spoke to the patient and scheduled his procedure for 2/7/2024 at [facility] [place] [facility]"" with Dr. Army Mata.    -instructions given verbally and mailed  -patient aware to be NPO, needs a  and use an enema 1 hour prior to leaving the house morning of procedure  -CBC, CMP, Urine C&S  2 weeks prior  -PO/RB 2/26/24

## 2023-12-12 ENCOUNTER — OFFICE VISIT (OUTPATIENT)
Dept: FAMILY MEDICINE CLINIC | Facility: CLINIC | Age: 53
End: 2023-12-12
Payer: COMMERCIAL

## 2023-12-12 VITALS
SYSTOLIC BLOOD PRESSURE: 126 MMHG | OXYGEN SATURATION: 99 % | WEIGHT: 315 LBS | HEIGHT: 73 IN | TEMPERATURE: 97.2 F | HEART RATE: 85 BPM | BODY MASS INDEX: 41.75 KG/M2 | DIASTOLIC BLOOD PRESSURE: 62 MMHG

## 2023-12-12 DIAGNOSIS — I10 PRIMARY HYPERTENSION: ICD-10-CM

## 2023-12-12 DIAGNOSIS — E66.01 OBESITY, MORBID, BMI 40.0-49.9 (HCC): ICD-10-CM

## 2023-12-12 DIAGNOSIS — Z00.00 ANNUAL PHYSICAL EXAM: Primary | ICD-10-CM

## 2023-12-12 DIAGNOSIS — G47.33 OBSTRUCTIVE SLEEP APNEA: ICD-10-CM

## 2023-12-12 DIAGNOSIS — J32.0 CHRONIC MAXILLARY SINUSITIS: ICD-10-CM

## 2023-12-12 DIAGNOSIS — I10 BENIGN ESSENTIAL HYPERTENSION: ICD-10-CM

## 2023-12-12 DIAGNOSIS — E78.2 MIXED HYPERLIPIDEMIA: ICD-10-CM

## 2023-12-12 DIAGNOSIS — R21 RASH: ICD-10-CM

## 2023-12-12 PROCEDURE — 99396 PREV VISIT EST AGE 40-64: CPT | Performed by: FAMILY MEDICINE

## 2023-12-12 PROCEDURE — 99214 OFFICE O/P EST MOD 30 MIN: CPT | Performed by: FAMILY MEDICINE

## 2023-12-12 RX ORDER — SPIRONOLACTONE 25 MG/1
25 TABLET ORAL DAILY
Qty: 30 TABLET | Refills: 5 | Status: SHIPPED | OUTPATIENT
Start: 2023-12-12

## 2023-12-12 RX ORDER — TRIAMCINOLONE ACETONIDE 1 MG/G
OINTMENT TOPICAL 2 TIMES DAILY
Qty: 30 G | Refills: 2 | Status: SHIPPED | OUTPATIENT
Start: 2023-12-12

## 2023-12-12 RX ORDER — LOSARTAN POTASSIUM 100 MG/1
100 TABLET ORAL DAILY
Qty: 30 TABLET | Refills: 5 | Status: SHIPPED | OUTPATIENT
Start: 2023-12-12

## 2023-12-12 RX ORDER — PRAVASTATIN SODIUM 20 MG
20 TABLET ORAL DAILY
Qty: 30 TABLET | Refills: 3 | Status: SHIPPED | OUTPATIENT
Start: 2023-12-12

## 2023-12-12 RX ORDER — METOPROLOL SUCCINATE 50 MG/1
50 TABLET, EXTENDED RELEASE ORAL DAILY
Qty: 30 TABLET | Refills: 5 | Status: SHIPPED | OUTPATIENT
Start: 2023-12-12

## 2023-12-12 NOTE — PATIENT INSTRUCTIONS
Wellness Visit for Adults   AMBULATORY CARE:   A wellness visit  is when you see your healthcare provider to get screened for health problems. Your healthcare provider will also give you advice on how to stay healthy. Write down your questions so you remember to ask them. Ask your healthcare provider how often you should have a wellness visit. What happens at a wellness visit:  Your healthcare provider will ask about your health, and your family history of health problems. This includes high blood pressure, heart disease, and cancer. He or she will ask if you have symptoms that concern you, if you smoke, and about your mood. You may also be asked about your intake of medicines, supplements, food, and alcohol. Any of the following may be done: Your weight  will be checked. Your height may also be checked so your body mass index (BMI) can be calculated. Your BMI shows if you are at a healthy weight. Your blood pressure  and heart rate will be checked. Your temperature may also be checked. Blood and urine tests  may be done. Blood tests may be done to check your cholesterol levels. Abnormal cholesterol levels increase your risk for heart disease and stroke. You may also need a blood or urine test to check for diabetes if you are at increased risk. Urine tests may be done to look for signs of an infection or kidney disease. A physical exam  includes checking your heartbeat and lungs with a stethoscope. Your healthcare provider may also check your skin to look for sun damage. Screening tests  may be recommended. A screening test is done to check for diseases that may not cause symptoms. The screening tests you may need depend on your age, gender, family history, and lifestyle habits. For example, colorectal screening may be recommended if you are 48years old or older. Screening tests you need if you are a woman:   A Pap smear  is used to screen for cervical cancer.  Pap smears are usually done every 3 to 5 years depending on your age. You may need them more often if you have had abnormal Pap smear test results in the past. Ask your healthcare provider how often you should have a Pap smear. A mammogram  is an x-ray of your breasts to screen for breast cancer. Experts recommend mammograms every 2 years starting at age 48 years. You may need a mammogram at age 52 years or younger if you have an increased risk for breast cancer. Talk to your healthcare provider about when you should start having mammograms and how often you need them. Vaccines you may need:   Get an influenza vaccine  every year. The influenza vaccine protects you from the flu. Several types of viruses cause the flu. The viruses change over time, so new vaccines are made each year. Get a tetanus-diphtheria (Td) booster vaccine  every 10 years. This vaccine protects you against tetanus and diphtheria. Tetanus is a severe infection that may cause painful muscle spasms and lockjaw. Diphtheria is a severe bacterial infection that causes a thick covering in the back of your mouth and throat. Get a human papillomavirus (HPV) vaccine  if you are female and aged 23 to 32 or male 23 to 24 and never received it. This vaccine protects you from HPV infection. HPV is the most common infection spread by sexual contact. HPV may also cause vaginal, penile, and anal cancers. Get a pneumococcal vaccine  if you are aged 72 years or older. The pneumococcal vaccine is an injection given to protect you from pneumococcal disease. Pneumococcal disease is an infection caused by pneumococcal bacteria. The infection may cause pneumonia, meningitis, or an ear infection. Get a shingles vaccine  if you are 60 or older, even if you have had shingles before. The shingles vaccine is an injection to protect you from the varicella-zoster virus. This is the same virus that causes chickenpox.  Shingles is a painful rash that develops in people who had chickenpox or have been exposed to the virus. How to eat healthy:  My Plate is a model for planning healthy meals. It shows the types and amounts of foods that should go on your plate. Fruits and vegetables make up about half of your plate, and grains and protein make up the other half. A serving of dairy is included on the side of your plate. The amount of calories and serving sizes you need depends on your age, gender, weight, and height. Examples of healthy foods are listed below:  Eat a variety of vegetables  such as dark green, red, and orange vegetables. You can also include canned vegetables low in sodium (salt) and frozen vegetables without added butter or sauces. Eat a variety of fresh fruits , canned fruit in 100% juice, frozen fruit, and dried fruit. Include whole grains. At least half of the grains you eat should be whole grains. Examples include whole-wheat bread, wheat pasta, brown rice, and whole-grain cereals such as oatmeal.    Eat a variety of protein foods such as seafood (fish and shellfish), lean meat, and poultry without skin (turkey and chicken). Examples of lean meats include pork leg, shoulder, or tenderloin, and beef round, sirloin, tenderloin, and extra lean ground beef. Other protein foods include eggs and egg substitutes, beans, peas, soy products, nuts, and seeds. Choose low-fat dairy products such as skim or 1% milk or low-fat yogurt, cheese, and cottage cheese. Limit unhealthy fats  such as butter, hard margarine, and shortening. Exercise:  Exercise at least 30 minutes per day on most days of the week. Some examples of exercise include walking, biking, dancing, and swimming. You can also fit in more physical activity by taking the stairs instead of the elevator or parking farther away from stores. Include muscle strengthening activities 2 days each week. Regular exercise provides many health benefits.  It helps you manage your weight, and decreases your risk for type 2 diabetes, heart disease, stroke, and high blood pressure. Exercise can also help improve your mood. Ask your healthcare provider about the best exercise plan for you. General health and safety guidelines:   Do not smoke. Nicotine and other chemicals in cigarettes and cigars can cause lung damage. Ask your healthcare provider for information if you currently smoke and need help to quit. E-cigarettes or smokeless tobacco still contain nicotine. Talk to your healthcare provider before you use these products. Limit alcohol. A drink of alcohol is 12 ounces of beer, 5 ounces of wine, or 1½ ounces of liquor. Lose weight, if needed. Being overweight increases your risk of certain health conditions. These include heart disease, high blood pressure, type 2 diabetes, and certain types of cancer. Protect your skin. Do not sunbathe or use tanning beds. Use sunscreen with a SPF 15 or higher. Apply sunscreen at least 15 minutes before you go outside. Reapply sunscreen every 2 hours. Wear protective clothing, hats, and sunglasses when you are outside. Drive safely. Always wear your seatbelt. Make sure everyone in your car wears a seatbelt. A seatbelt can save your life if you are in an accident. Do not use your cell phone when you are driving. This could distract you and cause an accident. Pull over if you need to make a call or send a text message. Practice safe sex. Use latex condoms if are sexually active and have more than one partner. Your healthcare provider may recommend screening tests for sexually transmitted infections (STIs). Wear helmets, lifejackets, and protective gear. Always wear a helmet when you ride a bike or motorcycle, go skiing, or play sports that could cause a head injury. Wear protective equipment when you play sports. Wear a lifejacket when you are on a boat or doing water sports.     © Copyright [employer] 2023 Information is for End User's use only and may not be sold, redistributed or otherwise used for commercial purposes. The above information is an  only. It is not intended as medical advice for individual conditions or treatments. Talk to your doctor, nurse or pharmacist before following any medical regimen to see if it is safe and effective for you. Weight Management   AMBULATORY CARE:   Why it is important to manage your weight:  Being overweight increases your risk of health conditions such as heart disease, high blood pressure, type 2 diabetes, and certain types of cancer. It can also increase your risk for osteoarthritis, sleep apnea, and other respiratory problems. Aim for a slow, steady weight loss. Even a small amount of weight loss can lower your risk of health problems. Risks of being overweight:  Extra weight can cause many health problems, including the following:  Diabetes (high blood sugar level)    High blood pressure or high cholesterol    Heart disease    Stroke    Gallbladder or liver disease    Cancer of the colon, breast, prostate, liver, or kidney    Sleep apnea    Arthritis or gout    Screening  is done to check for health conditions before you have signs or symptoms. If you are 28to 79years old, your blood sugar level may be checked every 3 years for signs of prediabetes or diabetes. Your healthcare provider will check your blood pressure at each visit. High blood pressure can lead to a stroke or other problems. Your provider may check for signs of heart disease, cancer, or other health problems. How to lose weight safely:  A safe and healthy way to lose weight is to eat fewer calories and get regular exercise. You can lose up about 1 pound a week by decreasing the number of calories you eat by 500 calories each day. You can decrease calories by eating smaller portion sizes or by cutting out high-calorie foods. Read labels to find out how many calories are in the foods you eat.          You can also burn calories with exercise such as walking, swimming, or biking. You will be more likely to keep weight off if you make these changes part of your lifestyle. Exercise at least 30 minutes per day on most days of the week. You can also fit in more physical activity by taking the stairs instead of the elevator or parking farther away from stores. Ask your healthcare provider about the best exercise plan for you. Healthy meal plan for weight management:  A healthy meal plan includes a variety of foods, contains fewer calories, and helps you stay healthy. A healthy meal plan includes the following:     Eat whole-grain foods more often. A healthy meal plan should contain fiber. Fiber is the part of grains, fruits, and vegetables that is not broken down by your body. Whole-grain foods are healthy and provide extra fiber in your diet. Some examples of whole-grain foods are whole-wheat breads and pastas, oatmeal, brown rice, and bulgur. Eat a variety of vegetables every day. Include dark, leafy greens such as spinach, kale, camacho greens, and mustard greens. Eat yellow and orange vegetables such as carrots, sweet potatoes, and winter squash. Eat a variety of fruits every day. Choose fresh or canned fruit (canned in its own juice or light syrup) instead of juice. Fruit juice has very little or no fiber. Eat low-fat dairy foods. Drink fat-free (skim) milk or 1% milk. Eat fat-free yogurt and low-fat cottage cheese. Try low-fat cheeses such as mozzarella and other reduced-fat cheeses. Choose meat and other protein foods that are low in fat. Choose beans or other legumes such as split peas or lentils. Choose fish, skinless poultry (chicken or turkey), or lean cuts of red meat (beef or pork). Before you cook meat or poultry, cut off any visible fat. Use less fat and oil. Try baking foods instead of frying them. Add less fat, such as margarine, sour cream, regular salad dressing and mayonnaise to foods. Eat fewer high-fat foods.  Some examples of high-fat foods include french fries, doughnuts, ice cream, and cakes. Eat fewer sweets. Limit foods and drinks that are high in sugar. This includes candy, cookies, regular soda, and sweetened drinks. Ways to decrease calories:   Eat smaller portions. Use a small plate with smaller servings. Do not eat second helpings. When you eat at a restaurant, ask for a box and place half of your meal in the box before you eat. Share an entrée with someone else. Replace high-calorie snacks with healthy, low-calorie snacks. Choose fresh fruit, vegetables, fat-free rice cakes, or air-popped popcorn instead of potato chips, nuts, or chocolate. Choose water or calorie-free drinks instead of soda or sweetened drinks. Do not shop for groceries when you are hungry. You may be more likely to make unhealthy food choices. Take a grocery list of healthy foods and shop after you have eaten. Eat regular meals. Do not skip meals. Skipping meals can lead to overeating later in the day. This can make it harder for you to lose weight. Eat a healthy snack in place of a meal if you do not have time to eat a regular meal. Talk with a dietitian to help you create a meal plan and schedule that is right for you. Other things to consider as you try to lose weight:   Be aware of situations that may give you the urge to overeat, such as eating while watching television. Find ways to avoid these situations. For example, read a book, go for a walk, or do crafts. Meet with a weight loss support group or friends who are also trying to lose weight. This may help you stay motivated to continue working on your weight loss goals. © Copyright [employer] 2023 Information is for End User's use only and may not be sold, redistributed or otherwise used for commercial purposes. The above information is an  only. It is not intended as medical advice for individual conditions or treatments.  Talk to your doctor, nurse or pharmacist before following any medical regimen to see if it is safe and effective for you. Obesity   AMBULATORY CARE:   Obesity  means your body mass index (BMI) is greater than 30. Your healthcare provider will use your age, height, and weight to measure your BMI. The risks of obesity include  many health problems, including injuries or physical disability. Diabetes (high blood sugar level)    High blood pressure or high cholesterol    Heart disease or heart failure    Stroke    Gallbladder or liver disease    Cancer of the colon, breast, prostate, liver, or kidney    Sleep apnea    Arthritis or gout    Screening  is done to check for health conditions before you have signs or symptoms. If you are 28to 79years old, your blood sugar level may be checked every 3 years for signs of prediabetes or diabetes. Your healthcare provider will check your blood pressure at each visit. High blood pressure can lead to a stroke or other problems. Your provider may check for signs of heart disease, cancer, or other health problems. Seek care immediately if:   You have a severe headache, confusion, or difficulty speaking. You have weakness on one side of your body. You have chest pain, sweating, or shortness of breath. Call your doctor if:   You have symptoms of gallbladder or liver disease, such as pain in your upper abdomen. You have knee or hip pain and discomfort while walking. You have symptoms of diabetes, such as intense hunger and thirst, and frequent urination. You have symptoms of sleep apnea, such as snoring or daytime sleepiness. You have questions or concerns about your condition or care. Treatment for obesity  focuses on helping you lose weight to improve your health. Even a small decrease in BMI can reduce the risk for many health problems. Your healthcare provider will help you set a weight-loss goal.  Lifestyle changes  are the first step in treating obesity.  These include making healthy food choices and getting regular physical activity. Your healthcare provider may suggest a weight-loss program that involves coaching, education, and therapy. Medicine  may help you lose weight when it is used with a healthy foods and physical activity. Surgery  can help you lose weight if you have obesity along with other health problems. Several types of weight-loss surgery are available. Ask your healthcare provider for more information. Tips for safe weight loss:   Set small, realistic goals. An example of a small goal is to walk for 20 minutes 5 days a week. Anther goal is to lose 5% of your body weight. Ask for support. Tell friends, family members, and coworkers about your goals. Ask someone to lose weight with you. You may also want to join a weight-loss support group. Identify foods or triggers that may cause you to overeat. Remove tempting high-calorie foods from your home and workplace. Place a bowl of fresh fruit on your kitchen counter. If stress causes you to eat, find other ways to cope with stress. A counselor or therapist may be able to help you. Track your daily calories and activity. Write down what you eat and drink. Also write down how many minutes of physical activity you do each day. Track your weekly weight. Weigh yourself in the morning, before you eat or drink anything but after you use the bathroom. Use the same scale, in the same place, and in similar clothing each time. Only weigh yourself 1 to 2 times each week, or as directed. You may become discouraged if you weigh yourself every day. Eating changes: You will need to eat 500 to 1,000 fewer calories each day than you currently eat to lose 1 to 2 pounds a week. The following changes will help you cut calories:  Eat smaller portions. Use small plates, no larger than 9 inches in diameter. Fill your plate half full of fruits and vegetables.  Measure your food using measuring cups until you know what a serving size looks like. Eat 3 meals and 1 or 2 snacks each day. Plan your meals in advance. Demetria Lipps and eat at home most of the time. Eat slowly. Do not skip meals. Skipping meals can lead to overeating later in the day. This can make it harder for you to lose weight. Talk with a dietitian to help you make a meal plan and schedule that is right for you. Eat fruits and vegetables at every meal.  They are low in calories and high in fiber, which makes you feel full. Do not add butter, margarine, or cream sauce to vegetables. Use herbs to season steamed vegetables. Eat less fat and fewer fried foods. Eat more baked or grilled chicken and fish. These protein sources are lower in calories and fat than red meat. Limit fast food. Dress your salads with olive oil and vinegar instead of bottled dressing. Limit the amount of sugar you eat. Do not drink sugary beverages. Limit alcohol. Activity changes:  Physical activity is good for your body in many ways. It helps you burn calories and build strong muscles. It decreases stress and depression, and improves your mood. It can also help you sleep better. Talk to your healthcare provider before you begin an exercise program.  Exercise for at least 30 minutes 5 days a week. Start slowly. Set aside time each day for physical activity that you enjoy and that is convenient for you. It is best to do both weight training and an activity that increases your heart rate, such as walking, bicycling, or swimming. Find ways to be more active. Do yard work and housecleaning. Walk up the stairs instead of using elevators. Spend your leisure time going to events that require walking, such as outdoor festivals or fairs. This extra physical activity can help you lose weight and keep it off. Follow up with your doctor as directed: You may need to meet with a dietitian. Write down your questions so you remember to ask them during your visits.   © Copyright [employer] 2023 Information is for End User's use only and may not be sold, redistributed or otherwise used for commercial purposes. The above information is an  only. It is not intended as medical advice for individual conditions or treatments. Talk to your doctor, nurse or pharmacist before following any medical regimen to see if it is safe and effective for you.

## 2023-12-12 NOTE — PROGRESS NOTES
ADULT ANNUAL PHYSICAL  [id number] [facility]    NAME: Yanni Mathews  AGE: 52 y.o. SEX: male  : 1970     DATE: 2023     Assessment and Plan:     Problem List Items Addressed This Visit     Hypertension    Relevant Medications    losartan (COZAAR) 100 MG tablet    metoprolol succinate (TOPROL-XL) 50 mg 24 hr tablet    spironolactone (ALDACTONE) 25 mg tablet    Other Relevant Orders    Lipid panel    Comprehensive metabolic panel    CBC and differential    TSH, 3rd generation with Free T4 reflex    Hyperlipemia    Relevant Medications    pravastatin (PRAVACHOL) 20 mg tablet    Other Relevant Orders    Ambulatory Referral to Medical Fitness Exercise Specialist    Lipid panel    Comprehensive metabolic panel    CBC and differential    TSH, 3rd generation with Free T4 reflex    Obesity, morbid, BMI 40.0-49.9 ([address])    Relevant Orders    Ambulatory Referral to Medical Fitness Exercise Specialist    Benign essential hypertension    Relevant Medications    losartan (COZAAR) 100 MG tablet    metoprolol succinate (TOPROL-XL) 50 mg 24 hr tablet    spironolactone (ALDACTONE) 25 mg tablet    Other Relevant Orders    Lipid panel    Comprehensive metabolic panel    CBC and differential    TSH, 3rd generation with Free T4 reflex    Obstructive sleep apnea   Other Visit Diagnoses     Annual physical exam    -  Primary    Relevant Orders    Lipid panel    Comprehensive metabolic panel    CBC and differential    TSH, 3rd generation with Free T4 reflex    Rash        Relevant Medications    triamcinolone (KENALOG) 0.1 % ointment    Chronic maxillary sinusitis        Relevant Orders    Ambulatory Referral to Otolaryngology          Immunizations and preventive care screenings were discussed with patient today. Appropriate education was printed on patient's after visit summary. Discussed risks and benefits of prostate cancer screening.  We discussed the controversial history of PSA screening for prostate cancer in the [facility] as well as the risk of over detection and over treatment of prostate cancer by way of PSA screening. The patient understands that PSA blood testing is an imperfect way to screen for prostate cancer and that elevated PSA levels in the blood may also be caused by infection, inflammation, prostatic trauma or manipulation, urological procedures, or by benign prostatic enlargement. The role of the digital rectal examination in prostate cancer screening was also discussed and I discussed with him that there is large interobserver variability in the findings of digital rectal examination. Counseling:  Alcohol/drug use: discussed moderation in alcohol intake, the recommendations for healthy alcohol use, and avoidance of illicit drug use. Dental Health: discussed importance of regular tooth brushing, flossing, and dental visits. Injury prevention: discussed safety/seat belts, safety helmets, smoke detectors, carbon dioxide detectors, and smoking near bedding or upholstery. Sexual health: discussed sexually transmitted diseases, partner selection, use of condoms, avoidance of unintended pregnancy, and contraceptive alternatives. Exercise: the importance of regular exercise/physical activity was discussed. Recommend exercise 3-5 times per week for at least 30 minutes. Return in 6 months (on 6/12/2024). Chief Complaint:     Chief Complaint   Patient presents with   • Follow-up     6 month f/u visit. Pt has concerns with his weight. Physical due. Pt has no other concerns. LS      History of Present Illness:     Adult Annual Physical   Patient here for a comprehensive physical exam. The patient reports problems - see list .    Diet and Physical Activity  Diet/Nutrition: poor diet. Exercise: walking. Depression Screening  PHQ-2/9 Depression Screening         General Health  Sleep: sleeps well.    Hearing: normal - bilateral.  Vision: no vision problems. Dental: no dental visits for >1 year.  Health  Symptoms include: none    Advanced Care Planning  Do you have an advanced directive? no  Do you have a durable medical power of ? no     Review of Systems:     Review of Systems   Constitutional: Negative. HENT: Negative. Eyes: Negative. Respiratory: Negative. Cardiovascular: Negative. Gastrointestinal: Negative. Endocrine: Negative. Genitourinary: Negative. Musculoskeletal: Negative. Skin: Negative. Allergic/Immunologic: Negative. Neurological: Negative. Hematological: Negative. Psychiatric/Behavioral: Negative. All other systems reviewed and are negative.      Past Medical History:     Past Medical History:   Diagnosis Date   • Allergic    • Anxiety    • Arthritis    • Asthma    • CPAP (continuous positive airway pressure) dependence    • Depression    • Hyperlipemia    • Hypertension    • Obesity       Past Surgical History:     Past Surgical History:   Procedure Laterality Date   • HEMORRHOID SURGERY     • TONSILLECTOMY     • URETHRAL DILATION  2019      Family History:     Family History   Problem Relation Age of Onset   • Hypertension Mother    • Hyperlipidemia Mother    • Heart disease Mother    • Hypertension Father    • Heart disease Father    • Hyperlipidemia Father    • COPD Sister    • Liver disease Sister    • Prostate cancer Maternal Uncle       Social History:     Social History     Socioeconomic History   • Marital status: Single     Spouse name: None   • Number of children: None   • Years of education: None   • Highest education level: None   Occupational History   • Occupation: employed   Tobacco Use   • Smoking status: Former     Types: Cigarettes     Passive exposure: Past   • Smokeless tobacco: Never   • Tobacco comments:     Patient uses e-cigarette   Vaping Use   • Vaping Use: Every day   • Substances: Nicotine   Substance and Sexual Activity   • Alcohol use: Not Currently   • Drug use: Never   • Sexual activity: Yes     Partners: Female   Other Topics Concern   • None   Social History Narrative   • None     Social Determinants of Health     Financial Resource Strain: Not on file   Food Insecurity: No Food Insecurity (6/26/2023)    Hunger Vital Sign    • Worried About Running Out of Food in the Last Year: Never true    • Ran Out of Food in the Last Year: Never true   Transportation Needs: No Transportation Needs (6/26/2023)    PRAPARE - Transportation    • Lack of Transportation (Medical): No    • Lack of Transportation (Non-Medical):  No   Physical Activity: Not on file   Stress: Not on file   Social Connections: Not on file   Intimate Partner Violence: Not on file   Housing Stability: Low Risk  (6/26/2023)    Housing Stability Vital Sign    • Unable to Pay for Housing in the Last Year: No    • Number of Places Lived in the Last Year: 1    • Unstable Housing in the Last Year: No      Current Medications:     Current Outpatient Medications   Medication Sig Dispense Refill   • azelastine (ASTELIN) 0.1 % nasal spray 2 sprays into each nostril 2 (two) times a day Use in each nostril as directed 30 mL 5   • BLACK CURRANT SEED OIL PO Take by mouth     • Cyanocobalamin (Vitamin B-12) 2500 MCG SUBL Place under the tongue     • diclofenac (VOLTAREN) 75 mg EC tablet Take 1 tablet (75 mg total) by mouth 2 (two) times a day 60 tablet 3   • diltiazem (CARDIZEM CD) 240 mg 24 hr capsule TAKE ONE CAPSULE BY MOUTH ONCE DAILY 30 capsule 5   • ergocalciferol (VITAMIN D2) 50,000 units TAKE ONE CAPSULE BY MOUTH ONCE A WEEK 4 capsule 0   • fluticasone (FLONASE) 50 mcg/act nasal spray 1 spray into each nostril daily 48 g 1   • losartan (COZAAR) 100 MG tablet Take 1 tablet (100 mg total) by mouth daily 30 tablet 5   • methocarbamol (ROBAXIN) 750 mg tablet Take 1 tablet (750 mg total) by mouth every 8 (eight) hours 20 tablet 0   • metoprolol succinate (TOPROL-XL) 50 mg 24 hr tablet Take 1 tablet (50 mg total) by mouth daily 30 tablet 5   • montelukast (SINGULAIR) 10 mg tablet Take 1 tablet (10 mg total) by mouth daily at bedtime 30 tablet 5   • Multiple Vitamin (ONE-A-DAY MENS PO) Take by mouth     • olopatadine (PATANOL) 0.1 % ophthalmic solution Administer 1 drop to both eyes 2 (two) times a day 5 mL 5   • pravastatin (PRAVACHOL) 20 mg tablet Take 1 tablet (20 mg total) by mouth daily 30 tablet 3   • spironolactone (ALDACTONE) 25 mg tablet Take 1 tablet (25 mg total) by mouth daily 30 tablet 5   • Testosterone 1.62 % GEL apply one pump to skin once daily 75 g 0   • triamcinolone (KENALOG) 0.1 % ointment Apply topically 2 (two) times a day 30 g 2   • ondansetron (ZOFRAN-ODT) 8 mg disintegrating tablet Take 1 tablet (8 mg total) by mouth every 8 (eight) hours as needed for nausea or vomiting for up to 5 days 15 tablet 0     No current facility-administered medications for this visit. Allergies:     No Known Allergies   Physical Exam:     /62 (BP Location: Right arm, Patient Position: Sitting, Cuff Size: Large)   Pulse 85   Temp (!) 97.2 °F (36.2 °C) (Tympanic)   Ht 6' 1" (1.854 m)   Wt (!) 174 kg (382 lb 12.8 oz)   SpO2 99%   BMI 50.50 kg/m²     Physical Exam  Vitals and nursing note reviewed. Constitutional:       Appearance: Normal appearance. He is well-developed. He is obese. HENT:      Head: Normocephalic. Nose: Nose normal.   Eyes:      Conjunctiva/sclera: Conjunctivae normal.      Pupils: Pupils are equal, round, and reactive to light. Cardiovascular:      Rate and Rhythm: Normal rate and regular rhythm. Pulses: Normal pulses. Heart sounds: Normal heart sounds. Pulmonary:      Effort: Pulmonary effort is normal.      Breath sounds: Normal breath sounds. Abdominal:      General: Abdomen is flat. Bowel sounds are normal.      Palpations: Abdomen is soft. Musculoskeletal:         General: Normal range of motion.       Cervical back: Normal range of motion and neck supple. Skin:     General: Skin is warm and dry. Neurological:      General: No focal deficit present. Mental Status: He is alert and oriented to person, place, and time. Psychiatric:         Mood and Affect: Mood normal.         Behavior: Behavior normal.         Thought Content:  Thought content normal.         Judgment: Judgment normal.          Lissa Solis, DO  [address] [place] [address]

## 2023-12-13 ENCOUNTER — VBI (OUTPATIENT)
Dept: ADMINISTRATIVE | Facility: OTHER | Age: 53
End: 2023-12-13

## 2024-01-25 ENCOUNTER — APPOINTMENT (OUTPATIENT)
Dept: LAB | Facility: HOSPITAL | Age: 54
End: 2024-01-25
Payer: MEDICARE

## 2024-01-25 ENCOUNTER — ANESTHESIA EVENT (OUTPATIENT)
Dept: PERIOP | Facility: HOSPITAL | Age: 54
End: 2024-01-25
Payer: MEDICARE

## 2024-01-25 ENCOUNTER — OFFICE VISIT (OUTPATIENT)
Dept: OTOLARYNGOLOGY | Facility: CLINIC | Age: 54
End: 2024-01-25
Payer: MEDICARE

## 2024-01-25 ENCOUNTER — OFFICE VISIT (OUTPATIENT)
Dept: AUDIOLOGY | Facility: CLINIC | Age: 54
End: 2024-01-25
Payer: MEDICARE

## 2024-01-25 DIAGNOSIS — J34.3 NASAL TURBINATE HYPERTROPHY: ICD-10-CM

## 2024-01-25 DIAGNOSIS — J34.89 SINUS PRESSURE: Primary | ICD-10-CM

## 2024-01-25 DIAGNOSIS — H90.6 MIXED HEARING LOSS, BILATERAL: Primary | ICD-10-CM

## 2024-01-25 DIAGNOSIS — H69.93 DYSFUNCTION OF BOTH EUSTACHIAN TUBES: ICD-10-CM

## 2024-01-25 DIAGNOSIS — G47.33 OBSTRUCTIVE SLEEP APNEA: ICD-10-CM

## 2024-01-25 DIAGNOSIS — R09.82 POST-NASAL DRIP: ICD-10-CM

## 2024-01-25 DIAGNOSIS — H93.8X1 CLOGGED EAR, RIGHT: ICD-10-CM

## 2024-01-25 DIAGNOSIS — H90.6 MIXED CONDUCTIVE AND SENSORINEURAL HEARING LOSS OF BOTH EARS: ICD-10-CM

## 2024-01-25 DIAGNOSIS — E66.01 OBESITY, MORBID, BMI 40.0-49.9 (HCC): ICD-10-CM

## 2024-01-25 DIAGNOSIS — R97.20 ELEVATED PSA: ICD-10-CM

## 2024-01-25 DIAGNOSIS — H69.93 TYPE C TYMPANOGRAM OF BOTH EARS: ICD-10-CM

## 2024-01-25 DIAGNOSIS — J32.0 CHRONIC MAXILLARY SINUSITIS: ICD-10-CM

## 2024-01-25 DIAGNOSIS — H61.21 IMPACTED CERUMEN OF RIGHT EAR: ICD-10-CM

## 2024-01-25 DIAGNOSIS — J34.2 DEVIATED NASAL SEPTUM: ICD-10-CM

## 2024-01-25 LAB
ALBUMIN SERPL BCP-MCNC: 3.9 G/DL (ref 3.5–5)
ALP SERPL-CCNC: 85 U/L (ref 34–104)
ALT SERPL W P-5'-P-CCNC: 16 U/L (ref 7–52)
ANION GAP SERPL CALCULATED.3IONS-SCNC: 5 MMOL/L
AST SERPL W P-5'-P-CCNC: 12 U/L (ref 13–39)
ATRIAL RATE: 87 BPM
BASOPHILS # BLD AUTO: 0.04 THOUSANDS/ÂΜL (ref 0–0.1)
BASOPHILS NFR BLD AUTO: 1 % (ref 0–1)
BILIRUB SERPL-MCNC: 0.37 MG/DL (ref 0.2–1)
BUN SERPL-MCNC: 11 MG/DL (ref 5–25)
CALCIUM SERPL-MCNC: 9.6 MG/DL (ref 8.4–10.2)
CHLORIDE SERPL-SCNC: 102 MMOL/L (ref 96–108)
CHOLEST SERPL-MCNC: 175 MG/DL
CO2 SERPL-SCNC: 28 MMOL/L (ref 21–32)
CREAT SERPL-MCNC: 0.84 MG/DL (ref 0.6–1.3)
EOSINOPHIL # BLD AUTO: 0.15 THOUSAND/ÂΜL (ref 0–0.61)
EOSINOPHIL NFR BLD AUTO: 2 % (ref 0–6)
ERYTHROCYTE [DISTWIDTH] IN BLOOD BY AUTOMATED COUNT: 15.5 % (ref 11.6–15.1)
GFR SERPL CREATININE-BSD FRML MDRD: 98 ML/MIN/1.73SQ M
GLUCOSE SERPL-MCNC: 86 MG/DL (ref 65–140)
HCT VFR BLD AUTO: 44.2 % (ref 36.5–49.3)
HDLC SERPL-MCNC: 46 MG/DL
HGB BLD-MCNC: 14.2 G/DL (ref 12–17)
IMM GRANULOCYTES # BLD AUTO: 0.02 THOUSAND/UL (ref 0–0.2)
IMM GRANULOCYTES NFR BLD AUTO: 0 % (ref 0–2)
LDLC SERPL CALC-MCNC: 103 MG/DL (ref 0–100)
LYMPHOCYTES # BLD AUTO: 2.32 THOUSANDS/ÂΜL (ref 0.6–4.47)
LYMPHOCYTES NFR BLD AUTO: 33 % (ref 14–44)
MCH RBC QN AUTO: 26.5 PG (ref 26.8–34.3)
MCHC RBC AUTO-ENTMCNC: 32.1 G/DL (ref 31.4–37.4)
MCV RBC AUTO: 83 FL (ref 82–98)
MONOCYTES # BLD AUTO: 0.65 THOUSAND/ÂΜL (ref 0.17–1.22)
MONOCYTES NFR BLD AUTO: 9 % (ref 4–12)
NEUTROPHILS # BLD AUTO: 3.96 THOUSANDS/ÂΜL (ref 1.85–7.62)
NEUTS SEG NFR BLD AUTO: 55 % (ref 43–75)
NONHDLC SERPL-MCNC: 129 MG/DL
NRBC BLD AUTO-RTO: 0 /100 WBCS
P AXIS: 31 DEGREES
PLATELET # BLD AUTO: 400 THOUSANDS/UL (ref 149–390)
PMV BLD AUTO: 9.1 FL (ref 8.9–12.7)
POTASSIUM SERPL-SCNC: 4.3 MMOL/L (ref 3.5–5.3)
PR INTERVAL: 218 MS
PROT SERPL-MCNC: 7.9 G/DL (ref 6.4–8.4)
QRS AXIS: -16 DEGREES
QRSD INTERVAL: 112 MS
QT INTERVAL: 368 MS
QTC INTERVAL: 442 MS
RBC # BLD AUTO: 5.35 MILLION/UL (ref 3.88–5.62)
SODIUM SERPL-SCNC: 135 MMOL/L (ref 135–147)
T WAVE AXIS: 33 DEGREES
TRIGL SERPL-MCNC: 128 MG/DL
TSH SERPL DL<=0.05 MIU/L-ACNC: 1.12 UIU/ML (ref 0.45–4.5)
VENTRICULAR RATE: 87 BPM
WBC # BLD AUTO: 7.14 THOUSAND/UL (ref 4.31–10.16)

## 2024-01-25 PROCEDURE — 99204 OFFICE O/P NEW MOD 45 MIN: CPT | Performed by: PHYSICIAN ASSISTANT

## 2024-01-25 PROCEDURE — 92557 COMPREHENSIVE HEARING TEST: CPT | Performed by: AUDIOLOGIST

## 2024-01-25 PROCEDURE — 92567 TYMPANOMETRY: CPT | Performed by: AUDIOLOGIST

## 2024-01-25 PROCEDURE — 69210 REMOVE IMPACTED EAR WAX UNI: CPT | Performed by: PHYSICIAN ASSISTANT

## 2024-01-25 PROCEDURE — 87086 URINE CULTURE/COLONY COUNT: CPT

## 2024-01-25 RX ORDER — FLUTICASONE PROPIONATE 50 MCG
1 SPRAY, SUSPENSION (ML) NASAL 2 TIMES DAILY
Qty: 16 G | Refills: 3 | Status: SHIPPED | OUTPATIENT
Start: 2024-01-25

## 2024-01-25 NOTE — PROGRESS NOTES
Otolaryngology Clinic Visit  Name:  Gavin Frank  MRN:  [id number]  Date:  1/25/2024 6:17 PM  ________________________________________________________________________       CHIEF COMPLAINT:   Presents for incidental sinus findings on brain mri.      HPI:  Gavin Frank is a 53 y.o. male who presents for incidental findings of Complete left maxillary sinusitis/opacification with obstruction of the left ostiomeatal uni,  Left anterior ethmoid air and left frontal sinus near complete opacification,  Right mastoid effusion. This was found on brain MRI in June 2023 when he developed right sided numbness, negative for stroke.     He does note severe sinus pressure, right > left. + PND. + nasal congestion. No rhinorrhea. No relief with flonase, saline rinse. No frequent sinus infections.   Symptoms on going for at least 6 months.     Right ear crackling for about 1 year.  Feels like hearing is down. Denies ear/sinus surgery, fmhx of ear issues, vertigo, otalgia, otorrhea.       PMHx:  Past Medical History:   Diagnosis Date    Allergic     Anxiety     Arthritis     Asthma     CPAP (continuous positive airway pressure) dependence     Depression     Hyperlipemia     Hypertension     Obesity        PSHx:  Past Surgical History:   Procedure Laterality Date    HEMORRHOID SURGERY      TONSILLECTOMY      URETHRAL DILATION  2019       FAMHx:  Family History   Problem Relation Age of Onset    Hypertension Mother     Hyperlipidemia Mother     Heart disease Mother     Hypertension Father     Heart disease Father     Hyperlipidemia Father     COPD Sister     Liver disease Sister     Prostate cancer Maternal Uncle        SOCHx:  Social History     Socioeconomic History    Marital status: Single     Spouse name: Not on file    Number of children: Not on file    Years of education: Not on file    Highest education level: Not on file   Occupational History    Occupation: employed   Tobacco Use    Smoking status: Former     Types: Cigarettes      Passive exposure: Past    Smokeless tobacco: Never    Tobacco comments:     Patient uses e-cigarette   Vaping Use    Vaping status: Every Day    Substances: Nicotine   Substance and Sexual Activity    Alcohol use: Not Currently    Drug use: Never    Sexual activity: Yes     Partners: Female   Other Topics Concern    Not on file   Social History Narrative    Not on file     Social Determinants of Health     Financial Resource Strain: Not on file   Food Insecurity: No Food Insecurity (6/26/2023)    Hunger Vital Sign     Worried About Running Out of Food in the Last Year: Never true     Ran Out of Food in the Last Year: Never true   Transportation Needs: No Transportation Needs (6/26/2023)    PRAPARE - Transportation     Lack of Transportation (Medical): No     Lack of Transportation (Non-Medical): No   Physical Activity: Not on file   Stress: Not on file   Social Connections: Not on file   Intimate Partner Violence: Not on file   Housing Stability: Low Risk  (6/26/2023)    Housing Stability Vital Sign     Unable to Pay for Housing in the Last Year: No     Number of Places Lived in the Last Year: 1     Unstable Housing in the Last Year: No       Allergies:  No Known Allergies     MEDS:  Reviewed    ROS:  As above       PHYSICAL EXAM:  There were no vitals taken for this visit.  General: NAD, AOx4  Eyes:  EOMI  Ears:  Right: ear canal with cerumen, TM intact but retracted no fluid  Left: ear canal normal, TM intact but retracted , no fluid  Nose:  septal deviation, severe inferior turbinate hypertrophy, no mass/lesions  Oral cavity:  No trismus, no mass/lesions, tonsils absent  Neck: Trachea is midline; no thyroid nodules, Salivary glands symmetrical, no masses/abnormality on palpation  Lymph:  No cervical lymphadenopathy  Skin:  No obvious facial lesions  Neuro:  Face symmetrical, no obvious cranial nerve palsy. No focal deficits   Lungs:  Normal work of breathing, symmetrical chest expansion  Vascular: Well  perfused    Procedures:  Patient informed of the finding of cerumen impaction in the right ear obstructing visualization of the tympanic membrane.  Recommended removal.  Patient was in agreement.  Patient gave verbal consent.  Cerumen removed from the right ear using suction     Medical Data Reviewed:  Audiogram from 1/25/2024 Mild Mixed Hearing Loss Au   Tympanogram from Today type C bilaterally  Reviewed personally with patient and audiology     Records reviewed and summarized as in [facility]  Brain mri 6/2023  PARANASAL SINUSES: Complete left maxillary sinusitis/opacification with obstruction of the left ostiomeatal unit.     Left anterior ethmoid air and left frontal sinus near complete opacification.     Right mastoid effusion.    Radiology:      Labs:      Patient Active Problem List   Diagnosis    Hypertension    Arthritis    Hyperlipemia    Obesity, morbid, BMI 40.0-49.9 (HCC)    Benign essential hypertension    Obstructive sleep apnea    Benign prostatic hyperplasia with urinary frequency    H/O urethral stricture    Vitamin D deficiency    Elevated PSA    Ingrown nail    Onychomycosis    Asthma    Stroke-like symptoms    Back pain    Prediabetes       ASSESSMENT/PLAN:  Gavin Frank is a 53 y.o. male with acute and chronic problems as above who presents with:    1. Sinus pressure    2. Chronic maxillary sinusitis    3. Deviated nasal septum    4. Nasal turbinate hypertrophy    5. Post-nasal drip    6. Clogged ear, right    7. Dysfunction of both eustachian tubes    8. Impacted cerumen of right ear    9. Mixed conductive and sensorineural hearing loss of both ears        53 y.o. here today for further evaluation of severe sinus pressure, nasal congestion, postnasal drip.  This has been ongoing for at least 6 months.  MRI of the brain from June 2023 revealed Complete left maxillary sinusitis/opacification with obstruction of the left ostiomeatal unit.  Left anterior ethmoid air and left frontal sinus near complete  opacification.  Right mastoid effusion.    Exam today positive for deviated septum, inferior turbinate hypertrophy.  Bilateral retracted TM.    Audiogram from 1/25/2024 Mild Mixed Hearing Loss Au   Tympanogram from Today type C bilaterally    Recommend getting an updated sinus CT without contrast to further evaluate the opacification in the sinuses and to see if there are any changes from June.  In the meantime should continue using Flonase 1 spray to each nostril twice daily, counseled on lateral application.  Trial Zyrtec 10 mg daily.  Follow-up with Dr. Martinez to discuss any further treatment options.        Maribel Rodriguez PA-C  Otolaryngology  [facility]   1/25/2024 6:17 PM

## 2024-01-25 NOTE — PROGRESS NOTES
AUDIOLOGY AUDIOMETRIC EVALUATION      Name:  Gavin Frank  :  1970  Age:  53 y.o.  Date of Evaluation: 2024    History: Crackling Ears  Reason for visit:  Mr. Frank is seen today at the request of Maribel Rodriguez PA-C for an evaluation of hearing.          EVALUATION RESULTS     Audiogram Description:     Mild Mixed Hearing Loss Au        Impedence Audiometry:     Tympanometry     Type Vol Press Comp   R C 1.4 ml -255 daPa 0.5 ml   L C 1.7 ml -245 daPa 0.8 ml            Speech Audiometry:     Right Ear:  SRT: 35dB                     WRS was excellent (100%) at 70dB presentation level     Left Ear:  SRT: 35dB         WRS was excellent (100%) at 70dB presentation level           Test-Retest Reliability: Good  PT Stimulus: Puretone  Speech Stimulus: Recorded  Recognition Test: NU-6 (2,3)  Response: Push Button  Transducer: Inserts           FOLLOW-UP  Patient to follow-up with provider afterwards for review.        Moose Guillen.  Licensed Audiologist

## 2024-01-26 LAB — BACTERIA UR CULT: NORMAL

## 2024-01-28 DIAGNOSIS — E55.9 VITAMIN D DEFICIENCY: ICD-10-CM

## 2024-01-29 RX ORDER — ERGOCALCIFEROL 1.25 MG/1
50000 CAPSULE ORAL WEEKLY
Qty: 4 CAPSULE | Refills: 0 | Status: SHIPPED | OUTPATIENT
Start: 2024-01-29

## 2024-01-29 NOTE — PRE-PROCEDURE INSTRUCTIONS
Pre-Surgery Instructions:   Medication Instructions    azelastine (ASTELIN) 0.1 % nasal spray Take day of surgery.    Cyanocobalamin (Vitamin B-12) 2500 MCG SUBL Hold day of surgery.    diclofenac (VOLTAREN) 75 mg EC tablet Stop taking 3 days prior to surgery.    diltiazem (CARDIZEM CD) 240 mg 24 hr capsule Take night before surgery    ergocalciferol (VITAMIN D2) 50,000 units Continue normal schedule    fluticasone (FLONASE) 50 mcg/act nasal spray Take day of surgery.    losartan (COZAAR) 100 MG tablet Take night before surgery    methocarbamol (ROBAXIN) 750 mg tablet Uses PRN- OK to take day of surgery    metoprolol succinate (TOPROL-XL) 50 mg 24 hr tablet Take night before surgery    montelukast (SINGULAIR) 10 mg tablet Take night before surgery    Multiple Vitamin (ONE-A-DAY MENS PO) Stop taking 7 days prior to surgery.    olopatadine (PATANOL) 0.1 % ophthalmic solution Take day of surgery.    ondansetron (ZOFRAN-ODT) 8 mg disintegrating tablet Uses PRN- OK to take day of surgery    pravastatin (PRAVACHOL) 20 mg tablet Take night before surgery    spironolactone (ALDACTONE) 25 mg tablet Hold day of surgery.    triamcinolone (KENALOG) 0.1 % ointment Uses PRN- DO NOT take day of surgery   Medication instructions for day surgery reviewed. Please use only a sip of water to take your instructed medications. Avoid all over the counter vitamins, supplements and NSAIDS for one week prior to surgery per anesthesia guidelines. Tylenol is ok to take as needed.     You will receive a call one business day prior to surgery with an arrival time and hospital directions. If your surgery is scheduled on a Monday, the hospital will be calling you on the Friday prior to your surgery. If you have not heard from anyone by 8pm, please call the hospital supervisor through the hospital  at [phone]. (Anthony [phone]).    Do not eat or drink anything after midnight the night before your surgery, including candy, mints,  lifesavers, or chewing gum. Do not drink alcohol 24hrs before your surgery. Try not to smoke at least 24hrs before your surgery.       Follow the pre surgery showering instructions as listed in the “My Surgical Experience Booklet” or otherwise provided by your surgeon's office. Do not use a blade to shave the surgical area 1 week before surgery. It is okay to use a clean electric clippers up to 24 hours before surgery. Do not apply any lotions, creams, including makeup, cologne, deodorant, or perfumes after showering on the day of your surgery. Do not use dry shampoo, hair spray, hair gel, or any type of hair products.     No contact lenses, eye make-up, or artificial eyelashes. Remove nail polish, including gel polish, and any artificial, gel, or acrylic nails if possible. Remove all jewelry including rings and body piercing jewelry.     Wear causal clothing that is easy to take on and off. Consider your type of surgery.    Keep any valuables, jewelry, piercings at home. Please bring any specially ordered equipment (sling, braces) if indicated.    Arrange for a responsible person to drive you to and from the hospital on the day of your surgery. Visitor Guidelines discussed.     Call the surgeon's office with any new illnesses, exposures, or additional questions prior to surgery.    Please reference your “My Surgical Experience Booklet” for additional information to prepare for your upcoming surgery.     Patient aware to take fleets enema 1 hour prior to leaving for procedure.

## 2024-02-07 ENCOUNTER — ANESTHESIA (OUTPATIENT)
Dept: PERIOP | Facility: HOSPITAL | Age: 54
End: 2024-02-07
Payer: MEDICARE

## 2024-02-07 ENCOUNTER — HOSPITAL ENCOUNTER (OUTPATIENT)
Facility: HOSPITAL | Age: 54
Setting detail: OUTPATIENT SURGERY
Discharge: HOME | End: 2024-02-07
Attending: UROLOGY | Admitting: UROLOGY
Payer: MEDICARE

## 2024-02-07 VITALS
SYSTOLIC BLOOD PRESSURE: 149 MMHG | RESPIRATION RATE: 18 BRPM | WEIGHT: 315 LBS | OXYGEN SATURATION: 100 % | BODY MASS INDEX: 50.58 KG/M2 | DIASTOLIC BLOOD PRESSURE: 81 MMHG | HEART RATE: 73 BPM | TEMPERATURE: 97.5 F

## 2024-02-07 DIAGNOSIS — R97.20 ELEVATED PSA: ICD-10-CM

## 2024-02-07 DIAGNOSIS — E66.01 OBESITY, MORBID, BMI 40.0-49.9 (HCC): ICD-10-CM

## 2024-02-07 DIAGNOSIS — G47.33 OBSTRUCTIVE SLEEP APNEA: Primary | ICD-10-CM

## 2024-02-07 PROBLEM — Z99.89 CPAP (CONTINUOUS POSITIVE AIRWAY PRESSURE) DEPENDENCE: Status: ACTIVE | Noted: 2024-02-07

## 2024-02-07 PROBLEM — E66.813 CLASS 3 SEVERE OBESITY DUE TO EXCESS CALORIES WITH SERIOUS COMORBIDITY AND BODY MASS INDEX (BMI) OF 50.0 TO 59.9 IN ADULT (HCC): Status: ACTIVE | Noted: 2017-02-07

## 2024-02-07 PROCEDURE — 76942 ECHO GUIDE FOR BIOPSY: CPT | Performed by: UROLOGY

## 2024-02-07 PROCEDURE — 55700 PR PROSTATE NEEDLE BIOPSY ANY APPROACH: CPT | Performed by: UROLOGY

## 2024-02-07 PROCEDURE — NC001 PR NO CHARGE: Performed by: UROLOGY

## 2024-02-07 PROCEDURE — 88344 IMHCHEM/IMCYTCHM EA MLT ANTB: CPT | Performed by: STUDENT IN AN ORGANIZED HEALTH CARE EDUCATION/TRAINING PROGRAM

## 2024-02-07 PROCEDURE — G0416 PROSTATE BIOPSY, ANY MTHD: HCPCS | Performed by: STUDENT IN AN ORGANIZED HEALTH CARE EDUCATION/TRAINING PROGRAM

## 2024-02-07 RX ORDER — HYDRALAZINE HYDROCHLORIDE 20 MG/ML
5 INJECTION INTRAMUSCULAR; INTRAVENOUS ONCE
Status: COMPLETED | OUTPATIENT
Start: 2024-02-07 | End: 2024-02-07

## 2024-02-07 RX ORDER — ONDANSETRON 2 MG/ML
INJECTION INTRAMUSCULAR; INTRAVENOUS AS NEEDED
Status: DISCONTINUED | OUTPATIENT
Start: 2024-02-07 | End: 2024-02-07

## 2024-02-07 RX ORDER — PROPOFOL 10 MG/ML
INJECTION, EMULSION INTRAVENOUS AS NEEDED
Status: DISCONTINUED | OUTPATIENT
Start: 2024-02-07 | End: 2024-02-07

## 2024-02-07 RX ORDER — SODIUM CHLORIDE 9 MG/ML
125 INJECTION, SOLUTION INTRAVENOUS CONTINUOUS
Status: DISCONTINUED | OUTPATIENT
Start: 2024-02-07 | End: 2024-02-07 | Stop reason: HOSPADM

## 2024-02-07 RX ORDER — CEFAZOLIN SODIUM 2 G/50ML
2000 SOLUTION INTRAVENOUS
Status: COMPLETED | OUTPATIENT
Start: 2024-02-07 | End: 2024-02-07

## 2024-02-07 RX ORDER — FENTANYL CITRATE 50 UG/ML
INJECTION, SOLUTION INTRAMUSCULAR; INTRAVENOUS AS NEEDED
Status: DISCONTINUED | OUTPATIENT
Start: 2024-02-07 | End: 2024-02-07

## 2024-02-07 RX ORDER — FENTANYL CITRATE/PF 50 MCG/ML
25 SYRINGE (ML) INJECTION
Status: DISCONTINUED | OUTPATIENT
Start: 2024-02-07 | End: 2024-02-07 | Stop reason: HOSPADM

## 2024-02-07 RX ORDER — LIDOCAINE HYDROCHLORIDE 20 MG/ML
INJECTION, SOLUTION EPIDURAL; INFILTRATION; INTRACAUDAL; PERINEURAL AS NEEDED
Status: DISCONTINUED | OUTPATIENT
Start: 2024-02-07 | End: 2024-02-07 | Stop reason: HOSPADM

## 2024-02-07 RX ORDER — LIDOCAINE HYDROCHLORIDE 20 MG/ML
INJECTION, SOLUTION EPIDURAL; INFILTRATION; INTRACAUDAL; PERINEURAL AS NEEDED
Status: DISCONTINUED | OUTPATIENT
Start: 2024-02-07 | End: 2024-02-07

## 2024-02-07 RX ORDER — METOPROLOL TARTRATE 1 MG/ML
2.5 INJECTION, SOLUTION INTRAVENOUS ONCE
Status: COMPLETED | OUTPATIENT
Start: 2024-02-07 | End: 2024-02-07

## 2024-02-07 RX ORDER — ACETAMINOPHEN 325 MG/1
650 TABLET ORAL EVERY 6 HOURS PRN
Status: DISCONTINUED | OUTPATIENT
Start: 2024-02-07 | End: 2024-02-07 | Stop reason: HOSPADM

## 2024-02-07 RX ORDER — ONDANSETRON 2 MG/ML
4 INJECTION INTRAMUSCULAR; INTRAVENOUS ONCE AS NEEDED
Status: DISCONTINUED | OUTPATIENT
Start: 2024-02-07 | End: 2024-02-07 | Stop reason: HOSPADM

## 2024-02-07 RX ADMIN — LIDOCAINE HYDROCHLORIDE 40 MG: 20 INJECTION, SOLUTION EPIDURAL; INFILTRATION; INTRACAUDAL at 08:48

## 2024-02-07 RX ADMIN — PROPOFOL 300 MG: 10 INJECTION, EMULSION INTRAVENOUS at 08:48

## 2024-02-07 RX ADMIN — HYDRALAZINE HYDROCHLORIDE 5 MG: 20 INJECTION, SOLUTION INTRAMUSCULAR; INTRAVENOUS at 10:45

## 2024-02-07 RX ADMIN — FENTANYL CITRATE 100 MCG: 50 INJECTION INTRAMUSCULAR; INTRAVENOUS at 08:52

## 2024-02-07 RX ADMIN — FENTANYL CITRATE 25 MCG: 50 INJECTION INTRAMUSCULAR; INTRAVENOUS at 09:45

## 2024-02-07 RX ADMIN — CEFAZOLIN SODIUM 2000 MG: 2 SOLUTION INTRAVENOUS at 08:45

## 2024-02-07 RX ADMIN — FENTANYL CITRATE 25 MCG: 50 INJECTION INTRAMUSCULAR; INTRAVENOUS at 09:50

## 2024-02-07 RX ADMIN — ONDANSETRON 4 MG: 2 INJECTION INTRAMUSCULAR; INTRAVENOUS at 08:52

## 2024-02-07 RX ADMIN — METOPROLOL TARTRATE 2.5 MG: 1 INJECTION, SOLUTION INTRAVENOUS at 08:01

## 2024-02-07 RX ADMIN — ACETAMINOPHEN 325MG 650 MG: 325 TABLET ORAL at 10:43

## 2024-02-07 RX ADMIN — SODIUM CHLORIDE 125 ML/HR: 0.9 INJECTION, SOLUTION INTRAVENOUS at 07:56

## 2024-02-07 NOTE — ANESTHESIA POSTPROCEDURE EVALUATION
Post-Op Assessment Note    CV Status:  Stable    Pain management: adequate       Mental Status:  Alert and awake   Hydration Status:  Euvolemic   PONV Controlled:  Controlled   Airway Patency:  Patent     Post Op Vitals Reviewed: Yes    No anethesia notable event occurred.    Staff: Anesthesiologist               BP      Temp      Pulse     Resp      SpO2      BP (!) 165/109   Pulse 68   Temp 98.4 °F (36.9 °C) (Temporal)   Resp 18   Wt (!) 174 kg (383 lb 6.1 oz)   SpO2 100%   BMI 50.58 kg/m²

## 2024-02-07 NOTE — DISCHARGE INSTR - AVS FIRST PAGE
You had transperineal prostate biopsy. You have puncture sites in your perineum. These areas may have some slight oozing or bruising. You may put gauze over the site as needed.    Starting the day after surgery, you may shower. Do not take any baths/hot tubs/go in any standing body of water until your perineum puncture sites have totally healed (this can take about 1 week).    You may see blood in your urine, stool, or semen for the next few days. Call office if it is persistent.    Please call office if you pass large blood clots in your urine or if you are unable to urinate.    Please call office or present to ED immediately if you experience fevers or chills.    You may take over the counter Tylenol as needed for pain.    Try avoid too much physical activity on the day of your procedure. Try to avoid long car rides or sitting down on a hard chair for at least a few days.     You are allowed to walk as usual on the day after surgery. Avoid heavy lifting for about 5 days after your procedure (as long as you are not experiencing any bleeding at that time). Avoid activities that put extra pressure on your perineum (bike riding, horse riding, etc.) for 2-3 weeks. Again, if you are still experiencing bleeding at this time, please do not partake in these activities.    You will follow up in the office with me on 2/26/24 at 3:30pm.

## 2024-02-07 NOTE — ANESTHESIA PREPROCEDURE EVALUATION
Procedure:  TRANSPERINEAL MRI FUSION BIOPSY PROSTATE (Perineum)    Relevant Problems   CARDIO   (+) Benign essential hypertension   (+) Hyperlipemia   (+) Hypertension      MUSCULOSKELETAL   (+) Arthritis   (+) Back pain      PULMONARY   (+) Asthma   (+) Obstructive sleep apnea      Other   (+) Benign prostatic hyperplasia with urinary frequency   (+) CPAP (continuous positive airway pressure) dependence   (+) Class 3 severe obesity due to excess calories with serious comorbidity and body mass index (BMI) of 50.0 to 59.9 in adult (HCC)   (+) Stroke-like symptoms        Physical Exam    Airway    Mallampati score: III  TM Distance: >3 FB  Neck ROM: full     Dental   No notable dental hx     Cardiovascular  Rhythm: regular, Rate: normal, Cardiovascular exam normal    Pulmonary  Pulmonary exam normal Breath sounds clear to auscultation    Other Findings      Anesthesia Plan  ASA Score- 3     Anesthesia Type- general with ASA Monitors.         Additional Monitors:     Airway Plan:     Comment: Metoprolol 2.5 mg IV ordered--TBA in SDS pre-operatively--for beta blocker coverage..       Plan Factors-    Chart reviewed.    Patient summary reviewed.    Patient is not a current smoker. Patient not instructed to abstain from smoking on day of procedure. Patient did not smoke on day of surgery.    Obstructive sleep apnea risk education given perioperatively.        Induction- intravenous.    Postoperative Plan-     Informed Consent- Anesthetic plan and risks discussed with patient (Alesia VELEZ)).

## 2024-02-07 NOTE — PROGRESS NOTES
Pt's /81, pt asking to be discharged.No questions concerning discharge instructions.Encouraged pt to take his BP medication as prescribed at home.

## 2024-02-07 NOTE — OP NOTE
OPERATIVE REPORT  PATIENT NAME: Gavin Frank    :  1970  MRN: [id number]  Pt Location: [facility] OR ROOM 04    SURGERY DATE: 2024    Surgeons and Role:     * Benjie Stewart DO - Primary    Preop Diagnosis:  Elevated PSA [R97.20]    Post-Op Diagnosis Codes:     * Elevated PSA [R97.20]    Procedure(s):  TRANSPERINEAL MRI FUSION BIOPSY PROSTATE    Specimen(s):  ID Type Source Tests Collected by Time Destination   1 : RIGHT ANTERIOR MEDIAL Tissue Prostate TISSUE EXAM Benjie Stewart, DO 2024 0907    2 : REGION OF INTEREST Tissue Prostate TISSUE EXAM Benjie Stewart, DO 2024 0903    3 : RIGHT BASE Tissue Prostate TISSUE EXAM Benjie Stewart, DO 2024 0906    4 : RIGHT ANTERIOR LATERAL Tissue Prostate TISSUE EXAM Benjie Stewart, DO 2024 0848    5 : RIGHT POSTERIOR MEDIAL Tissue Prostate TISSUE EXAM Benjie Stewart, DO 2024 0848    6 : RIGHT POSTERIOR LATERAL Tissue Prostate TISSUE EXAM Benjie Stewart, DO 2024 0848    7 : LEFT ANTERIOR LATERAL Tissue Prostate TISSUE EXAM Benjie Stewart, DO 2024 0848    8 : LEFT ANTERIOR MEDIAL Tissue Prostate TISSUE EXAM Benjie Stewart, DO 2024 0848    9 : LEFT BASE Tissue Prostate TISSUE EXAM Benjie Stewart, DO 2024 0848    10 : LEFT POSTERIOR MEDIAL Tissue Prostate TISSUE EXAM Benjie Stewart, DO 2024 0848    11 : LEFT POSTERIOR LATERAL Tissue Prostate TISSUE EXAM Benjie Stewart, DO 2024 0848        Estimated Blood Loss:   Minimal    Drains:  * No LDAs found *    Anesthesia Type:   IV Sedation with Anesthesia    Operative Indications:  Elevated PSA [R97.20]      53 y.o. old male with ePSA 5.33 on 23, 4.7 on 10/31/23     MRI prostate 23:  1. PI-RADSv2.1 Category 3 - Intermediate (the presence of clinically significant cancer is equivocal). Possible 0.4 cm lesion in the posterior left peripheral zone at apex     2. No extraprostatic tumor, seminal vesicle  invasion, pelvic lymphadenopathy, or pelvic osseous metastatic disease.     3. Calculated prostate volume of 41 cc.       Consented for transperineal prostate biopsy with MRI fusion          FINDINGS:  - No hypoechoic lesions were found  - A total of 10 standard samples are taken, in order to provide saturation sampling to maximize diagnostic sensitivity  - 4 samples are taken from BIB #1 (0.4 cm lesion in the posterior left peripheral zone at apex)        Complications:   None    Procedure and Technique:              The patient is  is here for transperineal prostate saturation biopsy guided by biplanar transrectal ultrasound using the Precision Point Perineologic kit and uroNav device for MR fusion.      The procedure, as well as the risks of bleeding, infection, and urinary retention have been explained and consent has been given.     The patient was brought to the operating room and identified properly.  A time-out was performed.  IV sedation was induced, and he was placed in the dorsal lithotomy position.  Patient received Ancef for IV antibiotics. The perineum shaved, and IO band was used to lift the scrotum away from the perineum.  ChloraPrep was used to prep the perineum.  Care was taken when placing the patient in the dorsal lithotomy position to make sure all pressure points were protected.  The biplanar ultrasound probe was prepared with a condom and precision Point kit on that. I placed the Precision Point device with a clamp over the midportion of the ultrasound.  The aperture and guide needle were also prepared to be used later, and I placed the transrectal ultrasound probe into the rectum and visualized the prostate in sagittal and transverse views. See above findings for details. Split screen was utilized.      Transverse view was obtained, Volumetric sweep was performed for the purpose of fusing the MRI images with the ultrasound images.      The perineum was anesthetized with 2% xylocaine 10 cc  total both superficially and deep with a spinal needle on both sides of the median raphe.     I then performed biopsies of the region of interest after placing the introducer needle into the perineum and took multiple biopsies with MR fusion guidance.  Realtime ultrasound is used in addition to the UroNav verification of each target sampling.  Additionally, I verified on the back table that adequate sampling was obtained grossly from each specimen.  Multiple samples were taken from the targeted lesion in order provide full saturation of the region of concern on MRI.    I then performed standard transperineal prostate biopsy without MR guidance, taking the biopsies from the peripheral zone in the standard fashion of anterior medial, anterior lateral, posterior medial, posterior lateral, and base regions bilaterally.  The top and second to top aperture settings were used to get the anterior zones, and the second to bottom aperture settings were used to obtain the posterior zones.  These were all peripheral zone biopsies.  Extra biopsies were taken in zones where the initial biopsy was suboptimal tissue.  Care was taken to try to include the entire length of the prostate as much as possible for complete saturation coverage.   Excellent prostate tissue cores were obtained, and specimens were sent to pathology.  I withdrew the guide needle and I held pressure on the perineum for 1 minute using gauze sponges.  The perineum was then cleansed with sterile water.    Patient was uneventfully awoken from anesthesia and brought to PACU in good condition.               A qualified resident physician was not available.    Patient Disposition:  PACU         SIGNATURE: Benjie Stewart DO  DATE: February 7, 2024  TIME: 9:17 AM

## 2024-02-07 NOTE — H&P
UROLOGY H&P NOTE     Patient Identifiers: Gavin Frank (MRN [id number])    Date of Service: 2024    History of Present Illness:     Gavin Frank is a 53 y.o. old with a history of ePSA    Past Medical, Past Surgical History:     Past Medical History:   Diagnosis Date    Allergic     Anxiety     Arthritis     Asthma     Chronic pain disorder     Back pain    CPAP (continuous positive airway pressure) dependence     Depression     Hyperlipemia     Hypertension     Obesity     Sleep apnea    :    Past Surgical History:   Procedure Laterality Date    HEMORRHOID SURGERY      TONSILLECTOMY      URETHRAL DILATION  2019   :    Medications, Allergies:     Current Facility-Administered Medications   Medication Dose Route Frequency    ceFAZolin (ANCEF) IVPB (premix in dextrose) 2,000 mg 50 mL  2,000 mg Intravenous On Call To OR    sodium chloride 0.9 % infusion  125 mL/hr Intravenous Continuous       Allergies:  Allergies   Allergen Reactions    Contrast Dye [Iodinated Contrast Media] Vomiting   :    Social and Family History:   Social History:   Social History     Tobacco Use    Smoking status: Former     Current packs/day: 0.00     Types: Cigarettes     Quit date:      Years since quittin.1     Passive exposure: Past    Smokeless tobacco: Never    Tobacco comments:     Patient uses e-cigarette   Vaping Use    Vaping status: Every Day    Substances: Nicotine   Substance Use Topics    Alcohol use: Not Currently    Drug use: Never   .    Social History     Tobacco Use   Smoking Status Former    Current packs/day: 0.00    Types: Cigarettes    Quit date:     Years since quittin.1    Passive exposure: Past   Smokeless Tobacco Never   Tobacco Comments    Patient uses e-cigarette       Family History:  Family History   Problem Relation Age of Onset    Hypertension Mother     Hyperlipidemia Mother     Heart disease Mother     Hypertension Father     Heart disease Father     Hyperlipidemia Father     COPD Sister      Liver disease Sister     Prostate cancer Maternal Uncle    :     Review of Systems:     General: Fever, chills, or night sweats: negative  Cardiac: Negative for chest pain.    Pulmonary: Negative for shortness of breath.  Gastrointestinal: Abdominal pain negative.  Nausea, vomiting, or diarrhea negative,  Genitourinary: See HPI above.  Patient does not have hematuria.  All other systems queried were negative.    Physical Exam:   General: Patient is pleasant and in NAD. Awake and alert  BP (!) 185/87   Pulse 73   Temp 97.9 °F (36.6 °C) (Temporal)   Resp 16   SpO2 98% Temp (24hrs), Av.9 °F (36.6 °C), Min:97.9 °F (36.6 °C), Max:97.9 °F (36.6 °C)  current; Temperature: 97.9 °F (36.6 °C)  No intake/output data recorded.  Cardiac: Peripheral edema: negative  Pulmonary: Non-labored breathing  Abdomen: Soft, non-tender, non-distended.  No surgical scars.  No masses, tenderness, hernias noted.    Genitourinary: Negative CVA tenderness, negative suprapubic tenderness.    Labs:     Lab Results   Component Value Date    HGB 14.2 2024    HCT 44.2 2024    WBC 7.14 2024     (H) 2024   ]    Lab Results   Component Value Date    K 4.3 2024     2024    CO2 28 2024    BUN 11 2024    CREATININE 0.84 2024    CALCIUM 9.6 2024   ]    Imaging:   I personally reviewed the images and report of the following studies, and reviewed them with the patient:    reviewed      ASSESSMENT:     53 y.o. old male with ePSA 5.33 on 23, 4.7 on 10/31/23    MRI prostate 23:  1. PI-RADSv2.1 Category 3 - Intermediate (the presence of clinically significant cancer is equivocal). Possible 0.4 cm lesion in the posterior left peripheral zone at apex     2. No extraprostatic tumor, seminal vesicle invasion, pelvic lymphadenopathy, or pelvic osseous metastatic disease.     3. Calculated prostate volume of 41 cc.      Consented for transperineal prostate biopsy with MRI  fusion    PLAN:     -preop ancef  -OR plan as above  -dc home from pacu

## 2024-02-09 PROCEDURE — G0416 PROSTATE BIOPSY, ANY MTHD: HCPCS | Performed by: STUDENT IN AN ORGANIZED HEALTH CARE EDUCATION/TRAINING PROGRAM

## 2024-02-09 PROCEDURE — 88344 IMHCHEM/IMCYTCHM EA MLT ANTB: CPT | Performed by: STUDENT IN AN ORGANIZED HEALTH CARE EDUCATION/TRAINING PROGRAM

## 2024-02-16 ENCOUNTER — TELEPHONE (OUTPATIENT)
Dept: UROLOGY | Facility: CLINIC | Age: 54
End: 2024-02-16

## 2024-02-16 ENCOUNTER — NURSE TRIAGE (OUTPATIENT)
Age: 54
End: 2024-02-16

## 2024-02-16 NOTE — TELEPHONE ENCOUNTER
Agree - hematospermia and hematuria can be common after biopsy. Should hydrate and minimize heavy lifting for the next 48 hours. Biopsy results will be discussed at follow up appointment already scheduled.

## 2024-02-16 NOTE — TELEPHONE ENCOUNTER
Patient status post transperineal prostate biopsy 2/7/2024.  He called the office earlier today stating that he noticed blood in his semen and this was bothersome to him.  He also noted small amount of blood in his urine.    He also stated that he was concerned about his prostate biopsy results and wanted to discuss it over the phone prior to his appointment with me on 2/26/2024.    I called him this afternoon.  I confirmed with him that having blood in the semen for a couple weeks after his prostate biopsy was quite normal and would eventually go away on its own.  I also confirmed that having blood in the urine for a few weeks after the biopsy was normal.  I explained that as long as his urine was lighter than fruit punch and did not have any clots, he would just need to stay hydrated and there was no cause for immediate concern.    We also briefly reviewed his prostate cancer pathology.  I explained that his transperineal prostate biopsy demonstrated multiple cores of Maryanne 6 prostate cancer.  I briefly explained to him that there were multiple levels of localized prostate cancer in terms of restratification.  I explained that as of now, he is considered to have low risk prostate cancer.    I explained that the gold standard for low risk prostate cancer was active surveillance, meaning he would have serial PSA testing, MRI, prostate biopsies to keep close watch on his prostate cancer to make sure it does not progress to intermediate or high risk prostate cancer.  I explained that if subsequent biopsies demonstrate that his prostate cancer does progress to intermediate or high risk, we would then pursue localized prostate cancer treatment which generally consists of surgical management or radiation.    He was relieved to hear that he did not need active treatment at this point.    He will come to see me in the office on 2/26/2024 and we will have further conversations about his prostate cancer.    All questions  were answered.  He was very appreciative the call.    He has the office number and knows to call with any questions or concerns.

## 2024-02-16 NOTE — TELEPHONE ENCOUNTER
Pt returning Dr Stewart call.  Dr Stewart is aware of this call and will call pt shortly.   [phone]

## 2024-02-16 NOTE — TELEPHONE ENCOUNTER
Called and spoke with pt reviewed AP recommendations. Pt is very worried about results and would like to know if the Provider would please call him to review rather then waiting another ten days.

## 2024-02-16 NOTE — TELEPHONE ENCOUNTER
Pt of Dr. Stewart had a TRANSPERINEAL MRI FUSION BIOPSY PROSTATE (Perineum) on 2/7/24 and concerned about having some blood in his semen/urine. Lifted a heavy stroller yesterday which is when he noticed blood in his urine. Advised pt this can be normal post op and to monitor for worsening symptoms. ED precautions reviewed. Denies any fevers or blood clots. Advised pt to increase his water intake and to avoid lifting or strenuous activity. Pt is also requesting his biopsy results.     Reason for Disposition   The patient has a post-op issue    Answer Questions - Initial Assessment   Do you have a fever: no    Do you have pain: no    Do you have a catheter in place: no    Do you have any blood clots: no    Have you become unable to urinate: no    Do you have a drain in place and is it draining: no drain    Protocols used: Urology Post-Op Problem

## 2024-02-23 ENCOUNTER — OFFICE VISIT (OUTPATIENT)
Dept: SLEEP CENTER | Facility: CLINIC | Age: 54
End: 2024-02-23
Payer: MEDICARE

## 2024-02-23 VITALS
SYSTOLIC BLOOD PRESSURE: 132 MMHG | BODY MASS INDEX: 41.75 KG/M2 | DIASTOLIC BLOOD PRESSURE: 70 MMHG | OXYGEN SATURATION: 98 % | HEART RATE: 74 BPM | WEIGHT: 315 LBS | HEIGHT: 73 IN | RESPIRATION RATE: 20 BRPM

## 2024-02-23 DIAGNOSIS — E66.01 MORBID OBESITY (HCC): ICD-10-CM

## 2024-02-23 DIAGNOSIS — I10 PRIMARY HYPERTENSION: ICD-10-CM

## 2024-02-23 DIAGNOSIS — J31.0 CHRONIC RHINITIS: ICD-10-CM

## 2024-02-23 DIAGNOSIS — R35.1 NOCTURIA: ICD-10-CM

## 2024-02-23 DIAGNOSIS — E29.1 HYPOGONADISM IN MALE: ICD-10-CM

## 2024-02-23 DIAGNOSIS — G47.33 OBSTRUCTIVE SLEEP APNEA: Primary | ICD-10-CM

## 2024-02-23 PROCEDURE — 99214 OFFICE O/P EST MOD 30 MIN: CPT | Performed by: INTERNAL MEDICINE

## 2024-02-23 NOTE — PATIENT INSTRUCTIONS

## 2024-02-23 NOTE — PROGRESS NOTES
Follow-Up Note - [facility]   Gavin Frank  53 y.o. male  :1970  MRN:[id number]  DOS:2024    CC: I saw this patient for follow-up in clinic today for Sleep disordered breathing, Coexisting Sleep and Medical Problems.  He is using a ResMed machine.  Interval changes: None reported.    Results of a split study in : The diagnostic portion demonstrated: AHI of 33 per hour, and had no during stage REM.  Minimum oxygen saturation was 90 %. During the therapeutic portion of the study, his sleep disordered breathing was adequately remediated with nasal CPAP at  14 cm H2O.    PFSH, Problem List, Medications & Allergies were reviewed in EMR.   He  has a past medical history of Allergic, Anxiety, Arthritis, Asthma, Chronic pain disorder, CPAP (continuous positive airway pressure) dependence (2024), Depression, Hyperlipemia, Hypertension, Obesity, and Sleep apnea.    He has a current medication list which includes the following prescription(s): azelastine, black currant seed oil, vitamin b-12, diclofenac, diltiazem, ergocalciferol, fluticasone, fluticasone, losartan, methocarbamol, metoprolol succinate, montelukast, multiple vitamin, olopatadine, pravastatin, spironolactone, testosterone, triamcinolone, and ondansetron.    PHYSIOLOGICAL DATA REVIEW : Using PAP > 4 hours/night 100%. Estimated ZEV 0.3/hour with pressure of 14cm H2O@90th/95th percentile; patient has not been using non FDA approved devices to sanitize the machine.  INTERPRETATION: Compliance is excellent; Pressure setting is:optimal; ;   SUBJECTIVE: With respect to use of PAP, Gavin  is experiencing some adverse effects:dry mouth/throat.He derives benefit.  Is satisfied with sleep and daytime function.   Sleep Routine: Gavin reports getting 8-9 hrs sleep; he has no difficulty initiating or maintaining sleep .  Sleep is interrupted 2-3 times a night because of nocturia and at times may struggle to fall back asleep.  He arises before alarm  "most days and feels more refreshed since on Rx.Gavin denies excessive daytime sleepiness,.  He rated himself at Total score: 0 /24 on the Kimball Sleepiness Scale.   Other issues: None reported.     Habits: Reports that he quit smoking about 9 years ago. His smoking use included cigarettes. He has been exposed to tobacco smoke. He has never used smokeless tobacco.,  Reports that he does not currently use alcohol.,  Reports no history of drug use., Caffeine use:limited; Exercise routine: none.      ROS: Significant for weight has been stable.  Allergies are controlled on current medication..  He is reporting no respiratory or cardiac symptoms.  He was recently diagnosed with prostate CA.  (He stopped taking testosterone a while ago).    EXAM: /70   Pulse 74   Resp 20   Ht 6' 1\" (1.854 m)   Wt (!) 173 kg (381 lb)   SpO2 98%   BMI 50.27 kg/m²     Wt Readings from Last 3 Encounters:   02/23/24 (!) 173 kg (381 lb)   02/07/24 (!) 174 kg (383 lb 6.1 oz)   12/12/23 (!) 174 kg (382 lb 12.8 oz)      Patient is well groomed; well appearing.   CNS: Alert, orientated, speech clear & coherent  Psych: cooperative and in no distress. Mental state: Appears normal.  H&N: EOMI; NC/AT: No facial pressure marks, no rashes.    Skin/Extrem: col & hydration normal; no edema  Resp: Respiratory effort is normal  Physical findings otherwise essentially unchanged from previous.    IMPRESSION: Problem List Items & Comorbidities Addressed this Visit    1. Obstructive sleep apnea  PAP DME Resupply/Reorder      2. Primary hypertension        3. Chronic rhinitis        4. Nocturia        5. Morbid obesity (HCC)        6. Hypogonadism in male            PLAN:  I reviewed results of prior studies and physiologic data with the patient.   I discussed treatment options with risks and benefits.  Treatment with  PAP is medically necessary and Gavin is agreable to continue use.   Care of equipment, methods to improve comfort using PAP and " "importance of compliance with therapy were discussed.  Pressure setting: continue 14 cmH2O.    Rx provided to replace supplies and Care coordinated with DME provider.   Continue current treatment for nasal symptoms.  He is under care of urology for prostate CA that likely explains his nocturia.  Discussed strategies for weight reduction.    Follow-up is advised in 1 year or sooner if needed to monitor progress, compliance and to adjust therapy.     Thank you for allowing me to participate in the care of this patient.    Sincerely,     Authenticated electronically on 02/23/24   Board Certified Specialist     Portions of the record may have been created with voice recognition software. Occasional wrong word or \"sound a like\" substitutions may have occurred due to the inherent limitations of voice recognition software. There may also be notations and random deletions of words or characters from malfunctioning software. Read the chart carefully and recognize, using context, where substitutions/deletions have occurred.    "

## 2024-02-26 ENCOUNTER — TELEPHONE (OUTPATIENT)
Dept: SLEEP CENTER | Facility: CLINIC | Age: 54
End: 2024-02-26

## 2024-02-26 ENCOUNTER — TELEPHONE (OUTPATIENT)
Dept: UROLOGY | Facility: CLINIC | Age: 54
End: 2024-02-26

## 2024-02-26 ENCOUNTER — OFFICE VISIT (OUTPATIENT)
Dept: UROLOGY | Facility: CLINIC | Age: 54
End: 2024-02-26
Payer: MEDICARE

## 2024-02-26 VITALS
RESPIRATION RATE: 14 BRPM | SYSTOLIC BLOOD PRESSURE: 146 MMHG | DIASTOLIC BLOOD PRESSURE: 84 MMHG | OXYGEN SATURATION: 99 % | HEART RATE: 82 BPM | BODY MASS INDEX: 41.75 KG/M2 | HEIGHT: 73 IN | WEIGHT: 315 LBS

## 2024-02-26 DIAGNOSIS — C61 PROSTATE CA (HCC): Primary | ICD-10-CM

## 2024-02-26 DIAGNOSIS — R35.0 BENIGN PROSTATIC HYPERPLASIA WITH URINARY FREQUENCY: ICD-10-CM

## 2024-02-26 DIAGNOSIS — N40.1 BENIGN PROSTATIC HYPERPLASIA WITH URINARY FREQUENCY: ICD-10-CM

## 2024-02-26 LAB
BACTERIA UR QL AUTO: ABNORMAL /HPF
BILIRUB UR QL STRIP: NEGATIVE
CLARITY UR: CLEAR
COLOR UR: ABNORMAL
GLUCOSE UR STRIP-MCNC: NEGATIVE MG/DL
HGB UR QL STRIP.AUTO: NEGATIVE
HYALINE CASTS #/AREA URNS LPF: ABNORMAL /LPF
KETONES UR STRIP-MCNC: NEGATIVE MG/DL
LEUKOCYTE ESTERASE UR QL STRIP: ABNORMAL
MUCOUS THREADS UR QL AUTO: ABNORMAL
NITRITE UR QL STRIP: NEGATIVE
NON-SQ EPI CELLS URNS QL MICRO: ABNORMAL /HPF
PH UR STRIP.AUTO: 7 [PH]
POST-VOID RESIDUAL VOLUME, ML POC: 0 ML
PROT UR STRIP-MCNC: ABNORMAL MG/DL
RBC #/AREA URNS AUTO: ABNORMAL /HPF
SP GR UR STRIP.AUTO: 1.01 (ref 1–1.03)
UROBILINOGEN UR STRIP-ACNC: <2 MG/DL
WBC #/AREA URNS AUTO: ABNORMAL /HPF

## 2024-02-26 PROCEDURE — 87086 URINE CULTURE/COLONY COUNT: CPT | Performed by: UROLOGY

## 2024-02-26 PROCEDURE — 51798 US URINE CAPACITY MEASURE: CPT | Performed by: UROLOGY

## 2024-02-26 PROCEDURE — 99215 OFFICE O/P EST HI 40 MIN: CPT | Performed by: UROLOGY

## 2024-02-26 PROCEDURE — 81001 URINALYSIS AUTO W/SCOPE: CPT | Performed by: UROLOGY

## 2024-02-26 RX ORDER — LORAZEPAM 2 MG/1
2 TABLET ORAL ONCE AS NEEDED
Qty: 1 TABLET | Refills: 0 | Status: SHIPPED | OUTPATIENT
Start: 2024-02-26

## 2024-02-26 NOTE — PROGRESS NOTES
UROLOGY FOLLOW-UP ENCOUNTER    Gavin Frank is a 53 y.o. male with prostate cancer    Pertinent non-urologic PMH: HLD, HTN, SHANDA, prediabetes    Pertinent non-urologic PSH: no abdominal or pelvic surgeries    Anticoagulation: none    53 y.o. old male with ePSA 5.33 on 5/18/23, 4.7 on 10/31/23     MRI prostate 11/16/23:  1. PI-RADSv2.1 Category 3 - Intermediate (the presence of clinically significant cancer is equivocal). Possible 0.4 cm lesion in the posterior left peripheral zone at apex  2. No extraprostatic tumor, seminal vesicle invasion, pelvic lymphadenopathy, or pelvic osseous metastatic disease.  3. Calculated prostate volume of 41 cc.    OR MRI fusion TP bx 2/7/24: G 3+3 in BIB, RB, RAL, RPM  A. Prostate, right anterior medial, biopsy:  - Benign prostatic tissue with inflammation.       B. Prostate, region of interest, biopsy:  -Prostatic adenocarcinoma, involving (3) of (4) cores:              - Maryanne score 3 + 3 = 6 (0 % grade 4), Prognostic Grade Group 1 involving 5 % of 1 core.              - Maryanne score 3 + 3 = 6 (0 % grade 4), Prognostic Grade Group 1 involving 25 % of 1 core.                - [place] score 3 + 3 = 6 (0 % grade 4), Prognostic Grade Group 1 involving 80 % (discontinuous) of 1 core.                C. Prostate, right base, biopsy:  - Prostatic adenocarcinoma, Maryanne score 3+3=6 (0% pattern 4), Grade Group 1, present in 1 of 1 cores, involving approximately 0.1 cm of length or 5% of total biopsy length.       D. Prostate, right anterior lateral, biopsy:  - Prostatic adenocarcinoma, [place] score 3+3=6 (0% pattern 4), Grade Group 1, present in 1 of 1 cores, involving approximately 0.2 cm of length or 15% of total biopsy length.       E. prostate, right posterior medial, biopsy:  - Prostatic adenocarcinoma, Maryanne score 3+3=6 (0% pattern 4), Grade Group 1, present in 1 of 1 cores, involving approximately 0.2 cm of length or 15% of total biopsy length.       F. Prostate, right posterior  lateral, biopsy:  - Benign prostatic tissue.        G. Prostate, left anterior lateral, biopsy:  - Benign prostatic tissue.        H. Prostate, left anterior medial, biopsy:  - Benign prostatic tissue.        I. Prostate, left base, biopsy:  - Benign fibrovascular tissue.       J. Prostate, left posterior medial, biopsy:  - Benign prostatic tissue.        K. Prostate, left posterior lateral, biopsy:  - Benign prostatic tissue.                Assessment and plan:     Prostate cancer    Results of the most recent prostate biopsy were reviewed with the patient.  We had a thorough discussion regarding Rollins scoring.  I explained that based on prostate biopsy results, PSA, and other clinical features, localized prostate cancer is restratified into categories.  We reviewed the AUA localized prostate cancer risk stratification table below:        I then explained to the patient that he was considered to be in the low risk category.        I first explained that since patient was considered to be in the low risk category, he did not require additional staging imaging per AUA guidelines.          Management for low risk prostate cancer was then discussed with the patient.  I explained that per AUA guidelines, active surveillance is the preferred management for low risk disease.  I then explained that active surveillance was a management option in which we would perform serial PSA testing, prostate MRI imaging, and prostate biopsies in order to intermittently monitor the patient's prostate cancer.  I explained that if active surveillance were to be pursued, we would plan to obtain a confirmatory biopsy within 6 months.  I explained that even though the patient was currently at a low risk state of prostate cancer, he would still be at risk for progressing to a high risk localized prostate cancer, or even metastatic prostate cancer, while on active surveillance.  I explained that there were several instances in which we would  "need to switch from active surveillance to definitive management of his prostate cancer.  These instances include but are not limited to: Increase in Maryanne grade on prostate biopsy results, increase in number of positive cores on prostate biopsy results, increase in percent of core positive and prostate biopsy results, increase in PSA, patient development of anxiety/fear on active surveillance, prostate biopsy results including intraductal cancer.  I explained to the patient that studies have demonstrated between 50 to 68% of those eligible for active surveillance may be able to safely avoid definitive treatment for greater than 10 years.,  I also explained that study between 32 to 50% of patients on active surveillance will undergo definitive management within 10 years (Migdalia et al, 2015).  I also explained to the patient's that patients with concerning genetic abnormalities, such as the BRCA2 gene, are generally poor candidate for active surveillance.                PLAN  -Will get PSA and prostate MRI in May 2024  -Will see me in June 2024 to review results and plan for next prostate biopsy  -Follow up Decipher testing  [facility]        Patient's partner, Tianna, present in office today and assisted with history    Portions of the above record have been created with voice recognition software.  Occasional wrong word or \"sound alike\" substitution may have occurred due to the inherent limitations of voice recognition software.  Read the chart carefully and recognize, using context, where substitution may have occurred.      Benjie Stewart,         Chief Complaint     Prostate cancer      History of Present Illness     See summary above    No fevers or chills        The following portions of the patient's history were reviewed and updated as appropriate: allergies, current medications, past family history, past medical history, past social history, past surgical history and " problem list.        AUA SYMPTOM SCORE      Flowsheet Row Most Recent Value   AUA SYMPTOM SCORE    How often have you had a sensation of not emptying your bladder completely after you finished urinating? 2 (P)    How often have you had to urinate again less than two hours after you finished urinating? 3 (P)    How often have you found you stopped and started again several times when you urinate? 1 (P)    How often have you found it difficult to postpone urination? 0 (P)    How often have you had a weak urinary stream? 0 (P)    How often have you had to push or strain to begin urination? 1 (P)    How many times did you most typically get up to urinate from the time you went to bed at night until the time you got up in the morning? 2 (P)    Quality of Life: If you were to spend the rest of your life with your urinary condition just the way it is now, how would you feel about that? 3 (P)    AUA SYMPTOM SCORE 9 (P)             Review of Systems     Review of Systems   Constitutional:  Negative for chills and fever.   Respiratory:  Negative for cough and shortness of breath.    Gastrointestinal:  Negative for abdominal pain.   Genitourinary:  Negative for dysuria and hematuria.   Neurological:  Negative for dizziness and headaches.   Psychiatric/Behavioral:  Negative for agitation and behavioral problems.            Allergies     Allergies   Allergen Reactions    Contrast Dye [Iodinated Contrast Media] Vomiting       Physical Exam     Physical Exam  Constitutional:       General: He is not in acute distress.  HENT:      Head: Normocephalic and atraumatic.   Pulmonary:      Effort: Pulmonary effort is normal. No respiratory distress.   Abdominal:      General: Abdomen is flat.      Palpations: Abdomen is soft.      Tenderness: There is no right CVA tenderness or left CVA tenderness.   Skin:     General: Skin is warm and dry.   Neurological:      General: No focal deficit present.      Mental Status: He is alert and  "oriented to person, place, and time.   Psychiatric:         Mood and Affect: Mood normal.         Behavior: Behavior normal.             Vital Signs  Vitals:    02/26/24 1545   BP: 146/84   BP Location: Left arm   Patient Position: Sitting   Cuff Size: Standard   Pulse: 82   Resp: 14   SpO2: 99%   Weight: (!) 174 kg (384 lb 6.4 oz)   Height: 6' 1\" (1.854 m)         Current Medications       Current Outpatient Medications:     azelastine (ASTELIN) 0.1 % nasal spray, 2 sprays into each nostril 2 (two) times a day Use in each nostril as directed, Disp: 30 mL, Rfl: 5    Cyanocobalamin (Vitamin B-12) 2500 MCG SUBL, Place under the tongue, Disp: , Rfl:     diclofenac (VOLTAREN) 75 mg EC tablet, Take 1 tablet (75 mg total) by mouth 2 (two) times a day, Disp: 60 tablet, Rfl: 3    diltiazem (CARDIZEM CD) 240 mg 24 hr capsule, TAKE ONE CAPSULE BY MOUTH ONCE DAILY, Disp: 30 capsule, Rfl: 5    ergocalciferol (VITAMIN D2) 50,000 units, TAKE ONE CAPSULE BY MOUTH ONCE A WEEK, Disp: 4 capsule, Rfl: 0    fluticasone (FLONASE) 50 mcg/act nasal spray, 1 spray into each nostril daily, Disp: 48 g, Rfl: 1    fluticasone (FLONASE) 50 mcg/act nasal spray, 1 spray into each nostril 2 (two) times a day, Disp: 16 g, Rfl: 3    losartan (COZAAR) 100 MG tablet, Take 1 tablet (100 mg total) by mouth daily, Disp: 30 tablet, Rfl: 5    methocarbamol (ROBAXIN) 750 mg tablet, Take 1 tablet (750 mg total) by mouth every 8 (eight) hours, Disp: 20 tablet, Rfl: 0    metoprolol succinate (TOPROL-XL) 50 mg 24 hr tablet, Take 1 tablet (50 mg total) by mouth daily, Disp: 30 tablet, Rfl: 5    montelukast (SINGULAIR) 10 mg tablet, Take 1 tablet (10 mg total) by mouth daily at bedtime, Disp: 30 tablet, Rfl: 5    Multiple Vitamin (ONE-A-DAY MENS PO), Take by mouth, Disp: , Rfl:     olopatadine (PATANOL) 0.1 % ophthalmic solution, Administer 1 drop to both eyes 2 (two) times a day, Disp: 5 mL, Rfl: 5    pravastatin (PRAVACHOL) 20 mg tablet, Take 1 tablet (20 mg " total) by mouth daily, Disp: 30 tablet, Rfl: 3    spironolactone (ALDACTONE) 25 mg tablet, Take 1 tablet (25 mg total) by mouth daily, Disp: 30 tablet, Rfl: 5    Testosterone 1.62 % GEL, apply one pump to skin once daily, Disp: 75 g, Rfl: 0    triamcinolone (KENALOG) 0.1 % ointment, Apply topically 2 (two) times a day, Disp: 30 g, Rfl: 2    BLACK CURRANT SEED OIL PO, Take by mouth (Patient not taking: Reported on 2/26/2024), Disp: , Rfl:     ondansetron (ZOFRAN-ODT) 8 mg disintegrating tablet, Take 1 tablet (8 mg total) by mouth every 8 (eight) hours as needed for nausea or vomiting for up to 5 days, Disp: 15 tablet, Rfl: 0      Active Problems     Patient Active Problem List   Diagnosis    Hypertension    Arthritis    Hyperlipemia    Class 3 severe obesity due to excess calories with serious comorbidity and body mass index (BMI) of 50.0 to 59.9 in adult (HCC)    Benign essential hypertension    Obstructive sleep apnea    Benign prostatic hyperplasia with urinary frequency    H/O urethral stricture    Vitamin D deficiency    Elevated PSA    Ingrown nail    Onychomycosis    Asthma    Stroke-like symptoms    Back pain    Prediabetes    CPAP (continuous positive airway pressure) dependence         Past Medical History     Past Medical History:   Diagnosis Date    Allergic     Anxiety     Arthritis     Asthma     Chronic pain disorder     Back pain    CPAP (continuous positive airway pressure) dependence 2/7/2024    Depression     Hyperlipemia     Hypertension     Obesity     Sleep apnea          Surgical History     Past Surgical History:   Procedure Laterality Date    HEMORRHOID SURGERY      OH BX PROSTATE STRTCTC SATURATION SAMPLING IMG GID N/A 2/7/2024    Procedure: TRANSPERINEAL MRI FUSION BIOPSY PROSTATE;  Surgeon: Benjie Stewart DO;  Location: [facility];  Service: Urology    TONSILLECTOMY      URETHRAL DILATION  2019         Family History     Family History   Problem Relation Age of Onset     Hypertension Mother     Hyperlipidemia Mother     Heart disease Mother     Hypertension Father     Heart disease Father     Hyperlipidemia Father     COPD Sister     Liver disease Sister     Prostate cancer Maternal Uncle          Social History     Social History     Social History     Tobacco Use   Smoking Status Former    Current packs/day: 0.00    Types: Cigarettes    Quit date:     Years since quittin.1    Passive exposure: Past   Smokeless Tobacco Never   Tobacco Comments    Patient uses e-cigarette         Pertinent Lab Values     Lab Results   Component Value Date    CREATININE 0.84 2024       Lab Results   Component Value Date    PSA 4.7 (H) 10/31/2023    PSA 5.33 (H) 2023    PSA 3.0 2022               Pertinent Imaging     The patient's images were reviewed by me personally and also in real time with them in the examination room using our PACS imaging system.     MRI prostate 23:  1. PI-RADSv2.1 Category 3 - Intermediate (the presence of clinically significant cancer is equivocal). Possible 0.4 cm lesion in the posterior left peripheral zone at apex  2. No extraprostatic tumor, seminal vesicle invasion, pelvic lymphadenopathy, or pelvic osseous metastatic disease.  3. Calculated prostate volume of 41 cc.      Pertinent Pathology     OR MRI fusion TP bx 24: G 3+3 in BIB, RB, RAL, RPM  A. Prostate, right anterior medial, biopsy:  - Benign prostatic tissue with inflammation.       B. Prostate, region of interest, biopsy:  -Prostatic adenocarcinoma, involving (3) of (4) cores:              - Maryanne score 3 + 3 = 6 (0 % grade 4), Prognostic Grade Group 1 involving 5 % of 1 core.              - Maryanne score 3 + 3 = 6 (0 % grade 4), Prognostic Grade Group 1 involving 25 % of 1 core.                - [place] score 3 + 3 = 6 (0 % grade 4), Prognostic Grade Group 1 involving 80 % (discontinuous) of 1 core.                C. Prostate, right base, biopsy:  - Prostatic  adenocarcinoma, Maryanne score 3+3=6 (0% pattern 4), Grade Group 1, present in 1 of 1 cores, involving approximately 0.1 cm of length or 5% of total biopsy length.       D. Prostate, right anterior lateral, biopsy:  - Prostatic adenocarcinoma, Maryanne score 3+3=6 (0% pattern 4), Grade Group 1, present in 1 of 1 cores, involving approximately 0.2 cm of length or 15% of total biopsy length.       E. prostate, right posterior medial, biopsy:  - Prostatic adenocarcinoma, [place]el score 3+3=6 (0% pattern 4), Grade Group 1, present in 1 of 1 cores, involving approximately 0.2 cm of length or 15% of total biopsy length.       F. Prostate, right posterior lateral, biopsy:  - Benign prostatic tissue.        G. Prostate, left anterior lateral, biopsy:  - Benign prostatic tissue.        H. Prostate, left anterior medial, biopsy:  - Benign prostatic tissue.        I. Prostate, left base, biopsy:  - Benign fibrovascular tissue.       J. Prostate, left posterior medial, biopsy:  - Benign prostatic tissue.        K. Prostate, left posterior lateral, biopsy:  - Benign prostatic tissue.            I have spent 40 minutes with Patient and family today in which greater than 50% of this time was spent in counseling/coordination of care regarding Diagnostic results, Prognosis, Risks and benefits of tx options, Instructions for management, Patient and family education, Importance of tx compliance, Impressions, Counseling / Coordination of care, Documenting in the medical record, Reviewing / ordering tests, medicine, procedures  , and Obtaining or reviewing history  .    Please note this time includes cumulative time on the day of encounter, including reviewing medical records and/or coordinating care among the patient's other specialists.

## 2024-02-26 NOTE — PATIENT INSTRUCTIONS
You will get PSA and MRI in May 2024    You will see me June 2024 to go over results and book you for next prostate biopsy      Rx sent for Ativan - take one time 1 hour before your MRI

## 2024-02-27 ENCOUNTER — TELEPHONE (OUTPATIENT)
Dept: UROLOGY | Facility: CLINIC | Age: 54
End: 2024-02-27

## 2024-02-27 LAB
BACTERIA UR CULT: NORMAL
DME PARACHUTE DELIVERY DATE ACTUAL: NORMAL
DME PARACHUTE DELIVERY DATE REQUESTED: NORMAL
DME PARACHUTE ITEM DESCRIPTION: NORMAL
DME PARACHUTE ORDER STATUS: NORMAL
DME PARACHUTE SUPPLIER NAME: NORMAL
DME PARACHUTE SUPPLIER PHONE: NORMAL

## 2024-02-27 NOTE — TELEPHONE ENCOUNTER
----- Message from Benjie Stewart DO sent at 2/26/2024  4:38 PM EST -----  Cristian gagnon, can you send for decipher?

## 2024-03-05 ENCOUNTER — LAB REQUISITION (OUTPATIENT)
Dept: LAB | Facility: HOSPITAL | Age: 54
End: 2024-03-05

## 2024-03-05 DIAGNOSIS — R97.20 ELEVATED PROSTATE SPECIFIC ANTIGEN (PSA): ICD-10-CM

## 2024-03-18 LAB — SCAN RESULT: NORMAL

## 2024-03-21 ENCOUNTER — HOSPITAL ENCOUNTER (OUTPATIENT)
Dept: CT IMAGING | Facility: HOSPITAL | Age: 54
End: 2024-03-21
Payer: MEDICARE

## 2024-03-21 ENCOUNTER — TELEPHONE (OUTPATIENT)
Age: 54
End: 2024-03-21

## 2024-03-21 DIAGNOSIS — J32.0 CHRONIC MAXILLARY SINUSITIS: ICD-10-CM

## 2024-03-21 DIAGNOSIS — J34.3 NASAL TURBINATE HYPERTROPHY: ICD-10-CM

## 2024-03-21 DIAGNOSIS — J34.2 DEVIATED NASAL SEPTUM: ICD-10-CM

## 2024-03-21 DIAGNOSIS — J34.89 SINUS PRESSURE: ICD-10-CM

## 2024-03-21 DIAGNOSIS — R09.82 POST-NASAL DRIP: ICD-10-CM

## 2024-03-21 PROCEDURE — G1004 CDSM NDSC: HCPCS

## 2024-03-21 PROCEDURE — 70486 CT MAXILLOFACIAL W/O DYE: CPT

## 2024-03-21 NOTE — TELEPHONE ENCOUNTER
Patient calling in and stating that he couldn't get ct scan until 3/21 at 9 am which is after his appt     Patient asking to be resceduled Dr Martinez next opening isnt until Sept. Patient asking if Juan can squeeze him in for an appt somewhere else

## 2024-04-04 ENCOUNTER — OFFICE VISIT (OUTPATIENT)
Dept: OTOLARYNGOLOGY | Facility: CLINIC | Age: 54
End: 2024-04-04
Payer: MEDICARE

## 2024-04-04 VITALS
HEART RATE: 80 BPM | WEIGHT: 315 LBS | HEIGHT: 73 IN | OXYGEN SATURATION: 98 % | TEMPERATURE: 97 F | BODY MASS INDEX: 41.75 KG/M2

## 2024-04-04 DIAGNOSIS — J34.2 DNS (DEVIATED NASAL SEPTUM): Primary | ICD-10-CM

## 2024-04-04 DIAGNOSIS — J34.3 NASAL TURBINATE HYPERTROPHY: ICD-10-CM

## 2024-04-04 DIAGNOSIS — R09.82 POST-NASAL DRIP: ICD-10-CM

## 2024-04-04 DIAGNOSIS — J32.4 CHRONIC PANSINUSITIS: ICD-10-CM

## 2024-04-04 PROCEDURE — 31231 NASAL ENDOSCOPY DX: CPT | Performed by: SPECIALIST

## 2024-04-04 PROCEDURE — 99205 OFFICE O/P NEW HI 60 MIN: CPT | Performed by: SPECIALIST

## 2024-04-04 RX ORDER — IBUPROFEN 600 MG/1
600 TABLET ORAL
COMMUNITY
Start: 2024-03-07

## 2024-04-04 NOTE — PROGRESS NOTES
Otolaryngology Head and Neck Surgery History and Physical    Chief complaint    Chief Complaint   Patient presents with    Follow-up     Review CT scan         History of the Present Illness    Independent Historian   N      Relationship  NA    Gavin Frank is a 53 y.o. who presents for reevaluation.  Patient was at the hospital visiting his niece when he was having some numbness in his right arm and leg.  He went to the emergency room and they immediately treated him as a stroke patient.  He had an MRI at that time that showed obstruction of the left maxillary sinus.  He does report having some issues with feeling of blockage in his left ear.  He does get some yellowish discharge from the left nasal cavity.  No complaints of any headaches or visual issues.  He did not have a stroke previously.  Patient did report that he has been using Flonase for several months.  Did not notice any significant change in his symptoms.          Review of Systems    Per HPI remainder noncontributory present complaint    Past Medical History:   Diagnosis Date    Allergic     Anxiety     Arthritis     Asthma     Chronic pain disorder     Back pain    CPAP (continuous positive airway pressure) dependence 2/7/2024    Depression     Hyperlipemia     Hypertension     Obesity     Sleep apnea        Past Surgical History:   Procedure Laterality Date    HEMORRHOID SURGERY      [place] BX PROSTATE STRTCTC SATURATION SAMPLING IMG GID N/A 2/7/2024    Procedure: TRANSPERINEAL MRI FUSION BIOPSY PROSTATE;  Surgeon: Benjie Stewart DO;  Location: [facility];  Service: Urology    TONSILLECTOMY      URETHRAL DILATION  2019       Social History     Socioeconomic History    Marital status: Single     Spouse name: Not on file    Number of children: Not on file    Years of education: Not on file    Highest education level: Not on file   Occupational History    Occupation: employed   Tobacco Use    Smoking status: Former     Current packs/day: 0.00      "Types: Cigarettes     Quit date:      Years since quittin.2     Passive exposure: Past    Smokeless tobacco: Never    Tobacco comments:     Patient uses e-cigarette   Vaping Use    Vaping status: Every Day    Substances: Nicotine   Substance and Sexual Activity    Alcohol use: Not Currently    Drug use: Never    Sexual activity: Yes     Partners: Female   Other Topics Concern    Not on file   Social History Narrative    Not on file     Social Determinants of Health     Financial Resource Strain: Not on file   Food Insecurity: No Food Insecurity (2023)    Hunger Vital Sign     Worried About Running Out of Food in the Last Year: Never true     Ran Out of Food in the Last Year: Never true   Transportation Needs: No Transportation Needs (2023)    PRAPARE - Transportation     Lack of Transportation (Medical): No     Lack of Transportation (Non-Medical): No   Physical Activity: Not on file   Stress: Not on file   Social Connections: Not on file   Intimate Partner Violence: Not on file   Housing Stability: Low Risk  (2023)    Housing Stability Vital Sign     Unable to Pay for Housing in the Last Year: No     Number of Places Lived in the Last Year: 1     Unstable Housing in the Last Year: No       Family History   Problem Relation Age of Onset    Hypertension Mother     Hyperlipidemia Mother     Heart disease Mother     Hypertension Father     Heart disease Father     Hyperlipidemia Father     COPD Sister     Liver disease Sister     Prostate cancer Maternal Uncle            Pulse 80   Temp (!) 97 °F (36.1 °C) (Temporal)   Ht 6' 1\" (1.854 m)   Wt (!) 174 kg (384 lb)   SpO2 98%   BMI 50.66 kg/m²       Current Outpatient Medications:     azelastine (ASTELIN) 0.1 % nasal spray, 2 sprays into each nostril 2 (two) times a day Use in each nostril as directed, Disp: 30 mL, Rfl: 5    Cyanocobalamin (Vitamin B-12) 2500 MCG SUBL, Place under the tongue, Disp: , Rfl:     diltiazem (CARDIZEM CD) 240 mg 24 " hr capsule, TAKE ONE CAPSULE BY MOUTH ONCE DAILY, Disp: 30 capsule, Rfl: 5    ergocalciferol (VITAMIN D2) 50,000 units, TAKE ONE CAPSULE BY MOUTH ONCE A WEEK, Disp: 4 capsule, Rfl: 0    fluticasone (FLONASE) 50 mcg/act nasal spray, 1 spray into each nostril 2 (two) times a day, Disp: 16 g, Rfl: 3    ibuprofen (MOTRIN) 600 mg tablet, 600 mg, Disp: , Rfl:     losartan (COZAAR) 100 MG tablet, Take 1 tablet (100 mg total) by mouth daily, Disp: 30 tablet, Rfl: 5    methocarbamol (ROBAXIN) 750 mg tablet, Take 1 tablet (750 mg total) by mouth every 8 (eight) hours, Disp: 20 tablet, Rfl: 0    metoprolol succinate (TOPROL-XL) 50 mg 24 hr tablet, Take 1 tablet (50 mg total) by mouth daily, Disp: 30 tablet, Rfl: 5    montelukast (SINGULAIR) 10 mg tablet, Take 1 tablet (10 mg total) by mouth daily at bedtime, Disp: 30 tablet, Rfl: 5    Multiple Vitamin (ONE-A-DAY MENS PO), Take by mouth, Disp: , Rfl:     olopatadine (PATANOL) 0.1 % ophthalmic solution, Administer 1 drop to both eyes 2 (two) times a day, Disp: 5 mL, Rfl: 5    pravastatin (PRAVACHOL) 20 mg tablet, Take 1 tablet (20 mg total) by mouth daily, Disp: 30 tablet, Rfl: 3    spironolactone (ALDACTONE) 25 mg tablet, Take 1 tablet (25 mg total) by mouth daily, Disp: 30 tablet, Rfl: 5    BLACK CURRANT SEED OIL PO, Take by mouth (Patient not taking: Reported on 2/26/2024), Disp: , Rfl:     diclofenac (VOLTAREN) 75 mg EC tablet, Take 1 tablet (75 mg total) by mouth 2 (two) times a day (Patient not taking: Reported on 4/4/2024), Disp: 60 tablet, Rfl: 3    fluticasone (FLONASE) 50 mcg/act nasal spray, 1 spray into each nostril daily (Patient not taking: Reported on 4/4/2024), Disp: 48 g, Rfl: 1    LORazepam (ATIVAN) 2 mg tablet, Take 1 tablet (2 mg total) by mouth once as needed for anxiety (take 1 hour before MRI) for up to 1 dose (Patient not taking: Reported on 4/4/2024), Disp: 1 tablet, Rfl: 0    ondansetron (ZOFRAN-ODT) 8 mg disintegrating tablet, Take 1 tablet (8 mg  total) by mouth every 8 (eight) hours as needed for nausea or vomiting for up to 5 days, Disp: 15 tablet, Rfl: 0    Testosterone 1.62 % GEL, apply one pump to skin once daily (Patient not taking: Reported on 4/4/2024), Disp: 75 g, Rfl: 0    triamcinolone (KENALOG) 0.1 % ointment, Apply topically 2 (two) times a day (Patient not taking: Reported on 4/4/2024), Disp: 30 g, Rfl: 2     Physical Exam  Constitutional:       Appearance: Normal appearance. He is obese.   HENT:      Head: Normocephalic and atraumatic.      Right Ear: Tympanic membrane, ear canal and external ear normal.      Left Ear: Tympanic membrane, ear canal and external ear normal.      Nose:      Comments: Mild left NSD     Mouth/Throat:      Mouth: Mucous membranes are moist.   Eyes:      Extraocular Movements: Extraocular movements intact.   Cardiovascular:      Rate and Rhythm: Normal rate and regular rhythm.   Pulmonary:      Effort: Pulmonary effort is normal.      Breath sounds: Normal breath sounds.   Abdominal:      Palpations: Abdomen is soft.   Musculoskeletal:      Cervical back: Normal range of motion and neck supple.   Neurological:      General: No focal deficit present.      Mental Status: He is alert and oriented to person, place, and time.   Psychiatric:         Mood and Affect: Mood normal.         Behavior: Behavior normal.           Procedure:  4/4/24  Sinus endoscopy    Due to the patient's complaints of chronic nasal discharge it was recommended that  endoscopy carried out of the nasal cavity and sinuses.  The patient was in agreement and gave verbal consent       Performed by  Samuel Balderrama MD      Authorized by Samuel Balderrama MD       Universal Protocol Risks and benefits: risks, benefits and alternatives were discussed  Consent given by: patient  Patient understanding: patient states understanding of the procedure being performed                 Anesthesia    Local anesthesia used: yes  Local anesthetic: 4% lidocaine/Afrin.           Procedure Details    The scope was passed into the left nasal cavity and the following anatomical structures were evaluated:    Nasal cavity clear had some bloody discharge on the floor without obstructing soft tissue  Inferior turbinate normal mucosa  Middle turbinate normal without edema, middle meatus with purulent secretions  Nasal vault normal  Spheno ethmoid recess and superior meatus normal   Posterior nasopharyngeal wall clear   purulent secretions over posterior inferior turbinate   Fossa of Rosenmuller clear  The scope was then withdrawn without difficulty      Nasal septum showed deviation to the left with difficulty accessing the middle meatus     Patient tolerated the procedure well without any immediate complications          Pertinent Notes / Tests / Data reviewed  CT head 6/25/2023  MRI brain 6/26/2023      Data with independent Interpretation    CT scan 3/21/2024 films interpreted and reviewed with the patient        Assessment and plan:    1. DNS (deviated nasal septum)        2. Nasal turbinate hypertrophy        3. Post-nasal drip        4. Chronic pansinusitis            Patient with persistent complete opacification of left frontal maxillary and ethmoid sinuses.  Patient with purulent secretions in the middle meatus going over the inferior turbinate posteriorly.  Patient extremely difficult to scope in the office because of tenderness.  Patient does have septal deviation to the left and the perpendicular plate of the ethmoid which makes it tight to get into the middle meatus region.  In discussion with the patient since she has had purulent infection for almost 10 months I think this needs to be addressed surgically to open up the sinus get it drained culture anything that is there and then treat him going forward.  He has been using Flonase without any improvement.  I think he is going to need to have septal surgery in order to facilitate improving his nasal airway and also to gain  "access into the sinuses.  We discussed with the patient that we be working around the orbit with I showed him the anatomy.    Disclosure: Voice to text software was used in the preparation of this document and could have resulted in translational errors.      Occasional wrong word or \"sound a like\" substitutions may have occurred due to the inherent limitations of voice recognition software.  Read the chart carefully and recognize, using context, where substitutions have occurred.      "

## 2024-04-08 ENCOUNTER — TELEPHONE (OUTPATIENT)
Age: 54
End: 2024-04-08

## 2024-04-08 NOTE — TELEPHONE ENCOUNTER
Patient calling stating he was told someone would be calling him to schedule his sinus surgery but he has not heard anything yet. Please advise

## 2024-04-23 ENCOUNTER — TELEPHONE (OUTPATIENT)
Age: 54
End: 2024-04-23

## 2024-04-25 ENCOUNTER — OFFICE VISIT (OUTPATIENT)
Dept: FAMILY MEDICINE CLINIC | Facility: CLINIC | Age: 54
End: 2024-04-25
Payer: MEDICARE

## 2024-04-25 VITALS
WEIGHT: 315 LBS | HEART RATE: 84 BPM | BODY MASS INDEX: 39.17 KG/M2 | OXYGEN SATURATION: 98 % | HEIGHT: 75 IN | SYSTOLIC BLOOD PRESSURE: 140 MMHG | DIASTOLIC BLOOD PRESSURE: 90 MMHG | TEMPERATURE: 98.3 F

## 2024-04-25 DIAGNOSIS — G47.33 OBSTRUCTIVE SLEEP APNEA: ICD-10-CM

## 2024-04-25 DIAGNOSIS — M19.90 ARTHRITIS: ICD-10-CM

## 2024-04-25 DIAGNOSIS — I10 BENIGN ESSENTIAL HYPERTENSION: ICD-10-CM

## 2024-04-25 DIAGNOSIS — E78.2 MIXED HYPERLIPIDEMIA: ICD-10-CM

## 2024-04-25 DIAGNOSIS — J34.2 DEVIATED NASAL SEPTUM: ICD-10-CM

## 2024-04-25 DIAGNOSIS — T78.40XS ALLERGY, SEQUELA: ICD-10-CM

## 2024-04-25 DIAGNOSIS — J34.3 NASAL TURBINATE HYPERTROPHY: ICD-10-CM

## 2024-04-25 DIAGNOSIS — J34.89 SINUS PRESSURE: ICD-10-CM

## 2024-04-25 DIAGNOSIS — C61 PROSTATE CA (HCC): ICD-10-CM

## 2024-04-25 DIAGNOSIS — E66.01 CLASS 3 SEVERE OBESITY DUE TO EXCESS CALORIES WITH SERIOUS COMORBIDITY AND BODY MASS INDEX (BMI) OF 50.0 TO 59.9 IN ADULT (HCC): Primary | ICD-10-CM

## 2024-04-25 DIAGNOSIS — R97.20 ELEVATED PSA: ICD-10-CM

## 2024-04-25 DIAGNOSIS — Z99.89 CPAP (CONTINUOUS POSITIVE AIRWAY PRESSURE) DEPENDENCE: ICD-10-CM

## 2024-04-25 PROCEDURE — 99215 OFFICE O/P EST HI 40 MIN: CPT | Performed by: FAMILY MEDICINE

## 2024-04-25 RX ORDER — LORAZEPAM 2 MG/1
2 TABLET ORAL ONCE AS NEEDED
Qty: 1 TABLET | Refills: 0 | Status: SHIPPED | OUTPATIENT
Start: 2024-04-25

## 2024-04-25 RX ORDER — MELOXICAM 15 MG/1
15 TABLET ORAL DAILY PRN
Qty: 30 TABLET | Refills: 3 | Status: SHIPPED | OUTPATIENT
Start: 2024-04-25

## 2024-04-25 RX ORDER — OLOPATADINE HYDROCHLORIDE 1 MG/ML
1 SOLUTION/ DROPS OPHTHALMIC 2 TIMES DAILY
Qty: 5 ML | Refills: 5 | Status: SHIPPED | OUTPATIENT
Start: 2024-04-25

## 2024-04-25 RX ORDER — FLUTICASONE PROPIONATE 50 MCG
1 SPRAY, SUSPENSION (ML) NASAL 2 TIMES DAILY
Qty: 16 G | Refills: 3 | Status: SHIPPED | OUTPATIENT
Start: 2024-04-25

## 2024-04-25 RX ORDER — IBUPROFEN 600 MG/1
600 TABLET ORAL
Qty: 30 TABLET | Status: CANCELLED | OUTPATIENT
Start: 2024-04-25

## 2024-04-25 NOTE — PROGRESS NOTES
Name: Gavin Frank      : 1970      MRN: [id number]  Encounter Provider: Ricardo Castillo DO  Encounter Date: 2024   Encounter department: [facility]    Assessment & Plan     1. Class 3 severe obesity due to excess calories with serious comorbidity and body mass index (BMI) of 50.0 to 59.9 in adult ([facility])  -     Semaglutide-Weight Management (WEGOVY) 1 MG/0.5ML; Inject 0.5 mL (1 mg total) under the skin once a week    2. Sinus pressure  -     fluticasone (FLONASE) 50 mcg/act nasal spray; 1 spray into each nostril 2 (two) times a day    3. Deviated nasal septum  -     fluticasone (FLONASE) 50 mcg/act nasal spray; 1 spray into each nostril 2 (two) times a day    4. Nasal turbinate hypertrophy  -     fluticasone (FLONASE) 50 mcg/act nasal spray; 1 spray into each nostril 2 (two) times a day    5. Prostate CA ([facility])  -     LORazepam (ATIVAN) 2 mg tablet; Take 1 tablet (2 mg total) by mouth once as needed for anxiety (take 1 hour before MRI) for up to 1 dose    6. Allergy, sequela  -     olopatadine (PATANOL) 0.1 % ophthalmic solution; Administer 1 drop to both eyes 2 (two) times a day    7. CPAP (continuous positive airway pressure) dependence    8. Mixed hyperlipidemia    9. Elevated PSA    10. Obstructive sleep apnea    11. Benign essential hypertension    12. Arthritis  -     meloxicam (MOBIC) 15 mg tablet; Take 1 tablet (15 mg total) by mouth daily as needed for moderate pain  -     Diclofenac Sodium (VOLTAREN) 1 %; Apply 2 g topically 4 (four) times a day    He will follow-up with urology regarding his prostate.  They are just following the PSA every 3 to 6 months.  Will try meloxicam and diclofenac gel for the arthritis and pain in the knees to hopefully get him better motion.  We talked about weight loss medication.  I gave him a sample of semaglutide for the starting dose and then wrote for Wegovy 1 mg weekly.  Will follow-up in 3 to 4 months and adjust the dose as  needed.    Depression Screening and Follow-up Plan: Patient was screened for depression during today's encounter. They screened negative with a PHQ-2 score of 0.        Subjective      Patient presents with:  Follow-up: Pt is here to discuss weight loss medication no other problems or concerns xavier           Review of Systems   Musculoskeletal:  Positive for arthralgias and gait problem.       Current Outpatient Medications on File Prior to Visit   Medication Sig   • azelastine (ASTELIN) 0.1 % nasal spray 2 sprays into each nostril 2 (two) times a day Use in each nostril as directed   • Cyanocobalamin (Vitamin B-12) 2500 MCG SUBL Place under the tongue   • diltiazem (CARDIZEM CD) 240 mg 24 hr capsule TAKE ONE CAPSULE BY MOUTH ONCE DAILY   • ergocalciferol (VITAMIN D2) 50,000 units TAKE ONE CAPSULE BY MOUTH ONCE A WEEK   • losartan (COZAAR) 100 MG tablet Take 1 tablet (100 mg total) by mouth daily   • methocarbamol (ROBAXIN) 750 mg tablet Take 1 tablet (750 mg total) by mouth every 8 (eight) hours   • metoprolol succinate (TOPROL-XL) 50 mg 24 hr tablet Take 1 tablet (50 mg total) by mouth daily   • montelukast (SINGULAIR) 10 mg tablet Take 1 tablet (10 mg total) by mouth daily at bedtime   • Multiple Vitamin (ONE-A-DAY MENS PO) Take by mouth   • pravastatin (PRAVACHOL) 20 mg tablet Take 1 tablet (20 mg total) by mouth daily   • spironolactone (ALDACTONE) 25 mg tablet Take 1 tablet (25 mg total) by mouth daily   • [DISCONTINUED] fluticasone (FLONASE) 50 mcg/act nasal spray 1 spray into each nostril 2 (two) times a day   • [DISCONTINUED] ibuprofen (MOTRIN) 600 mg tablet 600 mg   • [DISCONTINUED] LORazepam (ATIVAN) 2 mg tablet Take 1 tablet (2 mg total) by mouth once as needed for anxiety (take 1 hour before MRI) for up to 1 dose   • [DISCONTINUED] olopatadine (PATANOL) 0.1 % ophthalmic solution Administer 1 drop to both eyes 2 (two) times a day   • BLACK CURRANT SEED OIL PO Take by mouth (Patient not taking: Reported on  "2/26/2024)   • fluticasone (FLONASE) 50 mcg/act nasal spray 1 spray into each nostril daily (Patient not taking: Reported on 4/4/2024)   • ondansetron (ZOFRAN-ODT) 8 mg disintegrating tablet Take 1 tablet (8 mg total) by mouth every 8 (eight) hours as needed for nausea or vomiting for up to 5 days   • Testosterone 1.62 % GEL apply one pump to skin once daily (Patient not taking: Reported on 4/4/2024)   • triamcinolone (KENALOG) 0.1 % ointment Apply topically 2 (two) times a day (Patient not taking: Reported on 4/4/2024)   • [DISCONTINUED] diclofenac (VOLTAREN) 75 mg EC tablet Take 1 tablet (75 mg total) by mouth 2 (two) times a day (Patient not taking: Reported on 4/4/2024)       Objective     /90 (BP Location: Right arm, Patient Position: Sitting, Cuff Size: Standard)   Pulse 84   Temp 98.3 °F (36.8 °C) (Temporal)   Ht 6' 3\" (1.905 m)   Wt (!) 174 kg (383 lb)   SpO2 98%   BMI 47.87 kg/m²     Physical Exam  Vitals and nursing note reviewed.   Constitutional:       Appearance: Normal appearance. He is well-developed.   HENT:      Head: Normocephalic and atraumatic.      Right Ear: External ear normal.      Left Ear: External ear normal.      Nose: Nose normal.   Eyes:      Conjunctiva/sclera: Conjunctivae normal.      Pupils: Pupils are equal, round, and reactive to light.   Cardiovascular:      Rate and Rhythm: Normal rate and regular rhythm.      Pulses: Normal pulses.      Heart sounds: Normal heart sounds.   Pulmonary:      Effort: Pulmonary effort is normal.      Breath sounds: Normal breath sounds.   Abdominal:      General: Bowel sounds are normal.      Palpations: Abdomen is soft.   Musculoskeletal:      Cervical back: Normal range of motion and neck supple.   Skin:     General: Skin is warm and dry.   Neurological:      General: No focal deficit present.      Mental Status: He is alert and oriented to person, place, and time.      Deep Tendon Reflexes: Reflexes are normal and symmetric. "   Psychiatric:         Mood and Affect: Mood normal.         Behavior: Behavior normal.       Ricardo Castillo, DO

## 2024-04-26 DIAGNOSIS — E66.01 CLASS 3 SEVERE OBESITY DUE TO EXCESS CALORIES WITH SERIOUS COMORBIDITY AND BODY MASS INDEX (BMI) OF 50.0 TO 59.9 IN ADULT (HCC): ICD-10-CM

## 2024-04-26 NOTE — TELEPHONE ENCOUNTER
Pt called to verify refill status for Ativan and wegovy. Pt was advised scripts were already sent to pharmacy.

## 2024-04-29 DIAGNOSIS — E55.9 VITAMIN D DEFICIENCY: ICD-10-CM

## 2024-04-30 RX ORDER — ERGOCALCIFEROL 1.25 MG/1
50000 CAPSULE ORAL WEEKLY
Qty: 4 CAPSULE | Refills: 0 | Status: SHIPPED | OUTPATIENT
Start: 2024-04-30

## 2024-05-01 ENCOUNTER — TELEPHONE (OUTPATIENT)
Age: 54
End: 2024-05-01

## 2024-05-01 ENCOUNTER — TELEPHONE (OUTPATIENT)
Dept: FAMILY MEDICINE CLINIC | Facility: CLINIC | Age: 54
End: 2024-05-01

## 2024-05-01 NOTE — TELEPHONE ENCOUNTER
Auth is needed for Wegovy 1mg/0.5ml. He is to inject 0.5ml once every week. Dispense 2ml with 2 additional refills. DX: E66.01, and Z68.43.     +Diet/Exercise since 12/2023.  BMI: 47.87, Wt: 383lb, Ht: 75in     CMM Key: [id number]  Prescribed by Dr Castillo

## 2024-05-01 NOTE — TELEPHONE ENCOUNTER
PA for Wegovy    Submitted via    [x]CMM-KEY [id number]  [][employer]-Case ID #   []Faxed to plan   []Other website   []Phone call Case ID #     Office notes sent, clinical questions answered. Awaiting determination    Turnaround time for your insurance to make a decision on your Prior Authorization can take 7-21 business days.

## 2024-05-01 NOTE — TELEPHONE ENCOUNTER
PA for Flonase    Submitted via    [x]M-KEY [id number]  [][employer]-Case ID #   []Faxed to plan   []Other website   []Phone call Case ID #     Office notes sent, clinical questions answered. Awaiting determination    Turnaround time for your insurance to make a decision on your Prior Authorization can take 7-21 business days.

## 2024-05-02 NOTE — TELEPHONE ENCOUNTER
PA for wegovy  Approved     Date(s) approved 5/2/2024-11/2/2024      Patient advised by          [x] [employer] Message  [] Phone call   []LMOM  []L/M to call office as no active Communication consent on file  []Unable to leave detailed message as VM not approved on Communication consent       Pharmacy advised by    []Fax  []Phone call    Approval letter scanned into [employer] Yes

## 2024-05-03 ENCOUNTER — TELEPHONE (OUTPATIENT)
Age: 54
End: 2024-05-03

## 2024-05-03 NOTE — TELEPHONE ENCOUNTER
Patient calling in and asking for a call back to discuss scheduling procedure that is needed for sinuses

## 2024-05-03 NOTE — TELEPHONE ENCOUNTER
Patient calling stating he got disconnected with someone at the office, attempted to warm transfer but no answer. Please return call when able.

## 2024-05-06 ENCOUNTER — TELEPHONE (OUTPATIENT)
Age: 54
End: 2024-05-06

## 2024-05-06 NOTE — TELEPHONE ENCOUNTER
Patient stated he has called multiple times to find out when his surgery is going to be done. Patient requests a call back regarding surgery date.

## 2024-05-09 ENCOUNTER — HOSPITAL ENCOUNTER (OUTPATIENT)
Facility: MEDICAL CENTER | Age: 54
Discharge: HOME | End: 2024-05-09
Payer: MEDICARE

## 2024-05-09 ENCOUNTER — TELEPHONE (OUTPATIENT)
Age: 54
End: 2024-05-09

## 2024-05-09 DIAGNOSIS — C61 PROSTATE CA (HCC): ICD-10-CM

## 2024-05-09 PROCEDURE — 76377 3D RENDER W/INTRP POSTPROCES: CPT

## 2024-05-09 PROCEDURE — A9585 GADOBUTROL INJECTION: HCPCS | Performed by: UROLOGY

## 2024-05-09 PROCEDURE — 72197 MRI PELVIS W/O & W/DYE: CPT

## 2024-05-09 RX ORDER — GADOBUTROL 604.72 MG/ML
17 INJECTION INTRAVENOUS
Status: COMPLETED | OUTPATIENT
Start: 2024-05-09 | End: 2024-05-09

## 2024-05-09 RX ADMIN — GADOBUTROL 17 ML: 604.72 INJECTION INTRAVENOUS at 12:22

## 2024-05-09 NOTE — TELEPHONE ENCOUNTER
Pt called stating pt received a call.Contacted ENT to see if they called pt. Call was disconnected.

## 2024-06-13 ENCOUNTER — OFFICE VISIT (OUTPATIENT)
Dept: FAMILY MEDICINE CLINIC | Facility: CLINIC | Age: 54
End: 2024-06-13
Payer: MEDICARE

## 2024-06-13 VITALS
WEIGHT: 315 LBS | HEIGHT: 75 IN | OXYGEN SATURATION: 95 % | DIASTOLIC BLOOD PRESSURE: 90 MMHG | BODY MASS INDEX: 39.17 KG/M2 | HEART RATE: 89 BPM | SYSTOLIC BLOOD PRESSURE: 140 MMHG | TEMPERATURE: 98 F

## 2024-06-13 DIAGNOSIS — Z99.89 CPAP (CONTINUOUS POSITIVE AIRWAY PRESSURE) DEPENDENCE: ICD-10-CM

## 2024-06-13 DIAGNOSIS — R73.03 PREDIABETES: ICD-10-CM

## 2024-06-13 DIAGNOSIS — G47.33 OBSTRUCTIVE SLEEP APNEA: ICD-10-CM

## 2024-06-13 DIAGNOSIS — I10 BENIGN ESSENTIAL HYPERTENSION: Primary | ICD-10-CM

## 2024-06-13 DIAGNOSIS — E66.01 CLASS 3 SEVERE OBESITY DUE TO EXCESS CALORIES WITH SERIOUS COMORBIDITY AND BODY MASS INDEX (BMI) OF 50.0 TO 59.9 IN ADULT (HCC): ICD-10-CM

## 2024-06-13 PROCEDURE — 99215 OFFICE O/P EST HI 40 MIN: CPT | Performed by: FAMILY MEDICINE

## 2024-06-13 NOTE — PROGRESS NOTES
Assessment/Plan:      Diagnoses and all orders for this visit:    Benign essential hypertension  -     Hemoglobin A1C  -     Comprehensive metabolic panel  -     Lipid panel    Obstructive sleep apnea    Class 3 severe obesity due to excess calories with serious comorbidity and body mass index (BMI) of 50.0 to 59.9 in adult ([facility])  -     Hemoglobin A1C  -     Comprehensive metabolic panel  -     Lipid panel    Prediabetes  -     Hemoglobin A1C  -     Comprehensive metabolic panel  -     Lipid panel    CPAP (continuous positive airway pressure) dependence     Continue current therapy.  Will check labs follow-up in 3 months.    Subjective:     Patient ID: Gavin Frank is a 53 y.o. male.    Patient presents with:  Follow-up: 6 month f/u , med management     He is taking the Wegovy.  He does complain of some constipation.  He is trying to adjust his diet is much as he can.        Review of Systems   Constitutional: Negative.    HENT: Negative.     Eyes: Negative.    Respiratory: Negative.     Cardiovascular: Negative.    Gastrointestinal:  Positive for constipation.   Endocrine: Negative.    Genitourinary: Negative.    Musculoskeletal: Negative.    Skin: Negative.    Allergic/Immunologic: Negative.    Neurological: Negative.    Hematological: Negative.    Psychiatric/Behavioral: Negative.     All other systems reviewed and are negative.        Objective:     Physical Exam  Vitals and nursing note reviewed.   Constitutional:       Appearance: Normal appearance. He is well-developed.   HENT:      Head: Normocephalic and atraumatic.      Right Ear: External ear normal.      Left Ear: External ear normal.      Nose: Nose normal.   Eyes:      Conjunctiva/sclera: Conjunctivae normal.      Pupils: Pupils are equal, round, and reactive to light.   Cardiovascular:      Rate and Rhythm: Normal rate and regular rhythm.      Pulses: Normal pulses.      Heart sounds: Normal heart sounds.   Pulmonary:      Effort: Pulmonary effort is  normal.      Breath sounds: Normal breath sounds.   Abdominal:      General: Bowel sounds are normal.      Palpations: Abdomen is soft.   Musculoskeletal:         General: Normal range of motion.      Cervical back: Normal range of motion and neck supple.   Skin:     General: Skin is warm and dry.   Neurological:      General: No focal deficit present.      Mental Status: He is alert and oriented to person, place, and time.      Deep Tendon Reflexes: Reflexes are normal and symmetric.   Psychiatric:         Mood and Affect: Mood normal.         Behavior: Behavior normal.

## 2024-06-24 ENCOUNTER — APPOINTMENT (OUTPATIENT)
Dept: LAB | Facility: HOSPITAL | Age: 54
End: 2024-06-24
Payer: MEDICARE

## 2024-06-24 DIAGNOSIS — C61 PROSTATE CA (HCC): ICD-10-CM

## 2024-06-24 LAB
ALBUMIN SERPL BCG-MCNC: 3.8 G/DL (ref 3.5–5)
ALP SERPL-CCNC: 87 U/L (ref 34–104)
ALT SERPL W P-5'-P-CCNC: 11 U/L (ref 7–52)
ANION GAP SERPL CALCULATED.3IONS-SCNC: 5 MMOL/L (ref 4–13)
AST SERPL W P-5'-P-CCNC: 9 U/L (ref 13–39)
BILIRUB SERPL-MCNC: 0.28 MG/DL (ref 0.2–1)
BUN SERPL-MCNC: 10 MG/DL (ref 5–25)
CALCIUM SERPL-MCNC: 9.3 MG/DL (ref 8.4–10.2)
CHLORIDE SERPL-SCNC: 104 MMOL/L (ref 96–108)
CHOLEST SERPL-MCNC: 171 MG/DL
CO2 SERPL-SCNC: 25 MMOL/L (ref 21–32)
CREAT SERPL-MCNC: 0.87 MG/DL (ref 0.6–1.3)
EST. AVERAGE GLUCOSE BLD GHB EST-MCNC: 123 MG/DL
GFR SERPL CREATININE-BSD FRML MDRD: 97 ML/MIN/1.73SQ M
GLUCOSE P FAST SERPL-MCNC: 86 MG/DL (ref 65–99)
HBA1C MFR BLD: 5.9 %
HDLC SERPL-MCNC: 37 MG/DL
LDLC SERPL CALC-MCNC: 109 MG/DL (ref 0–100)
NONHDLC SERPL-MCNC: 134 MG/DL
POTASSIUM SERPL-SCNC: 4.4 MMOL/L (ref 3.5–5.3)
PROT SERPL-MCNC: 7.6 G/DL (ref 6.4–8.4)
PSA SERPL-MCNC: 4.95 NG/ML (ref 0–4)
SODIUM SERPL-SCNC: 134 MMOL/L (ref 135–147)
TRIGL SERPL-MCNC: 125 MG/DL

## 2024-06-24 PROCEDURE — 84153 ASSAY OF PSA TOTAL: CPT

## 2024-06-24 PROCEDURE — 36415 COLL VENOUS BLD VENIPUNCTURE: CPT

## 2024-06-26 ENCOUNTER — TELEPHONE (OUTPATIENT)
Dept: UROLOGY | Facility: CLINIC | Age: 54
End: 2024-06-26

## 2024-06-26 ENCOUNTER — OFFICE VISIT (OUTPATIENT)
Dept: UROLOGY | Facility: CLINIC | Age: 54
End: 2024-06-26
Payer: MEDICARE

## 2024-06-26 VITALS
HEIGHT: 75 IN | OXYGEN SATURATION: 96 % | DIASTOLIC BLOOD PRESSURE: 90 MMHG | WEIGHT: 315 LBS | HEART RATE: 96 BPM | BODY MASS INDEX: 39.17 KG/M2 | SYSTOLIC BLOOD PRESSURE: 152 MMHG

## 2024-06-26 DIAGNOSIS — C61 PROSTATE CANCER (HCC): Primary | ICD-10-CM

## 2024-06-26 PROCEDURE — 99215 OFFICE O/P EST HI 40 MIN: CPT | Performed by: UROLOGY

## 2024-06-26 NOTE — PROGRESS NOTES
UROLOGY FOLLOW-UP ENCOUNTER    Gavin Frank is a 53 y.o. male with prostate cancer    Pertinent non-urologic PMH: HLD, HTN, SHANDA, prediabetes    Pertinent non-urologic PSH: no abdominal or pelvic surgeries    Anticoagulation: none    53 y.o. old male with ePSA 5.33 on 5/18/23, 4.7 on 10/31/23     MRI prostate 11/16/23:  1. PI-RADSv2.1 Category 3 - Intermediate (the presence of clinically significant cancer is equivocal). Possible 0.4 cm lesion in the posterior left peripheral zone at apex  2. No extraprostatic tumor, seminal vesicle invasion, pelvic lymphadenopathy, or pelvic osseous metastatic disease.  3. Calculated prostate volume of 41 cc.    OR MRI fusion TP bx 2/7/24: G 3+3 in BIB, RB, RAL, RPM  A. Prostate, right anterior medial, biopsy:  - Benign prostatic tissue with inflammation.       B. Prostate, region of interest, biopsy:  -Prostatic adenocarcinoma, involving (3) of (4) cores:              - Maryanne score 3 + 3 = 6 (0 % grade 4), Prognostic Grade Group 1 involving 5 % of 1 core.              - Maryanne score 3 + 3 = 6 (0 % grade 4), Prognostic Grade Group 1 involving 25 % of 1 core.                - [place] score 3 + 3 = 6 (0 % grade 4), Prognostic Grade Group 1 involving 80 % (discontinuous) of 1 core.                C. Prostate, right base, biopsy:  - Prostatic adenocarcinoma, Maryanne score 3+3=6 (0% pattern 4), Grade Group 1, present in 1 of 1 cores, involving approximately 0.1 cm of length or 5% of total biopsy length.       D. Prostate, right anterior lateral, biopsy:  - Prostatic adenocarcinoma, [place] score 3+3=6 (0% pattern 4), Grade Group 1, present in 1 of 1 cores, involving approximately 0.2 cm of length or 15% of total biopsy length.       E. prostate, right posterior medial, biopsy:  - Prostatic adenocarcinoma, Maryanne score 3+3=6 (0% pattern 4), Grade Group 1, present in 1 of 1 cores, involving approximately 0.2 cm of length or 15% of total biopsy length.       F. Prostate, right posterior  lateral, biopsy:  - Benign prostatic tissue.        G. Prostate, left anterior lateral, biopsy:  - Benign prostatic tissue.        H. Prostate, left anterior medial, biopsy:  - Benign prostatic tissue.        I. Prostate, left base, biopsy:  - Benign fibrovascular tissue.       J. Prostate, left posterior medial, biopsy:  - Benign prostatic tissue.        K. Prostate, left posterior lateral, biopsy:  - Benign prostatic tissue.            Prostate MRI 5/9/2024: PI-RADS 3 small left apical peripheral zone lesion without any significant change compared to previous; negative EPE/SVI/LAD/bony metastasis; prostate 46 g    PSA 4.95 on 6/24/2024    Assessment and plan:     Prostate cancer    See summary above.  Patient has been diagnosed with prostate cancer.  He had a biopsy in the operating room for transperineal MRI fusion on 2/7/2024.  At that time, he had Hinckley 3+3 disease in multiple cores.    During last visit, I explained that he was technically low risk prostate cancer and could be placed on active surveillance, which she opted to pursue.    As part of his active surveillance protocol, I asked him to get a repeat PSA and a repeat MRI.    I reviewed the results with the patient today.    I explained that his PSA did go up to 4.95 from around 4.7.    I explained that overall, his MRI was unchanged compared to previous, still with a PI-RADS 3 lesion, which we already targeted biopsied and it demonstrated Hinckley 3+3 prostate cancer.    However, I did go over decipher testing with the patient.  I explained to him that decipher testing was a genetic test that could help us assess whether or not it patient is genetic component of the prostate cancer was particularly aggressive or not.  Unfortunately, I explained to him that his prostate cancer was determined to be in the high risk category and the decipher testing.  I explained that this generally meant that while he did currently have Maryanne 3+3 prostate cancer, it  was likely that his prostate cancer would progress to intermediate or high risk disease in the next few years without treatment.    Additionally, I explained the decipher testing demonstrated that his prostate cancer had an approximately 1.2% risk of metastasis 5 years after standard therapy.    I therefore explained that if he wanted to continue active surveillance, that would be reasonable, but we would need to get a confirmatory prostate biopsy within the next few months.  He stated that the biopsy was very bothersome for him last time and he did not think he could undergo repeat biopsies in the future.  Additionally, he understandably does not want to have repeat biopsies in the future if he is already getting data that is telling him that he likely needs his prostate cancer treated sooner rather than later.  We therefore agreed that further active surveillance would not be his best option at this point.    I then explained to him that definitive treatment for prostate cancer is either surgical prostatectomy or radiation therapy.          Regarding radiation therapy, we discussed the general categories of external beam radiotherapy (XRT) and interstitial seed implantation (brachytherapy), which may be delivered with or without adjuvant hormonal therapy depending on clinical circumstances. For XRT we discussed the different modes of delivery and general risks and benefits between conventional, 3-D conformal and intensity modulated radiotherapy.  For brachytherapy we described high dose rate and permanent seed implantation. I explained that if patient was interested in pursuing radiation treatment, or even interested in learning more about potential radiation treatment, he should have appointment set up with our radiation oncology team.    Regarding surgery, I explained that this is most commonly performed with a MIS approach, the robotic assisted laparoscopic radical prostatectomy. I also did explain that this  surgery used to be primarily performed using open approach, but indications for initially pursuing this approach are becoming more and more narrow. The anticipated hospital and post-operative courses were reviewed.  We highlighted the relevant anatomy, and we discussed the importance of neurovascular bundle preservation (nerve sparing) to minimize negative effects on erectile function and incontinence.     I explained that concurrent pelvic lymphadenectomy is routinely performed, which serves both a diagnostic and potentially therapeutic purpose if metastatic disease is identified.  While in the majority of cases bilateral nerve sparing is appropriate and possible, they understand that in certain circumstances intentional partial or complete unilateral or bilateral neurovascular bundle resection is necessary when there is suspicion that cancer extends into that region; this may be determined either preoperatively or intraoperatively.      We discussed the general risks of surgery, which include but are not limited to infection, bleeding, medical and anesthetic complications, and adjacent organ involvement or injury.  We explained that the risk of rectal injury is <1% and that in most of cases the injury can be repaired primarily; however, a temporary colostomy is at times necessary.  We discussed the common specific risks of prostatectomy, which include urinary incontinence--commonly mild but which can also be severe in rare circumstances; erectile dysfunction--which may occur despite bilateral nerve-sparing; and the need for blood transfusion.  We discussed that when a pelvic lymph node dissection is performed, there is a 5-10% risk of symptomatic lymphocele formation, which may need percutaneous drainage weeks to months following surgery.         Given the discussion above, the patient was most concerned about the potential for erectile dysfunction.  He was very concerned about pursuing prostatectomy given the  risk of erectile dysfunction.  I did explain to him that even if he were to do radiation, there would be no way to completely eliminate the risk of erectile dysfunction.  I also explained to him that given his high risk genetics of his prostate cancer, there was a high likelihood of needing adjuvant treatment.  I talked to him about the general easy ability to have primary prostatectomy with adjuvant radiation if need be.  I explained that having primary radiation with adjuvant prostatectomy was not as simple and oftentimes impossible.  I additionally explained that the majority of younger patients that can tolerate surgery do quite well with prostatectomy and regain their baseline erectile function within a year.    Still, the patient did not want to pursue surgery given the real risk of erectile dysfunction.    He therefore opted to pursue radiation therapy.  I placed a referral to radiation oncology.  He will talk to them about potential treatment.    I tentatively made an appointment to see me in about 4 months.  I explained to him that if after discussion with radiation oncology they deem him not to be a good candidate at this time and he needs to continue active surveillance, he can reach out to the office and I can schedule him for his next biopsy.  Additionally, I explained that if he talks to radiation and then changes his mind and wants to talk about surgery, we can get him on the schedule sooner in the office for formal surgical discussion with robotic surgeon.        PLAN  -Patient declining further active surveillance and consideration for surgery at this time.  He would like to discuss radiation therapy.  I placed a referral for radiation oncology.  -I will tentatively plan to see him in about 4 months.  He understands that the date of this appointment may change based on his course of treatment.  He also understands that he can contact me or the office if he would consider continuing active  "surveillance or would like to have further surgical discussion for prostatectomy.        Patient's partner, Tianna, present in office today and assisted with history    Portions of the above record have been created with voice recognition software.  Occasional wrong word or \"sound alike\" substitution may have occurred due to the inherent limitations of voice recognition software.  Read the chart carefully and recognize, using context, where substitution may have occurred.      Benjie Stewart, DO        Chief Complaint     Prostate cancer      History of Present Illness     See summary above    No fevers or chills        The following portions of the patient's history were reviewed and updated as appropriate: allergies, current medications, past family history, past medical history, past social history, past surgical history and problem list.        AUA SYMPTOM SCORE      Flowsheet Row Most Recent Value   AUA SYMPTOM SCORE    How often have you had a sensation of not emptying your bladder completely after you finished urinating? 2 (P)    How often have you had to urinate again less than two hours after you finished urinating? 3 (P)    How often have you found you stopped and started again several times when you urinate? 1 (P)    How often have you found it difficult to postpone urination? 0 (P)    How often have you had a weak urinary stream? 0 (P)    How often have you had to push or strain to begin urination? 1 (P)    How many times did you most typically get up to urinate from the time you went to bed at night until the time you got up in the morning? 2 (P)    Quality of Life: If you were to spend the rest of your life with your urinary condition just the way it is now, how would you feel about that? 3 (P)    AUA SYMPTOM SCORE 9 (P)             Review of Systems     Negative for fevers, chills, nausea, vomiting, shortness of breath, blood in urine, painful urination    Allergies     Allergies   Allergen " "Reactions    Contrast Dye [Iodinated Contrast Media] Vomiting       Physical Exam     No acute distress, normocephalic atraumatic, abdomen soft nontender, no CVA tenderness bilaterally, nonlabored breathing    Vital Signs  Vitals:    06/26/24 1524   BP: 152/90   BP Location: Left arm   Patient Position: Sitting   Cuff Size: Adult   Pulse: 96   SpO2: 96%   Weight: (!) 168 kg (370 lb)   Height: 6' 3\" (1.905 m)         Current Medications       Current Outpatient Medications:     Cyanocobalamin (Vitamin B-12) 2500 MCG SUBL, Place under the tongue, Disp: , Rfl:     Diclofenac Sodium (VOLTAREN) 1 %, Apply 2 g topically 4 (four) times a day, Disp: 100 g, Rfl: 3    diltiazem (CARDIZEM CD) 240 mg 24 hr capsule, TAKE ONE CAPSULE BY MOUTH ONCE DAILY, Disp: 30 capsule, Rfl: 5    ergocalciferol (VITAMIN D2) 50,000 units, TAKE ONE CAPSULE BY MOUTH ONCE A WEEK, Disp: 4 capsule, Rfl: 0    fluticasone (FLONASE) 50 mcg/act nasal spray, 1 spray into each nostril 2 (two) times a day, Disp: 16 g, Rfl: 3    LORazepam (ATIVAN) 2 mg tablet, Take 1 tablet (2 mg total) by mouth once as needed for anxiety (take 1 hour before MRI) for up to 1 dose, Disp: 1 tablet, Rfl: 0    losartan (COZAAR) 100 MG tablet, Take 1 tablet (100 mg total) by mouth daily, Disp: 30 tablet, Rfl: 5    meloxicam (MOBIC) 15 mg tablet, Take 1 tablet (15 mg total) by mouth daily as needed for moderate pain, Disp: 30 tablet, Rfl: 3    methocarbamol (ROBAXIN) 750 mg tablet, Take 1 tablet (750 mg total) by mouth every 8 (eight) hours, Disp: 20 tablet, Rfl: 0    metoprolol succinate (TOPROL-XL) 50 mg 24 hr tablet, Take 1 tablet (50 mg total) by mouth daily, Disp: 30 tablet, Rfl: 5    montelukast (SINGULAIR) 10 mg tablet, Take 1 tablet (10 mg total) by mouth daily at bedtime, Disp: 30 tablet, Rfl: 5    Multiple Vitamin (ONE-A-DAY MENS PO), Take by mouth, Disp: , Rfl:     olopatadine (PATANOL) 0.1 % ophthalmic solution, Administer 1 drop to both eyes 2 (two) times a day, Disp: " 5 mL, Rfl: 5    ondansetron (ZOFRAN-ODT) 8 mg disintegrating tablet, Take 1 tablet (8 mg total) by mouth every 8 (eight) hours as needed for nausea or vomiting for up to 5 days, Disp: 15 tablet, Rfl: 0    pravastatin (PRAVACHOL) 20 mg tablet, Take 1 tablet (20 mg total) by mouth daily, Disp: 30 tablet, Rfl: 3    Semaglutide-Weight Management (WEGOVY) 1 MG/0.5ML, Inject 0.5 mL (1 mg total) under the skin once a week, Disp: 2 mL, Rfl: 2    spironolactone (ALDACTONE) 25 mg tablet, Take 1 tablet (25 mg total) by mouth daily, Disp: 30 tablet, Rfl: 5    azelastine (ASTELIN) 0.1 % nasal spray, 2 sprays into each nostril 2 (two) times a day Use in each nostril as directed (Patient not taking: Reported on 6/13/2024), Disp: 30 mL, Rfl: 5    BLACK CURRANT SEED OIL PO, Take by mouth (Patient not taking: Reported on 2/26/2024), Disp: , Rfl:     fluticasone (FLONASE) 50 mcg/act nasal spray, 1 spray into each nostril daily (Patient not taking: Reported on 4/4/2024), Disp: 48 g, Rfl: 1    Testosterone 1.62 % GEL, apply one pump to skin once daily (Patient not taking: Reported on 4/4/2024), Disp: 75 g, Rfl: 0    triamcinolone (KENALOG) 0.1 % ointment, Apply topically 2 (two) times a day (Patient not taking: Reported on 4/4/2024), Disp: 30 g, Rfl: 2      Active Problems     Patient Active Problem List   Diagnosis    Hypertension    Arthritis    Hyperlipemia    Class 3 severe obesity due to excess calories with serious comorbidity and body mass index (BMI) of 50.0 to 59.9 in adult (HCC)    Benign essential hypertension    Obstructive sleep apnea    Benign prostatic hyperplasia with urinary frequency    H/O urethral stricture    Vitamin D deficiency    Elevated PSA    Ingrown nail    Onychomycosis    Asthma    Stroke-like symptoms    Back pain    Prediabetes    CPAP (continuous positive airway pressure) dependence         Past Medical History     Past Medical History:   Diagnosis Date    Allergic     Anxiety     Arthritis     Asthma      Chronic pain disorder     Back pain    CPAP (continuous positive airway pressure) dependence 2024    Depression     Hyperlipemia     Hypertension     Obesity     Sleep apnea          Surgical History     Past Surgical History:   Procedure Laterality Date    HEMORRHOID SURGERY      OR BX PROSTATE STRTCTC SATURATION SAMPLING IMG GID N/A 2024    Procedure: TRANSPERINEAL MRI FUSION BIOPSY PROSTATE;  Surgeon: Benjie Stewart DO;  Location: [facility];  Service: Urology    TONSILLECTOMY      URETHRAL DILATION  2019         Family History     Family History   Problem Relation Age of Onset    Hypertension Mother     Hyperlipidemia Mother     Heart disease Mother     Substance Abuse Mother     Hypertension Father     Heart disease Father     Hyperlipidemia Father     Substance Abuse Father     COPD Sister     Liver disease Sister     Prostate cancer Maternal Uncle          Social History     Social History     Social History     Tobacco Use   Smoking Status Former    Current packs/day: 0.00    Average packs/day: 0.5 packs/day for 31.0 years (15.5 ttl pk-yrs)    Types: Cigarettes    Start date: 1984    Quit date: 2018    Years since quittin.4    Passive exposure: Past   Smokeless Tobacco Never   Tobacco Comments    No cigarettes vape only         Pertinent Lab Values     Lab Results   Component Value Date    CREATININE 0.87 2024       Lab Results   Component Value Date    PSA 4.951 (H) 2024    PSA 4.7 (H) 10/31/2023    PSA 5.33 (H) 2023               Pertinent Imaging     The patient's images were reviewed by me personally and also in real time with them in the examination room using our PACS imaging system.     MRI prostate 23:  1. PI-RADSv2.1 Category 3 - Intermediate (the presence of clinically significant cancer is equivocal). Possible 0.4 cm lesion in the posterior left peripheral zone at apex  2. No extraprostatic tumor, seminal vesicle invasion, pelvic  lymphadenopathy, or pelvic osseous metastatic disease.  3. Calculated prostate volume of 41 cc.      Pertinent Pathology     OR MRI fusion TP bx 2/7/24: G 3+3 in BIB, RB, RAL, RPM  A. Prostate, right anterior medial, biopsy:  - Benign prostatic tissue with inflammation.       B. Prostate, region of interest, biopsy:  -Prostatic adenocarcinoma, involving (3) of (4) cores:              - Maryanne score 3 + 3 = 6 (0 % grade 4), Prognostic Grade Group 1 involving 5 % of 1 core.              - Maryanne score 3 + 3 = 6 (0 % grade 4), Prognostic Grade Group 1 involving 25 % of 1 core.                - [place] score 3 + 3 = 6 (0 % grade 4), Prognostic Grade Group 1 involving 80 % (discontinuous) of 1 core.                C. Prostate, right base, biopsy:  - Prostatic adenocarcinoma, Maryanne score 3+3=6 (0% pattern 4), Grade Group 1, present in 1 of 1 cores, involving approximately 0.1 cm of length or 5% of total biopsy length.       D. Prostate, right anterior lateral, biopsy:  - Prostatic adenocarcinoma, [place] score 3+3=6 (0% pattern 4), Grade Group 1, present in 1 of 1 cores, involving approximately 0.2 cm of length or 15% of total biopsy length.       E. prostate, right posterior medial, biopsy:  - Prostatic adenocarcinoma, Maryanne score 3+3=6 (0% pattern 4), Grade Group 1, present in 1 of 1 cores, involving approximately 0.2 cm of length or 15% of total biopsy length.       F. Prostate, right posterior lateral, biopsy:  - Benign prostatic tissue.        G. Prostate, left anterior lateral, biopsy:  - Benign prostatic tissue.        H. Prostate, left anterior medial, biopsy:  - Benign prostatic tissue.        I. Prostate, left base, biopsy:  - Benign fibrovascular tissue.       J. Prostate, left posterior medial, biopsy:  - Benign prostatic tissue.        K. Prostate, left posterior lateral, biopsy:  - Benign prostatic tissue.              I have spent 45 minutes with Patient  today in which greater than 50% of this time  was spent in counseling/coordination of care regarding Diagnostic results, Prognosis, Risks and benefits of tx options, Instructions for management, Patient and family education, Importance of tx compliance, Impressions, Counseling / Coordination of care, Documenting in the medical record, Reviewing / ordering tests, medicine, procedures  , and Obtaining or reviewing history  .    Please note this time includes cumulative time on the day of encounter, including reviewing medical records and/or coordinating care among the patient's other specialists.

## 2024-06-27 ENCOUNTER — DOCUMENTATION (OUTPATIENT)
Dept: HEMATOLOGY ONCOLOGY | Facility: CLINIC | Age: 54
End: 2024-06-27

## 2024-06-27 ENCOUNTER — PATIENT OUTREACH (OUTPATIENT)
Dept: HEMATOLOGY ONCOLOGY | Facility: CLINIC | Age: 54
End: 2024-06-27

## 2024-06-27 NOTE — PROGRESS NOTES
[facility] location is closest     Oncology Nurse Navigator made call to patient due to referral to provider within [facility]. I spoke with patient about my role as an Oncology Nurse Navigator. I explained how to reach the office for any routine or urgent matters and the availability of patient navigation for non urgent matters. Explained oncology navigation is here to help patients through their journey and to offer assistance with any barriers to care. As a team, we strive to be patient advocates and help navigate healthcare system. Patient aware that medical oncology nursing will be primary contact once consult is complete and patient navigator will continue to offer support through entire journey. Conversation is based on evidence based care to optimize patient outcomes. Emotional support provided throughout conversation.     Discussed patient symptoms patient has no symptoms at this time.     Evaluated for any barriers to care.   Genetic testing discussed   Smoking status verified  Provided contact information for nurse navigator and patient navigator  Verified email for any medical release needs/electronic outreach  Discussed use of My Chart    Answered questions to pt's satisfaction.  Reviewed upcoming appts. Provided support and provided direct contact information for any questions or concerns.       Future Appointments   Date Time Provider Department Center   7/29/2024  9:15 AM DO DAGO Reid [facility]   10/4/2024 10:30 AM Benjie Stewart DO [facility]   2/28/2025  2:45 PM MD Julia López

## 2024-06-27 NOTE — PROGRESS NOTES
Oncology Nurse Navigator  Intake received/ Chart reviewed for work up completed     Urologist:      Dr. Stewart       Imaging completed:    completed at [facility]  [] CT AP   [] Bone Scan   [] PSMA PET   [x] MRI prostate    Pathology completed:  Prostate at [facility]    All records needed are in patients chart. No records retrieval needed at this time.        PSA Date:        Lab Results   Component Value Date    PSA 4.951 (H) 06/24/2024    PSA 4.7 (H) 10/31/2023    PSA 5.33 (H) 05/18/2023

## 2024-07-05 PROBLEM — C61 PROSTATE CANCER (HCC): Status: ACTIVE | Noted: 2024-07-05

## 2024-07-08 DIAGNOSIS — E55.9 VITAMIN D DEFICIENCY: ICD-10-CM

## 2024-07-08 RX ORDER — ERGOCALCIFEROL 1.25 MG/1
50000 CAPSULE ORAL WEEKLY
Qty: 4 CAPSULE | Refills: 0 | Status: SHIPPED | OUTPATIENT
Start: 2024-07-08

## 2024-07-09 ENCOUNTER — APPOINTMENT (OUTPATIENT)
Dept: RADIATION ONCOLOGY | Facility: HOSPITAL | Age: 54
End: 2024-07-09
Attending: UROLOGY
Payer: MEDICARE

## 2024-07-11 ENCOUNTER — DOCUMENTATION (OUTPATIENT)
Dept: HEMATOLOGY ONCOLOGY | Facility: CLINIC | Age: 54
End: 2024-07-11

## 2024-07-11 ENCOUNTER — TELEPHONE (OUTPATIENT)
Dept: RADIATION ONCOLOGY | Facility: HOSPITAL | Age: 54
End: 2024-07-11

## 2024-07-11 ENCOUNTER — CONSULT (OUTPATIENT)
Dept: RADIATION ONCOLOGY | Facility: HOSPITAL | Age: 54
End: 2024-07-11
Attending: RADIOLOGY
Payer: MEDICARE

## 2024-07-11 VITALS
RESPIRATION RATE: 18 BRPM | HEART RATE: 80 BPM | WEIGHT: 315 LBS | TEMPERATURE: 98.2 F | SYSTOLIC BLOOD PRESSURE: 152 MMHG | OXYGEN SATURATION: 97 % | DIASTOLIC BLOOD PRESSURE: 84 MMHG | BODY MASS INDEX: 46.25 KG/M2

## 2024-07-11 DIAGNOSIS — C61 PROSTATE CANCER (HCC): ICD-10-CM

## 2024-07-11 PROCEDURE — 99245 OFF/OP CONSLTJ NEW/EST HI 55: CPT | Performed by: RADIOLOGY

## 2024-07-11 PROCEDURE — 99211 OFF/OP EST MAY X REQ PHY/QHP: CPT | Performed by: RADIOLOGY

## 2024-07-11 NOTE — PROGRESS NOTES
Consultation - Radiation Oncology      MRN:[id number] : 1970  Encounter: [id number]  Patient Information: Gavin Frank      CHIEF COMPLAINT  Chief Complaint   Patient presents with    Consult     Radiation Oncology     Cancer Staging   Stage I low risk prostate adenocarcinoma, [id number], [place] score 3+3=6, [place] grade group 1 with pre-treatment PSA 4.9 and < 50% positive biopsy cores.           History of Present Illness   Gavin Frank is a 53 y.o. male with a reported prior history of TURP who presented in  for evaluation of elevated PSA.    PSA was 5.33 on 2023 and 4.7 on 10/31/2023.      MRI prostate on 2023 demonstrated a 40.9 cc gland with a 0.4 x 0.3 cm lesion in the posterior left peripheral zone at the apex.  There was abutment of the capsule without visualized extraprostatic extension.  The seminal vesicles appeared unremarkable.      Prostate biopsy on 2024 was remarkable for the following positive cores:  B. Prostate, region of interest, biopsy:  -Prostatic adenocarcinoma, involving (3) of (4) cores:              - [place] score 3 + 3 = 6 (0 % grade 4), Prognostic Grade Group 1 involving 5 % of 1 core.              - [place] score 3 + 3 = 6 (0 % grade 4), Prognostic Grade Group 1 involving 25 % of 1 core.               - Maryanne score 3 + 3 = 6 (0 % grade 4), Prognostic Grade Group 1 involving 80 % (discontinuous) of 1 core.    C. Prostate, right base, biopsy:  - Prostatic adenocarcinoma, [place] score 3+3=6 (0% pattern 4), Grade Group 1, present in 1 of 1 cores, involving approximately 0.1 cm of length or 5% of total biopsy length.      D. Prostate, right anterior lateral, biopsy:  - Prostatic adenocarcinoma, [place] score 3+3=6 (0% pattern 4), Grade Group 1, present in 1 of 1 cores, involving approximately 0.2 cm of length or 15% of total biopsy length.      E. prostate, right posterior medial, biopsy:  - Prostatic adenocarcinoma, Maryanne score 3+3=6 (0% pattern 4), Grade  Group 1, present in 1 of 1 cores, involving approximately 0.2 cm of length or 15% of total biopsy length.     MRI prostate on 5/9/2024 demonstrated a 46 cc gland.  There was a 0.6 x 0.6 cm lesion in the left apical posterior peripheral zone with abutment of the capsule without visualized extraprostatic extension.  The seminal vesicles appeared unremarkable and there were no suspicious osseous lesions.      PSA on 6/24/24 was 4.951.      Upon interview, he endorses the above history.  He is able to achieve erections and is concerned about erectile function post-treatment.  He is pleased with urinary function (see below).  He has regular bowel movements.  He is without additional acute concerns.    Historical Information   Oncology History   Prostate cancer (HCC)   2/7/2024 Biopsy    Final Diagnosis   A. Prostate, right anterior medial, biopsy:  - Benign prostatic tissue with inflammation.       B. Prostate, region of interest, biopsy:  -Prostatic adenocarcinoma, involving (3) of (4) cores:              - [place] score 3 + 3 = 6 (0 % grade 4), Prognostic Grade Group 1 involving 5 % of 1 core.              - [place] score 3 + 3 = 6 (0 % grade 4), Prognostic Grade Group 1 involving 25 % of 1 core.                - [place] score 3 + 3 = 6 (0 % grade 4), Prognostic Grade Group 1 involving 80 % (discontinuous) of 1 core.                C. Prostate, right base, biopsy:  - Prostatic adenocarcinoma, Maryanne score 3+3=6 (0% pattern 4), Grade Group 1, present in 1 of 1 cores, involving approximately 0.1 cm of length or 5% of total biopsy length.       D. Prostate, right anterior lateral, biopsy:  - Prostatic adenocarcinoma, Maryanne score 3+3=6 (0% pattern 4), Grade Group 1, present in 1 of 1 cores, involving approximately 0.2 cm of length or 15% of total biopsy length.       E. prostate, right posterior medial, biopsy:  - Prostatic adenocarcinoma, [place] score 3+3=6 (0% pattern 4), Grade Group 1, present in 1 of 1 cores,  involving approximately 0.2 cm of length or 15% of total biopsy length.       F. Prostate, right posterior lateral, biopsy:  - Benign prostatic tissue.        G. Prostate, left anterior lateral, biopsy:  - Benign prostatic tissue.        H. Prostate, left anterior medial, biopsy:  - Benign prostatic tissue.        I. Prostate, left base, biopsy:  - Benign fibrovascular tissue.       J. Prostate, left posterior medial, biopsy:  - Benign prostatic tissue.        K. Prostate, left posterior lateral, biopsy:  - Benign prostatic tissue.            7/5/2024 Initial Diagnosis    Prostate cancer (HCC)     7/11/2024 -  Cancer Staged    Staging form: Prostate, AJCC 8th Edition  - Clinical stage from 7/11/2024: Stage I (cT1c, cN0, cM0, PSA: 5, Grade Group: 1) - Signed by Kiko Coffman MD on 7/11/2024  Prostate specific antigen (PSA) range: Less than 10  [place] primary pattern: 3  [place] secondary pattern: 3  Maryanne score: 6  Histologic grading system: 5 grade system             Past Medical History:   Diagnosis Date    Allergic     Anxiety     Arthritis     Asthma     Chronic pain disorder     Back pain    CPAP (continuous positive airway pressure) dependence 02/07/2024    Depression     Hyperlipemia     Hypertension     Obesity     Prostate cancer (HCC)     Sleep apnea      Past Surgical History:   Procedure Laterality Date    HEMORRHOID SURGERY      DE BX PROSTATE STRTCTC SATURATION SAMPLING IMG GID N/A 2/7/2024    Procedure: TRANSPERINEAL MRI FUSION BIOPSY PROSTATE;  Surgeon: Benjie Stewart DO;  Location: [facility];  Service: Urology    TONSILLECTOMY      URETHRAL DILATION  2019       Family History   Problem Relation Age of Onset    Hypertension Mother     Hyperlipidemia Mother     Heart disease Mother     Substance Abuse Mother     Hypertension Father     Heart disease Father     Hyperlipidemia Father     Substance Abuse Father     COPD Sister     Liver disease Sister     Prostate cancer Maternal Uncle         Social History   Social History     Substance and Sexual Activity   Alcohol Use Not Currently     Social History     Substance and Sexual Activity   Drug Use Never     Social History     Tobacco Use   Smoking Status Former    Current packs/day: 0.00    Average packs/day: 0.5 packs/day for 31.0 years (15.5 ttl pk-yrs)    Types: Cigarettes    Start date: 1984    Quit date: 2018    Years since quittin.5    Passive exposure: Past   Smokeless Tobacco Never   Tobacco Comments    No cigarettes vape only         Meds/Allergies     Current Outpatient Medications:     Cyanocobalamin (Vitamin B-12) 2500 MCG SUBL, Place under the tongue, Disp: , Rfl:     Diclofenac Sodium (VOLTAREN) 1 %, Apply 2 g topically 4 (four) times a day, Disp: 100 g, Rfl: 3    diltiazem (CARDIZEM CD) 240 mg 24 hr capsule, TAKE ONE CAPSULE BY MOUTH ONCE DAILY, Disp: 30 capsule, Rfl: 5    ergocalciferol (VITAMIN D2) 50,000 units, TAKE ONE CAPSULE BY MOUTH ONCE A WEEK, Disp: 4 capsule, Rfl: 0    fluticasone (FLONASE) 50 mcg/act nasal spray, 1 spray into each nostril 2 (two) times a day, Disp: 16 g, Rfl: 3    LORazepam (ATIVAN) 2 mg tablet, Take 1 tablet (2 mg total) by mouth once as needed for anxiety (take 1 hour before MRI) for up to 1 dose, Disp: 1 tablet, Rfl: 0    losartan (COZAAR) 100 MG tablet, Take 1 tablet (100 mg total) by mouth daily, Disp: 30 tablet, Rfl: 5    meloxicam (MOBIC) 15 mg tablet, Take 1 tablet (15 mg total) by mouth daily as needed for moderate pain, Disp: 30 tablet, Rfl: 3    methocarbamol (ROBAXIN) 750 mg tablet, Take 1 tablet (750 mg total) by mouth every 8 (eight) hours, Disp: 20 tablet, Rfl: 0    metoprolol succinate (TOPROL-XL) 50 mg 24 hr tablet, Take 1 tablet (50 mg total) by mouth daily, Disp: 30 tablet, Rfl: 5    montelukast (SINGULAIR) 10 mg tablet, Take 1 tablet (10 mg total) by mouth daily at bedtime, Disp: 30 tablet, Rfl: 5    Multiple Vitamin (ONE-A-DAY MENS PO), Take by mouth, Disp: , Rfl:      olopatadine (PATANOL) 0.1 % ophthalmic solution, Administer 1 drop to both eyes 2 (two) times a day, Disp: 5 mL, Rfl: 5    pravastatin (PRAVACHOL) 20 mg tablet, Take 1 tablet (20 mg total) by mouth daily, Disp: 30 tablet, Rfl: 3    Semaglutide-Weight Management (WEGOVY) 1 MG/0.5ML, Inject 0.5 mL (1 mg total) under the skin once a week, Disp: 2 mL, Rfl: 2    spironolactone (ALDACTONE) 25 mg tablet, Take 1 tablet (25 mg total) by mouth daily, Disp: 30 tablet, Rfl: 5    triamcinolone (KENALOG) 0.1 % ointment, Apply topically 2 (two) times a day, Disp: 30 g, Rfl: 2    azelastine (ASTELIN) 0.1 % nasal spray, 2 sprays into each nostril 2 (two) times a day Use in each nostril as directed (Patient not taking: Reported on 6/13/2024), Disp: 30 mL, Rfl: 5    BLACK CURRANT SEED OIL PO, Take by mouth (Patient not taking: Reported on 2/26/2024), Disp: , Rfl:     fluticasone (FLONASE) 50 mcg/act nasal spray, 1 spray into each nostril daily (Patient not taking: Reported on 4/4/2024), Disp: 48 g, Rfl: 1    ondansetron (ZOFRAN-ODT) 8 mg disintegrating tablet, Take 1 tablet (8 mg total) by mouth every 8 (eight) hours as needed for nausea or vomiting for up to 5 days, Disp: 15 tablet, Rfl: 0  Allergies   Allergen Reactions    Contrast Dye [Iodinated Contrast Media] Vomiting         Review of Systems  Constitutional: Negative.    HENT: Negative.     Eyes: Negative.    Respiratory: Negative.     Cardiovascular: Negative.    Gastrointestinal:  Positive for constipation (since starting weight loss medication).   Endocrine: Negative.    Genitourinary:  Positive for frequency and urgency.   Musculoskeletal:  Positive for back pain (lower back).   Skin: Negative.    Allergic/Immunologic: Negative.    Hematological: Negative.    Psychiatric/Behavioral: Negative.        IPSS Questionnaire (AUA-7):  Over the past month…     1)  How often have you had a sensation of not emptying your bladder completely after you finish urinating?  1 - Less than 1  time in 5   2)  How often have you had to urinate again less than two hours after you finished urinating? 3 - About half the time   3)  How often have you found you stopped and started again several times when you urinated?  2 - Less than half the time   4) How difficult have you found it to postpone urination?  1 - Less than 1 time in 5   5) How often have you had a weak urinary stream?  0 - Not at all   6) How often have you had to push or strain to begin urination?  2 - Less than half the time   7) How many times did you most typically get up to urinate from the time you went to bed until the time you got up in the morning?  2 - 2 times   Total Score:  11     OBJECTIVE:   /84 (BP Location: Left arm)   Pulse 80   Temp 98.2 °F (36.8 °C) (Temporal)   Resp 18   Wt (!) 168 kg (370 lb)   SpO2 97%   BMI 46.25 kg/m²   Pain Assessment:  0  Performance Status: Karnofsky: 90 - Able to carry on normal activity; minor signs or symptoms of disease     Physical Exam  HENT:      Head: Normocephalic and atraumatic.   Eyes:      General: No scleral icterus.     Extraocular Movements: Extraocular movements intact.   Cardiovascular:      Rate and Rhythm: Normal rate.   Pulmonary:      Effort: Pulmonary effort is normal.   Abdominal:      General: Abdomen is flat. There is no distension.   Genitourinary:     Comments: Deferred  Musculoskeletal:         General: Normal range of motion.      Cervical back: Normal range of motion.   Skin:     General: Skin is warm and dry.   Neurological:      General: No focal deficit present.      Mental Status: He is alert.   Psychiatric:         Mood and Affect: Mood normal.        RESULTS  Lab Results    Chemistry        Component Value Date/Time    K 4.4 06/24/2024 0945     06/24/2024 0945    CO2 25 06/24/2024 0945    BUN 10 06/24/2024 0945    CREATININE 0.87 06/24/2024 0945        Component Value Date/Time    CALCIUM 9.3 06/24/2024 0945    ALKPHOS 87 06/24/2024 0945    AST 9 (L)  06/24/2024 0945    ALT 11 06/24/2024 0945            Lab Results   Component Value Date    WBC 7.14 01/25/2024    HGB 14.2 01/25/2024    HCT 44.2 01/25/2024    MCV 83 01/25/2024     (H) 01/25/2024         Imaging Studies  No results found.      Pathology: See above.        ASSESSMENT  1. Prostate cancer (HCC)  Ambulatory Referral to Radiation Oncology        Cancer Staging   No matching staging information was found for the patient.        PLAN/DISCUSSION  No orders of the defined types were placed in this encounter.         Gavin Frank is a 53 y.o. male with recently diagnosed stage 1 low risk [id number] prostate adenocarcinoma, Maryanne score 3+3=6, [place] grade group 1 with pre-treatment PSA 4.951 and < 50% positive biopsy cores.  Decipher was high risk.     We reviewed the diagnosis of low risk prostate cancer and treatment options include active surveillance, radical prostatectomy or definitive radiotherapy.      In the ProtecT trial, comparing men (primarily with low risk prostate cancer) treated with initial active monitoring, radical prostatectomy or neoadjuvant ADT+radiotherapy, the incidence of prostate cancer specific mortality was low regardless of treatment received. At 15 years of follow-up, 61% of men assigned to active monitoring ultimately received treatment for prostate cancer. Those initially monitored had a slightly higher rate of development of distant metastatic disease and a higher percentage ultimately received LTADT (Sivakumar et al, NEJ 2023). Given no significant differences in prostate cancer specific mortality, active surveillance is a reasonable treatment approach. This would require ongoing PSA monitoring with repeat MRI and biopsy, coordinated by urology.      Given high decipher and desire to avoid ongoing or repeat biopsies, he is inclined to proceed with treatment.  He is refusing surgery at this time.  Please see discussion regarding radical prostatectomy as documented by   Terry.     From an XRT standpoint, there are multiple effective treatment approaches including conventionally fractionated radiotherapy, moderately hypofractionated radiotherapy (20-28 fx), SBRT and brachytherapy. Given low risk disease, I have not recommended AD or additional imaging/staging. When comparing various radiotherapy dose/fractionation schedules, PACE-B reported similar rates of toxicity; grade 2 or greater  or GI toxicities were 3% or less across modalities (Tree et al Lancet 2022). Brachytherapy is also an alternative to external beam radiation. Series report similar low rates of grade 3 or greater  and GI toxicity with this technique, 3% or less (Bettye et al IJROBP 2016).  He reports a history of prior TURP ~ 4 years ago, therefore I have recommended conventional fractions (44 treatments daily M-F targeting the prostate).     Overall toxicities of radiotherapy were reviewed in detail.  In regards to urinary symptoms, urgency, frequency, nocturia and dysuria may develop acutely and this toxicity is similar between moderate hypofractionation and conventional fractionation.  Late  toxicity including urethritis or strictures may be experienced although the risk of significant toxicity is generally < 5% with external beam radiotherapy. Acute GI toxicity includes diarrhea, rectal irritation, pain or bleeding.  Late GI toxicity including painless rectal bleeding, or rectal bleeding requiring intervention is generally 2% or less with external beam radiotherapy.  Again, a SpaceOAR may be used to limit these risks.      After radiation therapy, there is a 66% to no ejaculate while after radical prostatectomy there is no ejaculate.  Among men with normal erectile function at baseline, a little over half of men will have new erectile dysfunction after radical prostatectomy, while a little under half of men will have new ED after external beam radiation [[facility] QoL, PROST-QA, ProtecT].  Generally, 75% of  "men with new ED after intervention can achieve functionable erections with Trimix or Viagra.  Most differences associated with contemporary management options attenuate by 5 years [[place] AMITA 2020].  However, the addition of ADT and/or RT after radical prostatectomy is associated with quality of life decline compared to monotherapy.      Informed consent for conventionally fractionated radiotherapy targeting the prostate was obtained today.  All questions were answered to his satisfaction.    Thank you for involving me in Mr. Frank's care.    Kiko Coffman MD  7/11/2024,1:21 PM      Portions of the record may have been created with voice recognition software.  Occasional wrong word or \"sound a like\" substitutions may have occurred due to the inherent limitations of voice recognition software.  Read the chart carefully and recognize, using context, where substitutions have occurred.          "

## 2024-07-11 NOTE — PROGRESS NOTES
Gavin Frank 1970 is a 53 y.o. male noted with elevated PSA. MRI prostate 11/23 PI-RADSv2.1 Category 3 - Intermediate (the presence of clinically significant cancer is equivocal). Possible 0.4 cm lesion in the posterior left peripheral zone at apex. Transperineal MRI fusion biopsy 2/24 demonstrated multiple cores of [place] 6 prostate cancer. Patient considered low risk and remained under surveillance. Decipher testing came back high risk, 0.73. Repeat MRI prostate 5/24 unchanged. PSA did continue to rise. Patient does not want repeat biopsy in the future and would like to pursue treatment with RT. He presents today for initial consult.     PSA   Latest Ref Rng 0.000 - 4.000 ng/mL   3/21/2022 4.6 (H)     4.8 (H)    4/21/2022 3.0    5/18/2023 5.33 (H)    10/31/2023 4.7 (H)    6/24/2024 4.951 (H)         11/16/23 MRI prostate multiparametric wo w contrast  IMPRESSION:  1. PI-RADSv2.1 Category 3 - Intermediate (the presence of clinically significant cancer is equivocal). Possible 0.4 cm lesion in the posterior left peripheral zone at apex  2. No extraprostatic tumor, seminal vesicle invasion, pelvic lymphadenopathy, or pelvic osseous metastatic disease.  3. Calculated prostate volume of 41 cc.    2/7/24 Transperineal MRI fusion biopsy prostate  (Results below)    2/26/24 Urology - [facility]  Low risk prostate cancer  Will get PSA and prostate MRI in May 2024  Will f/u in June 2024 to review results and plan for next prostate biopsy  Follow up Decipher testing    5/9/24 MRI prostate multiparametric wo w contrast  IMPRESSION:  Motion degraded study.  1. PI-RADSv2.1 Category 3 - Intermediate (the presence of clinically significant cancer is equivocal). No significant interval change in small left apical peripheral zone lesion.  2. No extraprostatic tumor, seminal vesicle invasion, pelvic lymphadenopathy, or pelvic osseous metastatic disease.  3. Calculated prostate volume of 46 mL.  4.  Prostate cancer radiological  estimation of change in sequential evaluation (PRECISE) score 3- Visible (3-V)- Stable MRI appearance with a visible focal lesion.    6/26/24 Urology - [place], DO  Patient does not want repeat biopsy in the future, would like to pursue treatment   Does not want surgery - would like to pursue radiation therapy    Upcoming:  10/4/24 Urology        Oncology History   Prostate cancer (HCC)   2/7/2024 Biopsy    Final Diagnosis   A. Prostate, right anterior medial, biopsy:  - Benign prostatic tissue with inflammation.       B. Prostate, region of interest, biopsy:  -Prostatic adenocarcinoma, involving (3) of (4) cores:              - Maryanne score 3 + 3 = 6 (0 % grade 4), Prognostic Grade Group 1 involving 5 % of 1 core.              - Maryanne score 3 + 3 = 6 (0 % grade 4), Prognostic Grade Group 1 involving 25 % of 1 core.                - Maryanne score 3 + 3 = 6 (0 % grade 4), Prognostic Grade Group 1 involving 80 % (discontinuous) of 1 core.                C. Prostate, right base, biopsy:  - Prostatic adenocarcinoma, Maryanne score 3+3=6 (0% pattern 4), Grade Group 1, present in 1 of 1 cores, involving approximately 0.1 cm of length or 5% of total biopsy length.       D. Prostate, right anterior lateral, biopsy:  - Prostatic adenocarcinoma, [place] score 3+3=6 (0% pattern 4), Grade Group 1, present in 1 of 1 cores, involving approximately 0.2 cm of length or 15% of total biopsy length.       E. prostate, right posterior medial, biopsy:  - Prostatic adenocarcinoma, [place] score 3+3=6 (0% pattern 4), Grade Group 1, present in 1 of 1 cores, involving approximately 0.2 cm of length or 15% of total biopsy length.       F. Prostate, right posterior lateral, biopsy:  - Benign prostatic tissue.        G. Prostate, left anterior lateral, biopsy:  - Benign prostatic tissue.        H. Prostate, left anterior medial, biopsy:  - Benign prostatic tissue.        I. Prostate, left base, biopsy:  - Benign fibrovascular tissue.        J. Prostate, left posterior medial, biopsy:  - Benign prostatic tissue.        K. Prostate, left posterior lateral, biopsy:  - Benign prostatic tissue.            7/5/2024 Initial Diagnosis    Prostate cancer (HCC)         Review of Systems:  Review of Systems   Constitutional: Negative.    HENT: Negative.     Eyes: Negative.    Respiratory: Negative.     Cardiovascular: Negative.    Gastrointestinal:  Positive for constipation (since starting weight loss medication).   Endocrine: Negative.    Genitourinary:  Positive for frequency and urgency.   Musculoskeletal:  Positive for back pain (lower back).   Skin: Negative.    Allergic/Immunologic: Negative.    Hematological: Negative.    Psychiatric/Behavioral: Negative.         Clinical Trial: no    IPSS Questionnaire (AUA-7):  Over the past month…    1)  How often have you had a sensation of not emptying your bladder completely after you finish urinating?  1 - Less than 1 time in 5   2)  How often have you had to urinate again less than two hours after you finished urinating? 3 - About half the time   3)  How often have you found you stopped and started again several times when you urinated?  2 - Less than half the time   4) How difficult have you found it to postpone urination?  1 - Less than 1 time in 5   5) How often have you had a weak urinary stream?  0 - Not at all   6) How often have you had to push or strain to begin urination?  2 - Less than half the time   7) How many times did you most typically get up to urinate from the time you went to bed until the time you got up in the morning?  2 - 2 times   Total Score:  11       Pain assessment: 8/10 lower back, taking tylenol, meloxicam prn    PSA: 4.95 (June 2024)    PFT: n/a    Prior Radiation: no     Teaching: NCI radiation packet    [facility] n/a    Implantable Devices (Port, pacemaker, pain stimulator): no    Hip Replacement: no    Health Maintenance   Topic Date Due    Pneumococcal Vaccine: Pediatrics (0 to 5  Years) and At-Risk Patients (6 to 64 Years) (1 of 2 - PCV) Never done    Zoster Vaccine (1 of 2) Never done    COVID-19 Vaccine (3 - 2023-24 season) 09/01/2023    BMI: Followup Plan  07/11/2024    Influenza Vaccine (1) 09/01/2024    DTaP,Tdap,and Td Vaccines (2 - Td or Tdap) 11/18/2024    Annual Physical  12/12/2024    BMI: Adult  06/26/2025    Depression Screening  07/11/2025    RSV Vaccine Age 60+ Years (1 - 1-dose 60+ series) 08/05/2028    Colorectal Cancer Screening  03/18/2032    HIV Screening  Completed    Hepatitis C Screening  Completed    RSV Vaccine age 0-20 Months  Aged Out    HIB Vaccine  Aged Out    IPV Vaccine  Aged Out    Hepatitis A Vaccine  Aged Out    Meningococcal ACWY Vaccine  Aged Out    HPV Vaccine  Aged Out       Past Medical History:   Diagnosis Date    Allergic     Anxiety     Arthritis     Asthma     Chronic pain disorder     Back pain    CPAP (continuous positive airway pressure) dependence 02/07/2024    Depression     Hyperlipemia     Hypertension     Obesity     Prostate cancer (HCC)     Sleep apnea        Past Surgical History:   Procedure Laterality Date    HEMORRHOID SURGERY      PA BX PROSTATE STRTCTC SATURATION SAMPLING IMG GID N/A 2/7/2024    Procedure: TRANSPERINEAL MRI FUSION BIOPSY PROSTATE;  Surgeon: Benjie Stewart DO;  Location: [facility];  Service: Urology    TONSILLECTOMY      URETHRAL DILATION  2019       Family History   Problem Relation Age of Onset    Hypertension Mother     Hyperlipidemia Mother     Heart disease Mother     Substance Abuse Mother     Hypertension Father     Heart disease Father     Hyperlipidemia Father     Substance Abuse Father     COPD Sister     Liver disease Sister     Prostate cancer Maternal Uncle        Social History     Tobacco Use    Smoking status: Former     Current packs/day: 0.00     Average packs/day: 0.5 packs/day for 31.0 years (15.5 ttl pk-yrs)     Types: Cigarettes     Start date: 1/1/1984     Quit date: 1/8/2018     Years  since quittin.5     Passive exposure: Past    Smokeless tobacco: Never    Tobacco comments:     No cigarettes vape only   Vaping Use    Vaping status: Every Day    Substances: Nicotine, Flavoring   Substance Use Topics    Alcohol use: Not Currently    Drug use: Never          Current Outpatient Medications:     Cyanocobalamin (Vitamin B-12) 2500 MCG SUBL, Place under the tongue, Disp: , Rfl:     Diclofenac Sodium (VOLTAREN) 1 %, Apply 2 g topically 4 (four) times a day, Disp: 100 g, Rfl: 3    diltiazem (CARDIZEM CD) 240 mg 24 hr capsule, TAKE ONE CAPSULE BY MOUTH ONCE DAILY, Disp: 30 capsule, Rfl: 5    ergocalciferol (VITAMIN D2) 50,000 units, TAKE ONE CAPSULE BY MOUTH ONCE A WEEK, Disp: 4 capsule, Rfl: 0    fluticasone (FLONASE) 50 mcg/act nasal spray, 1 spray into each nostril 2 (two) times a day, Disp: 16 g, Rfl: 3    LORazepam (ATIVAN) 2 mg tablet, Take 1 tablet (2 mg total) by mouth once as needed for anxiety (take 1 hour before MRI) for up to 1 dose, Disp: 1 tablet, Rfl: 0    losartan (COZAAR) 100 MG tablet, Take 1 tablet (100 mg total) by mouth daily, Disp: 30 tablet, Rfl: 5    meloxicam (MOBIC) 15 mg tablet, Take 1 tablet (15 mg total) by mouth daily as needed for moderate pain, Disp: 30 tablet, Rfl: 3    methocarbamol (ROBAXIN) 750 mg tablet, Take 1 tablet (750 mg total) by mouth every 8 (eight) hours, Disp: 20 tablet, Rfl: 0    metoprolol succinate (TOPROL-XL) 50 mg 24 hr tablet, Take 1 tablet (50 mg total) by mouth daily, Disp: 30 tablet, Rfl: 5    montelukast (SINGULAIR) 10 mg tablet, Take 1 tablet (10 mg total) by mouth daily at bedtime, Disp: 30 tablet, Rfl: 5    Multiple Vitamin (ONE-A-DAY MENS PO), Take by mouth, Disp: , Rfl:     olopatadine (PATANOL) 0.1 % ophthalmic solution, Administer 1 drop to both eyes 2 (two) times a day, Disp: 5 mL, Rfl: 5    pravastatin (PRAVACHOL) 20 mg tablet, Take 1 tablet (20 mg total) by mouth daily, Disp: 30 tablet, Rfl: 3    Semaglutide-Weight Management (WEGOVY)  1 MG/0.5ML, Inject 0.5 mL (1 mg total) under the skin once a week, Disp: 2 mL, Rfl: 2    spironolactone (ALDACTONE) 25 mg tablet, Take 1 tablet (25 mg total) by mouth daily, Disp: 30 tablet, Rfl: 5    triamcinolone (KENALOG) 0.1 % ointment, Apply topically 2 (two) times a day, Disp: 30 g, Rfl: 2    azelastine (ASTELIN) 0.1 % nasal spray, 2 sprays into each nostril 2 (two) times a day Use in each nostril as directed (Patient not taking: Reported on 6/13/2024), Disp: 30 mL, Rfl: 5    BLACK CURRANT SEED OIL PO, Take by mouth (Patient not taking: Reported on 2/26/2024), Disp: , Rfl:     fluticasone (FLONASE) 50 mcg/act nasal spray, 1 spray into each nostril daily (Patient not taking: Reported on 4/4/2024), Disp: 48 g, Rfl: 1    ondansetron (ZOFRAN-ODT) 8 mg disintegrating tablet, Take 1 tablet (8 mg total) by mouth every 8 (eight) hours as needed for nausea or vomiting for up to 5 days, Disp: 15 tablet, Rfl: 0    Allergies   Allergen Reactions    Contrast Dye [Iodinated Contrast Media] Vomiting        Vitals:    07/11/24 1004   BP: 152/84   BP Location: Left arm   Pulse: 80   Resp: 18   Temp: 98.2 °F (36.8 °C)   TempSrc: Temporal   SpO2: 97%   Weight: (!) 168 kg (370 lb)

## 2024-07-11 NOTE — PROGRESS NOTES
No ADT needed. Please schedule SpaceOAR/Markers. Once SpaceOAR/Markers are scheduled encounter is to be sent to RAD ONC to schedule SIM. Patient will be getting RT at ([place]). Thank you       - Routed message to Leanna Siddiqui.

## 2024-07-11 NOTE — TELEPHONE ENCOUNTER
No ADT needed. Please schedule SpaceOAR/Markers. Once SpaceOAR/Markers are scheduled encounter is to be sent to RAD ONC to schedule SIM. Patient will be getting RT at ([place]). Thank you

## 2024-07-11 NOTE — TELEPHONE ENCOUNTER
Dr. Augustus Gutiérrez consulted with Gavin today at [place], he recommends fiducial markers/SpaceOAR placement and NO ADT.  Please let us know when this is scheduled and we will coordinate sim.   thanks

## 2024-07-16 ENCOUNTER — TELEPHONE (OUTPATIENT)
Age: 54
End: 2024-07-16

## 2024-07-19 ENCOUNTER — PATIENT OUTREACH (OUTPATIENT)
Dept: HEMATOLOGY ONCOLOGY | Facility: CLINIC | Age: 54
End: 2024-07-19

## 2024-07-19 DIAGNOSIS — C61 PROSTATE CANCER (HCC): Primary | ICD-10-CM

## 2024-07-19 NOTE — PROGRESS NOTES
I reached out and spoke with Gavin now that consults have been completed with the oncology teams to complete the Distress Thermometer, review for any barriers to care and offer supportive services as needed. I reviewed and updated the members assigned to the care team in [facility].   He knows the members of the care team as well as how and when to contact them with any needs.    He verbalizes managing the schedules well.   He is currently able to drive and denies any transportation needs.    He denies any uncontrolled symptoms. Discussed role of Palliative Care in symptom and side effect management. Declined referral at this time.  Patients states that he is eating and drinking as per usual with no unintentional weight loss.     Patient is currently smoking. We reviewed the smoking cessation program. I offered to place a referral but patient declines at this time.    Patient states he is well supported by family and friends.  Community support groups discussed including the Cancer Support Community of the [facility]. Patient declined information at this time.   Patient feels he has adequate insurance coverage and denies any financial concerns at this time.       Based on their individual needs I will follow up with them in several weeks about half way through treatment.   I have provided my direct contact information and welcome them to contact me if their needs as discussed above change. They were appreciative for the call.

## 2024-07-23 ENCOUNTER — TELEPHONE (OUTPATIENT)
Age: 54
End: 2024-07-23

## 2024-07-23 ENCOUNTER — DOCUMENTATION (OUTPATIENT)
Dept: HEMATOLOGY ONCOLOGY | Facility: CLINIC | Age: 54
End: 2024-07-23

## 2024-07-23 NOTE — PROGRESS NOTES
Chart reviewed. Patient is scheduled for SpaceOAR/Fiducial markers on 9/4/24. Date was sent from urology surgery scheduler to RAD ONC office to schedule SIM. I will follow up on date.

## 2024-07-23 NOTE — TELEPHONE ENCOUNTER
Pt called, sts that he received a missed call from office and he is returning the call.  Informed pt that SS was trying to reach him and pt requested to speak with SS.  Call was dropped, pt disconnected call when I tried to trans to SS.    Pt call back: [phone]

## 2024-07-26 ENCOUNTER — PATIENT OUTREACH (OUTPATIENT)
Dept: CASE MANAGEMENT | Facility: OTHER | Age: 54
End: 2024-07-26

## 2024-07-26 NOTE — PROGRESS NOTES
Biopsychosocial and Barriers Assessment    Cancer Diagnosis: Prostate cancer  Home/Cell Phone:[phone]  Emergency Contact:Tianna Montanez ([phone]) S.O.  Marital Status:single  Interpretation concerns, speaks another language, preferred language: English  Cultural concerns:none  Ability to read or write: yes    Caregiver/Support: limited, S.O. has medical issues  Children:   Child/Elder care: none    Housing: House (rental)  Home Setup: multistory  Lives With: S.O.  Daily Living Activities: independent  Durable Medical Equipment: none  Ambulation: independent    Preferred Pharmacy: [facility] [place]  High co-pays with insurance: [employer] [place]  High co-pays with medication coverage: none-has MA  No medication coverage: N/A    Primary Care Provider: Ricardo Castillo DO  Hx of Home Health Care: none  Hx of Short term rehab: none  Mental Health Hx: endorses anxiety from dx and work stress  Substance Abuse Hx: none  Employment:works FT as a   [place] Status/Location: no  Ability to pay bills: yes-but worried about missing work for treatments  POA/LW/AD: none  Transportation Plan/Concerns: drives self      What do you know about your Cancer Diagnosis    What has your doctor told you about your cancer diagnosis: I have prostate cancer    What has your doctor told you about your cancer treatment: I need to have markers placed then get radiation    What specific concerns do you have about your diagnosis and treatment: I am really worried about missing work and paying my bills, and how I'm going to feel while I still have to go to work    Have you been made aware of any hair loss associated with treatment: N/A    Additional Comments:  OSW received referral from PN after DT was completed where pt scored a 10/10 and noted several psychosocial concerns. Called Mr. Frank today to introduce self and explain role of OSW team. Mr. Frank is a very pleasant 52 y/o gentleman with prostate  cancer. He saw radiation oncology provider and is scheduled for SpaceOar and markers in September. Mr. Frank stated he works as a  and has a very stressful job. 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 He wants to make sure he is in good shape physically and mentally to work after he starts treatment. In addition, he does not get PTO so any time off is unpaid. He worries about losing income and not being able to pay rent or bills. He has worked for this company for the past 2 years. He does receive a managed MA, but was denied SNAP for exceeding income limits. Mr. Frank lives in [place] with his girlfriend who has MS and has been falling more recently.   OSW provided emotional support and validated his feelings. Offered to gather some resources for coping with cancer as well as possible financial aid from organizations and he is very appreciative. [email or web address] was accessed and will print out listing for pt to be mailed. He stated he is not skilled with technology and would prefer information on paper.   OSW printed out the following resources:   One-on-One Peer Counseling by Support Connection   Supportive Services for Cancer Fighters by Burt Sheets   Reel Recovery Retreats (for Men with Cancer) by [facility]   Financial Assistance by [facility]  Peer to Peer Cancer Mentorship by Elena SHEETS (Community Access to Resources, Education, and Support) by American Cancer Society   Cancer Care Packages by [employer]   Cancer Peer Support by [facility]   Cancer-Related Legal and Financial Education by Triage Cancer   Support Groups by Cancer Support Community of [facility]   Cancer Helpline and Live Chat by American Cancer Society   [id number] Comprehensive Patient Support by [facility]     OSW will continue to offer outreach and support. Will plan on another call in about 2 weeks.

## 2024-07-29 ENCOUNTER — OFFICE VISIT (OUTPATIENT)
Dept: FAMILY MEDICINE CLINIC | Facility: CLINIC | Age: 54
End: 2024-07-29
Payer: MEDICARE

## 2024-07-29 ENCOUNTER — DOCUMENTATION (OUTPATIENT)
Dept: HEMATOLOGY ONCOLOGY | Facility: CLINIC | Age: 54
End: 2024-07-29

## 2024-07-29 ENCOUNTER — TELEPHONE (OUTPATIENT)
Age: 54
End: 2024-07-29

## 2024-07-29 VITALS
BODY MASS INDEX: 39.17 KG/M2 | HEIGHT: 75 IN | HEART RATE: 76 BPM | TEMPERATURE: 97.6 F | SYSTOLIC BLOOD PRESSURE: 140 MMHG | OXYGEN SATURATION: 99 % | WEIGHT: 315 LBS | DIASTOLIC BLOOD PRESSURE: 90 MMHG

## 2024-07-29 DIAGNOSIS — J34.2 DEVIATED NASAL SEPTUM: ICD-10-CM

## 2024-07-29 DIAGNOSIS — E78.2 MIXED HYPERLIPIDEMIA: ICD-10-CM

## 2024-07-29 DIAGNOSIS — J34.3 NASAL TURBINATE HYPERTROPHY: ICD-10-CM

## 2024-07-29 DIAGNOSIS — C61 PROSTATE CANCER (HCC): ICD-10-CM

## 2024-07-29 DIAGNOSIS — E66.01 CLASS 3 SEVERE OBESITY DUE TO EXCESS CALORIES WITH SERIOUS COMORBIDITY AND BODY MASS INDEX (BMI) OF 50.0 TO 59.9 IN ADULT (HCC): ICD-10-CM

## 2024-07-29 DIAGNOSIS — T78.40XS ALLERGY, SEQUELA: ICD-10-CM

## 2024-07-29 DIAGNOSIS — J34.89 SINUS PRESSURE: ICD-10-CM

## 2024-07-29 DIAGNOSIS — M19.90 ARTHRITIS: ICD-10-CM

## 2024-07-29 DIAGNOSIS — I10 PRIMARY HYPERTENSION: ICD-10-CM

## 2024-07-29 DIAGNOSIS — Z99.89 CPAP (CONTINUOUS POSITIVE AIRWAY PRESSURE) DEPENDENCE: ICD-10-CM

## 2024-07-29 DIAGNOSIS — G47.33 OBSTRUCTIVE SLEEP APNEA: Primary | ICD-10-CM

## 2024-07-29 DIAGNOSIS — E55.9 VITAMIN D DEFICIENCY: ICD-10-CM

## 2024-07-29 DIAGNOSIS — C61 PROSTATE CA (HCC): ICD-10-CM

## 2024-07-29 DIAGNOSIS — M54.9 BACK PAIN: ICD-10-CM

## 2024-07-29 DIAGNOSIS — R21 RASH: ICD-10-CM

## 2024-07-29 DIAGNOSIS — E53.8 B12 DEFICIENCY: ICD-10-CM

## 2024-07-29 PROCEDURE — 99215 OFFICE O/P EST HI 40 MIN: CPT | Performed by: FAMILY MEDICINE

## 2024-07-29 PROCEDURE — 96372 THER/PROPH/DIAG INJ SC/IM: CPT

## 2024-07-29 RX ORDER — FLUTICASONE PROPIONATE 50 MCG
1 SPRAY, SUSPENSION (ML) NASAL 2 TIMES DAILY
Qty: 16 G | Refills: 3 | Status: SHIPPED | OUTPATIENT
Start: 2024-07-29

## 2024-07-29 RX ORDER — METHOCARBAMOL 750 MG/1
750 TABLET, FILM COATED ORAL EVERY 8 HOURS SCHEDULED
Qty: 30 TABLET | Refills: 3 | Status: SHIPPED | OUTPATIENT
Start: 2024-07-29

## 2024-07-29 RX ORDER — LORAZEPAM 2 MG/1
2 TABLET ORAL ONCE AS NEEDED
Qty: 30 TABLET | Refills: 3 | Status: SHIPPED | OUTPATIENT
Start: 2024-07-29

## 2024-07-29 RX ORDER — MONTELUKAST SODIUM 10 MG/1
10 TABLET ORAL
Qty: 90 TABLET | Refills: 1 | Status: SHIPPED | OUTPATIENT
Start: 2024-07-29

## 2024-07-29 RX ORDER — DILTIAZEM HYDROCHLORIDE 240 MG/1
240 CAPSULE, COATED, EXTENDED RELEASE ORAL DAILY
Qty: 90 CAPSULE | Refills: 1 | Status: SHIPPED | OUTPATIENT
Start: 2024-07-29 | End: 2025-01-25

## 2024-07-29 RX ORDER — MELOXICAM 15 MG/1
15 TABLET ORAL DAILY PRN
Qty: 90 TABLET | Refills: 1 | Status: SHIPPED | OUTPATIENT
Start: 2024-07-29

## 2024-07-29 RX ORDER — SEMAGLUTIDE 1.7 MG/.75ML
INJECTION, SOLUTION SUBCUTANEOUS
Qty: 3 ML | Refills: 5 | Status: SHIPPED | OUTPATIENT
Start: 2024-07-29

## 2024-07-29 RX ORDER — TRIAMCINOLONE ACETONIDE 1 MG/G
OINTMENT TOPICAL 2 TIMES DAILY
Qty: 30 G | Refills: 2 | Status: SHIPPED | OUTPATIENT
Start: 2024-07-29

## 2024-07-29 RX ORDER — LOSARTAN POTASSIUM 100 MG/1
100 TABLET ORAL DAILY
Qty: 90 TABLET | Refills: 1 | Status: SHIPPED | OUTPATIENT
Start: 2024-07-29

## 2024-07-29 RX ORDER — METOPROLOL SUCCINATE 50 MG/1
50 TABLET, EXTENDED RELEASE ORAL DAILY
Qty: 90 TABLET | Refills: 1 | Status: SHIPPED | OUTPATIENT
Start: 2024-07-29

## 2024-07-29 RX ORDER — PRAVASTATIN SODIUM 20 MG
20 TABLET ORAL DAILY
Qty: 90 TABLET | Refills: 1 | Status: SHIPPED | OUTPATIENT
Start: 2024-07-29

## 2024-07-29 RX ORDER — ERGOCALCIFEROL 1.25 MG/1
50000 CAPSULE ORAL WEEKLY
Qty: 4 CAPSULE | Refills: 0 | Status: SHIPPED | OUTPATIENT
Start: 2024-07-29

## 2024-07-29 RX ORDER — SPIRONOLACTONE 25 MG/1
25 TABLET ORAL DAILY
Qty: 90 TABLET | Refills: 1 | Status: SHIPPED | OUTPATIENT
Start: 2024-07-29

## 2024-07-29 RX ORDER — CYANOCOBALAMIN 1000 UG/ML
1000 INJECTION, SOLUTION INTRAMUSCULAR; SUBCUTANEOUS
Status: SHIPPED | OUTPATIENT
Start: 2024-07-29

## 2024-07-29 RX ORDER — OLOPATADINE HYDROCHLORIDE 1 MG/ML
1 SOLUTION/ DROPS OPHTHALMIC 2 TIMES DAILY
Qty: 5 ML | Refills: 5 | Status: SHIPPED | OUTPATIENT
Start: 2024-07-29

## 2024-07-29 RX ORDER — SEMAGLUTIDE 1.7 MG/.75ML
INJECTION, SOLUTION SUBCUTANEOUS
Qty: 3 ML | Refills: 5 | Status: SHIPPED | OUTPATIENT
Start: 2024-07-29 | End: 2024-07-29 | Stop reason: SDUPTHER

## 2024-07-29 RX ADMIN — CYANOCOBALAMIN 1000 MCG: 1000 INJECTION, SOLUTION INTRAMUSCULAR; SUBCUTANEOUS at 10:09

## 2024-07-29 NOTE — TELEPHONE ENCOUNTER
Patient called and would like the   Semaglutide-Weight Management (Wegovy) 1.7 MG/0.75ML  released to the pharmacy.  He states his insurance does not need another PA>

## 2024-07-29 NOTE — PROGRESS NOTES
Assessment/Plan:      Diagnoses and all orders for this visit:    Obstructive sleep apnea    Primary hypertension  -     diltiazem (CARDIZEM CD) 240 mg 24 hr capsule; Take 1 capsule (240 mg total) by mouth daily  -     losartan (COZAAR) 100 MG tablet; Take 1 tablet (100 mg total) by mouth daily  -     metoprolol succinate (TOPROL-XL) 50 mg 24 hr tablet; Take 1 tablet (50 mg total) by mouth daily  -     spironolactone (ALDACTONE) 25 mg tablet; Take 1 tablet (25 mg total) by mouth daily    Vitamin D deficiency  -     ergocalciferol (VITAMIN D2) 50,000 units; Take 1 capsule (50,000 Units total) by mouth once a week    Sinus pressure  -     fluticasone (FLONASE) 50 mcg/act nasal spray; 1 spray into each nostril 2 (two) times a day    Deviated nasal septum  -     fluticasone (FLONASE) 50 mcg/act nasal spray; 1 spray into each nostril 2 (two) times a day    Nasal turbinate hypertrophy  -     fluticasone (FLONASE) 50 mcg/act nasal spray; 1 spray into each nostril 2 (two) times a day    Prostate CA (HCC)  -     LORazepam (ATIVAN) 2 mg tablet; Take 1 tablet (2 mg total) by mouth once as needed for anxiety (take 1 hour before MRI) for up to 120 doses    Arthritis  -     meloxicam (MOBIC) 15 mg tablet; Take 1 tablet (15 mg total) by mouth daily as needed for moderate pain    Back pain  -     methocarbamol (ROBAXIN) 750 mg tablet; Take 1 tablet (750 mg total) by mouth every 8 (eight) hours    Allergy, sequela  -     montelukast (SINGULAIR) 10 mg tablet; Take 1 tablet (10 mg total) by mouth daily at bedtime  -     olopatadine (PATANOL) 0.1 % ophthalmic solution; Administer 1 drop to both eyes 2 (two) times a day    Mixed hyperlipidemia  -     pravastatin (PRAVACHOL) 20 mg tablet; Take 1 tablet (20 mg total) by mouth daily    Rash  -     triamcinolone (KENALOG) 0.1 % ointment; Apply topically 2 (two) times a day    Prostate cancer (HCC)    Class 3 severe obesity due to excess calories with serious comorbidity and body mass index  (BMI) of 50.0 to 59.9 in adult (HCC)  -     Semaglutide-Weight Management (Wegovy) 1.7 MG/0.75ML; Inject 1.7 mg under the skin weekly    CPAP (continuous positive airway pressure) dependence    B12 deficiency  -     Vitamin B12; Future  -     cyanocobalamin injection 1,000 mcg          Subjective:     Patient ID: Gavin Frank is a 53 y.o. male.    Patient presents with:  Follow-up: Patient is here for a 3 month follow up and labs review. Medication refill needed and discuss wegovy medication.  LR    He does follow up with Radiation Oncology...Stage I low risk prostate adenocarcinoma, [id number], Maryanne score 3+3=6, [place] grade group 1 with pre-treatment PSA 4.9 and < 50% positive biopsy cores.        Review of Systems   Constitutional: Negative.    HENT: Negative.     Eyes: Negative.    Respiratory: Negative.     Cardiovascular: Negative.    Gastrointestinal: Negative.    Endocrine: Negative.    Musculoskeletal: Negative.    Skin: Negative.    Allergic/Immunologic: Negative.    Neurological: Negative.    Hematological: Negative.    Psychiatric/Behavioral: Negative.     All other systems reviewed and are negative.        Objective:     Physical Exam  Vitals and nursing note reviewed.   Constitutional:       Appearance: Normal appearance. He is well-developed.   HENT:      Head: Normocephalic and atraumatic.      Right Ear: External ear normal.      Left Ear: External ear normal.      Nose: Nose normal.   Eyes:      Conjunctiva/sclera: Conjunctivae normal.      Pupils: Pupils are equal, round, and reactive to light.   Cardiovascular:      Rate and Rhythm: Normal rate and regular rhythm.      Pulses: Normal pulses.      Heart sounds: Normal heart sounds.   Pulmonary:      Effort: Pulmonary effort is normal.      Breath sounds: Normal breath sounds.   Abdominal:      General: Bowel sounds are normal.      Palpations: Abdomen is soft.   Musculoskeletal:         General: Normal range of motion.      Cervical back: Normal  range of motion and neck supple.   Skin:     General: Skin is warm and dry.   Neurological:      General: No focal deficit present.      Mental Status: He is alert and oriented to person, place, and time.      Deep Tendon Reflexes: Reflexes are normal and symmetric.   Psychiatric:         Mood and Affect: Mood normal.         Behavior: Behavior normal.

## 2024-07-29 NOTE — PROGRESS NOTES
Patient is scheduled for Radiation SIM on 9/12/24. I will make sure patient follows back up with urology 3-6 months after completion of radiation with PSA prior to the visit.

## 2024-07-30 ENCOUNTER — TELEPHONE (OUTPATIENT)
Age: 54
End: 2024-07-30

## 2024-07-30 NOTE — TELEPHONE ENCOUNTER
Reason for call:   [] Refill   [x] Prior Auth  [] Other:     Office:   [x] PCP/Provider - Ricardo Castillo DO   [] Specialty/Provider -     Medication:   Semaglutide-Weight Management (Wegovy) 1.7 MG/0.75M     Dose/Frequency:  Inject 1.7 mg under the skin weekly     Quantity: 3 ml    Pharmacy: [id number] - [place] - [address]     Does the patient have enough for 3 days?   [] Yes   [x] No - Send as HP to POD

## 2024-08-06 ENCOUNTER — PATIENT OUTREACH (OUTPATIENT)
Dept: CASE MANAGEMENT | Facility: OTHER | Age: 54
End: 2024-08-06

## 2024-08-06 NOTE — PROGRESS NOTES
OSW called Mr. Frank today. He confirmed he received this writer's packet of information and is going to read through the packet when he has a bit more time. He stated he is going to call his insurance company about possible benefits for free transportation. He is very concerned he won't feel up to driving himself to radiation once it starts.   Yesterday was Mr. Frank's birthday. His boss took him to lunch of which he was very grateful. He talked about his cancer diagnosis and that he tries not to look up things on the internet. Discussed that doing this can sometimes cause more anxiety and fears, and the information may not always be accurate. He understood same.  OSW will continue to follow. Will review chart after pt has SpaceOAR and markers and outreach closer to when radiation will start. In the meantime, encouraged pt to contact OSW for support as needed. He was appreciative of call today.

## 2024-08-14 ENCOUNTER — HOSPITAL ENCOUNTER (EMERGENCY)
Facility: HOSPITAL | Age: 54
Discharge: HOME | End: 2024-08-14
Attending: EMERGENCY MEDICINE | Admitting: EMERGENCY MEDICINE
Payer: MEDICARE

## 2024-08-14 VITALS
DIASTOLIC BLOOD PRESSURE: 127 MMHG | TEMPERATURE: 97.5 F | RESPIRATION RATE: 16 BRPM | SYSTOLIC BLOOD PRESSURE: 209 MMHG | OXYGEN SATURATION: 99 % | HEART RATE: 95 BPM

## 2024-08-14 DIAGNOSIS — M79.605 LEFT LEG PAIN: Primary | ICD-10-CM

## 2024-08-14 DIAGNOSIS — M54.32 SCIATICA OF LEFT SIDE: ICD-10-CM

## 2024-08-14 PROCEDURE — 96372 THER/PROPH/DIAG INJ SC/IM: CPT

## 2024-08-14 PROCEDURE — 99282 EMERGENCY DEPT VISIT SF MDM: CPT

## 2024-08-14 PROCEDURE — 99284 EMERGENCY DEPT VISIT MOD MDM: CPT | Performed by: EMERGENCY MEDICINE

## 2024-08-14 RX ORDER — METHOCARBAMOL 500 MG/1
500 TABLET, FILM COATED ORAL ONCE
Status: COMPLETED | OUTPATIENT
Start: 2024-08-14 | End: 2024-08-14

## 2024-08-14 RX ORDER — METHOCARBAMOL 500 MG/1
500 TABLET, FILM COATED ORAL 2 TIMES DAILY
Qty: 20 TABLET | Refills: 0 | Status: SHIPPED | OUTPATIENT
Start: 2024-08-14

## 2024-08-14 RX ORDER — NAPROXEN 500 MG/1
500 TABLET ORAL 2 TIMES DAILY WITH MEALS
Qty: 30 TABLET | Refills: 0 | Status: SHIPPED | OUTPATIENT
Start: 2024-08-14

## 2024-08-14 RX ORDER — LIDOCAINE 50 MG/G
1 PATCH TOPICAL ONCE
Status: DISCONTINUED | OUTPATIENT
Start: 2024-08-14 | End: 2024-08-14 | Stop reason: HOSPADM

## 2024-08-14 RX ORDER — PREDNISONE 20 MG/1
40 TABLET ORAL DAILY
Qty: 10 TABLET | Refills: 0 | Status: SHIPPED | OUTPATIENT
Start: 2024-08-14 | End: 2024-08-19

## 2024-08-14 RX ORDER — LIDOCAINE 50 MG/G
1 PATCH TOPICAL DAILY
Qty: 10 PATCH | Refills: 0 | Status: SHIPPED | OUTPATIENT
Start: 2024-08-14 | End: 2024-08-24

## 2024-08-14 RX ORDER — KETOROLAC TROMETHAMINE 30 MG/ML
15 INJECTION, SOLUTION INTRAMUSCULAR; INTRAVENOUS ONCE
Status: COMPLETED | OUTPATIENT
Start: 2024-08-14 | End: 2024-08-14

## 2024-08-14 RX ADMIN — DEXAMETHASONE 10 MG: 2 TABLET ORAL at 10:45

## 2024-08-14 RX ADMIN — KETOROLAC TROMETHAMINE 15 MG: 30 INJECTION, SOLUTION INTRAMUSCULAR; INTRAVENOUS at 10:45

## 2024-08-14 RX ADMIN — LIDOCAINE 1 PATCH: 50 PATCH CUTANEOUS at 10:45

## 2024-08-14 RX ADMIN — METHOCARBAMOL 500 MG: 500 TABLET ORAL at 10:45

## 2024-08-14 NOTE — ED PROVIDER NOTES
History  Chief Complaint   Patient presents with    Leg Pain     Patient reports sciatic nerve pain that radiates down the leg.      HPI this is a 54-year-old male who presents to the emergency department with a left-sided buttock pain radiating down to his leg.  States that he has a significant other with MS, and that they had stumbled, and he had reached to catch them.  He states that he believes he had pulled his back.  He states that during the night, he had a hard time getting comfortable, then woke up this morning and states he cannot sit secondary to radiating pain.  He has a history of sciatica in the past.  The patient also has a history of prostate cancer.  He has no history of bony mets, has received no radiation therapy, and has had no surgery or history of fractures or injuries to his back.  He did get injections in his back many years ago.  The patient has not had fevers.  He does not use IV drugs.  He is not diabetic.  The patient does not have any saddle anesthesia, bowel or bladder symptoms.  He has not defecated since the pain got severe, but he has been able to urinate normally.  The patient states that he does not feel he can work today secondary to the pain, because he could not sit in his car.  He states that in the past muscle relaxants have provided him with some relief.    Prior to Admission Medications   Prescriptions Last Dose Informant Patient Reported? Taking?   BLACK CURRANT SEED OIL PO  Self Yes No   Sig: Take by mouth   Patient not taking: Reported on 2/26/2024   Cyanocobalamin (Vitamin B-12) 2500 MCG SUBL  Self Yes No   Sig: Place under the tongue   Diclofenac Sodium (VOLTAREN) 1 %  Self No No   Sig: Apply 2 g topically 4 (four) times a day   LORazepam (ATIVAN) 2 mg tablet   No No   Sig: Take 1 tablet (2 mg total) by mouth once as needed for anxiety (take 1 hour before MRI) for up to 120 doses   Multiple Vitamin (ONE-A-DAY MENS PO)  Self Yes No   Sig: Take by mouth    Semaglutide-Weight Management (Wegovy) 1.7 MG/0.75ML   No No   Sig: Inject 1.7 mg under the skin weekly   azelastine (ASTELIN) 0.1 % nasal spray  Self No No   Si sprays into each nostril 2 (two) times a day Use in each nostril as directed   Patient not taking: Reported on 2024   diltiazem (CARDIZEM CD) 240 mg 24 hr capsule   No No   Sig: Take 1 capsule (240 mg total) by mouth daily   ergocalciferol (VITAMIN D2) 50,000 units   No No   Sig: Take 1 capsule (50,000 Units total) by mouth once a week   fluticasone (FLONASE) 50 mcg/act nasal spray  Self No No   Si spray into each nostril daily   Patient not taking: Reported on 2024   fluticasone (FLONASE) 50 mcg/act nasal spray   No No   Si spray into each nostril 2 (two) times a day   losartan (COZAAR) 100 MG tablet   No No   Sig: Take 1 tablet (100 mg total) by mouth daily   meloxicam (MOBIC) 15 mg tablet   No No   Sig: Take 1 tablet (15 mg total) by mouth daily as needed for moderate pain   methocarbamol (ROBAXIN) 750 mg tablet   No No   Sig: Take 1 tablet (750 mg total) by mouth every 8 (eight) hours   metoprolol succinate (TOPROL-XL) 50 mg 24 hr tablet   No No   Sig: Take 1 tablet (50 mg total) by mouth daily   montelukast (SINGULAIR) 10 mg tablet   No No   Sig: Take 1 tablet (10 mg total) by mouth daily at bedtime   olopatadine (PATANOL) 0.1 % ophthalmic solution   No No   Sig: Administer 1 drop to both eyes 2 (two) times a day   ondansetron (ZOFRAN-ODT) 8 mg disintegrating tablet  Self No No   Sig: Take 1 tablet (8 mg total) by mouth every 8 (eight) hours as needed for nausea or vomiting for up to 5 days   pravastatin (PRAVACHOL) 20 mg tablet   No No   Sig: Take 1 tablet (20 mg total) by mouth daily   spironolactone (ALDACTONE) 25 mg tablet   No No   Sig: Take 1 tablet (25 mg total) by mouth daily   triamcinolone (KENALOG) 0.1 % ointment   No No   Sig: Apply topically 2 (two) times a day      Facility-Administered Medications Last  Administration Doses Remaining   cyanocobalamin injection 1,000 mcg 2024 10:09 AM           Past Medical History:   Diagnosis Date    Allergic     Anxiety     Arthritis     Asthma     Chronic pain disorder     Back pain    CPAP (continuous positive airway pressure) dependence 2024    Depression     Hyperlipemia     Hypertension     Obesity     Prostate cancer (HCC)     Sleep apnea        Past Surgical History:   Procedure Laterality Date    HEMORRHOID SURGERY      DE BX PROSTATE STRTCTC SATURATION SAMPLING IMG GID N/A 2024    Procedure: TRANSPERINEAL MRI FUSION BIOPSY PROSTATE;  Surgeon: Benjie Stewart DO;  Location: [facility];  Service: Urology    TONSILLECTOMY      URETHRAL DILATION         Family History   Problem Relation Age of Onset    Hypertension Mother     Hyperlipidemia Mother     Heart disease Mother     Substance Abuse Mother     Hypertension Father     Heart disease Father     Hyperlipidemia Father     Substance Abuse Father     COPD Sister     Liver disease Sister     Prostate cancer Maternal Uncle      I have reviewed and agree with the history as documented.    E-Cigarette/Vaping    E-Cigarette Use Current Every Day User     Comments Vapes daily and cartridge last vaped 2.6.24      E-Cigarette/Vaping Substances    Nicotine Yes     THC No     CBD No     Flavoring Yes     Other No     Unknown No      Social History     Tobacco Use    Smoking status: Former     Current packs/day: 0.00     Average packs/day: 0.5 packs/day for 31.0 years (15.5 ttl pk-yrs)     Types: Cigarettes     Start date: 1984     Quit date: 2018     Years since quittin.6     Passive exposure: Past    Smokeless tobacco: Never    Tobacco comments:     No cigarettes vape only   Vaping Use    Vaping status: Every Day    Substances: Nicotine, Flavoring   Substance Use Topics    Alcohol use: Not Currently    Drug use: Never       Review of Systems  Review of systems otherwise -12 system  reviewed  Physical Exam  Physical Exam    Vital Signs  ED Triage Vitals [08/14/24 0947]   Temperature Pulse Respirations Blood Pressure SpO2   97.5 °F (36.4 °C) 95 16 (!) 209/127 99 %      Temp Source Heart Rate Source Patient Position - Orthostatic VS BP Location FiO2 (%)   Temporal Monitor -- -- --      Pain Score       7           Vitals:    08/14/24 0947   BP: (!) 209/127   Pulse: 95     On exam the patient awake and alert.  His HEENT exam is nonfocal.  He has stable stigmata of embolic phenomenon.  His neck is supple and nontender.  His heart is regular without murmurs, rubs, gallops or his lungs are clear with good air movement.  Abdomen is soft and nontender with no masses, rebound, or guarding.  He has no midline back tenderness or step-offs.  He does have some mild tenderness over his SI joint.  He has intact lower extremity reflexes, no evidence of saddle anesthesia, steady gait.  The patient has a negative straight leg test.  He does not have any abdominal tenderness or masses.  Visual Acuity      ED Medications  Medications   ketorolac (TORADOL) injection 15 mg (15 mg Intramuscular Given 8/14/24 1045)   methocarbamol (ROBAXIN) tablet 500 mg (500 mg Oral Given 8/14/24 1045)   dexamethasone (DECADRON) tablet 10 mg (10 mg Oral Given 8/14/24 1045)       Diagnostic Studies  Results Reviewed       None                   No orders to display              Procedures  Procedures         ED Course                                               Medical Decision Making  Risk  Prescription drug management.               MEDICAL DECISION MAKING    Number and Complexity of Problems  Differential diagnosis: Sciatica, musculoskeletal back pain, doubt cauda equina, doubt bony metastases, doubt occult fracture    Medical Decision Making Data  External documents reviewed:   My EKG interpretation:   My CT interpretation:   My X-ray interpretation:   My ultrasound interpretation:     No orders to display       Labs Reviewed -  No data to display    Labs reviewed by me are significant for:     Clinical decision rules/scores are significant for:     Discussed case with:   Considered admission for:     Treatment and Disposition  ED course: Patient seen and examined.  Patient with no contraindications to steroids, complaining of neuropathic lancinating pain.  Will plan to treat with NSAIDs, Lidoderm patch, Robaxin, and steroid pulse  Shared decision making:   Code status:     Disposition  Final diagnoses:   Left leg pain   Sciatica of left side     Time reflects when diagnosis was documented in both MDM as applicable and the Disposition within this note       Time User Action Codes Description Comment    8/14/2024 10:39 AM Anjelica Tran [M79.605] Left leg pain     8/14/2024 10:39 AM Anjelica Tran [M54.32] Sciatica of left side           ED Disposition       ED Disposition   Discharge    Condition   Stable    Date/Time   Wed Aug 14, 2024 10:39 AM    Comment   Gavin Frank discharge to home/self care.                   Follow-up Information    None         Discharge Medication List as of 8/14/2024 10:41 AM        START taking these medications    Details   lidocaine (Lidoderm) 5 % Apply 1 patch topically over 12 hours daily for 10 days Remove & Discard patch within 12 hours or as directed by MD, Starting Wed 8/14/2024, Until Sat 8/24/2024, Normal      !! methocarbamol (ROBAXIN) 500 mg tablet Take 1 tablet (500 mg total) by mouth 2 (two) times a day, Starting Wed 8/14/2024, Normal      naproxen (Naprosyn) 500 mg tablet Take 1 tablet (500 mg total) by mouth 2 (two) times a day with meals, Starting Wed 8/14/2024, Normal      predniSONE 20 mg tablet Take 2 tablets (40 mg total) by mouth daily for 5 days, Starting Wed 8/14/2024, Until Mon 8/19/2024, Normal       !! - Potential duplicate medications found. Please discuss with provider.        CONTINUE these medications which have NOT CHANGED    Details   azelastine (ASTELIN) 0.1  % nasal spray 2 sprays into each nostril 2 (two) times a day Use in each nostril as directed, Starting Tue 7/11/2023, Normal      BLACK CURRANT SEED OIL PO Take by mouth, Historical Med      Cyanocobalamin (Vitamin B-12) 2500 MCG SUBL Place under the tongue, Historical Med      Diclofenac Sodium (VOLTAREN) 1 % Apply 2 g topically 4 (four) times a day, Starting u 4/25/2024, Normal      diltiazem (CARDIZEM CD) 240 mg 24 hr capsule Take 1 capsule (240 mg total) by mouth daily, Starting Mon 7/29/2024, Until Sat 1/25/2025, Normal      ergocalciferol (VITAMIN D2) 50,000 units Take 1 capsule (50,000 Units total) by mouth once a week, Starting Mon 7/29/2024, Normal      !! fluticasone (FLONASE) 50 mcg/act nasal spray 1 spray into each nostril daily, Starting Wed 5/10/2023, Normal      !! fluticasone (FLONASE) 50 mcg/act nasal spray 1 spray into each nostril 2 (two) times a day, Starting Mon 7/29/2024, Normal      LORazepam (ATIVAN) 2 mg tablet Take 1 tablet (2 mg total) by mouth once as needed for anxiety (take 1 hour before MRI) for up to 120 doses, Starting Mon 7/29/2024, Normal      losartan (COZAAR) 100 MG tablet Take 1 tablet (100 mg total) by mouth daily, Starting Mon 7/29/2024, Normal      meloxicam (MOBIC) 15 mg tablet Take 1 tablet (15 mg total) by mouth daily as needed for moderate pain, Starting Mon 7/29/2024, Normal      !! methocarbamol (ROBAXIN) 750 mg tablet Take 1 tablet (750 mg total) by mouth every 8 (eight) hours, Starting Mon 7/29/2024, Normal      metoprolol succinate (TOPROL-XL) 50 mg 24 hr tablet Take 1 tablet (50 mg total) by mouth daily, Starting Mon 7/29/2024, Normal      montelukast (SINGULAIR) 10 mg tablet Take 1 tablet (10 mg total) by mouth daily at bedtime, Starting Mon 7/29/2024, Normal      Multiple Vitamin (ONE-A-DAY MENS PO) Take by mouth, Historical Med      olopatadine (PATANOL) 0.1 % ophthalmic solution Administer 1 drop to both eyes 2 (two) times a day, Starting Mon 7/29/2024, Normal       ondansetron (ZOFRAN-ODT) 8 mg disintegrating tablet Take 1 tablet (8 mg total) by mouth every 8 (eight) hours as needed for nausea or vomiting for up to 5 days, Starting Tue 11/7/2023, Until Wed 6/26/2024 at 2359, Normal      pravastatin (PRAVACHOL) 20 mg tablet Take 1 tablet (20 mg total) by mouth daily, Starting Mon 7/29/2024, Normal      Semaglutide-Weight Management (Wegovy) 1.7 MG/0.75ML Inject 1.7 mg under the skin weekly, Normal      spironolactone (ALDACTONE) 25 mg tablet Take 1 tablet (25 mg total) by mouth daily, Starting Mon 7/29/2024, Normal      triamcinolone (KENALOG) 0.1 % ointment Apply topically 2 (two) times a day, Starting Mon 7/29/2024, Normal       !! - Potential duplicate medications found. Please discuss with provider.              PDMP Review         Value Time User    PDMP Reviewed  Yes 4/25/2024 11:13 AM Ricardo Castillo DO            ED Provider  Electronically Signed by             Anjelica Tran MD  08/15/24 4905

## 2024-08-14 NOTE — DISCHARGE INSTRUCTIONS
You likely have sciatica.  You should not return to work today or tomorrow.  You can take muscle relaxants, use Lidoderm patches, and take Naprosyn for your discomfort.  If the steroids are helping you, you may take them for 5 days.  You should follow-up with [facility] for physical therapy and further referrals.  You should return to the emergency department if you have numbness around your groin, cannot control your bowels, have difficulty ambulating, or have any other concerns.

## 2024-08-15 ENCOUNTER — VBI (OUTPATIENT)
Dept: FAMILY MEDICINE CLINIC | Facility: CLINIC | Age: 54
End: 2024-08-15

## 2024-08-15 ENCOUNTER — TELEPHONE (OUTPATIENT)
Dept: PHYSICAL THERAPY | Facility: OTHER | Age: 54
End: 2024-08-15

## 2024-08-15 NOTE — TELEPHONE ENCOUNTER
Call placed to the patient per [facility] referral.    Spoke with patient, explained program and reason for the call.    Patient declined for now. He is scheduled for sinus Surgery this month and will start treatment for prostate Cancer soon. Patient also would like to discuss further with his Oncologist or PCP about doing PT.    Patient will call us back when ready. Phone number and hours of business provided.    [facility] referral closed per protocol.

## 2024-08-16 NOTE — TELEPHONE ENCOUNTER
08/16/24 2:13 PM    Patient contacted post ED visit, [facility] department spoke with patient/caregiver and outreach was successful.    Thank you.  Gi Sauceda MA  [facility]

## 2024-08-19 ENCOUNTER — TELEPHONE (OUTPATIENT)
Age: 54
End: 2024-08-19

## 2024-08-19 NOTE — TELEPHONE ENCOUNTER
Pt calling to r/s procedure scheduled with Dr. Martinez 08/23/24. Pt has another procedure scheduled for 09/04/24. Please, call pt to r/s.

## 2024-08-21 NOTE — TELEPHONE ENCOUNTER
Prior-Auth Team called in about patient's surgery date 08/23 with Dr. Martinez. Patient needs Prior-Auth for his procedure.  Please contact Arlene at # [phone] with any questions regarding prior-Auth.

## 2024-08-22 ENCOUNTER — APPOINTMENT (OUTPATIENT)
Dept: LAB | Facility: CLINIC | Age: 54
End: 2024-08-22
Payer: MEDICARE

## 2024-08-22 DIAGNOSIS — C61 MALIGNANT NEOPLASM OF PROSTATE (HCC): ICD-10-CM

## 2024-08-22 DIAGNOSIS — E53.8 B12 DEFICIENCY: ICD-10-CM

## 2024-08-22 LAB
ALBUMIN SERPL BCG-MCNC: 3.9 G/DL (ref 3.5–5)
ALP SERPL-CCNC: 95 U/L (ref 34–104)
ALT SERPL W P-5'-P-CCNC: 19 U/L (ref 7–52)
ANION GAP SERPL CALCULATED.3IONS-SCNC: 11 MMOL/L (ref 4–13)
AST SERPL W P-5'-P-CCNC: 13 U/L (ref 13–39)
BASOPHILS # BLD AUTO: 0.04 THOUSANDS/ÂΜL (ref 0–0.1)
BASOPHILS NFR BLD AUTO: 1 % (ref 0–1)
BILIRUB SERPL-MCNC: 0.43 MG/DL (ref 0.2–1)
BUN SERPL-MCNC: 11 MG/DL (ref 5–25)
CALCIUM SERPL-MCNC: 9.4 MG/DL (ref 8.4–10.2)
CHLORIDE SERPL-SCNC: 100 MMOL/L (ref 96–108)
CO2 SERPL-SCNC: 24 MMOL/L (ref 21–32)
CREAT SERPL-MCNC: 0.84 MG/DL (ref 0.6–1.3)
EOSINOPHIL # BLD AUTO: 0.21 THOUSAND/ÂΜL (ref 0–0.61)
EOSINOPHIL NFR BLD AUTO: 3 % (ref 0–6)
ERYTHROCYTE [DISTWIDTH] IN BLOOD BY AUTOMATED COUNT: 15.6 % (ref 11.6–15.1)
GFR SERPL CREATININE-BSD FRML MDRD: 97 ML/MIN/1.73SQ M
GLUCOSE P FAST SERPL-MCNC: 80 MG/DL (ref 65–99)
HCT VFR BLD AUTO: 45.5 % (ref 36.5–49.3)
HGB BLD-MCNC: 14.6 G/DL (ref 12–17)
IMM GRANULOCYTES # BLD AUTO: 0.03 THOUSAND/UL (ref 0–0.2)
IMM GRANULOCYTES NFR BLD AUTO: 0 % (ref 0–2)
LYMPHOCYTES # BLD AUTO: 2.89 THOUSANDS/ÂΜL (ref 0.6–4.47)
LYMPHOCYTES NFR BLD AUTO: 35 % (ref 14–44)
MCH RBC QN AUTO: 27 PG (ref 26.8–34.3)
MCHC RBC AUTO-ENTMCNC: 32.1 G/DL (ref 31.4–37.4)
MCV RBC AUTO: 84 FL (ref 82–98)
MONOCYTES # BLD AUTO: 0.68 THOUSAND/ÂΜL (ref 0.17–1.22)
MONOCYTES NFR BLD AUTO: 8 % (ref 4–12)
NEUTROPHILS # BLD AUTO: 4.39 THOUSANDS/ÂΜL (ref 1.85–7.62)
NEUTS SEG NFR BLD AUTO: 53 % (ref 43–75)
NRBC BLD AUTO-RTO: 0 /100 WBCS
PLATELET # BLD AUTO: 416 THOUSANDS/UL (ref 149–390)
PMV BLD AUTO: 9.3 FL (ref 8.9–12.7)
POTASSIUM SERPL-SCNC: 4.1 MMOL/L (ref 3.5–5.3)
PROT SERPL-MCNC: 8.1 G/DL (ref 6.4–8.4)
RBC # BLD AUTO: 5.41 MILLION/UL (ref 3.88–5.62)
SODIUM SERPL-SCNC: 135 MMOL/L (ref 135–147)
VIT B12 SERPL-MCNC: 794 PG/ML (ref 180–914)
WBC # BLD AUTO: 8.24 THOUSAND/UL (ref 4.31–10.16)

## 2024-08-22 PROCEDURE — 36415 COLL VENOUS BLD VENIPUNCTURE: CPT

## 2024-08-22 PROCEDURE — 87086 URINE CULTURE/COLONY COUNT: CPT

## 2024-08-22 PROCEDURE — 93005 ELECTROCARDIOGRAM TRACING: CPT

## 2024-08-22 PROCEDURE — 82607 VITAMIN B-12: CPT

## 2024-08-22 PROCEDURE — 80053 COMPREHEN METABOLIC PANEL: CPT

## 2024-08-22 PROCEDURE — 85025 COMPLETE CBC W/AUTO DIFF WBC: CPT

## 2024-08-23 LAB
ATRIAL RATE: 94 BPM
BACTERIA UR CULT: NORMAL
P AXIS: 27 DEGREES
PR INTERVAL: 198 MS
QRS AXIS: -18 DEGREES
QRSD INTERVAL: 102 MS
QT INTERVAL: 362 MS
QTC INTERVAL: 452 MS
T WAVE AXIS: 48 DEGREES
VENTRICULAR RATE: 94 BPM

## 2024-08-23 PROCEDURE — 93010 ELECTROCARDIOGRAM REPORT: CPT | Performed by: INTERNAL MEDICINE

## 2024-08-29 NOTE — PRE-PROCEDURE INSTRUCTIONS
Pre-Surgery Instructions:   Medication Instructions    azelastine (ASTELIN) 0.1 % nasal spray Uses PRN- OK to take day of surgery    Cyanocobalamin (Vitamin B-12) 2500 MCG SUBL Hold day of surgery.    Diclofenac Sodium (VOLTAREN) 1 % Stop taking 3 days prior to surgery.    diltiazem (CARDIZEM CD) 240 mg 24 hr capsule Take day of surgery.    ergocalciferol (VITAMIN D2) 50,000 units Hold day of surgery.    losartan (COZAAR) 100 MG tablet Hold day of surgery.    meloxicam (MOBIC) 15 mg tablet Stop taking 3 days prior to surgery.    methocarbamol (ROBAXIN) 500 mg tablet Uses PRN- OK to take day of surgery    metoprolol succinate (TOPROL-XL) 50 mg 24 hr tablet Take day of surgery.    montelukast (SINGULAIR) 10 mg tablet Take night before surgery    Multiple Vitamin (ONE-A-DAY MENS PO) Stop taking 7 days prior to surgery.    naproxen (Naprosyn) 500 mg tablet Stop taking 3 days prior to surgery.    olopatadine (PATANOL) 0.1 % ophthalmic solution Uses PRN- OK to take day of surgery    pravastatin (PRAVACHOL) 20 mg tablet Take night before surgery    Semaglutide-Weight Management (Wegovy) 1.7 MG/0.75ML Stop taking 7 days prior to surgery.    spironolactone (ALDACTONE) 25 mg tablet Hold day of surgery.    triamcinolone (KENALOG) 0.1 % ointment Hold DOS    Medication instructions for day surgery reviewed. Please use only a sip of water to take your instructed medications. Avoid all over the counter vitamins, supplements and NSAIDS for one week prior to surgery per anesthesia guidelines. Tylenol is ok to take as needed.     You will receive a call one business day prior to surgery with an arrival time and hospital directions. If your surgery is scheduled on a Monday, the hospital will be calling you on the Friday prior to your surgery. If you have not heard from anyone by 8pm, please call the hospital supervisor through the hospital  at [phone]. ([place] [phone] or [place] [phone]).    Do not eat or  drink anything after midnight the night before your surgery, including candy, mints, lifesavers, or chewing gum. Do not drink alcohol 24hrs before your surgery. Try not to smoke at least 24hrs before your surgery.       Follow the pre surgery showering instructions as listed in the “My Surgical Experience Booklet” or otherwise provided by your surgeon's office. Do not use a blade to shave the surgical area 1 week before surgery. It is okay to use a clean electric clippers up to 24 hours before surgery. Do not apply any lotions, creams, including makeup, cologne, deodorant, or perfumes after showering on the day of your surgery. Do not use dry shampoo, hair spray, hair gel, or any type of hair products.     No contact lenses, eye make-up, or artificial eyelashes. Remove nail polish, including gel polish, and any artificial, gel, or acrylic nails if possible. Remove all jewelry including rings and body piercing jewelry.     Wear causal clothing that is easy to take on and off. Consider your type of surgery.    Keep any valuables, jewelry, piercings at home. Please bring any specially ordered equipment (sling, braces) if indicated.    Arrange for a responsible person to drive you to and from the hospital on the day of your surgery. Please confirm the visitor policy for the day of your procedure when you receive your phone call with an arrival time.     Call the surgeon's office with any new illnesses, exposures, or additional questions prior to surgery.    Please reference your “My Surgical Experience Booklet” for additional information to prepare for your upcoming surgery.

## 2024-09-01 ENCOUNTER — ANESTHESIA EVENT (OUTPATIENT)
Dept: PERIOP | Facility: HOSPITAL | Age: 54
End: 2024-09-01
Payer: MEDICARE

## 2024-09-04 ENCOUNTER — ANESTHESIA (OUTPATIENT)
Dept: PERIOP | Facility: HOSPITAL | Age: 54
End: 2024-09-04
Payer: MEDICARE

## 2024-09-04 ENCOUNTER — HOSPITAL ENCOUNTER (OUTPATIENT)
Facility: HOSPITAL | Age: 54
Setting detail: OUTPATIENT SURGERY
Discharge: HOME | End: 2024-09-04
Attending: UROLOGY | Admitting: UROLOGY
Payer: MEDICARE

## 2024-09-04 VITALS
SYSTOLIC BLOOD PRESSURE: 152 MMHG | DIASTOLIC BLOOD PRESSURE: 88 MMHG | RESPIRATION RATE: 16 BRPM | HEART RATE: 64 BPM | TEMPERATURE: 97.6 F | WEIGHT: 315 LBS | OXYGEN SATURATION: 97 % | HEIGHT: 75 IN | BODY MASS INDEX: 39.17 KG/M2

## 2024-09-04 PROCEDURE — 77263 THER RADIOLOGY TX PLNG CPLX: CPT | Performed by: RADIOLOGY

## 2024-09-04 PROCEDURE — C1889 IMPLANT/INSERT DEVICE, NOC: HCPCS | Performed by: UROLOGY

## 2024-09-04 PROCEDURE — 55876 PLACE RT DEVICE/MARKER PROS: CPT | Performed by: UROLOGY

## 2024-09-04 PROCEDURE — NC001 PR NO CHARGE: Performed by: UROLOGY

## 2024-09-04 PROCEDURE — A4648 IMPLANTABLE TISSUE MARKER: HCPCS | Performed by: UROLOGY

## 2024-09-04 PROCEDURE — 55874 TPRNL PLMT BIODEGRDABL MATRL: CPT | Performed by: UROLOGY

## 2024-09-04 DEVICE — SPACEOAR SYSTEMS
Type: IMPLANTABLE DEVICE | Site: PENIS | Status: FUNCTIONAL
Brand: SPACEOAR VUE™ SYSTEM - 10ML

## 2024-09-04 DEVICE — MARKER FIDUCIARY 1.2 X 3MM W/17GA X 20CM NEEDLE: Type: IMPLANTABLE DEVICE | Site: PENIS | Status: FUNCTIONAL

## 2024-09-04 RX ORDER — MIDAZOLAM HYDROCHLORIDE 2 MG/2ML
INJECTION, SOLUTION INTRAMUSCULAR; INTRAVENOUS AS NEEDED
Status: DISCONTINUED | OUTPATIENT
Start: 2024-09-04 | End: 2024-09-04

## 2024-09-04 RX ORDER — ONDANSETRON 2 MG/ML
INJECTION INTRAMUSCULAR; INTRAVENOUS AS NEEDED
Status: DISCONTINUED | OUTPATIENT
Start: 2024-09-04 | End: 2024-09-04

## 2024-09-04 RX ORDER — GLYCOPYRROLATE 0.2 MG/ML
INJECTION INTRAMUSCULAR; INTRAVENOUS AS NEEDED
Status: DISCONTINUED | OUTPATIENT
Start: 2024-09-04 | End: 2024-09-04

## 2024-09-04 RX ORDER — ONDANSETRON 2 MG/ML
4 INJECTION INTRAMUSCULAR; INTRAVENOUS ONCE AS NEEDED
Status: DISCONTINUED | OUTPATIENT
Start: 2024-09-04 | End: 2024-09-04 | Stop reason: HOSPADM

## 2024-09-04 RX ORDER — ACETAMINOPHEN 325 MG/1
650 TABLET ORAL EVERY 6 HOURS PRN
Status: DISCONTINUED | OUTPATIENT
Start: 2024-09-04 | End: 2024-09-04 | Stop reason: HOSPADM

## 2024-09-04 RX ORDER — PROPOFOL 10 MG/ML
INJECTION, EMULSION INTRAVENOUS AS NEEDED
Status: DISCONTINUED | OUTPATIENT
Start: 2024-09-04 | End: 2024-09-04

## 2024-09-04 RX ORDER — FENTANYL CITRATE 50 UG/ML
INJECTION, SOLUTION INTRAMUSCULAR; INTRAVENOUS AS NEEDED
Status: DISCONTINUED | OUTPATIENT
Start: 2024-09-04 | End: 2024-09-04

## 2024-09-04 RX ORDER — CEFAZOLIN SODIUM 2 G/50ML
2000 SOLUTION INTRAVENOUS
Status: DISCONTINUED | OUTPATIENT
Start: 2024-09-05 | End: 2024-09-04

## 2024-09-04 RX ORDER — SODIUM CHLORIDE, SODIUM LACTATE, POTASSIUM CHLORIDE, CALCIUM CHLORIDE 600; 310; 30; 20 MG/100ML; MG/100ML; MG/100ML; MG/100ML
125 INJECTION, SOLUTION INTRAVENOUS CONTINUOUS
Status: DISCONTINUED | OUTPATIENT
Start: 2024-09-04 | End: 2024-09-04 | Stop reason: HOSPADM

## 2024-09-04 RX ORDER — FENTANYL CITRATE/PF 50 MCG/ML
25 SYRINGE (ML) INJECTION
Status: DISCONTINUED | OUTPATIENT
Start: 2024-09-04 | End: 2024-09-04 | Stop reason: HOSPADM

## 2024-09-04 RX ORDER — DEXAMETHASONE SODIUM PHOSPHATE 10 MG/ML
INJECTION, SOLUTION INTRAMUSCULAR; INTRAVENOUS AS NEEDED
Status: DISCONTINUED | OUTPATIENT
Start: 2024-09-04 | End: 2024-09-04

## 2024-09-04 RX ORDER — LIDOCAINE HYDROCHLORIDE 10 MG/ML
INJECTION, SOLUTION EPIDURAL; INFILTRATION; INTRACAUDAL; PERINEURAL AS NEEDED
Status: DISCONTINUED | OUTPATIENT
Start: 2024-09-04 | End: 2024-09-04

## 2024-09-04 RX ORDER — LIDOCAINE HYDROCHLORIDE 10 MG/ML
INJECTION, SOLUTION EPIDURAL; INFILTRATION; INTRACAUDAL; PERINEURAL AS NEEDED
Status: DISCONTINUED | OUTPATIENT
Start: 2024-09-04 | End: 2024-09-04 | Stop reason: HOSPADM

## 2024-09-04 RX ORDER — MEPERIDINE HYDROCHLORIDE 25 MG/ML
12.5 INJECTION INTRAMUSCULAR; INTRAVENOUS; SUBCUTANEOUS
Status: DISCONTINUED | OUTPATIENT
Start: 2024-09-04 | End: 2024-09-04 | Stop reason: HOSPADM

## 2024-09-04 RX ADMIN — LIDOCAINE HYDROCHLORIDE 100 MG: 10 INJECTION, SOLUTION EPIDURAL; INFILTRATION; INTRACAUDAL; PERINEURAL at 14:57

## 2024-09-04 RX ADMIN — PROPOFOL 300 MG: 10 INJECTION, EMULSION INTRAVENOUS at 14:52

## 2024-09-04 RX ADMIN — GLYCOPYRROLATE 0.2 MG: 0.2 INJECTION, SOLUTION INTRAMUSCULAR; INTRAVENOUS at 14:47

## 2024-09-04 RX ADMIN — FENTANYL CITRATE 100 MCG: 50 INJECTION INTRAMUSCULAR; INTRAVENOUS at 14:51

## 2024-09-04 RX ADMIN — ONDANSETRON 4 MG: 2 INJECTION INTRAMUSCULAR; INTRAVENOUS at 14:50

## 2024-09-04 RX ADMIN — DEXAMETHASONE SODIUM PHOSPHATE 10 MG: 10 INJECTION INTRAMUSCULAR; INTRAVENOUS at 14:57

## 2024-09-04 RX ADMIN — SODIUM CHLORIDE, SODIUM LACTATE, POTASSIUM CHLORIDE, AND CALCIUM CHLORIDE 125 ML/HR: .6; .31; .03; .02 INJECTION, SOLUTION INTRAVENOUS at 12:18

## 2024-09-04 RX ADMIN — Medication 3000 MG: at 14:57

## 2024-09-04 RX ADMIN — PROPOFOL 100 MG: 10 INJECTION, EMULSION INTRAVENOUS at 14:54

## 2024-09-04 RX ADMIN — MIDAZOLAM 2 MG: 1 INJECTION INTRAMUSCULAR; INTRAVENOUS at 14:47

## 2024-09-04 NOTE — OP NOTE
OPERATIVE REPORT  PATIENT NAME: Gavin Frank    :  1970  MRN: [id number]  Pt Location: [facility] OR ROOM 04    SURGERY DATE: 2024    Surgeons and Role:     * Benjie Stewart DO - Primary    Preop Diagnosis:  Malignant neoplasm of prostate (HCC) [C61]    Post-Op Diagnosis Codes:     * Malignant neoplasm of prostate (HCC) [C61]    Procedure(s):  INSERTION OF FIDUCIAL MARKER .SPACEOAR    Specimen(s):  * No specimens in log *    Estimated Blood Loss:   Minimal    Drains:  * No LDAs found *    Anesthesia Type:   IV Sedation with Anesthesia    Operative Indications:  Malignant neoplasm of prostate (HCC) [C61]          54 y.o. old male with prostate cancer     Pertinent non-urologic PMH: HLD, HTN, SHANDA, prediabetes     Pertinent non-urologic PSH: no abdominal or pelvic surgeries     Anticoagulation: none     53 y.o. old male with ePSA 5.33 on 23, 4.7 on 10/31/23     MRI prostate 23:  1. PI-RADSv2.1 Category 3 - Intermediate (the presence of clinically significant cancer is equivocal). Possible 0.4 cm lesion in the posterior left peripheral zone at apex  2. No extraprostatic tumor, seminal vesicle invasion, pelvic lymphadenopathy, or pelvic osseous metastatic disease.  3. Calculated prostate volume of 41 cc.     OR MRI fusion TP bx 24: G 3+3 in BIB, RB, RAL, RPM  A. Prostate, right anterior medial, biopsy:  - Benign prostatic tissue with inflammation.       B. Prostate, region of interest, biopsy:  -Prostatic adenocarcinoma, involving (3) of (4) cores:              - [place] score 3 + 3 = 6 (0 % grade 4), Prognostic Grade Group 1 involving 5 % of 1 core.              - [place] score 3 + 3 = 6 (0 % grade 4), Prognostic Grade Group 1 involving 25 % of 1 core.                - [place] score 3 + 3 = 6 (0 % grade 4), Prognostic Grade Group 1 involving 80 % (discontinuous) of 1 core.                C. Prostate, right base, biopsy:  - Prostatic adenocarcinoma, [place] score 3+3=6 (0% pattern 4), Grade Group  1, present in 1 of 1 cores, involving approximately 0.1 cm of length or 5% of total biopsy length.       D. Prostate, right anterior lateral, biopsy:  - Prostatic adenocarcinoma, [place] score 3+3=6 (0% pattern 4), Grade Group 1, present in 1 of 1 cores, involving approximately 0.2 cm of length or 15% of total biopsy length.       E. prostate, right posterior medial, biopsy:  - Prostatic adenocarcinoma, [place] score 3+3=6 (0% pattern 4), Grade Group 1, present in 1 of 1 cores, involving approximately 0.2 cm of length or 15% of total biopsy length.       F. Prostate, right posterior lateral, biopsy:  - Benign prostatic tissue.        G. Prostate, left anterior lateral, biopsy:  - Benign prostatic tissue.        H. Prostate, left anterior medial, biopsy:  - Benign prostatic tissue.        I. Prostate, left base, biopsy:  - Benign fibrovascular tissue.       J. Prostate, left posterior medial, biopsy:  - Benign prostatic tissue.        K. Prostate, left posterior lateral, biopsy:  - Benign prostatic tissue.              Prostate MRI 5/9/2024: PI-RADS 3 small left apical peripheral zone lesion without any significant change compared to previous; negative EPE/SVI/LAD/bony metastasis; prostate 46 g     PSA 4.95 on 6/24/2024         Opted to pursue treatment.  Seen by radiation oncology who requested SpaceOAR and fiducial marker placement.        Operative Findings:          Fiducial markers placed at right base, right apex, left mid gland    Successfully placed SpaceOAR hydrogel          Complications:   None    Procedure and Technique:        Patient was identified in preop holding area. The patient has prostate cancer and has elected to undergo radiation therapy.  To shorten the duration of treatment, radiation oncology has requested fiducial marker placement and transperineal SpaceOar (R) Hydrogel placement transperineally.  I have explained to the patient the procedure, and the risks of bleeding, infection and pelvic  pain due to the gel.  Also if I cannot develop a plane properly between the prostate and the rectum or if there is perforation of the rectum with the needle the procedure will have to be aborted.  He understood and gave informed consent.    The patient was brought to the operating room and identified properly.  He was placed supine on the table. IV sedation was induced, he was prepped and draped in the dorsal lithotomy position.  Care was taken to ensure that all pressure points were addressed.  IV Ancef was administered for preoperative antibiotics. He was prepped in sterile fashion.  A time-out was performed, and the biplanar transrectal ultrasound probe was placed into the rectum and sagittal and axial views were obtained of the prostate.    First 10 cc total of 1% lidocaine was used to provide local anesthesia to skin about 1 inch above anus at skin then deeper soft tissues toward the bilateral neurovascular bundles of the prostate. Ultrasound was used to guide needle placement. Then the fiducial markers were placed.  Three markers were placed with 3 individual needles and ultrasound guidance both sagittally and axially was used to place a gold marker in each of the following locations: Right apex, right base, left mid gland.      Once this was done, the Space Oar kit was opened and the individual syringes were prepared and placed into the device, taking care not to mix hard inner with the Hydrogel which could cause occlusion of the needle.  Once this was done, the needle itself was introduced in the midline of the perineum approximately 1 inch from the anus into the prostate under sagittal guidance-the needle was advanced above the rectal tent into the fat planes that lie between the prostate and the rectum and I advanced the needle to approximately senior living along the length of the gland.  Sterile saline was injected for approximately 0.5 cc to make sure I was in the proper area and not in the prostate capsule  "nor in the wall the rectum.  This confirmed that I was indeed in the right area, and I performed hydrodissection with a couple cc of saline/lidocaine.  I then attached the 2 syringes with the Luer Lock (the \"Space Oar\") and reconfirmed that I was in the right area, then injected both syringes simultaneously with the device containing the Hydrogel and the hardener into the space over an approximately 10 second time.  I watched it spread out posteriorly and anteriorly to the apex.  It also spread out laterally and covered space over the anterior rectum nicely.  Once I was satisfied with this, I withdrew the needle and held pressure against the perineum for 2 minutes. The patient tolerated the procedure well.    Patient was uneventfully awoken from anesthesia and taken to PACU in good condition.           A qualified resident physician was not available.    Patient Disposition:  PACU              SIGNATURE: Benjie Stewart DO  DATE: September 4, 2024  TIME: 3:22 PM                "

## 2024-09-04 NOTE — ANESTHESIA PREPROCEDURE EVALUATION
Procedure:  INSERTION OF FIDUCIAL MARKER ,SPACEOAR (Penis)    Relevant Problems   CARDIO   (+) Benign essential hypertension   (+) Hyperlipemia   (+) Hypertension      /RENAL   (+) Prostate cancer (HCC)      MUSCULOSKELETAL   (+) Arthritis   (+) Back pain      PULMONARY   (+) Asthma   (+) Obstructive sleep apnea      Other   (+) CPAP (continuous positive airway pressure) dependence   (+) Class 3 severe obesity due to excess calories with serious comorbidity and body mass index (BMI) of 50.0 to 59.9 in adult (HCC)        Physical Exam    Airway    Mallampati score: III  TM Distance: >3 FB  Neck ROM: full     Dental   Comment: Crown front upper, No notable dental hx     Cardiovascular  Cardiovascular exam normal    Pulmonary  Pulmonary exam normal     Other Findings        Anesthesia Plan  ASA Score- 3     Anesthesia Type- general with ASA Monitors.         Additional Monitors:     Airway Plan: LMA.    Comment: GA LMA D/W secondary to severe sleep apnea and obesity .       Plan Factors-Exercise tolerance (METS): >4 METS.    Chart reviewed.   Existing labs reviewed. Patient summary reviewed.    Patient is a current smoker.  Patient instructed to abstain from smoking on day of procedure. Patient did not smoke on day of surgery.    Obstructive sleep apnea risk education given perioperatively.        Induction- intravenous.    Postoperative Plan- Plan for postoperative opioid use.     Perioperative Resuscitation Plan - Level 1 - Full Code.       Informed Consent- Anesthetic plan and risks discussed with patient.

## 2024-09-04 NOTE — ANESTHESIA POSTPROCEDURE EVALUATION
"Post-Op Assessment Note    CV Status:  Stable    Pain management: adequate       Mental Status:  Awake   Hydration Status:  Stable   PONV Controlled:  Controlled   Airway Patency:  Patent     Post Op Vitals Reviewed: Yes    No anethesia notable event occurred.    Staff: Anesthesiologist             BP (!) 180/77   Pulse 72   Temp (!) 97 °F (36.1 °C) (Temporal)   Resp 16   Ht 6' 3\" (1.905 m)   Wt (!) 162 kg (358 lb 0.4 oz)   SpO2 97%   BMI 44.75 kg/m²     BP      Temp      Pulse     Resp      SpO2        "

## 2024-09-04 NOTE — DISCHARGE INSTR - AVS FIRST PAGE
You had prostate fiducial marker placement and Space Oar placement performed today. We placed 3 fiducial markers in your prostate. The Space Oar gel was placed in the area between your prostate and rectum as planned.    You have puncture sites in your perineum. These areas may have some slight oozing or bruising. You may put gauze over the site as needed.    Starting the day after surgery, you may shower. Do not take any baths/hot tubs/go in any standing body of water until your perineum puncture sites have totally healed (this can take about 1 week).    You may see blood in your urine, stool, or semen for the next few days. Call office if it is persistent.    Please call office if you pass large blood clots in your urine or if you are unable to urinate.    Please call office or present to ED immediately if you experience fevers or chills.    You may take over the counter Tylenol as needed for pain.    Try avoid too much physical activity on the day of your procedure. Try to avoid long car rides or sitting down on a hard chair for at least a few days.     You are allowed to walk as usual on the day after surgery. Avoid heavy lifting for about 5 days after your procedure (as long as you are not experiencing any bleeding at that time). Avoid activities that put extra pressure on your perineum (bike riding, horse riding, etc.) for 2-3 weeks. Again, if you are still experiencing bleeding at this time, please do not partake in these activities.    Please follow up as scheduled with your radiation oncology team as you begin your radiation therapy for your prostate cancer.    You already have a follow-up appointment scheduled with me on 10/4/2024.  If this interferes with your radiation treatment and you need to move the appointment, just let the office know.

## 2024-09-04 NOTE — H&P
UROLOGY H&P NOTE     Patient Identifiers: Gavin Frank (MRN [id number])    Date of Service: 2024    History of Present Illness:     Gavin Frank is a 54 y.o. old with a history of prostate cancer    Past Medical, Past Surgical History:     Past Medical History:   Diagnosis Date    Allergic     Anxiety     Arthritis     Asthma     Chronic pain disorder     Back pain    CPAP (continuous positive airway pressure) dependence 2024    Depression     Hyperlipemia     Hypertension     Obesity     Prostate cancer (HCC)     Sleep apnea    :    Past Surgical History:   Procedure Laterality Date    HEMORRHOID SURGERY      [place] BX PROSTATE STRTCTC SATURATION SAMPLING IMG GID N/A 2024    Procedure: TRANSPERINEAL MRI FUSION BIOPSY PROSTATE;  Surgeon: Benjie Stewart DO;  Location: [facility];  Service: Urology    TONSILLECTOMY      URETHRAL DILATION  2019   :    Medications, Allergies:     Current Facility-Administered Medications   Medication Dose Route Frequency    ceFAZolin (ANCEF) 3,000 mg in dextrose 5% 100 ml IVPB  3,000 mg Intravenous Once    lactated ringers infusion  125 mL/hr Intravenous Continuous       Allergies:  Allergies   Allergen Reactions    Contrast Dye [Iodinated Contrast Media] Vomiting   :    Social and Family History:   Social History:   Social History     Tobacco Use    Smoking status: Former     Current packs/day: 0.00     Average packs/day: 0.5 packs/day for 31.0 years (15.5 ttl pk-yrs)     Types: Cigarettes     Start date: 1984     Quit date: 2018     Years since quittin.6     Passive exposure: Past    Smokeless tobacco: Never    Tobacco comments:     No cigarettes vape only   Vaping Use    Vaping status: Every Day    Substances: Nicotine, Flavoring   Substance Use Topics    Alcohol use: Not Currently    Drug use: Never   .    Social History     Tobacco Use   Smoking Status Former    Current packs/day: 0.00    Average packs/day: 0.5 packs/day for 31.0 years (15.5 ttl pk-yrs)  "   Types: Cigarettes    Start date: 1984    Quit date: 2018    Years since quittin.6    Passive exposure: Past   Smokeless Tobacco Never   Tobacco Comments    No cigarettes vape only       Family History:  Family History   Problem Relation Age of Onset    Hypertension Mother     Hyperlipidemia Mother     Heart disease Mother     Substance Abuse Mother     Hypertension Father     Heart disease Father     Hyperlipidemia Father     Substance Abuse Father     COPD Sister     Liver disease Sister     Prostate cancer Maternal Uncle    :     Review of Systems:     General: Fever, chills, or night sweats: negative  Cardiac: Negative for chest pain.    Pulmonary: Negative for shortness of breath.  Gastrointestinal: Abdominal pain negative.  Nausea, vomiting, or diarrhea negative,  Genitourinary: See HPI above.  Patient does not have hematuria.  All other systems queried were negative.    Physical Exam:   General: Patient is pleasant and in NAD. Awake and alert  /91   Pulse 84   Temp 98.3 °F (36.8 °C) (Temporal)   Resp 18   Ht 6' 3\" (1.905 m)   Wt (!) 162 kg (358 lb 0.4 oz)   SpO2 97%   BMI 44.75 kg/m² Temp (24hrs), Av.3 °F (36.8 °C), Min:98.3 °F (36.8 °C), Max:98.3 °F (36.8 °C)  current; Temperature: 98.3 °F (36.8 °C)  No intake/output data recorded.  Cardiac: Peripheral edema: negative  Pulmonary: Non-labored breathing  Abdomen: Soft, non-tender, non-distended.  No surgical scars.  No masses, tenderness, hernias noted.    Genitourinary: Negative CVA tenderness, negative suprapubic tenderness.        Labs:     Lab Results   Component Value Date    HGB 14.6 2024    HCT 45.5 2024    WBC 8.24 2024     (H) 2024   ]    Lab Results   Component Value Date    K 4.1 2024     2024    CO2 24 2024    BUN 11 2024    CREATININE 0.84 2024    CALCIUM 9.4 2024   ]    Imaging:   I personally reviewed the images and report of the following " studies, and reviewed them with the patient:    Reviewed, see below      ASSESSMENT:     54 y.o. old male with prostate cancer     Pertinent non-urologic PMH: HLD, HTN, SHANDA, prediabetes     Pertinent non-urologic PSH: no abdominal or pelvic surgeries     Anticoagulation: none     53 y.o. old male with ePSA 5.33 on 5/18/23, 4.7 on 10/31/23     MRI prostate 11/16/23:  1. PI-RADSv2.1 Category 3 - Intermediate (the presence of clinically significant cancer is equivocal). Possible 0.4 cm lesion in the posterior left peripheral zone at apex  2. No extraprostatic tumor, seminal vesicle invasion, pelvic lymphadenopathy, or pelvic osseous metastatic disease.  3. Calculated prostate volume of 41 cc.     OR MRI fusion TP bx 2/7/24: G 3+3 in BIB, RB, RAL, RPM  A. Prostate, right anterior medial, biopsy:  - Benign prostatic tissue with inflammation.       B. Prostate, region of interest, biopsy:  -Prostatic adenocarcinoma, involving (3) of (4) cores:              - [place] score 3 + 3 = 6 (0 % grade 4), Prognostic Grade Group 1 involving 5 % of 1 core.              - [place] score 3 + 3 = 6 (0 % grade 4), Prognostic Grade Group 1 involving 25 % of 1 core.                - [place] score 3 + 3 = 6 (0 % grade 4), Prognostic Grade Group 1 involving 80 % (discontinuous) of 1 core.                C. Prostate, right base, biopsy:  - Prostatic adenocarcinoma, [place] score 3+3=6 (0% pattern 4), Grade Group 1, present in 1 of 1 cores, involving approximately 0.1 cm of length or 5% of total biopsy length.       D. Prostate, right anterior lateral, biopsy:  - Prostatic adenocarcinoma, [place] score 3+3=6 (0% pattern 4), Grade Group 1, present in 1 of 1 cores, involving approximately 0.2 cm of length or 15% of total biopsy length.       E. prostate, right posterior medial, biopsy:  - Prostatic adenocarcinoma, [place] score 3+3=6 (0% pattern 4), Grade Group 1, present in 1 of 1 cores, involving approximately 0.2 cm of length or 15% of total  biopsy length.       F. Prostate, right posterior lateral, biopsy:  - Benign prostatic tissue.        G. Prostate, left anterior lateral, biopsy:  - Benign prostatic tissue.        H. Prostate, left anterior medial, biopsy:  - Benign prostatic tissue.        I. Prostate, left base, biopsy:  - Benign fibrovascular tissue.       J. Prostate, left posterior medial, biopsy:  - Benign prostatic tissue.        K. Prostate, left posterior lateral, biopsy:  - Benign prostatic tissue.              Prostate MRI 5/9/2024: PI-RADS 3 small left apical peripheral zone lesion without any significant change compared to previous; negative EPE/SVI/LAD/bony metastasis; prostate 46 g     PSA 4.95 on 6/24/2024       Opted to pursue treatment.  Seen by radiation oncology who requested SpaceOAR and fiducial marker placement.        We discussed the procedure of SpaceOAR hydrogel placement and prostate fiducial marker placement.    We went over the details of the procedure.  I explained that during the procedure, the patient is asleep and a transrectal ultrasound probe is entered to visualize the prostate and surrounding tissues.      I explained that under ultrasound guidance, we are able to place the gold fiducial markers into the prostate.  I explained that typically, 3 markers are placed in different areas of the prostate.  I explained that the markers help the radiation team localized their treatment.  Patient understands that the fiducial markers are permanent.    I explained that the SpaceOAR hydrogel is a substance that is entered into the body to add extra cushioning between the prostate and the rectum.  I explained that this is useful in the setting of prostate cancer radiation to space the rectum out a bit further from the prostate so that there is less risk of unwanted radiation to the rectum.  I explained that the substance usually dissolves after about 6 months.    We discussed the risks of the procedure.  We went over the  risks including but not limited to: General Anesthesia risks, bleeding, infection, damage to nearby structures, lack of efficacy.    We talked about the possibility of having blood in the urine.  I explained that the urethra runs through the prostate and there are times that the needle goes through the urethra, causing blood in the urine.  I explained that as long as the urine is fruit punch or lighter and does not have large clots, and as long as the patient is able to urinate, they usually does not need to be any sort of intervention regarding the blood in the urine.  I explained that in trouble some cases, patients may need Mckinney catheter placement.  We also talked about the low risk of needing additional procedures to address the bleeding.    We discussed the risk of the gold seeds being placed outside of the prostate.    We also discussed the risk of the SpaceOAR hydrogel not being ideally placed.  I explained that generally, we want the hydrogel to be placed as close to the midline as possible to allow for ideal separation between the prostate and the rectum.  However, I explained that sometimes the hydrogel is placed asymmetrically more on 1 side versus the other which causes suboptimal separation of the rectum from the prostate.  We also went over the risk of the hydrogel being placed into the rectum causing rectal injury.    Patient agreed to proceed.  Consent was obtained.      PLAN:     -preop ancef  -OR plan as above  -dc home from pacu

## 2024-09-11 ENCOUNTER — OFFICE VISIT (OUTPATIENT)
Dept: PODIATRY | Facility: CLINIC | Age: 54
End: 2024-09-11
Payer: MEDICARE

## 2024-09-11 DIAGNOSIS — B35.1 ONYCHOMYCOSIS: Primary | ICD-10-CM

## 2024-09-11 DIAGNOSIS — L85.3 XEROSIS OF SKIN: ICD-10-CM

## 2024-09-11 DIAGNOSIS — R73.03 PRE-DIABETES: ICD-10-CM

## 2024-09-11 PROCEDURE — 99212 OFFICE O/P EST SF 10 MIN: CPT | Performed by: PODIATRIST

## 2024-09-11 NOTE — PROGRESS NOTES
Ambulatory Visit  Name: Gavin Frank      : 1970      MRN: [id number]  Encounter Provider: Toño Shen DPM  Encounter Date: 2024   Encounter department: [facility] PODIATRY [place]    Assessment & Plan  Onychomycosis       Debride mycotic nails and thin the nail plates with the use of a nail nipper manually and a Dremel bur was used to smooth the edges and decrease the thickness of the nail beds.  Patient tolerated the procedure well  Xerosis of skin       Pt was instructed to use lotion twice a day on both feet such as cerave, Cetaphil or similar.  It was suggested as a reminder for him to remove socks before going to bed at night.  Pre-diabetes           Return in about 12 weeks (around 2024).     History of Present Illness     Gavin Frank is a 54 y.o. male who presents with chief complaint of painful thick nails on both feet and dry flaky skin also on both feet    History obtained from : patient  Review of Systems  Medical History Reviewed by provider this encounter:       Current Outpatient Medications on File Prior to Visit   Medication Sig Dispense Refill    azelastine (ASTELIN) 0.1 % nasal spray 2 sprays into each nostril 2 (two) times a day Use in each nostril as directed 30 mL 5    BLACK CURRANT SEED OIL PO Take by mouth (Patient not taking: Reported on 2024)      Cyanocobalamin (Vitamin B-12) 2500 MCG SUBL Place under the tongue      Diclofenac Sodium (VOLTAREN) 1 % Apply 2 g topically 4 (four) times a day 100 g 3    diltiazem (CARDIZEM CD) 240 mg 24 hr capsule Take 1 capsule (240 mg total) by mouth daily 90 capsule 1    ergocalciferol (VITAMIN D2) 50,000 units Take 1 capsule (50,000 Units total) by mouth once a week 4 capsule 0    fluticasone (FLONASE) 50 mcg/act nasal spray 1 spray into each nostril daily 48 g 1    fluticasone (FLONASE) 50 mcg/act nasal spray 1 spray into each nostril 2 (two) times a day 16 g 3    lidocaine (Lidoderm) 5 % Apply 1 patch topically over 12  hours daily for 10 days Remove & Discard patch within 12 hours or as directed by MD 10 patch 0    LORazepam (ATIVAN) 2 mg tablet Take 1 tablet (2 mg total) by mouth once as needed for anxiety (take 1 hour before MRI) for up to 120 doses 30 tablet 3    losartan (COZAAR) 100 MG tablet Take 1 tablet (100 mg total) by mouth daily 90 tablet 1    meloxicam (MOBIC) 15 mg tablet Take 1 tablet (15 mg total) by mouth daily as needed for moderate pain 90 tablet 1    methocarbamol (ROBAXIN) 500 mg tablet Take 1 tablet (500 mg total) by mouth 2 (two) times a day 20 tablet 0    methocarbamol (ROBAXIN) 750 mg tablet Take 1 tablet (750 mg total) by mouth every 8 (eight) hours 30 tablet 3    metoprolol succinate (TOPROL-XL) 50 mg 24 hr tablet Take 1 tablet (50 mg total) by mouth daily 90 tablet 1    montelukast (SINGULAIR) 10 mg tablet Take 1 tablet (10 mg total) by mouth daily at bedtime 90 tablet 1    Multiple Vitamin (ONE-A-DAY MENS PO) Take by mouth      naproxen (Naprosyn) 500 mg tablet Take 1 tablet (500 mg total) by mouth 2 (two) times a day with meals 30 tablet 0    olopatadine (PATANOL) 0.1 % ophthalmic solution Administer 1 drop to both eyes 2 (two) times a day 5 mL 5    ondansetron (ZOFRAN-ODT) 8 mg disintegrating tablet Take 1 tablet (8 mg total) by mouth every 8 (eight) hours as needed for nausea or vomiting for up to 5 days 15 tablet 0    pravastatin (PRAVACHOL) 20 mg tablet Take 1 tablet (20 mg total) by mouth daily 90 tablet 1    Semaglutide-Weight Management (Wegovy) 1.7 MG/0.75ML Inject 1.7 mg under the skin weekly 3 mL 5    spironolactone (ALDACTONE) 25 mg tablet Take 1 tablet (25 mg total) by mouth daily 90 tablet 1    triamcinolone (KENALOG) 0.1 % ointment Apply topically 2 (two) times a day 30 g 2     Current Facility-Administered Medications on File Prior to Visit   Medication Dose Route Frequency Provider Last Rate Last Admin    cyanocobalamin injection 1,000 mcg  1,000 mcg Intramuscular Q30 Days    1,000 mcg  at 07/29/24 1009          Objective     There were no vitals taken for this visit.    Physical Exam  Vascular status is 1/4 DP PT negative digital hair normal distal cooling immediate capillary refill with edema present bilaterally.  There is +1 edema present bilaterally    Derm nails are brittle elongated hypertrophic white-black discoloration with subungual debris.  There is an increased thickness in the nails for approximately 1 to 3 mm.  There is white flaky tissue present on the plantar aspect of both feet as well as the medial aspect of both heels.  No breaks in the skin are noted no dried blood is within the tissue.    Ortho hammertoe deformities are present on the fifth digits bilaterally which are in adductovarus position and pes planus is also noted bilaterally.    Neuro 0.5 monofilament test was performed and it was intact on the plantar aspect of the hallux, plantar aspect of the third and fourth toes, submet 3 and the arch area    Administrative Statements   I have spent a total time of 15 minutes in caring for this patient on the day of the visit/encounter including Instructions for management, Patient and family education, Counseling / Coordination of care, Documenting in the medical record, Reviewing / ordering tests, medicine, procedures  , and Obtaining or reviewing history  .

## 2024-09-11 NOTE — ASSESSMENT & PLAN NOTE
Debride mycotic nails and thin the nail plates with the use of a nail nipper manually and a Dremel bur was used to smooth the edges and decrease the thickness of the nail beds.  Patient tolerated the procedure well

## 2024-09-12 ENCOUNTER — APPOINTMENT (OUTPATIENT)
Dept: RADIATION ONCOLOGY | Facility: HOSPITAL | Age: 54
End: 2024-09-12
Attending: RADIOLOGY
Payer: MEDICARE

## 2024-09-12 PROCEDURE — 77334 RADIATION TREATMENT AID(S): CPT | Performed by: RADIOLOGY

## 2024-09-18 ENCOUNTER — TELEPHONE (OUTPATIENT)
Age: 54
End: 2024-09-18

## 2024-09-18 ENCOUNTER — APPOINTMENT (OUTPATIENT)
Dept: LAB | Facility: HOSPITAL | Age: 54
End: 2024-09-18
Payer: MEDICARE

## 2024-09-18 DIAGNOSIS — R30.0 DYSURIA: ICD-10-CM

## 2024-09-18 DIAGNOSIS — R35.0 URINARY FREQUENCY: ICD-10-CM

## 2024-09-18 DIAGNOSIS — R35.0 URINARY FREQUENCY: Primary | ICD-10-CM

## 2024-09-18 LAB
BACTERIA UR QL AUTO: ABNORMAL /HPF
BILIRUB UR QL STRIP: NEGATIVE
CLARITY UR: CLEAR
COLOR UR: YELLOW
GLUCOSE UR STRIP-MCNC: NEGATIVE MG/DL
HGB UR QL STRIP.AUTO: ABNORMAL
KETONES UR STRIP-MCNC: NEGATIVE MG/DL
LEUKOCYTE ESTERASE UR QL STRIP: ABNORMAL
NITRITE UR QL STRIP: NEGATIVE
NON-SQ EPI CELLS URNS QL MICRO: ABNORMAL /HPF
PH UR STRIP.AUTO: 6.5 [PH]
PROT UR STRIP-MCNC: ABNORMAL MG/DL
RBC #/AREA URNS AUTO: ABNORMAL /HPF
SP GR UR STRIP.AUTO: 1.01 (ref 1–1.03)
UROBILINOGEN UR STRIP-ACNC: <2 MG/DL
WBC #/AREA URNS AUTO: ABNORMAL /HPF

## 2024-09-18 PROCEDURE — 87086 URINE CULTURE/COLONY COUNT: CPT

## 2024-09-18 PROCEDURE — 81001 URINALYSIS AUTO W/SCOPE: CPT

## 2024-09-18 NOTE — TELEPHONE ENCOUNTER
Pt calling to report he has been experiencing a lot of straining in his abdomen, stinging with urination and very very dark brown urine. Pt stated he did have some blood in his urine after his fiducial marker procedure but not to the extent that it seems to be right now.     Call back: [phone]

## 2024-09-18 NOTE — TELEPHONE ENCOUNTER
Spoke to patient and he recently had fiducial markers and space OAR performed on 9/4/2024. He is feeling well over all. He is voiding well with no dysuria but has been experiencing burning with increased urinary frequency. He denies any hematuria or blood clots. Lower abdominal discomfort is noted but minimal in pain scale per his statement. No constipation. He states his ejaculate is tan to dark brown at times.  He is hydrating but he has not be drinking a lot of water. I gave him care advice to increase water intake to 64 ounces throughout the day, avoid bladder irritants and placed urine orders to R/O infection possibilities. I explained some of his symptoms and activity can stem from his recent procedure but we will seek provider feedback for further recommendations. ER precautions reviewed. Please advise.

## 2024-09-19 LAB — BACTERIA UR CULT: NORMAL

## 2024-09-20 PROCEDURE — 77338 DESIGN MLC DEVICE FOR IMRT: CPT | Performed by: RADIOLOGY

## 2024-09-20 PROCEDURE — 77300 RADIATION THERAPY DOSE PLAN: CPT | Performed by: RADIOLOGY

## 2024-09-20 PROCEDURE — 77301 RADIOTHERAPY DOSE PLAN IMRT: CPT | Performed by: RADIOLOGY

## 2024-09-23 ENCOUNTER — HOSPITAL ENCOUNTER (EMERGENCY)
Facility: HOSPITAL | Age: 54
Discharge: HOME | End: 2024-09-23
Attending: EMERGENCY MEDICINE
Payer: MEDICARE

## 2024-09-23 VITALS
BODY MASS INDEX: 39.17 KG/M2 | OXYGEN SATURATION: 97 % | TEMPERATURE: 97.7 F | HEIGHT: 75 IN | WEIGHT: 315 LBS | RESPIRATION RATE: 17 BRPM | HEART RATE: 85 BPM | SYSTOLIC BLOOD PRESSURE: 209 MMHG | DIASTOLIC BLOOD PRESSURE: 108 MMHG

## 2024-09-23 DIAGNOSIS — M54.32 SCIATICA OF LEFT SIDE: ICD-10-CM

## 2024-09-23 DIAGNOSIS — M54.9 BACK PAIN: Primary | ICD-10-CM

## 2024-09-23 PROCEDURE — 96372 THER/PROPH/DIAG INJ SC/IM: CPT

## 2024-09-23 PROCEDURE — 99283 EMERGENCY DEPT VISIT LOW MDM: CPT

## 2024-09-23 PROCEDURE — 99284 EMERGENCY DEPT VISIT MOD MDM: CPT | Performed by: EMERGENCY MEDICINE

## 2024-09-23 RX ORDER — METHOCARBAMOL 500 MG/1
500 TABLET, FILM COATED ORAL 2 TIMES DAILY
Qty: 20 TABLET | Refills: 0 | Status: SHIPPED | OUTPATIENT
Start: 2024-09-23

## 2024-09-23 RX ORDER — DIAZEPAM 5 MG
5 TABLET ORAL ONCE
Status: COMPLETED | OUTPATIENT
Start: 2024-09-23 | End: 2024-09-23

## 2024-09-23 RX ORDER — LIDOCAINE 50 MG/G
1 PATCH TOPICAL ONCE
Status: DISCONTINUED | OUTPATIENT
Start: 2024-09-23 | End: 2024-09-23 | Stop reason: HOSPADM

## 2024-09-23 RX ORDER — KETOROLAC TROMETHAMINE 30 MG/ML
15 INJECTION, SOLUTION INTRAMUSCULAR; INTRAVENOUS ONCE
Status: COMPLETED | OUTPATIENT
Start: 2024-09-23 | End: 2024-09-23

## 2024-09-23 RX ORDER — ACETAMINOPHEN 325 MG/1
650 TABLET ORAL ONCE
Status: COMPLETED | OUTPATIENT
Start: 2024-09-23 | End: 2024-09-23

## 2024-09-23 RX ADMIN — ACETAMINOPHEN 650 MG: 325 TABLET ORAL at 15:07

## 2024-09-23 RX ADMIN — KETOROLAC TROMETHAMINE 15 MG: 30 INJECTION, SOLUTION INTRAMUSCULAR; INTRAVENOUS at 15:07

## 2024-09-23 RX ADMIN — DIAZEPAM 5 MG: 5 TABLET ORAL at 15:07

## 2024-09-23 RX ADMIN — LIDOCAINE 1 PATCH: 50 PATCH CUTANEOUS at 15:07

## 2024-09-23 NOTE — ED ATTENDING ATTESTATION
"I, David Valladares MD, saw and evaluated the patient. I have discussed the patient with the resident and agree with the resident's findings, Plan of Care, and MDM as documented in the resident's note, except where noted. All available labs and Radiology studies were reviewed.  I was present for key portions of any procedure(s) performed by the resident and I was immediately available to provide assistance.    At this point I agree with the current assessment done in the Emergency Department.  I have conducted an independent evaluation of this patient a history and physical is as follows:    53 yo morbidly obese male with a history of HTN, anxiety, major depressive disorder, asthma, hyperlipidemia, SHANDA, prostate cancer, and multiple chronic pain syndromes presents to the ED complaining of low back pain x 1 day. The patient says he \"pulled\" his back while climbing into his girlfriend's SUV yesterday afternoon. Since then he has been experiencing an \"aching\" pain in the left lower back that radiates down the posterior left leg to the knee. No associated numbness, weakness, incontinence, or retention. No falls or direct trauma to the back. (+) History of a similar event about a month ago --> the patient was referred to [facility] at that time but did not schedule an appointment because he \"had too much stuff going on\". No chest pain, shortness of breath, nausea, vomiting, or diaphoresis. He denies IVDA. No fevers or chills. No other specific complaints.    ROS: per resident physician note    Gen: NAD, AA&Ox3  HEENT: PERRL, EOMI  Neck: supple  CV: RRR  Lungs: CTA B/L  Abdomen: soft, NT/ND  Back: (+) mild left lumbar paraspinal muscle tenderness, no bony pain, no step-offs or deformities  Ext: no swelling or deformity  Neuro: 5/5 strength all extremities, sensation grossly intact, normal gait  Skin: no rash    ED Course  The patient is very comfortable appearing with stable vital signs. Exam is significant for " some left lumbar paraspinal muscle tenderness. No bony pain. No neurologic complaints or findings. Low clinical suspicion for an acute cord injury. Symptoms are most consistent with a lumbar sprain + sciatica. (+) History of similar pain in the past. Plan for pain control and referral to the [facility] for further management. The patient is agreeable to this plan. Strict return precautions provided.      Critical Care Time  Procedures

## 2024-09-23 NOTE — Clinical Note
Gavin Frank was seen and treated in our emergency department on 9/23/2024.                Diagnosis:     Gavin  may return to work on return date.    He may return on this date: 09/26/2024         If you have any questions or concerns, please don't hesitate to call.      Freddie Vanegas MD    ______________________________           _______________          _______________  Hospital Representative                              Date                                Time

## 2024-09-23 NOTE — DISCHARGE INSTRUCTIONS
Please follow-up with [facility] and take Tylenol, Motrin, Robaxin as instructed for treatment of your lower back pain/sciatica.

## 2024-09-24 ENCOUNTER — OFFICE VISIT (OUTPATIENT)
Age: 54
End: 2024-09-24
Payer: MEDICARE

## 2024-09-24 VITALS
TEMPERATURE: 98 F | OXYGEN SATURATION: 99 % | HEART RATE: 80 BPM | RESPIRATION RATE: 20 BRPM | WEIGHT: 315 LBS | HEIGHT: 75 IN | DIASTOLIC BLOOD PRESSURE: 90 MMHG | SYSTOLIC BLOOD PRESSURE: 140 MMHG | BODY MASS INDEX: 39.17 KG/M2

## 2024-09-24 DIAGNOSIS — G47.33 OBSTRUCTIVE SLEEP APNEA: ICD-10-CM

## 2024-09-24 DIAGNOSIS — E66.813 CLASS 3 SEVERE OBESITY DUE TO EXCESS CALORIES WITH SERIOUS COMORBIDITY AND BODY MASS INDEX (BMI) OF 50.0 TO 59.9 IN ADULT (HCC): ICD-10-CM

## 2024-09-24 DIAGNOSIS — I10 BENIGN ESSENTIAL HYPERTENSION: ICD-10-CM

## 2024-09-24 DIAGNOSIS — E66.01 CLASS 3 SEVERE OBESITY DUE TO EXCESS CALORIES WITH SERIOUS COMORBIDITY AND BODY MASS INDEX (BMI) OF 50.0 TO 59.9 IN ADULT (HCC): ICD-10-CM

## 2024-09-24 DIAGNOSIS — C61 MALIGNANT NEOPLASM OF PROSTATE (HCC): ICD-10-CM

## 2024-09-24 DIAGNOSIS — M54.32 SCIATICA, LEFT SIDE: Primary | ICD-10-CM

## 2024-09-24 DIAGNOSIS — E78.2 MIXED HYPERLIPIDEMIA: ICD-10-CM

## 2024-09-24 PROCEDURE — 99215 OFFICE O/P EST HI 40 MIN: CPT | Performed by: FAMILY MEDICINE

## 2024-09-24 RX ORDER — ACETAMINOPHEN AND CODEINE PHOSPHATE 300; 30 MG/1; MG/1
1 TABLET ORAL EVERY 4 HOURS PRN
Qty: 30 TABLET | Refills: 0 | Status: SHIPPED | OUTPATIENT
Start: 2024-09-24

## 2024-09-24 NOTE — PROGRESS NOTES
Ambulatory Visit  Name: Gavin Frank      : 1970      MRN: [id number]  Encounter Provider: Ricardo Castillo DO  Encounter Date: 2024   Encounter department: [facility] PRIMARY CARE    Assessment & Plan  Sciatica, left side  He has tried Voltaren gel.  He has tried Mobic.  He is been using Robaxin without much relief.  Will try Tylenol with codeine hopefully that will give him enough relief that he could have the radiation therapy.  Orders:    acetaminophen-codeine (TYLENOL with CODEINE #3) 300-30 MG per tablet; Take 1 tablet by mouth every 4 (four) hours as needed for moderate pain    Class 3 severe obesity due to excess calories with serious comorbidity and body mass index (BMI) of 50.0 to 59.9 in adult (HCC)  He will make it through his radiation therapy then continue to work on diet exercise and healthy living.     Labs reviewed.  Mixed hyperlipidemia  Will check labs before next visit.       Malignant neoplasm of prostate (HCC)  Follow-up with urology and radiation oncology.       Benign essential hypertension  Labs reviewed with the patient. Continue current therapy.  Diet exercise.       Obstructive sleep apnea  He is doing well continue with CPAP.          History of Present Illness     Patient presents with:  Back Pain: Getting worse , unable to move   Hypertension  Medication Management: Pt unclear on bp meds   Follow-up: Sleep apnea doing better         Back Pain  Pertinent negatives include no fever.   Hypertension        History obtained from : patient  Review of Systems   Constitutional:  Positive for activity change. Negative for fever.   HENT: Negative.     Respiratory: Negative.     Cardiovascular: Negative.    Gastrointestinal: Negative.    Musculoskeletal:  Positive for back pain, gait problem and myalgias.   Psychiatric/Behavioral: Negative.       Pertinent Medical History   Past Medical History:   Diagnosis Date    Allergic     Anxiety     Arthritis     Asthma     Chronic  "pain disorder     Back pain    CPAP (continuous positive airway pressure) dependence 02/07/2024    Depression     GERD (gastroesophageal reflux disease)     Hyperlipemia     Hypertension     Obesity     Prostate cancer (HCC)     Sleep apnea            Medical History Reviewed by provider this encounter:  Tobacco  Allergies  Meds  Problems  Med Hx  Surg Hx  Fam Hx           Objective     /90 (BP Location: Left arm, Patient Position: Sitting, Cuff Size: Large)   Pulse 80   Temp 98 °F (36.7 °C) (Tympanic)   Resp 20   Ht 6' 3\" (1.905 m)   Wt (!) 161 kg (356 lb)   SpO2 99%   BMI 44.50 kg/m²     Physical Exam  Vitals and nursing note reviewed.   Constitutional:       Appearance: Normal appearance. He is well-developed.   HENT:      Head: Normocephalic.      Nose: Nose normal.   Eyes:      Conjunctiva/sclera: Conjunctivae normal.      Pupils: Pupils are equal, round, and reactive to light.   Cardiovascular:      Rate and Rhythm: Normal rate and regular rhythm.      Pulses: Normal pulses.      Heart sounds: Normal heart sounds.   Pulmonary:      Effort: Pulmonary effort is normal.      Breath sounds: Normal breath sounds.   Abdominal:      General: Abdomen is flat. Bowel sounds are normal.      Palpations: Abdomen is soft.   Musculoskeletal:      Cervical back: Normal range of motion and neck supple.      Lumbar back: Decreased range of motion. Positive left straight leg raise test.   Skin:     General: Skin is warm and dry.   Neurological:      General: No focal deficit present.      Mental Status: He is alert and oriented to person, place, and time.   Psychiatric:         Mood and Affect: Mood normal.         Behavior: Behavior normal.         Thought Content: Thought content normal.         Judgment: Judgment normal.         "

## 2024-09-24 NOTE — ASSESSMENT & PLAN NOTE
He has tried Voltaren gel.  He has tried Mobic.  He is been using Robaxin without much relief.  Will try Tylenol with codeine hopefully that will give him enough relief that he could have the radiation therapy.  Orders:    acetaminophen-codeine (TYLENOL with CODEINE #3) 300-30 MG per tablet; Take 1 tablet by mouth every 4 (four) hours as needed for moderate pain

## 2024-09-24 NOTE — ASSESSMENT & PLAN NOTE
He will make it through his radiation therapy then continue to work on diet exercise and healthy living.     Labs reviewed.

## 2024-09-24 NOTE — LETTER
September 24, 2024     Patient: Gavin Frank  YOB: 1970  Date of Visit: 9/24/2024      To Whom it May Concern:    Gavin Frank is under my professional care. Gavin was seen in my office on 9/24/2024. Gavin may return to work on December 2, 2024 .  He is suffering from significant low back pain with radiation down his left leg.  He is being treated with medication including narcotics.    If you have any questions or concerns, please don't hesitate to call.         Sincerely,          Ricardo Castillo,         CC: No Recipients

## 2024-09-24 NOTE — ED PROVIDER NOTES
1. Back pain    2. Sciatica of left side      ED Disposition       ED Disposition   Discharge    Condition   Stable    Date/Time   Mon Sep 23, 2024  2:44 PM    Comment   Gavin Frank discharge to home/self care.                   Assessment & Plan       Medical Decision Making  Patient is a 54-year-old female presenting for concerns of left lower back pain.  DDx: Sciatica  Based on patient presentation and physical exam findings, primary concerns for exacerbation of sciatica.  Plan for multimodal treatment with Tylenol, Toradol, Lidoderm patch, muscle relaxer.  Plan for Robaxin prescription for home.  Return precautions given.  Referral given the comprehensive spine.  Patient understands and agrees with plan.  Ready for discharge.    Problems Addressed:  Back pain: acute illness or injury  Sciatica of left side: acute illness or injury    Risk  OTC drugs.  Prescription drug management.                     Medications   acetaminophen (TYLENOL) tablet 650 mg (650 mg Oral Given 9/23/24 1507)   ketorolac (TORADOL) injection 15 mg (15 mg Intramuscular Given 9/23/24 1507)   diazepam (VALIUM) tablet 5 mg (5 mg Oral Given 9/23/24 1507)       History of Present Illness       HPI  Patient is a 54-year-old male presenting for concerns of acute on chronic back pain with radiation down left leg.  Past medical history significant for sciatica.  Patient states that he exacerbated sciatica and only reached out to catch his wife from falling, endorses acute left back pain radiation down left leg.  Denies any red flag symptoms, no numbness no tingling, no saddle anesthesia, no lower extremity weakness.  No loss of bowel or bladder incontinence.  Patient has not taken anything for symptoms.  Review of Systems   Constitutional: Negative.    HENT: Negative.     Eyes: Negative.    Respiratory: Negative.     Cardiovascular: Negative.    Gastrointestinal: Negative.    Endocrine: Negative.    Genitourinary: Negative.    Musculoskeletal:   Positive for back pain.   Allergic/Immunologic: Negative.    Neurological: Negative.    Hematological: Negative.    Psychiatric/Behavioral: Negative.             Objective     ED Triage Vitals   Temperature Pulse Blood Pressure Respirations SpO2 Patient Position - Orthostatic VS   09/23/24 1245 09/23/24 1245 09/23/24 1246 09/23/24 1245 09/23/24 1246 09/23/24 1245   97.7 °F (36.5 °C) 85 (!) 209/108 17 97 % Sitting      Temp Source Heart Rate Source BP Location FiO2 (%) Pain Score    09/23/24 1245 09/23/24 1245 09/23/24 1245 -- 09/23/24 1245    Oral Monitor Left arm  5        Physical Exam  Vitals and nursing note reviewed.   Constitutional:       Appearance: Normal appearance. He is normal weight.   HENT:      Head: Normocephalic and atraumatic.      Right Ear: Tympanic membrane, ear canal and external ear normal.      Left Ear: Tympanic membrane, ear canal and external ear normal.      Nose: Nose normal.      Mouth/Throat:      Mouth: Mucous membranes are moist.      Pharynx: Oropharynx is clear.   Eyes:      Extraocular Movements: Extraocular movements intact.      Conjunctiva/sclera: Conjunctivae normal.      Pupils: Pupils are equal, round, and reactive to light.   Cardiovascular:      Rate and Rhythm: Normal rate and regular rhythm.      Pulses: Normal pulses.      Heart sounds: Normal heart sounds.   Pulmonary:      Effort: Pulmonary effort is normal.      Breath sounds: Normal breath sounds.   Abdominal:      General: Abdomen is flat. Bowel sounds are normal.      Palpations: Abdomen is soft.   Musculoskeletal:         General: Normal range of motion.      Cervical back: Normal range of motion and neck supple.   Skin:     General: Skin is warm and dry.      Capillary Refill: Capillary refill takes less than 2 seconds.   Neurological:      General: No focal deficit present.      Mental Status: He is alert and oriented to person, place, and time.   Psychiatric:         Mood and Affect: Mood normal.          Behavior: Behavior normal.         Thought Content: Thought content normal.         Judgment: Judgment normal.         Labs Reviewed - No data to display  No orders to display       Procedures    ED Medication and Procedure Management   Prior to Admission Medications   Prescriptions Last Dose Informant Patient Reported? Taking?   BLACK CURRANT SEED OIL PO  Self Yes No   Sig: Take by mouth   Patient not taking: Reported on 2024   Cyanocobalamin (Vitamin B-12) 2500 MCG SUBL  Self Yes No   Sig: Place under the tongue   Diclofenac Sodium (VOLTAREN) 1 %  Self No No   Sig: Apply 2 g topically 4 (four) times a day   LORazepam (ATIVAN) 2 mg tablet   No No   Sig: Take 1 tablet (2 mg total) by mouth once as needed for anxiety (take 1 hour before MRI) for up to 120 doses   Multiple Vitamin (ONE-A-DAY MENS PO)  Self Yes No   Sig: Take by mouth   Semaglutide-Weight Management (Wegovy) 1.7 MG/0.75ML   No No   Sig: Inject 1.7 mg under the skin weekly   azelastine (ASTELIN) 0.1 % nasal spray  Self No No   Si sprays into each nostril 2 (two) times a day Use in each nostril as directed   diltiazem (CARDIZEM CD) 240 mg 24 hr capsule   No No   Sig: Take 1 capsule (240 mg total) by mouth daily   ergocalciferol (VITAMIN D2) 50,000 units   No No   Sig: Take 1 capsule (50,000 Units total) by mouth once a week   fluticasone (FLONASE) 50 mcg/act nasal spray  Self No No   Si spray into each nostril daily   fluticasone (FLONASE) 50 mcg/act nasal spray   No No   Si spray into each nostril 2 (two) times a day   lidocaine (Lidoderm) 5 %   No No   Sig: Apply 1 patch topically over 12 hours daily for 10 days Remove & Discard patch within 12 hours or as directed by MD   losartan (COZAAR) 100 MG tablet   No No   Sig: Take 1 tablet (100 mg total) by mouth daily   meloxicam (MOBIC) 15 mg tablet   No No   Sig: Take 1 tablet (15 mg total) by mouth daily as needed for moderate pain   methocarbamol (ROBAXIN) 500 mg tablet   No No   Sig:  Take 1 tablet (500 mg total) by mouth 2 (two) times a day   methocarbamol (ROBAXIN) 750 mg tablet   No No   Sig: Take 1 tablet (750 mg total) by mouth every 8 (eight) hours   metoprolol succinate (TOPROL-XL) 50 mg 24 hr tablet   No No   Sig: Take 1 tablet (50 mg total) by mouth daily   montelukast (SINGULAIR) 10 mg tablet   No No   Sig: Take 1 tablet (10 mg total) by mouth daily at bedtime   naproxen (Naprosyn) 500 mg tablet   No No   Sig: Take 1 tablet (500 mg total) by mouth 2 (two) times a day with meals   olopatadine (PATANOL) 0.1 % ophthalmic solution   No No   Sig: Administer 1 drop to both eyes 2 (two) times a day   ondansetron (ZOFRAN-ODT) 8 mg disintegrating tablet  Self No No   Sig: Take 1 tablet (8 mg total) by mouth every 8 (eight) hours as needed for nausea or vomiting for up to 5 days   pravastatin (PRAVACHOL) 20 mg tablet   No No   Sig: Take 1 tablet (20 mg total) by mouth daily   spironolactone (ALDACTONE) 25 mg tablet   No No   Sig: Take 1 tablet (25 mg total) by mouth daily   triamcinolone (KENALOG) 0.1 % ointment   No No   Sig: Apply topically 2 (two) times a day      Facility-Administered Medications Last Administration Doses Remaining   cyanocobalamin injection 1,000 mcg 7/29/2024 10:09 AM         Discharge Medication List as of 9/23/2024  2:56 PM        START taking these medications    Details   !! methocarbamol (ROBAXIN) 500 mg tablet Take 1 tablet (500 mg total) by mouth 2 (two) times a day, Starting Mon 9/23/2024, Normal       !! - Potential duplicate medications found. Please discuss with provider.        CONTINUE these medications which have NOT CHANGED    Details   azelastine (ASTELIN) 0.1 % nasal spray 2 sprays into each nostril 2 (two) times a day Use in each nostril as directed, Starting Tue 7/11/2023, Normal      BLACK CURRANT SEED OIL PO Take by mouth, Historical Med      Cyanocobalamin (Vitamin B-12) 2500 MCG SUBL Place under the tongue, Historical Med      Diclofenac Sodium  (VOLTAREN) 1 % Apply 2 g topically 4 (four) times a day, Starting Thu 4/25/2024, Normal      diltiazem (CARDIZEM CD) 240 mg 24 hr capsule Take 1 capsule (240 mg total) by mouth daily, Starting Mon 7/29/2024, Until Sat 1/25/2025, Normal      ergocalciferol (VITAMIN D2) 50,000 units Take 1 capsule (50,000 Units total) by mouth once a week, Starting Mon 7/29/2024, Normal      !! fluticasone (FLONASE) 50 mcg/act nasal spray 1 spray into each nostril daily, Starting Wed 5/10/2023, Normal      !! fluticasone (FLONASE) 50 mcg/act nasal spray 1 spray into each nostril 2 (two) times a day, Starting Mon 7/29/2024, Normal      lidocaine (Lidoderm) 5 % Apply 1 patch topically over 12 hours daily for 10 days Remove & Discard patch within 12 hours or as directed by MD, Starting Wed 8/14/2024, Until Sat 8/24/2024, Normal      LORazepam (ATIVAN) 2 mg tablet Take 1 tablet (2 mg total) by mouth once as needed for anxiety (take 1 hour before MRI) for up to 120 doses, Starting Mon 7/29/2024, Normal      losartan (COZAAR) 100 MG tablet Take 1 tablet (100 mg total) by mouth daily, Starting Mon 7/29/2024, Normal      meloxicam (MOBIC) 15 mg tablet Take 1 tablet (15 mg total) by mouth daily as needed for moderate pain, Starting Mon 7/29/2024, Normal      !! methocarbamol (ROBAXIN) 500 mg tablet Take 1 tablet (500 mg total) by mouth 2 (two) times a day, Starting Wed 8/14/2024, Normal      !! methocarbamol (ROBAXIN) 750 mg tablet Take 1 tablet (750 mg total) by mouth every 8 (eight) hours, Starting Mon 7/29/2024, Normal      metoprolol succinate (TOPROL-XL) 50 mg 24 hr tablet Take 1 tablet (50 mg total) by mouth daily, Starting Mon 7/29/2024, Normal      montelukast (SINGULAIR) 10 mg tablet Take 1 tablet (10 mg total) by mouth daily at bedtime, Starting Mon 7/29/2024, Normal      Multiple Vitamin (ONE-A-DAY MENS PO) Take by mouth, Historical Med      naproxen (Naprosyn) 500 mg tablet Take 1 tablet (500 mg total) by mouth 2 (two) times a day  with meals, Starting Wed 8/14/2024, Normal      olopatadine (PATANOL) 0.1 % ophthalmic solution Administer 1 drop to both eyes 2 (two) times a day, Starting Mon 7/29/2024, Normal      ondansetron (ZOFRAN-ODT) 8 mg disintegrating tablet Take 1 tablet (8 mg total) by mouth every 8 (eight) hours as needed for nausea or vomiting for up to 5 days, Starting Tue 11/7/2023, Until Wed 6/26/2024 at 2359, Normal      pravastatin (PRAVACHOL) 20 mg tablet Take 1 tablet (20 mg total) by mouth daily, Starting Mon 7/29/2024, Normal      Semaglutide-Weight Management (Wegovy) 1.7 MG/0.75ML Inject 1.7 mg under the skin weekly, Normal      spironolactone (ALDACTONE) 25 mg tablet Take 1 tablet (25 mg total) by mouth daily, Starting Mon 7/29/2024, Normal      triamcinolone (KENALOG) 0.1 % ointment Apply topically 2 (two) times a day, Starting Mon 7/29/2024, Normal       !! - Potential duplicate medications found. Please discuss with provider.             Freddie Vanegas MD  09/23/24 0367

## 2024-09-25 ENCOUNTER — TELEPHONE (OUTPATIENT)
Dept: PHYSICAL THERAPY | Facility: OTHER | Age: 54
End: 2024-09-25

## 2024-09-25 ENCOUNTER — TELEPHONE (OUTPATIENT)
Age: 54
End: 2024-09-25

## 2024-09-25 NOTE — TELEPHONE ENCOUNTER
Call placed to the patient per [facility] referral.    Call went straight to voicemail.    V/m left for patient to call back. Phone number and hours of business provided.    This is the 1st attempt to reach the patient. Will defer referral per protocol.    NOTE: Patient declined services last month . See previous telephone encounter.

## 2024-09-25 NOTE — TELEPHONE ENCOUNTER
PA for acetaminophen-codeine 300-30 MG  NOT REQUIRED        Pharmacy advised by    []Fax  [x]Phone call    Spoke with pharmacy staff member verified patient picked up medication on 09/24 with copay of $1 which was through patients insurance. PA was not required.

## 2024-09-27 ENCOUNTER — TELEPHONE (OUTPATIENT)
Age: 54
End: 2024-09-27

## 2024-09-27 NOTE — TELEPHONE ENCOUNTER
Patient dropped off a STD form to be completed. I have completed the form and placed it in Dr Castillo's folder for signature.   Patient is aware Dr ROUSE needs to sign but he is not back until Monday along with a $15 form fee.     Will notify patient when ready.

## 2024-09-30 ENCOUNTER — OFFICE VISIT (OUTPATIENT)
Age: 54
End: 2024-09-30
Payer: MEDICARE

## 2024-09-30 VITALS
BODY MASS INDEX: 39.17 KG/M2 | SYSTOLIC BLOOD PRESSURE: 140 MMHG | DIASTOLIC BLOOD PRESSURE: 90 MMHG | HEIGHT: 75 IN | WEIGHT: 315 LBS

## 2024-09-30 DIAGNOSIS — G47.33 OBSTRUCTIVE SLEEP APNEA: ICD-10-CM

## 2024-09-30 DIAGNOSIS — I10 PRIMARY HYPERTENSION: ICD-10-CM

## 2024-09-30 DIAGNOSIS — M54.32 SCIATICA, LEFT SIDE: Primary | ICD-10-CM

## 2024-09-30 DIAGNOSIS — C61 MALIGNANT NEOPLASM OF PROSTATE (HCC): ICD-10-CM

## 2024-09-30 DIAGNOSIS — E66.813 CLASS 3 SEVERE OBESITY DUE TO EXCESS CALORIES WITH SERIOUS COMORBIDITY AND BODY MASS INDEX (BMI) OF 50.0 TO 59.9 IN ADULT (HCC): ICD-10-CM

## 2024-09-30 DIAGNOSIS — E66.01 CLASS 3 SEVERE OBESITY DUE TO EXCESS CALORIES WITH SERIOUS COMORBIDITY AND BODY MASS INDEX (BMI) OF 50.0 TO 59.9 IN ADULT (HCC): ICD-10-CM

## 2024-09-30 PROCEDURE — 99214 OFFICE O/P EST MOD 30 MIN: CPT | Performed by: FAMILY MEDICINE

## 2024-09-30 NOTE — PROGRESS NOTES
Ambulatory Visit  Name: Gavin Frank      : 1970      MRN: [id number]  Encounter Provider: Ricardo Castillo DO  Encounter Date: 2024   Encounter department: [facility] PRIMARY [facility]ARE    Assessment & Plan  Sciatica, left side  Continue current therapy for now.  Will fill out the handicap placard past.       Primary hypertension  Continue current therapy.       Obstructive sleep apnea  Continue to use CPAP.       Class 3 severe obesity due to excess calories with serious comorbidity and body mass index (BMI) of 50.0 to 59.9 in adult (HCC)  Is still working on weight loss diet.  Not able to exercise right now.       Malignant neoplasm of prostate (HCC)  He starts radiation therapy tomorrow.          History of Present Illness     Is in today for follow-up.  He continues to complain of back pain and sciatica.  He starts his radiation therapy tomorrow.  He is requesting a [place] disability placard for handicap parking.        History obtained from : patient  Review of Systems   Constitutional:  Positive for activity change.   Respiratory: Negative.     Cardiovascular: Negative.    Gastrointestinal: Negative.    Musculoskeletal:  Positive for back pain and gait problem.     Pertinent Medical History   Past Medical History:   Diagnosis Date    Allergic     Anxiety     Arthritis     Asthma     Chronic pain disorder     Back pain    CPAP (continuous positive airway pressure) dependence 2024    Depression     GERD (gastroesophageal reflux disease)     Hyperlipemia     Hypertension     Obesity     Prostate cancer (HCC)     Sleep apnea      Past Medical History:   Diagnosis Date    Allergic     Anxiety     Arthritis     Asthma     Chronic pain disorder     Back pain    CPAP (continuous positive airway pressure) dependence 2024    Depression     GERD (gastroesophageal reflux disease)     Hyperlipemia     Hypertension     Obesity     Prostate cancer (HCC)     Sleep apnea            Medical  History Reviewed by provider this encounter:  Allergies  Meds  Problems       Past Medical History   Past Medical History:   Diagnosis Date    Allergic     Anxiety     Arthritis     Asthma     Chronic pain disorder     Back pain    CPAP (continuous positive airway pressure) dependence 02/07/2024    Depression     GERD (gastroesophageal reflux disease)     Hyperlipemia     Hypertension     Obesity     Prostate cancer (HCC)     Sleep apnea      Past Surgical History:   Procedure Laterality Date    HEMORRHOID SURGERY      INSERTION OF FIDUCIAL MARKER (TRANSRECTAL ULTRASOUND GUIDANCE) N/A 9/4/2024    Procedure: INSERTION OF FIDUCIAL MARKER ,SPACEOAR;  Surgeon: Benjie Stewart DO;  Location: [facility];  Service: Urology    TX BX PROSTATE STRTCTC SATURATION SAMPLING IMG GID N/A 2/7/2024    Procedure: TRANSPERINEAL MRI FUSION BIOPSY PROSTATE;  Surgeon: Benjie Stewart DO;  Location: [facility];  Service: Urology    TONSILLECTOMY      URETHRAL DILATION  2019     Family History   Problem Relation Age of Onset    Hypertension Mother     Hyperlipidemia Mother     Heart disease Mother     Substance Abuse Mother     Hypertension Father     Heart disease Father     Hyperlipidemia Father     Substance Abuse Father     COPD Sister     Liver disease Sister     Prostate cancer Maternal Uncle      Current Outpatient Medications on File Prior to Visit   Medication Sig Dispense Refill    acetaminophen-codeine (TYLENOL with CODEINE #3) 300-30 MG per tablet Take 1 tablet by mouth every 4 (four) hours as needed for moderate pain 30 tablet 0    azelastine (ASTELIN) 0.1 % nasal spray 2 sprays into each nostril 2 (two) times a day Use in each nostril as directed (Patient not taking: Reported on 9/24/2024) 30 mL 5    BLACK CURRANT SEED OIL PO Take by mouth (Patient not taking: Reported on 2/26/2024)      Cyanocobalamin (Vitamin B-12) 2500 MCG SUBL Place under the tongue      Diclofenac Sodium (VOLTAREN) 1 % Apply 2 g  topically 4 (four) times a day 100 g 3    diltiazem (CARDIZEM CD) 240 mg 24 hr capsule Take 1 capsule (240 mg total) by mouth daily 90 capsule 1    ergocalciferol (VITAMIN D2) 50,000 units Take 1 capsule (50,000 Units total) by mouth once a week 4 capsule 0    fluticasone (FLONASE) 50 mcg/act nasal spray 1 spray into each nostril daily 48 g 1    fluticasone (FLONASE) 50 mcg/act nasal spray 1 spray into each nostril 2 (two) times a day 16 g 3    lidocaine (Lidoderm) 5 % Apply 1 patch topically over 12 hours daily for 10 days Remove & Discard patch within 12 hours or as directed by MD 10 patch 0    LORazepam (ATIVAN) 2 mg tablet Take 1 tablet (2 mg total) by mouth once as needed for anxiety (take 1 hour before MRI) for up to 120 doses 30 tablet 3    losartan (COZAAR) 100 MG tablet Take 1 tablet (100 mg total) by mouth daily 90 tablet 1    meloxicam (MOBIC) 15 mg tablet Take 1 tablet (15 mg total) by mouth daily as needed for moderate pain 90 tablet 1    methocarbamol (ROBAXIN) 500 mg tablet Take 1 tablet (500 mg total) by mouth 2 (two) times a day 20 tablet 0    methocarbamol (ROBAXIN) 500 mg tablet Take 1 tablet (500 mg total) by mouth 2 (two) times a day 20 tablet 0    methocarbamol (ROBAXIN) 750 mg tablet Take 1 tablet (750 mg total) by mouth every 8 (eight) hours (Patient not taking: Reported on 9/24/2024) 30 tablet 3    metoprolol succinate (TOPROL-XL) 50 mg 24 hr tablet Take 1 tablet (50 mg total) by mouth daily 90 tablet 1    montelukast (SINGULAIR) 10 mg tablet Take 1 tablet (10 mg total) by mouth daily at bedtime 90 tablet 1    Multiple Vitamin (ONE-A-DAY MENS PO) Take by mouth      naproxen (Naprosyn) 500 mg tablet Take 1 tablet (500 mg total) by mouth 2 (two) times a day with meals 30 tablet 0    olopatadine (PATANOL) 0.1 % ophthalmic solution Administer 1 drop to both eyes 2 (two) times a day 5 mL 5    ondansetron (ZOFRAN-ODT) 8 mg disintegrating tablet Take 1 tablet (8 mg total) by mouth every 8 (eight)  hours as needed for nausea or vomiting for up to 5 days 15 tablet 0    pravastatin (PRAVACHOL) 20 mg tablet Take 1 tablet (20 mg total) by mouth daily 90 tablet 1    Semaglutide-Weight Management (Wegovy) 1.7 MG/0.75ML Inject 1.7 mg under the skin weekly 3 mL 5    spironolactone (ALDACTONE) 25 mg tablet Take 1 tablet (25 mg total) by mouth daily 90 tablet 1    triamcinolone (KENALOG) 0.1 % ointment Apply topically 2 (two) times a day 30 g 2     Current Facility-Administered Medications on File Prior to Visit   Medication Dose Route Frequency Provider Last Rate Last Admin    cyanocobalamin injection 1,000 mcg  1,000 mcg Intramuscular Q30 Days    1,000 mcg at 07/29/24 1009     Allergies   Allergen Reactions    Contrast Dye [Iodinated Contrast Media] Vomiting      Current Outpatient Medications on File Prior to Visit   Medication Sig Dispense Refill    acetaminophen-codeine (TYLENOL with CODEINE #3) 300-30 MG per tablet Take 1 tablet by mouth every 4 (four) hours as needed for moderate pain 30 tablet 0    azelastine (ASTELIN) 0.1 % nasal spray 2 sprays into each nostril 2 (two) times a day Use in each nostril as directed (Patient not taking: Reported on 9/24/2024) 30 mL 5    BLACK CURRANT SEED OIL PO Take by mouth (Patient not taking: Reported on 2/26/2024)      Cyanocobalamin (Vitamin B-12) 2500 MCG SUBL Place under the tongue      Diclofenac Sodium (VOLTAREN) 1 % Apply 2 g topically 4 (four) times a day 100 g 3    diltiazem (CARDIZEM CD) 240 mg 24 hr capsule Take 1 capsule (240 mg total) by mouth daily 90 capsule 1    ergocalciferol (VITAMIN D2) 50,000 units Take 1 capsule (50,000 Units total) by mouth once a week 4 capsule 0    fluticasone (FLONASE) 50 mcg/act nasal spray 1 spray into each nostril daily 48 g 1    fluticasone (FLONASE) 50 mcg/act nasal spray 1 spray into each nostril 2 (two) times a day 16 g 3    lidocaine (Lidoderm) 5 % Apply 1 patch topically over 12 hours daily for 10 days Remove & Discard patch  within 12 hours or as directed by MD 10 patch 0    LORazepam (ATIVAN) 2 mg tablet Take 1 tablet (2 mg total) by mouth once as needed for anxiety (take 1 hour before MRI) for up to 120 doses 30 tablet 3    losartan (COZAAR) 100 MG tablet Take 1 tablet (100 mg total) by mouth daily 90 tablet 1    meloxicam (MOBIC) 15 mg tablet Take 1 tablet (15 mg total) by mouth daily as needed for moderate pain 90 tablet 1    methocarbamol (ROBAXIN) 500 mg tablet Take 1 tablet (500 mg total) by mouth 2 (two) times a day 20 tablet 0    methocarbamol (ROBAXIN) 500 mg tablet Take 1 tablet (500 mg total) by mouth 2 (two) times a day 20 tablet 0    methocarbamol (ROBAXIN) 750 mg tablet Take 1 tablet (750 mg total) by mouth every 8 (eight) hours (Patient not taking: Reported on 9/24/2024) 30 tablet 3    metoprolol succinate (TOPROL-XL) 50 mg 24 hr tablet Take 1 tablet (50 mg total) by mouth daily 90 tablet 1    montelukast (SINGULAIR) 10 mg tablet Take 1 tablet (10 mg total) by mouth daily at bedtime 90 tablet 1    Multiple Vitamin (ONE-A-DAY MENS PO) Take by mouth      naproxen (Naprosyn) 500 mg tablet Take 1 tablet (500 mg total) by mouth 2 (two) times a day with meals 30 tablet 0    olopatadine (PATANOL) 0.1 % ophthalmic solution Administer 1 drop to both eyes 2 (two) times a day 5 mL 5    ondansetron (ZOFRAN-ODT) 8 mg disintegrating tablet Take 1 tablet (8 mg total) by mouth every 8 (eight) hours as needed for nausea or vomiting for up to 5 days 15 tablet 0    pravastatin (PRAVACHOL) 20 mg tablet Take 1 tablet (20 mg total) by mouth daily 90 tablet 1    Semaglutide-Weight Management (Wegovy) 1.7 MG/0.75ML Inject 1.7 mg under the skin weekly 3 mL 5    spironolactone (ALDACTONE) 25 mg tablet Take 1 tablet (25 mg total) by mouth daily 90 tablet 1    triamcinolone (KENALOG) 0.1 % ointment Apply topically 2 (two) times a day 30 g 2     Current Facility-Administered Medications on File Prior to Visit   Medication Dose Route Frequency  "Provider Last Rate Last Admin    cyanocobalamin injection 1,000 mcg  1,000 mcg Intramuscular Q30 Days    1,000 mcg at 24 1009      Social History     Tobacco Use    Smoking status: Former     Current packs/day: 0.00     Average packs/day: 0.5 packs/day for 31.0 years (15.5 ttl pk-yrs)     Types: Cigarettes     Start date: 1984     Quit date: 2018     Years since quittin.7     Passive exposure: Past    Smokeless tobacco: Never    Tobacco comments:     No cigarettes vape only   Vaping Use    Vaping status: Every Day    Substances: Nicotine, Flavoring   Substance and Sexual Activity    Alcohol use: Not Currently    Drug use: Never    Sexual activity: Yes     Partners: Female     Birth control/protection: None         Objective     /90 (BP Location: Left arm, Patient Position: Sitting, Cuff Size: Adult)   Ht 6' 3\" (1.905 m)   Wt (!) 161 kg (356 lb)   BMI 44.50 kg/m²     Physical Exam  Vitals and nursing note reviewed.   Constitutional:       Appearance: He is well-developed. He is obese.   HENT:      Head: Normocephalic and atraumatic.   Eyes:      Pupils: Pupils are equal, round, and reactive to light.   Cardiovascular:      Rate and Rhythm: Normal rate.   Pulmonary:      Effort: Pulmonary effort is normal.   Abdominal:      Palpations: Abdomen is soft.   Musculoskeletal:      Cervical back: Normal range of motion and neck supple.      Lumbar back: Spasms and tenderness present. Decreased range of motion. Positive right straight leg raise test.   Lymphadenopathy:      Cervical: No cervical adenopathy.   Skin:     General: Skin is warm.   Neurological:      Mental Status: He is alert and oriented to person, place, and time.   Psychiatric:         Behavior: Behavior normal.         "

## 2024-10-01 ENCOUNTER — APPOINTMENT (OUTPATIENT)
Dept: RADIATION ONCOLOGY | Facility: HOSPITAL | Age: 54
End: 2024-10-01
Attending: RADIOLOGY
Payer: MEDICARE

## 2024-10-01 PROCEDURE — 77385 HB NTSTY MODUL RAD TX DLVR SMPL: CPT | Performed by: RADIOLOGY

## 2024-10-01 PROCEDURE — 77427 RADIATION TX MANAGEMENT X5: CPT | Performed by: RADIOLOGY

## 2024-10-01 PROCEDURE — 77014 CHG CT GUIDANCE RADIATION THERAPY FLDS PLACEMENT: CPT | Performed by: RADIOLOGY

## 2024-10-01 NOTE — TELEPHONE ENCOUNTER
Call placed to the patient per [facility] referral.      V/m left for patient to call back. Phone number and hours of business provided.     This is the 2nd attempt to reach the patient. Will close referral per protocol.     NOTE: Patient declined services last month . See previous telephone encounter.    Seen by FM yesterday 09/30.

## 2024-10-02 ENCOUNTER — APPOINTMENT (OUTPATIENT)
Dept: RADIATION ONCOLOGY | Facility: HOSPITAL | Age: 54
End: 2024-10-02
Attending: RADIOLOGY
Payer: MEDICARE

## 2024-10-02 ENCOUNTER — TELEPHONE (OUTPATIENT)
Dept: OTOLARYNGOLOGY | Facility: CLINIC | Age: 54
End: 2024-10-02

## 2024-10-02 PROCEDURE — 77014 CHG CT GUIDANCE RADIATION THERAPY FLDS PLACEMENT: CPT | Performed by: STUDENT IN AN ORGANIZED HEALTH CARE EDUCATION/TRAINING PROGRAM

## 2024-10-02 PROCEDURE — 77385 HB NTSTY MODUL RAD TX DLVR SMPL: CPT | Performed by: STUDENT IN AN ORGANIZED HEALTH CARE EDUCATION/TRAINING PROGRAM

## 2024-10-02 NOTE — TELEPHONE ENCOUNTER
Patient cancel his surgery for 8/23/24. If he would like to reschedule please give us a call @ [phone].

## 2024-10-03 ENCOUNTER — APPOINTMENT (OUTPATIENT)
Dept: RADIATION ONCOLOGY | Facility: HOSPITAL | Age: 54
End: 2024-10-03
Attending: RADIOLOGY
Payer: MEDICARE

## 2024-10-03 PROCEDURE — 77385 HB NTSTY MODUL RAD TX DLVR SMPL: CPT | Performed by: RADIOLOGY

## 2024-10-03 PROCEDURE — 77014 CHG CT GUIDANCE RADIATION THERAPY FLDS PLACEMENT: CPT | Performed by: RADIOLOGY

## 2024-10-03 NOTE — TELEPHONE ENCOUNTER
Patient stopped into the office to check status.  Form was signed and provided to patient. Copy scanned.

## 2024-10-04 ENCOUNTER — OFFICE VISIT (OUTPATIENT)
Dept: UROLOGY | Facility: CLINIC | Age: 54
End: 2024-10-04
Payer: MEDICARE

## 2024-10-04 ENCOUNTER — APPOINTMENT (OUTPATIENT)
Dept: RADIATION ONCOLOGY | Facility: HOSPITAL | Age: 54
End: 2024-10-04
Attending: RADIOLOGY
Payer: MEDICARE

## 2024-10-04 VITALS
OXYGEN SATURATION: 98 % | DIASTOLIC BLOOD PRESSURE: 100 MMHG | HEIGHT: 75 IN | BODY MASS INDEX: 39.17 KG/M2 | SYSTOLIC BLOOD PRESSURE: 158 MMHG | HEART RATE: 78 BPM | WEIGHT: 315 LBS

## 2024-10-04 DIAGNOSIS — C61 PROSTATE CANCER (HCC): Primary | ICD-10-CM

## 2024-10-04 PROCEDURE — 77014 CHG CT GUIDANCE RADIATION THERAPY FLDS PLACEMENT: CPT | Performed by: RADIOLOGY

## 2024-10-04 PROCEDURE — 77385 HB NTSTY MODUL RAD TX DLVR SMPL: CPT | Performed by: RADIOLOGY

## 2024-10-04 PROCEDURE — 99213 OFFICE O/P EST LOW 20 MIN: CPT | Performed by: UROLOGY

## 2024-10-04 NOTE — PROGRESS NOTES
UROLOGY FOLLOW-UP ENCOUNTER    Gavin Frank is a 54 y.o. male with prostate cancer    Pertinent non-urologic PMH: HLD, HTN, SHANDA, prediabetes    Pertinent non-urologic PSH: no abdominal or pelvic surgeries    Anticoagulation: none    PSA 5.33 on 5/18/23, 4.7 on 10/31/23     MRI prostate 11/16/23:  1. PI-RADSv2.1 Category 3 - Intermediate (the presence of clinically significant cancer is equivocal). Possible 0.4 cm lesion in the posterior left peripheral zone at apex  2. No extraprostatic tumor, seminal vesicle invasion, pelvic lymphadenopathy, or pelvic osseous metastatic disease.  3. Calculated prostate volume of 41 cc.    OR MRI fusion TP bx 2/7/24: G 3+3 in BIB, RB, RAL, RPM  A. Prostate, right anterior medial, biopsy:  - Benign prostatic tissue with inflammation.       B. Prostate, region of interest, biopsy:  -Prostatic adenocarcinoma, involving (3) of (4) cores:              - [place] score 3 + 3 = 6 (0 % grade 4), Prognostic Grade Group 1 involving 5 % of 1 core.              - [place] score 3 + 3 = 6 (0 % grade 4), Prognostic Grade Group 1 involving 25 % of 1 core.                - [place] score 3 + 3 = 6 (0 % grade 4), Prognostic Grade Group 1 involving 80 % (discontinuous) of 1 core.                C. Prostate, right base, biopsy:  - Prostatic adenocarcinoma, [place] score 3+3=6 (0% pattern 4), Grade Group 1, present in 1 of 1 cores, involving approximately 0.1 cm of length or 5% of total biopsy length.       D. Prostate, right anterior lateral, biopsy:  - Prostatic adenocarcinoma, [place] score 3+3=6 (0% pattern 4), Grade Group 1, present in 1 of 1 cores, involving approximately 0.2 cm of length or 15% of total biopsy length.       E. prostate, right posterior medial, biopsy:  - Prostatic adenocarcinoma, [place] score 3+3=6 (0% pattern 4), Grade Group 1, present in 1 of 1 cores, involving approximately 0.2 cm of length or 15% of total biopsy length.       F. Prostate, right posterior lateral, biopsy:  -  "Benign prostatic tissue.        G. Prostate, left anterior lateral, biopsy:  - Benign prostatic tissue.        H. Prostate, left anterior medial, biopsy:  - Benign prostatic tissue.        I. Prostate, left base, biopsy:  - Benign fibrovascular tissue.       J. Prostate, left posterior medial, biopsy:  - Benign prostatic tissue.        K. Prostate, left posterior lateral, biopsy:  - Benign prostatic tissue.            Prostate MRI 5/9/2024: PI-RADS 3 small left apical peripheral zone lesion without any significant change compared to previous; negative EPE/SVI/LAD/bony metastasis; prostate 46 g    PSA 4.95 on 6/24/2024    SpaceOAR and fiducial markers on 9/4/24    Started XRT late Sept 2024    Assessment and plan:     Prostate cancer    Known history of prostate cancer.  Diagnosed with [place] 6 prostate cancer.  He was previously on active surveillance.  Decipher testing was high risk.  I did review with him that given his young age, high-volume Maryanne 6 disease, and high risk decipher testing, he really would warrant treatment.  In order to preserve his sexual function, he preferred to pursue radiation rather than prostatectomy.  He started his radiation in late September 2024.  Tolerating well overall, just tired overall.    Also of note, he got SpaceOAR and fiducial markers with me on 9//24.    He feels like he is urinating well overall.    I explained to him that after he finishes his treatment he will get his PSAs monitored with radiation oncology.    I will plan to see him in 6 months to check up on his symptoms and see how he is feeling after his radiation.        PLAN  -Continue radiation therapy  -Rad onc will monitor his PSA after radiation  -I will see him in 6 months for symptom check          Portions of the above record have been created with voice recognition software.  Occasional wrong word or \"sound alike\" substitution may have occurred due to the inherent limitations of voice recognition software. " " Read the chart carefully and recognize, using context, where substitution may have occurred.      Benjie Stewart, DO        Chief Complaint     Prostate cancer      History of Present Illness     See summary above    No fevers or chills        The following portions of the patient's history were reviewed and updated as appropriate: allergies, current medications, past family history, past medical history, past social history, past surgical history and problem list.        AUA SYMPTOM SCORE      Flowsheet Row Most Recent Value   AUA SYMPTOM SCORE    How often have you had a sensation of not emptying your bladder completely after you finished urinating? 2 (P)    How often have you had to urinate again less than two hours after you finished urinating? 3 (P)    How often have you found you stopped and started again several times when you urinate? 1 (P)    How often have you found it difficult to postpone urination? 0 (P)    How often have you had a weak urinary stream? 0 (P)    How often have you had to push or strain to begin urination? 1 (P)    How many times did you most typically get up to urinate from the time you went to bed at night until the time you got up in the morning? 2 (P)    Quality of Life: If you were to spend the rest of your life with your urinary condition just the way it is now, how would you feel about that? 3 (P)    AUA SYMPTOM SCORE 9 (P)             Review of Systems     Neg for fevers, chills, nausea, vomiting, blood in urine    Allergies     Allergies   Allergen Reactions    Contrast Dye [Iodinated Contrast Media] Vomiting       Physical Exam     NAD, NCAT, no CVAT, ab soft and nontender, nonlabored breathing    Vital Signs  Vitals:    10/04/24 1021   BP: 158/100   BP Location: Left arm   Patient Position: Sitting   Cuff Size: Standard   Pulse: 78   SpO2: 98%   Weight: (!) 163 kg (359 lb)   Height: 6' 3\" (1.905 m)         Current Medications       Current Outpatient Medications:     " acetaminophen-codeine (TYLENOL with CODEINE #3) 300-30 MG per tablet, Take 1 tablet by mouth every 4 (four) hours as needed for moderate pain, Disp: 30 tablet, Rfl: 0    Cyanocobalamin (Vitamin B-12) 2500 MCG SUBL, Place under the tongue, Disp: , Rfl:     Diclofenac Sodium (VOLTAREN) 1 %, Apply 2 g topically 4 (four) times a day, Disp: 100 g, Rfl: 3    diltiazem (CARDIZEM CD) 240 mg 24 hr capsule, Take 1 capsule (240 mg total) by mouth daily, Disp: 90 capsule, Rfl: 1    ergocalciferol (VITAMIN D2) 50,000 units, Take 1 capsule (50,000 Units total) by mouth once a week, Disp: 4 capsule, Rfl: 0    fluticasone (FLONASE) 50 mcg/act nasal spray, 1 spray into each nostril 2 (two) times a day, Disp: 16 g, Rfl: 3    LORazepam (ATIVAN) 2 mg tablet, Take 1 tablet (2 mg total) by mouth once as needed for anxiety (take 1 hour before MRI) for up to 120 doses, Disp: 30 tablet, Rfl: 3    losartan (COZAAR) 100 MG tablet, Take 1 tablet (100 mg total) by mouth daily, Disp: 90 tablet, Rfl: 1    meloxicam (MOBIC) 15 mg tablet, Take 1 tablet (15 mg total) by mouth daily as needed for moderate pain, Disp: 90 tablet, Rfl: 1    methocarbamol (ROBAXIN) 500 mg tablet, Take 1 tablet (500 mg total) by mouth 2 (two) times a day, Disp: 20 tablet, Rfl: 0    methocarbamol (ROBAXIN) 500 mg tablet, Take 1 tablet (500 mg total) by mouth 2 (two) times a day, Disp: 20 tablet, Rfl: 0    metoprolol succinate (TOPROL-XL) 50 mg 24 hr tablet, Take 1 tablet (50 mg total) by mouth daily, Disp: 90 tablet, Rfl: 1    montelukast (SINGULAIR) 10 mg tablet, Take 1 tablet (10 mg total) by mouth daily at bedtime, Disp: 90 tablet, Rfl: 1    Multiple Vitamin (ONE-A-DAY MENS PO), Take by mouth, Disp: , Rfl:     naproxen (Naprosyn) 500 mg tablet, Take 1 tablet (500 mg total) by mouth 2 (two) times a day with meals, Disp: 30 tablet, Rfl: 0    olopatadine (PATANOL) 0.1 % ophthalmic solution, Administer 1 drop to both eyes 2 (two) times a day, Disp: 5 mL, Rfl: 5     pravastatin (PRAVACHOL) 20 mg tablet, Take 1 tablet (20 mg total) by mouth daily, Disp: 90 tablet, Rfl: 1    Semaglutide-Weight Management (Wegovy) 1.7 MG/0.75ML, Inject 1.7 mg under the skin weekly, Disp: 3 mL, Rfl: 5    spironolactone (ALDACTONE) 25 mg tablet, Take 1 tablet (25 mg total) by mouth daily, Disp: 90 tablet, Rfl: 1    triamcinolone (KENALOG) 0.1 % ointment, Apply topically 2 (two) times a day, Disp: 30 g, Rfl: 2    azelastine (ASTELIN) 0.1 % nasal spray, 2 sprays into each nostril 2 (two) times a day Use in each nostril as directed (Patient not taking: Reported on 9/24/2024), Disp: 30 mL, Rfl: 5    BLACK CURRANT SEED OIL PO, Take by mouth (Patient not taking: Reported on 2/26/2024), Disp: , Rfl:     fluticasone (FLONASE) 50 mcg/act nasal spray, 1 spray into each nostril daily (Patient not taking: Reported on 10/4/2024), Disp: 48 g, Rfl: 1    lidocaine (Lidoderm) 5 %, Apply 1 patch topically over 12 hours daily for 10 days Remove & Discard patch within 12 hours or as directed by MD, Disp: 10 patch, Rfl: 0    methocarbamol (ROBAXIN) 750 mg tablet, Take 1 tablet (750 mg total) by mouth every 8 (eight) hours (Patient not taking: Reported on 9/24/2024), Disp: 30 tablet, Rfl: 3    ondansetron (ZOFRAN-ODT) 8 mg disintegrating tablet, Take 1 tablet (8 mg total) by mouth every 8 (eight) hours as needed for nausea or vomiting for up to 5 days, Disp: 15 tablet, Rfl: 0    Current Facility-Administered Medications:     cyanocobalamin injection 1,000 mcg, 1,000 mcg, Intramuscular, Q30 Days, , 1,000 mcg at 07/29/24 1009      Active Problems     Patient Active Problem List   Diagnosis    Hypertension    Arthritis    Hyperlipemia    Class 3 severe obesity due to excess calories with serious comorbidity and body mass index (BMI) of 50.0 to 59.9 in adult (HCC)    Benign essential hypertension    Obstructive sleep apnea    Benign prostatic hyperplasia with urinary frequency    H/O urethral stricture    Vitamin D  deficiency    Elevated PSA    Ingrown nail    Onychomycosis    Asthma    Stroke-like symptoms    Back pain    Prediabetes    CPAP (continuous positive airway pressure) dependence    Malignant neoplasm of prostate (HCC)    Sciatica, left side         Past Medical History     Past Medical History:   Diagnosis Date    Allergic     Anxiety     Arthritis     Asthma     Chronic pain disorder     Back pain    CPAP (continuous positive airway pressure) dependence 2024    Depression     GERD (gastroesophageal reflux disease)     Hyperlipemia     Hypertension     Obesity     Prostate cancer (HCC)     Sleep apnea          Surgical History     Past Surgical History:   Procedure Laterality Date    HEMORRHOID SURGERY      INSERTION OF FIDUCIAL MARKER (TRANSRECTAL ULTRASOUND GUIDANCE) N/A 2024    Procedure: INSERTION OF FIDUCIAL MARKER ,SPACEOAR;  Surgeon: Benjie Stewart DO;  Location: [facility];  Service: Urology    GA BX PROSTATE STRTCTC SATURATION SAMPLING IMG GID N/A 2024    Procedure: TRANSPERINEAL MRI FUSION BIOPSY PROSTATE;  Surgeon: Benjie Stewart DO;  Location: [facility];  Service: Urology    TONSILLECTOMY      URETHRAL DILATION  2019         Family History     Family History   Problem Relation Age of Onset    Hypertension Mother     Hyperlipidemia Mother     Heart disease Mother     Substance Abuse Mother     Hypertension Father     Heart disease Father     Hyperlipidemia Father     Substance Abuse Father     COPD Sister     Liver disease Sister     Prostate cancer Maternal Uncle          Social History     Social History     Social History     Tobacco Use   Smoking Status Former    Current packs/day: 0.00    Average packs/day: 0.5 packs/day for 31.0 years (15.5 ttl pk-yrs)    Types: Cigarettes    Start date: 1984    Quit date: 2018    Years since quittin.7    Passive exposure: Past   Smokeless Tobacco Never   Tobacco Comments    No cigarettes vape only         Pertinent Lab  Values     Lab Results   Component Value Date    CREATININE 0.84 08/22/2024       Lab Results   Component Value Date    PSA 4.951 (H) 06/24/2024    PSA 4.7 (H) 10/31/2023    PSA 5.33 (H) 05/18/2023               Pertinent Imaging     The patient's images were reviewed by me personally and also in real time with them in the examination room using our PACS imaging system.     MRI prostate 11/16/23:  1. PI-RADSv2.1 Category 3 - Intermediate (the presence of clinically significant cancer is equivocal). Possible 0.4 cm lesion in the posterior left peripheral zone at apex  2. No extraprostatic tumor, seminal vesicle invasion, pelvic lymphadenopathy, or pelvic osseous metastatic disease.  3. Calculated prostate volume of 41 cc.      Pertinent Pathology     OR MRI fusion TP bx 2/7/24: G 3+3 in BIB, RB, RAL, RPM  A. Prostate, right anterior medial, biopsy:  - Benign prostatic tissue with inflammation.       B. Prostate, region of interest, biopsy:  -Prostatic adenocarcinoma, involving (3) of (4) cores:              - [place] score 3 + 3 = 6 (0 % grade 4), Prognostic Grade Group 1 involving 5 % of 1 core.              - [place] score 3 + 3 = 6 (0 % grade 4), Prognostic Grade Group 1 involving 25 % of 1 core.                - [place] score 3 + 3 = 6 (0 % grade 4), Prognostic Grade Group 1 involving 80 % (discontinuous) of 1 core.                C. Prostate, right base, biopsy:  - Prostatic adenocarcinoma, [place] score 3+3=6 (0% pattern 4), Grade Group 1, present in 1 of 1 cores, involving approximately 0.1 cm of length or 5% of total biopsy length.       D. Prostate, right anterior lateral, biopsy:  - Prostatic adenocarcinoma, [place] score 3+3=6 (0% pattern 4), Grade Group 1, present in 1 of 1 cores, involving approximately 0.2 cm of length or 15% of total biopsy length.       E. prostate, right posterior medial, biopsy:  - Prostatic adenocarcinoma, [place] score 3+3=6 (0% pattern 4), Grade Group 1, present in 1 of 1 cores,  involving approximately 0.2 cm of length or 15% of total biopsy length.       F. Prostate, right posterior lateral, biopsy:  - Benign prostatic tissue.        G. Prostate, left anterior lateral, biopsy:  - Benign prostatic tissue.        H. Prostate, left anterior medial, biopsy:  - Benign prostatic tissue.        I. Prostate, left base, biopsy:  - Benign fibrovascular tissue.       J. Prostate, left posterior medial, biopsy:  - Benign prostatic tissue.        K. Prostate, left posterior lateral, biopsy:  - Benign prostatic tissue.

## 2024-10-07 ENCOUNTER — APPOINTMENT (OUTPATIENT)
Dept: RADIATION ONCOLOGY | Facility: HOSPITAL | Age: 54
End: 2024-10-07
Attending: RADIOLOGY
Payer: MEDICARE

## 2024-10-07 PROCEDURE — 77385 HB NTSTY MODUL RAD TX DLVR SMPL: CPT | Performed by: STUDENT IN AN ORGANIZED HEALTH CARE EDUCATION/TRAINING PROGRAM

## 2024-10-07 PROCEDURE — 77336 RADIATION PHYSICS CONSULT: CPT | Performed by: RADIOLOGY

## 2024-10-07 PROCEDURE — 77014 CHG CT GUIDANCE RADIATION THERAPY FLDS PLACEMENT: CPT | Performed by: STUDENT IN AN ORGANIZED HEALTH CARE EDUCATION/TRAINING PROGRAM

## 2024-10-08 ENCOUNTER — APPOINTMENT (OUTPATIENT)
Dept: RADIATION ONCOLOGY | Facility: HOSPITAL | Age: 54
End: 2024-10-08
Attending: RADIOLOGY
Payer: MEDICARE

## 2024-10-08 PROCEDURE — 77385 HB NTSTY MODUL RAD TX DLVR SMPL: CPT | Performed by: RADIOLOGY

## 2024-10-08 PROCEDURE — 77014 CHG CT GUIDANCE RADIATION THERAPY FLDS PLACEMENT: CPT | Performed by: RADIOLOGY

## 2024-10-08 PROCEDURE — 77427 RADIATION TX MANAGEMENT X5: CPT | Performed by: RADIOLOGY

## 2024-10-09 ENCOUNTER — APPOINTMENT (OUTPATIENT)
Dept: RADIATION ONCOLOGY | Facility: HOSPITAL | Age: 54
End: 2024-10-09
Attending: RADIOLOGY
Payer: MEDICARE

## 2024-10-09 PROCEDURE — 77014 CHG CT GUIDANCE RADIATION THERAPY FLDS PLACEMENT: CPT | Performed by: RADIOLOGY

## 2024-10-09 PROCEDURE — 77385 HB NTSTY MODUL RAD TX DLVR SMPL: CPT | Performed by: RADIOLOGY

## 2024-10-10 ENCOUNTER — APPOINTMENT (OUTPATIENT)
Dept: RADIATION ONCOLOGY | Facility: HOSPITAL | Age: 54
End: 2024-10-10
Attending: RADIOLOGY
Payer: MEDICARE

## 2024-10-10 PROCEDURE — 77385 HB NTSTY MODUL RAD TX DLVR SMPL: CPT | Performed by: RADIOLOGY

## 2024-10-10 PROCEDURE — 77014 CHG CT GUIDANCE RADIATION THERAPY FLDS PLACEMENT: CPT | Performed by: RADIOLOGY

## 2024-10-11 ENCOUNTER — APPOINTMENT (OUTPATIENT)
Dept: RADIATION ONCOLOGY | Facility: HOSPITAL | Age: 54
End: 2024-10-11
Attending: RADIOLOGY
Payer: MEDICARE

## 2024-10-11 PROCEDURE — 77014 CHG CT GUIDANCE RADIATION THERAPY FLDS PLACEMENT: CPT | Performed by: RADIOLOGY

## 2024-10-11 PROCEDURE — 77385 HB NTSTY MODUL RAD TX DLVR SMPL: CPT | Performed by: RADIOLOGY

## 2024-10-14 ENCOUNTER — APPOINTMENT (OUTPATIENT)
Dept: RADIATION ONCOLOGY | Facility: HOSPITAL | Age: 54
End: 2024-10-14
Attending: RADIOLOGY
Payer: MEDICARE

## 2024-10-14 PROCEDURE — 77385 HB NTSTY MODUL RAD TX DLVR SMPL: CPT | Performed by: STUDENT IN AN ORGANIZED HEALTH CARE EDUCATION/TRAINING PROGRAM

## 2024-10-14 PROCEDURE — 77336 RADIATION PHYSICS CONSULT: CPT | Performed by: RADIOLOGY

## 2024-10-14 PROCEDURE — 77014 CHG CT GUIDANCE RADIATION THERAPY FLDS PLACEMENT: CPT | Performed by: STUDENT IN AN ORGANIZED HEALTH CARE EDUCATION/TRAINING PROGRAM

## 2024-10-15 ENCOUNTER — APPOINTMENT (OUTPATIENT)
Dept: RADIATION ONCOLOGY | Facility: HOSPITAL | Age: 54
End: 2024-10-15
Attending: RADIOLOGY
Payer: MEDICARE

## 2024-10-15 PROCEDURE — 77385 HB NTSTY MODUL RAD TX DLVR SMPL: CPT | Performed by: INTERNAL MEDICINE

## 2024-10-15 PROCEDURE — 77014 CHG CT GUIDANCE RADIATION THERAPY FLDS PLACEMENT: CPT | Performed by: INTERNAL MEDICINE

## 2024-10-16 ENCOUNTER — APPOINTMENT (OUTPATIENT)
Dept: RADIATION ONCOLOGY | Facility: HOSPITAL | Age: 54
End: 2024-10-16
Attending: RADIOLOGY
Payer: MEDICARE

## 2024-10-16 PROCEDURE — 77014 CHG CT GUIDANCE RADIATION THERAPY FLDS PLACEMENT: CPT | Performed by: RADIOLOGY

## 2024-10-16 PROCEDURE — 77385 HB NTSTY MODUL RAD TX DLVR SMPL: CPT | Performed by: RADIOLOGY

## 2024-10-17 ENCOUNTER — PATIENT OUTREACH (OUTPATIENT)
Dept: CASE MANAGEMENT | Facility: OTHER | Age: 54
End: 2024-10-17

## 2024-10-17 ENCOUNTER — APPOINTMENT (OUTPATIENT)
Dept: RADIATION ONCOLOGY | Facility: HOSPITAL | Age: 54
End: 2024-10-17
Attending: RADIOLOGY
Payer: MEDICARE

## 2024-10-17 PROCEDURE — 77014 CHG CT GUIDANCE RADIATION THERAPY FLDS PLACEMENT: CPT | Performed by: RADIOLOGY

## 2024-10-17 PROCEDURE — 77385 HB NTSTY MODUL RAD TX DLVR SMPL: CPT | Performed by: RADIOLOGY

## 2024-10-17 NOTE — PROGRESS NOTES
OSW met Mr. Frank today before his radiation treatment. He stated he is not working because of the radiation side effects and schedule. He shared feeling very overwhelmed since his diagnosis, he moved to a new home, and then had sinus surgery. He would like to go over the packet of information this writer gave him back in July. OSW asked if he can bring in the information next week and this writer can sit with him to review. He requested Monday, 10/21/24. Will plan on in-person supportive visit on this day.  
Adequate: hears normal conversation without difficulty

## 2024-10-18 ENCOUNTER — APPOINTMENT (OUTPATIENT)
Dept: RADIATION ONCOLOGY | Facility: HOSPITAL | Age: 54
End: 2024-10-18
Attending: RADIOLOGY
Payer: MEDICARE

## 2024-10-18 PROCEDURE — 77385 HB NTSTY MODUL RAD TX DLVR SMPL: CPT | Performed by: RADIOLOGY

## 2024-10-18 PROCEDURE — 77014 CHG CT GUIDANCE RADIATION THERAPY FLDS PLACEMENT: CPT | Performed by: RADIOLOGY

## 2024-10-21 ENCOUNTER — PATIENT OUTREACH (OUTPATIENT)
Dept: CASE MANAGEMENT | Facility: OTHER | Age: 54
End: 2024-10-21

## 2024-10-21 ENCOUNTER — APPOINTMENT (OUTPATIENT)
Dept: RADIATION ONCOLOGY | Facility: HOSPITAL | Age: 54
End: 2024-10-21
Attending: RADIOLOGY
Payer: MEDICARE

## 2024-10-21 PROCEDURE — 77385 HB NTSTY MODUL RAD TX DLVR SMPL: CPT | Performed by: STUDENT IN AN ORGANIZED HEALTH CARE EDUCATION/TRAINING PROGRAM

## 2024-10-21 PROCEDURE — 77014 CHG CT GUIDANCE RADIATION THERAPY FLDS PLACEMENT: CPT | Performed by: STUDENT IN AN ORGANIZED HEALTH CARE EDUCATION/TRAINING PROGRAM

## 2024-10-21 PROCEDURE — 77336 RADIATION PHYSICS CONSULT: CPT | Performed by: STUDENT IN AN ORGANIZED HEALTH CARE EDUCATION/TRAINING PROGRAM

## 2024-10-21 NOTE — PROGRESS NOTES
OSW and MSW student colleague met briefly with Mr. Frank after radiation. He stated he cannot find the packet of information this writer gave him and is very tired today. He asked if conversation about supportive resources be completed at another time. Offered this Thursday and he is agreeable. Will re-print list of resources from [employer] and continue support.

## 2024-10-22 ENCOUNTER — APPOINTMENT (OUTPATIENT)
Dept: RADIATION ONCOLOGY | Facility: HOSPITAL | Age: 54
End: 2024-10-22
Attending: RADIOLOGY
Payer: MEDICARE

## 2024-10-22 DIAGNOSIS — M54.32 SCIATICA, LEFT SIDE: ICD-10-CM

## 2024-10-22 PROCEDURE — 77427 RADIATION TX MANAGEMENT X5: CPT | Performed by: RADIOLOGY

## 2024-10-22 PROCEDURE — 77014 CHG CT GUIDANCE RADIATION THERAPY FLDS PLACEMENT: CPT | Performed by: RADIOLOGY

## 2024-10-22 PROCEDURE — 77385 HB NTSTY MODUL RAD TX DLVR SMPL: CPT | Performed by: RADIOLOGY

## 2024-10-22 RX ORDER — ACETAMINOPHEN AND CODEINE PHOSPHATE 300; 30 MG/1; MG/1
1 TABLET ORAL EVERY 4 HOURS PRN
Qty: 30 TABLET | Refills: 0 | Status: SHIPPED | OUTPATIENT
Start: 2024-10-22

## 2024-10-23 ENCOUNTER — APPOINTMENT (OUTPATIENT)
Dept: RADIATION ONCOLOGY | Facility: HOSPITAL | Age: 54
End: 2024-10-23
Attending: RADIOLOGY
Payer: MEDICARE

## 2024-10-23 PROCEDURE — 77385 HB NTSTY MODUL RAD TX DLVR SMPL: CPT | Performed by: STUDENT IN AN ORGANIZED HEALTH CARE EDUCATION/TRAINING PROGRAM

## 2024-10-23 PROCEDURE — 77014 CHG CT GUIDANCE RADIATION THERAPY FLDS PLACEMENT: CPT | Performed by: STUDENT IN AN ORGANIZED HEALTH CARE EDUCATION/TRAINING PROGRAM

## 2024-10-24 ENCOUNTER — APPOINTMENT (OUTPATIENT)
Dept: RADIATION ONCOLOGY | Facility: HOSPITAL | Age: 54
End: 2024-10-24
Attending: RADIOLOGY
Payer: MEDICARE

## 2024-10-24 ENCOUNTER — PATIENT OUTREACH (OUTPATIENT)
Dept: CASE MANAGEMENT | Facility: OTHER | Age: 54
End: 2024-10-24

## 2024-10-24 PROCEDURE — 77014 CHG CT GUIDANCE RADIATION THERAPY FLDS PLACEMENT: CPT | Performed by: RADIOLOGY

## 2024-10-24 PROCEDURE — 77385 HB NTSTY MODUL RAD TX DLVR SMPL: CPT | Performed by: RADIOLOGY

## 2024-10-24 NOTE — PROGRESS NOTES
OSW re-printed resources from [employer] and gave Gavin the packet this morning before his radiation treatment. Explained he can look at the programs available on his own and OSW can assist with referrals, etc. should he want to apply for any resources. He shared he has not been able to pay his rent or car payment because of not being able to work. He s/w [employer] and they have worked out a payment plan. He will be going to the bank to re-finance his car. OSW explained [facility] may be able to help and requested he bring in the statement next week. Gavin expressed appreciation for support.

## 2024-10-25 ENCOUNTER — PATIENT OUTREACH (OUTPATIENT)
Dept: HEMATOLOGY ONCOLOGY | Facility: CLINIC | Age: 54
End: 2024-10-25

## 2024-10-25 ENCOUNTER — APPOINTMENT (OUTPATIENT)
Dept: RADIATION ONCOLOGY | Facility: HOSPITAL | Age: 54
End: 2024-10-25
Attending: RADIOLOGY
Payer: MEDICARE

## 2024-10-25 PROCEDURE — 77014 CHG CT GUIDANCE RADIATION THERAPY FLDS PLACEMENT: CPT | Performed by: INTERNAL MEDICINE

## 2024-10-25 PROCEDURE — 77385 HB NTSTY MODUL RAD TX DLVR SMPL: CPT | Performed by: INTERNAL MEDICINE

## 2024-10-25 NOTE — PROGRESS NOTES
I reached out and spoke with Gavin to follow up and to review for any changes in barriers to care and offer supportive services as needed.    Barriers noted previously; none.     Current barriers and interventions provided: none    Very fatigued and nauseous after RT.    Bases on individual needs I will follow up with them in 4 weeks.   I have provided my direct contact information and welcome them to contact me if their needs as discussed above change. They were appreciative for the call.

## 2024-10-28 ENCOUNTER — APPOINTMENT (OUTPATIENT)
Dept: RADIATION ONCOLOGY | Facility: HOSPITAL | Age: 54
End: 2024-10-28
Attending: RADIOLOGY
Payer: MEDICARE

## 2024-10-28 ENCOUNTER — PATIENT OUTREACH (OUTPATIENT)
Dept: CASE MANAGEMENT | Facility: OTHER | Age: 54
End: 2024-10-28

## 2024-10-28 PROCEDURE — 77014 CHG CT GUIDANCE RADIATION THERAPY FLDS PLACEMENT: CPT | Performed by: STUDENT IN AN ORGANIZED HEALTH CARE EDUCATION/TRAINING PROGRAM

## 2024-10-28 PROCEDURE — 77385 HB NTSTY MODUL RAD TX DLVR SMPL: CPT | Performed by: STUDENT IN AN ORGANIZED HEALTH CARE EDUCATION/TRAINING PROGRAM

## 2024-10-28 PROCEDURE — 77336 RADIATION PHYSICS CONSULT: CPT | Performed by: RADIOLOGY

## 2024-10-28 NOTE — PROGRESS NOTES
"OSW met Mr. Frank before radiation treatment today. He provided car payment statement and OSW scanned to email. 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 He misses working right now, but stated the fatigue he is experiencing from treatments are too much for him to work. He showed OSW pictures of his 6 month old grandson, the albino rabbit that comes in his yard every day, and the titus he feels with \"simple things.\" He stated 10-15 years ago he was \"a mi\" and went to the clubs every weekend. He stated he never saw himself \"being old\" and getting happiness from the things he is now. OSW discussed his perspective about life and getting titus may have changed. He agreed and stated being diagnosed with cancer was very overwhelming in the beginning, however the outreach from family, friends and co-workers has been wonderful and he realizes \"I am loved.\" Provided supportive listening and validation.    Sent car payment request to [employer] team. Await response. Will update him of decision this week.     Addendum:  OSW received approval from team to use [employer] for pt's request. Called Mr. Frank to update and he was very appreciative. Explained a check will be sent out later next week per protocol and he understood same.  "

## 2024-10-29 ENCOUNTER — APPOINTMENT (OUTPATIENT)
Dept: RADIATION ONCOLOGY | Facility: HOSPITAL | Age: 54
End: 2024-10-29
Attending: RADIOLOGY
Payer: MEDICARE

## 2024-10-29 PROCEDURE — 77014 CHG CT GUIDANCE RADIATION THERAPY FLDS PLACEMENT: CPT | Performed by: STUDENT IN AN ORGANIZED HEALTH CARE EDUCATION/TRAINING PROGRAM

## 2024-10-29 PROCEDURE — 77427 RADIATION TX MANAGEMENT X5: CPT | Performed by: RADIOLOGY

## 2024-10-29 PROCEDURE — 77385 HB NTSTY MODUL RAD TX DLVR SMPL: CPT | Performed by: STUDENT IN AN ORGANIZED HEALTH CARE EDUCATION/TRAINING PROGRAM

## 2024-10-30 ENCOUNTER — APPOINTMENT (OUTPATIENT)
Dept: RADIATION ONCOLOGY | Facility: HOSPITAL | Age: 54
End: 2024-10-30
Attending: RADIOLOGY
Payer: MEDICARE

## 2024-10-30 PROCEDURE — 77385 HB NTSTY MODUL RAD TX DLVR SMPL: CPT | Performed by: STUDENT IN AN ORGANIZED HEALTH CARE EDUCATION/TRAINING PROGRAM

## 2024-10-30 PROCEDURE — 77014 CHG CT GUIDANCE RADIATION THERAPY FLDS PLACEMENT: CPT | Performed by: STUDENT IN AN ORGANIZED HEALTH CARE EDUCATION/TRAINING PROGRAM

## 2024-10-31 ENCOUNTER — APPOINTMENT (OUTPATIENT)
Dept: RADIATION ONCOLOGY | Facility: HOSPITAL | Age: 54
End: 2024-10-31
Attending: RADIOLOGY
Payer: MEDICARE

## 2024-10-31 PROCEDURE — 77385 HB NTSTY MODUL RAD TX DLVR SMPL: CPT | Performed by: INTERNAL MEDICINE

## 2024-10-31 PROCEDURE — 77014 CHG CT GUIDANCE RADIATION THERAPY FLDS PLACEMENT: CPT | Performed by: INTERNAL MEDICINE

## 2024-11-01 ENCOUNTER — APPOINTMENT (OUTPATIENT)
Dept: RADIATION ONCOLOGY | Facility: HOSPITAL | Age: 54
End: 2024-11-01
Attending: RADIOLOGY
Payer: MEDICARE

## 2024-11-01 PROCEDURE — 77014 CHG CT GUIDANCE RADIATION THERAPY FLDS PLACEMENT: CPT | Performed by: STUDENT IN AN ORGANIZED HEALTH CARE EDUCATION/TRAINING PROGRAM

## 2024-11-01 PROCEDURE — 77385 HB NTSTY MODUL RAD TX DLVR SMPL: CPT | Performed by: STUDENT IN AN ORGANIZED HEALTH CARE EDUCATION/TRAINING PROGRAM

## 2024-11-04 ENCOUNTER — APPOINTMENT (OUTPATIENT)
Dept: RADIATION ONCOLOGY | Facility: HOSPITAL | Age: 54
End: 2024-11-04
Payer: MEDICARE

## 2024-11-04 PROCEDURE — 77014 CHG CT GUIDANCE RADIATION THERAPY FLDS PLACEMENT: CPT | Performed by: STUDENT IN AN ORGANIZED HEALTH CARE EDUCATION/TRAINING PROGRAM

## 2024-11-04 PROCEDURE — 77336 RADIATION PHYSICS CONSULT: CPT | Performed by: RADIOLOGY

## 2024-11-04 PROCEDURE — 77385 HB NTSTY MODUL RAD TX DLVR SMPL: CPT | Performed by: STUDENT IN AN ORGANIZED HEALTH CARE EDUCATION/TRAINING PROGRAM

## 2024-11-05 ENCOUNTER — APPOINTMENT (OUTPATIENT)
Dept: RADIATION ONCOLOGY | Facility: HOSPITAL | Age: 54
End: 2024-11-05
Attending: RADIOLOGY
Payer: MEDICARE

## 2024-11-05 PROCEDURE — 77385 HB NTSTY MODUL RAD TX DLVR SMPL: CPT | Performed by: STUDENT IN AN ORGANIZED HEALTH CARE EDUCATION/TRAINING PROGRAM

## 2024-11-05 PROCEDURE — 77014 CHG CT GUIDANCE RADIATION THERAPY FLDS PLACEMENT: CPT | Performed by: STUDENT IN AN ORGANIZED HEALTH CARE EDUCATION/TRAINING PROGRAM

## 2024-11-05 PROCEDURE — 77427 RADIATION TX MANAGEMENT X5: CPT | Performed by: RADIOLOGY

## 2024-11-06 ENCOUNTER — APPOINTMENT (OUTPATIENT)
Dept: RADIATION ONCOLOGY | Facility: HOSPITAL | Age: 54
End: 2024-11-06
Attending: RADIOLOGY
Payer: MEDICARE

## 2024-11-06 PROCEDURE — 77014 CHG CT GUIDANCE RADIATION THERAPY FLDS PLACEMENT: CPT | Performed by: STUDENT IN AN ORGANIZED HEALTH CARE EDUCATION/TRAINING PROGRAM

## 2024-11-06 PROCEDURE — 77385 HB NTSTY MODUL RAD TX DLVR SMPL: CPT | Performed by: STUDENT IN AN ORGANIZED HEALTH CARE EDUCATION/TRAINING PROGRAM

## 2024-11-07 ENCOUNTER — APPOINTMENT (OUTPATIENT)
Dept: RADIATION ONCOLOGY | Facility: HOSPITAL | Age: 54
End: 2024-11-07
Attending: RADIOLOGY
Payer: MEDICARE

## 2024-11-07 PROCEDURE — 77385 HB NTSTY MODUL RAD TX DLVR SMPL: CPT | Performed by: RADIOLOGY

## 2024-11-07 PROCEDURE — 77014 CHG CT GUIDANCE RADIATION THERAPY FLDS PLACEMENT: CPT | Performed by: RADIOLOGY

## 2024-11-08 ENCOUNTER — APPOINTMENT (OUTPATIENT)
Dept: RADIATION ONCOLOGY | Facility: HOSPITAL | Age: 54
End: 2024-11-08
Attending: RADIOLOGY
Payer: MEDICARE

## 2024-11-08 PROCEDURE — 77385 HB NTSTY MODUL RAD TX DLVR SMPL: CPT | Performed by: INTERNAL MEDICINE

## 2024-11-08 PROCEDURE — 77014 CHG CT GUIDANCE RADIATION THERAPY FLDS PLACEMENT: CPT | Performed by: INTERNAL MEDICINE

## 2024-11-11 ENCOUNTER — APPOINTMENT (OUTPATIENT)
Dept: RADIATION ONCOLOGY | Facility: HOSPITAL | Age: 54
End: 2024-11-11
Attending: RADIOLOGY
Payer: MEDICARE

## 2024-11-11 ENCOUNTER — PATIENT OUTREACH (OUTPATIENT)
Dept: CASE MANAGEMENT | Facility: OTHER | Age: 54
End: 2024-11-11

## 2024-11-11 PROCEDURE — 77014 CHG CT GUIDANCE RADIATION THERAPY FLDS PLACEMENT: CPT | Performed by: RADIOLOGY

## 2024-11-11 PROCEDURE — 77336 RADIATION PHYSICS CONSULT: CPT | Performed by: RADIOLOGY

## 2024-11-11 PROCEDURE — 77385 HB NTSTY MODUL RAD TX DLVR SMPL: CPT | Performed by: RADIOLOGY

## 2024-11-11 NOTE — PROGRESS NOTES
OSW met Gavin today to inform him the [employer] check was sent to his car lender on Friday, as per accounts payable. He is very appreciative of this assistance. He talked about his daughters and his grandson and how he brings such titus to his life. He reflected on growing up and his parents. His last radiation is scheduled for December 2nd.   Will continue to follow for ongoing support.

## 2024-11-12 ENCOUNTER — APPOINTMENT (OUTPATIENT)
Dept: RADIATION ONCOLOGY | Facility: HOSPITAL | Age: 54
End: 2024-11-12
Attending: STUDENT IN AN ORGANIZED HEALTH CARE EDUCATION/TRAINING PROGRAM
Payer: MEDICARE

## 2024-11-12 ENCOUNTER — APPOINTMENT (OUTPATIENT)
Dept: RADIATION ONCOLOGY | Facility: HOSPITAL | Age: 54
End: 2024-11-12
Attending: RADIOLOGY
Payer: MEDICARE

## 2024-11-12 PROCEDURE — 77427 RADIATION TX MANAGEMENT X5: CPT | Performed by: RADIOLOGY

## 2024-11-12 PROCEDURE — 77385 HB NTSTY MODUL RAD TX DLVR SMPL: CPT | Performed by: STUDENT IN AN ORGANIZED HEALTH CARE EDUCATION/TRAINING PROGRAM

## 2024-11-12 PROCEDURE — 77014 CHG CT GUIDANCE RADIATION THERAPY FLDS PLACEMENT: CPT | Performed by: STUDENT IN AN ORGANIZED HEALTH CARE EDUCATION/TRAINING PROGRAM

## 2024-11-13 ENCOUNTER — APPOINTMENT (OUTPATIENT)
Dept: RADIATION ONCOLOGY | Facility: HOSPITAL | Age: 54
End: 2024-11-13
Attending: STUDENT IN AN ORGANIZED HEALTH CARE EDUCATION/TRAINING PROGRAM
Payer: MEDICARE

## 2024-11-13 PROCEDURE — 77385 HB NTSTY MODUL RAD TX DLVR SMPL: CPT | Performed by: RADIOLOGY

## 2024-11-13 PROCEDURE — 77014 CHG CT GUIDANCE RADIATION THERAPY FLDS PLACEMENT: CPT | Performed by: RADIOLOGY

## 2024-11-14 ENCOUNTER — APPOINTMENT (OUTPATIENT)
Dept: RADIATION ONCOLOGY | Facility: HOSPITAL | Age: 54
End: 2024-11-14
Attending: RADIOLOGY
Payer: MEDICARE

## 2024-11-14 PROCEDURE — 77014 CHG CT GUIDANCE RADIATION THERAPY FLDS PLACEMENT: CPT | Performed by: RADIOLOGY

## 2024-11-14 PROCEDURE — 77385 HB NTSTY MODUL RAD TX DLVR SMPL: CPT | Performed by: RADIOLOGY

## 2024-11-15 ENCOUNTER — APPOINTMENT (OUTPATIENT)
Dept: RADIATION ONCOLOGY | Facility: HOSPITAL | Age: 54
End: 2024-11-15
Attending: RADIOLOGY
Payer: MEDICARE

## 2024-11-15 PROCEDURE — 77385 HB NTSTY MODUL RAD TX DLVR SMPL: CPT | Performed by: INTERNAL MEDICINE

## 2024-11-15 PROCEDURE — 77014 CHG CT GUIDANCE RADIATION THERAPY FLDS PLACEMENT: CPT | Performed by: INTERNAL MEDICINE

## 2024-11-18 ENCOUNTER — APPOINTMENT (OUTPATIENT)
Dept: RADIATION ONCOLOGY | Facility: HOSPITAL | Age: 54
End: 2024-11-18
Attending: RADIOLOGY
Payer: MEDICARE

## 2024-11-18 PROCEDURE — 77385 HB NTSTY MODUL RAD TX DLVR SMPL: CPT | Performed by: RADIOLOGY

## 2024-11-18 PROCEDURE — 77336 RADIATION PHYSICS CONSULT: CPT | Performed by: RADIOLOGY

## 2024-11-18 PROCEDURE — 77014 CHG CT GUIDANCE RADIATION THERAPY FLDS PLACEMENT: CPT | Performed by: RADIOLOGY

## 2024-11-19 ENCOUNTER — TELEPHONE (OUTPATIENT)
Age: 54
End: 2024-11-19

## 2024-11-19 ENCOUNTER — APPOINTMENT (OUTPATIENT)
Dept: RADIATION ONCOLOGY | Facility: HOSPITAL | Age: 54
End: 2024-11-19
Attending: RADIOLOGY
Payer: MEDICARE

## 2024-11-19 DIAGNOSIS — C61 PROSTATE CANCER (HCC): Primary | ICD-10-CM

## 2024-11-19 DIAGNOSIS — M54.32 SCIATICA, LEFT SIDE: ICD-10-CM

## 2024-11-19 PROCEDURE — 77385 HB NTSTY MODUL RAD TX DLVR SMPL: CPT | Performed by: STUDENT IN AN ORGANIZED HEALTH CARE EDUCATION/TRAINING PROGRAM

## 2024-11-19 PROCEDURE — 77427 RADIATION TX MANAGEMENT X5: CPT | Performed by: RADIOLOGY

## 2024-11-19 PROCEDURE — 77014 CHG CT GUIDANCE RADIATION THERAPY FLDS PLACEMENT: CPT | Performed by: STUDENT IN AN ORGANIZED HEALTH CARE EDUCATION/TRAINING PROGRAM

## 2024-11-19 RX ORDER — ACETAMINOPHEN AND CODEINE PHOSPHATE 300; 30 MG/1; MG/1
1 TABLET ORAL EVERY 4 HOURS PRN
Qty: 30 TABLET | Refills: 0 | Status: SHIPPED | OUTPATIENT
Start: 2024-11-19

## 2024-11-19 NOTE — TELEPHONE ENCOUNTER
[employer] called regarding a prescription for acetaminophen with codeine and prior auth for the wegovy. Had her fax over a request. KOLE

## 2024-11-20 ENCOUNTER — APPOINTMENT (OUTPATIENT)
Dept: RADIATION ONCOLOGY | Facility: HOSPITAL | Age: 54
End: 2024-11-20
Attending: RADIOLOGY
Payer: MEDICARE

## 2024-11-20 ENCOUNTER — APPOINTMENT (OUTPATIENT)
Dept: LAB | Facility: CLINIC | Age: 54
End: 2024-11-20
Payer: MEDICARE

## 2024-11-20 DIAGNOSIS — C61 PROSTATE CANCER (HCC): ICD-10-CM

## 2024-11-20 PROCEDURE — 86480 TB TEST CELL IMMUN MEASURE: CPT

## 2024-11-20 PROCEDURE — 77014 CHG CT GUIDANCE RADIATION THERAPY FLDS PLACEMENT: CPT | Performed by: STUDENT IN AN ORGANIZED HEALTH CARE EDUCATION/TRAINING PROGRAM

## 2024-11-20 PROCEDURE — 36415 COLL VENOUS BLD VENIPUNCTURE: CPT

## 2024-11-20 PROCEDURE — 77385 HB NTSTY MODUL RAD TX DLVR SMPL: CPT | Performed by: STUDENT IN AN ORGANIZED HEALTH CARE EDUCATION/TRAINING PROGRAM

## 2024-11-20 NOTE — TELEPHONE ENCOUNTER
PA for Wegovy 1.7MG/0.75ML SUBMITTED to [employer]    via    [x]CMM-KEY: [id number]  []Surescripts-Case ID #   []Availity-Auth ID # NDC #   []Faxed to plan   []Other website   []Phone call Case ID #     [x]PA sent as URGENT    All office notes, labs and other pertaining documents and studies sent. Clinical questions answered. Awaiting determination from insurance company.     Turnaround time for your insurance to make a decision on your Prior Authorization can take 7-21 business days.

## 2024-11-21 ENCOUNTER — TELEPHONE (OUTPATIENT)
Age: 54
End: 2024-11-21

## 2024-11-21 ENCOUNTER — APPOINTMENT (OUTPATIENT)
Dept: RADIATION ONCOLOGY | Facility: HOSPITAL | Age: 54
End: 2024-11-21
Attending: RADIOLOGY
Payer: MEDICARE

## 2024-11-21 LAB
GAMMA INTERFERON BACKGROUND BLD IA-ACNC: 0.02 IU/ML
M TB IFN-G BLD-IMP: NEGATIVE
M TB IFN-G CD4+ BCKGRND COR BLD-ACNC: 0 IU/ML
M TB IFN-G CD4+ BCKGRND COR BLD-ACNC: 0.01 IU/ML
MITOGEN IGNF BCKGRD COR BLD-ACNC: 9.98 IU/ML

## 2024-11-21 PROCEDURE — 77385 HB NTSTY MODUL RAD TX DLVR SMPL: CPT | Performed by: RADIOLOGY

## 2024-11-21 PROCEDURE — 77014 CHG CT GUIDANCE RADIATION THERAPY FLDS PLACEMENT: CPT | Performed by: RADIOLOGY

## 2024-11-21 NOTE — TELEPHONE ENCOUNTER
PA for Wegovy 1.7MG/0.75ML APPROVED     Date(s) approved: 11/21/2024-05/21/2024    Patient advised by          [x]MyChart Message-Communication consent incomplete  []Phone call   []LMOM  []L/M to call office as no active Communication consent on file  []Unable to leave detailed message as VM not approved on Communication consent       Pharmacy advised by    [x]Fax  []Phone call    Approval letter scanned into [employer] Yes

## 2024-11-21 NOTE — TELEPHONE ENCOUNTER
Patient called had question on wegovy with new strength and changing pharmacy new PA was needed sent to insurance company 11/20/24

## 2024-11-22 ENCOUNTER — APPOINTMENT (OUTPATIENT)
Dept: RADIATION ONCOLOGY | Facility: HOSPITAL | Age: 54
End: 2024-11-22
Attending: RADIOLOGY
Payer: MEDICARE

## 2024-11-22 PROCEDURE — 77014 CHG CT GUIDANCE RADIATION THERAPY FLDS PLACEMENT: CPT | Performed by: INTERNAL MEDICINE

## 2024-11-22 PROCEDURE — 77385 HB NTSTY MODUL RAD TX DLVR SMPL: CPT | Performed by: INTERNAL MEDICINE

## 2024-11-24 ENCOUNTER — APPOINTMENT (OUTPATIENT)
Dept: RADIATION ONCOLOGY | Facility: HOSPITAL | Age: 54
End: 2024-11-24
Attending: RADIOLOGY
Payer: MEDICARE

## 2024-11-24 PROCEDURE — 77014 CHG CT GUIDANCE RADIATION THERAPY FLDS PLACEMENT: CPT | Performed by: STUDENT IN AN ORGANIZED HEALTH CARE EDUCATION/TRAINING PROGRAM

## 2024-11-24 PROCEDURE — 77385 HB NTSTY MODUL RAD TX DLVR SMPL: CPT | Performed by: STUDENT IN AN ORGANIZED HEALTH CARE EDUCATION/TRAINING PROGRAM

## 2024-11-25 ENCOUNTER — APPOINTMENT (OUTPATIENT)
Dept: RADIATION ONCOLOGY | Facility: HOSPITAL | Age: 54
End: 2024-11-25
Attending: RADIOLOGY
Payer: MEDICARE

## 2024-11-25 PROCEDURE — 77014 CHG CT GUIDANCE RADIATION THERAPY FLDS PLACEMENT: CPT | Performed by: RADIOLOGY

## 2024-11-25 PROCEDURE — 77385 HB NTSTY MODUL RAD TX DLVR SMPL: CPT | Performed by: RADIOLOGY

## 2024-11-26 ENCOUNTER — APPOINTMENT (OUTPATIENT)
Dept: RADIATION ONCOLOGY | Facility: HOSPITAL | Age: 54
End: 2024-11-26
Attending: RADIOLOGY
Payer: MEDICARE

## 2024-11-26 PROCEDURE — 77014 CHG CT GUIDANCE RADIATION THERAPY FLDS PLACEMENT: CPT | Performed by: RADIOLOGY

## 2024-11-26 PROCEDURE — 77385 HB NTSTY MODUL RAD TX DLVR SMPL: CPT | Performed by: RADIOLOGY

## 2024-11-27 ENCOUNTER — PATIENT OUTREACH (OUTPATIENT)
Dept: CASE MANAGEMENT | Facility: OTHER | Age: 54
End: 2024-11-27

## 2024-11-27 ENCOUNTER — APPOINTMENT (OUTPATIENT)
Dept: RADIATION ONCOLOGY | Facility: HOSPITAL | Age: 54
End: 2024-11-27
Attending: RADIOLOGY
Payer: MEDICARE

## 2024-11-27 PROCEDURE — 77014 CHG CT GUIDANCE RADIATION THERAPY FLDS PLACEMENT: CPT | Performed by: RADIOLOGY

## 2024-11-27 PROCEDURE — 77385 HB NTSTY MODUL RAD TX DLVR SMPL: CPT | Performed by: RADIOLOGY

## 2024-11-27 NOTE — PROGRESS NOTES
OSW s/w Gavin today after radiation. He has 1 more treatment to go and he is finished. He thanked OSW for helping him throughout this part of his journey. He is looking forward to Thanksgiving tomorrow with his family and grandson. OSW will f/u with him next month for ongoing support.

## 2024-11-29 ENCOUNTER — APPOINTMENT (OUTPATIENT)
Dept: RADIATION ONCOLOGY | Facility: HOSPITAL | Age: 54
End: 2024-11-29
Payer: MEDICARE

## 2024-12-02 ENCOUNTER — APPOINTMENT (OUTPATIENT)
Dept: RADIATION ONCOLOGY | Facility: HOSPITAL | Age: 54
End: 2024-12-02
Attending: RADIOLOGY
Payer: MEDICARE

## 2024-12-02 PROCEDURE — 77014 CHG CT GUIDANCE RADIATION THERAPY FLDS PLACEMENT: CPT | Performed by: RADIOLOGY

## 2024-12-02 PROCEDURE — 77385 HB NTSTY MODUL RAD TX DLVR SMPL: CPT | Performed by: RADIOLOGY

## 2024-12-03 ENCOUNTER — VBI (OUTPATIENT)
Dept: ADMINISTRATIVE | Facility: OTHER | Age: 54
End: 2024-12-03

## 2024-12-03 NOTE — TELEPHONE ENCOUNTER
12/03/24 12:10 PM     Chart reviewed for BP; nothing is submitted to the patient's insurance at this time.     Gi Sauceda MA   [facility]

## 2024-12-04 ENCOUNTER — OFFICE VISIT (OUTPATIENT)
Dept: PODIATRY | Facility: CLINIC | Age: 54
End: 2024-12-04
Payer: MEDICARE

## 2024-12-04 DIAGNOSIS — M79.675 PAIN IN TOES OF BOTH FEET: ICD-10-CM

## 2024-12-04 DIAGNOSIS — M79.674 PAIN IN TOES OF BOTH FEET: ICD-10-CM

## 2024-12-04 DIAGNOSIS — B35.1 ONYCHOMYCOSIS: Primary | ICD-10-CM

## 2024-12-04 DIAGNOSIS — L85.3 XEROSIS OF SKIN: ICD-10-CM

## 2024-12-04 DIAGNOSIS — R73.03 PRE-DIABETES: ICD-10-CM

## 2024-12-04 PROCEDURE — RECHECK: Performed by: PODIATRIST

## 2024-12-04 PROCEDURE — 11721 DEBRIDE NAIL 6 OR MORE: CPT | Performed by: PODIATRIST

## 2024-12-04 NOTE — PROGRESS NOTES
Name: Gavin Frank      : 1970      MRN: [id number]  Encounter Provider: Toño Shen DPM  Encounter Date: 2024   Encounter department: [facility] [place]  :  Assessment & Plan  Onychomycosis       Debride mycotic nails and thin the nail plates x 10 with the use of a nail nipper manually and an electric Dremel bur was used to reduce the thickness of the nail beds and smoothed the distal aspect of the nails.   Xerosis of skin       Pt was instructed to use lotion once a day on both feet such as cerave, Cetaphil or similar thick style of lotion.   Pre-diabetes         Pain in toes of both feet         Discussed proper shoe gear, daily inspections of feet, and general foot health with patient. Patient has Q8  findings and is recommended for at risk foot care every 9-10 weeks.    Patients most recent complete clinical foot exam was on: 2024      Return in about 10 weeks (around 2025).     History of Present Illness     HPI  Gavin Frank is a 54 y.o. male who presents with chief complaint of painful thick nails on both feet.  He is a prediabetic and he states he has been using lotion occasionally on both feet.Patient presents for at-risk foot care.  Patient has no acute concerns today.  Patient has significant lower extremity risk due to edema, and trophic skin changes to the lower extremity.   History obtained from: patient    Review of Systems  Medical History Reviewed by provider this encounter:     .  Current Outpatient Medications on File Prior to Visit   Medication Sig Dispense Refill    acetaminophen-codeine (TYLENOL with CODEINE #3) 300-30 MG per tablet Take 1 tablet by mouth every 4 (four) hours as needed for moderate pain 30 tablet 0    azelastine (ASTELIN) 0.1 % nasal spray 2 sprays into each nostril 2 (two) times a day Use in each nostril as directed (Patient not taking: Reported on 2024) 30 mL 5    BLACK CURRANT SEED OIL PO Take by mouth (Patient not taking:  Reported on 2/26/2024)      Cyanocobalamin (Vitamin B-12) 2500 MCG SUBL Place under the tongue      Diclofenac Sodium (VOLTAREN) 1 % Apply 2 g topically 4 (four) times a day 100 g 3    diltiazem (CARDIZEM CD) 240 mg 24 hr capsule Take 1 capsule (240 mg total) by mouth daily 90 capsule 1    ergocalciferol (VITAMIN D2) 50,000 units Take 1 capsule (50,000 Units total) by mouth once a week 4 capsule 0    fluticasone (FLONASE) 50 mcg/act nasal spray 1 spray into each nostril daily (Patient not taking: Reported on 10/4/2024) 48 g 1    fluticasone (FLONASE) 50 mcg/act nasal spray 1 spray into each nostril 2 (two) times a day 16 g 3    lidocaine (Lidoderm) 5 % Apply 1 patch topically over 12 hours daily for 10 days Remove & Discard patch within 12 hours or as directed by MD 10 patch 0    LORazepam (ATIVAN) 2 mg tablet Take 1 tablet (2 mg total) by mouth once as needed for anxiety (take 1 hour before MRI) for up to 120 doses 30 tablet 3    losartan (COZAAR) 100 MG tablet Take 1 tablet (100 mg total) by mouth daily 90 tablet 1    meloxicam (MOBIC) 15 mg tablet Take 1 tablet (15 mg total) by mouth daily as needed for moderate pain 90 tablet 1    methocarbamol (ROBAXIN) 500 mg tablet Take 1 tablet (500 mg total) by mouth 2 (two) times a day 20 tablet 0    methocarbamol (ROBAXIN) 500 mg tablet Take 1 tablet (500 mg total) by mouth 2 (two) times a day 20 tablet 0    methocarbamol (ROBAXIN) 750 mg tablet Take 1 tablet (750 mg total) by mouth every 8 (eight) hours (Patient not taking: Reported on 9/24/2024) 30 tablet 3    metoprolol succinate (TOPROL-XL) 50 mg 24 hr tablet Take 1 tablet (50 mg total) by mouth daily 90 tablet 1    montelukast (SINGULAIR) 10 mg tablet Take 1 tablet (10 mg total) by mouth daily at bedtime 90 tablet 1    Multiple Vitamin (ONE-A-DAY MENS PO) Take by mouth      naproxen (Naprosyn) 500 mg tablet Take 1 tablet (500 mg total) by mouth 2 (two) times a day with meals 30 tablet 0    olopatadine (PATANOL) 0.1 %  ophthalmic solution Administer 1 drop to both eyes 2 (two) times a day 5 mL 5    ondansetron (ZOFRAN-ODT) 8 mg disintegrating tablet Take 1 tablet (8 mg total) by mouth every 8 (eight) hours as needed for nausea or vomiting for up to 5 days 15 tablet 0    pravastatin (PRAVACHOL) 20 mg tablet Take 1 tablet (20 mg total) by mouth daily 90 tablet 1    Semaglutide-Weight Management (Wegovy) 1.7 MG/0.75ML Inject 1.7 mg under the skin weekly 3 mL 5    spironolactone (ALDACTONE) 25 mg tablet Take 1 tablet (25 mg total) by mouth daily 90 tablet 1    triamcinolone (KENALOG) 0.1 % ointment Apply topically 2 (two) times a day 30 g 2     Current Facility-Administered Medications on File Prior to Visit   Medication Dose Route Frequency Provider Last Rate Last Admin    cyanocobalamin injection 1,000 mcg  1,000 mcg Intramuscular Q30 Days    1,000 mcg at 24 1009      Social History     Tobacco Use    Smoking status: Former     Current packs/day: 0.00     Average packs/day: 0.5 packs/day for 31.0 years (15.5 ttl pk-yrs)     Types: Cigarettes     Start date: 1984     Quit date: 2018     Years since quittin.9     Passive exposure: Past    Smokeless tobacco: Never    Tobacco comments:     No cigarettes vape only   Vaping Use    Vaping status: Every Day    Substances: Nicotine, Flavoring   Substance and Sexual Activity    Alcohol use: Not Currently    Drug use: Never    Sexual activity: Yes     Partners: Female     Birth control/protection: None        Objective   There were no vitals taken for this visit.     Physical Exam  Vascular status is a faint 1/4 DP PT negative digital hair normal distal cooling immediate capillary refill with slight edema present bilaterally.  Capillary refill is approximately 2 to 3 seconds and edema is +2 pitting.    Derm nails are brittle elongated hypertrophic yellow-white discoloration with subungual debris x 10.  There is an increased thickness and the nails are approximately 2 mm.   There is white-yellow flaky skin present on the plantar aspect of both feet with the right being worse than the left.  There is small areas of pressure which have developed into calluses with no trophic changes and no central IPK's.  There is slight loss of turgor noted bilaterally secondary to the edema.    Ortho and neuro exam are unchanged from his visit on 9/11/2024.    Administrative Statements   I have spent a total time of 15 minutes in caring for this patient on the day of the visit/encounter including Risks and benefits of tx options, Instructions for management, Patient and family education, Importance of tx compliance, Risk factor reductions, Counseling / Coordination of care, and Documenting in the medical record.

## 2024-12-04 NOTE — ASSESSMENT & PLAN NOTE
Debride mycotic nails and thin the nail plates x 10 with the use of a nail nipper manually and an electric Dremel bur was used to reduce the thickness of the nail beds and smoothed the distal aspect of the nails.

## 2024-12-11 ENCOUNTER — PATIENT OUTREACH (OUTPATIENT)
Dept: HEMATOLOGY ONCOLOGY | Facility: CLINIC | Age: 54
End: 2024-12-11

## 2024-12-11 NOTE — PROGRESS NOTES
I reached out and spoke with Gavin to follow up and to review for any changes in barriers to care and offer supportive services as needed.    Barriers noted previously; none.     Current barriers and interventions provided: none    Fatigued     I have provided my direct contact information and welcome them to contact me if their needs as discussed above change. They were appreciative for the call.

## 2024-12-18 ENCOUNTER — PATIENT OUTREACH (OUTPATIENT)
Dept: CASE MANAGEMENT | Facility: OTHER | Age: 54
End: 2024-12-18

## 2024-12-18 NOTE — PROGRESS NOTES
OSW placed supportive call to Gavin today. He stated he is back to work, but struggling with ongoing fatigue since radiation. He is also working many different shifts, almost like a swing shift. He explained his insurance is through [employer], and he has to pay a small premium every month. Since he wasn't working during radiation treatments, he is wondering if this premium will decrease. OSW suggested he call the [facility] and speak to one of the caseworkers, because it is a valid question. He will reach out to the county office.   He will f/u with radiation oncology on 1/21. Will outreach Gavin next month for ongoing support. He was appreciative of call.

## 2024-12-29 ENCOUNTER — HOSPITAL ENCOUNTER (EMERGENCY)
Facility: HOSPITAL | Age: 54
Discharge: HOME | End: 2024-12-29
Attending: EMERGENCY MEDICINE | Admitting: EMERGENCY MEDICINE
Payer: MEDICARE

## 2024-12-29 VITALS
RESPIRATION RATE: 20 BRPM | TEMPERATURE: 98.8 F | OXYGEN SATURATION: 95 % | SYSTOLIC BLOOD PRESSURE: 174 MMHG | DIASTOLIC BLOOD PRESSURE: 87 MMHG | HEART RATE: 95 BPM

## 2024-12-29 DIAGNOSIS — S30.0XXA HEMATOMA OF LEFT BUTTOCK: Primary | ICD-10-CM

## 2024-12-29 PROCEDURE — 99284 EMERGENCY DEPT VISIT MOD MDM: CPT

## 2024-12-29 PROCEDURE — 96372 THER/PROPH/DIAG INJ SC/IM: CPT

## 2024-12-29 PROCEDURE — 99284 EMERGENCY DEPT VISIT MOD MDM: CPT | Performed by: EMERGENCY MEDICINE

## 2024-12-29 RX ORDER — LIDOCAINE 50 MG/G
1 PATCH TOPICAL DAILY
Qty: 9 PATCH | Refills: 0 | Status: SHIPPED | OUTPATIENT
Start: 2024-12-29 | End: 2024-12-29

## 2024-12-29 RX ORDER — KETOROLAC TROMETHAMINE 30 MG/ML
15 INJECTION, SOLUTION INTRAMUSCULAR; INTRAVENOUS ONCE
Status: COMPLETED | OUTPATIENT
Start: 2024-12-29 | End: 2024-12-29

## 2024-12-29 RX ORDER — ACETAMINOPHEN 325 MG/1
975 TABLET ORAL ONCE
Status: COMPLETED | OUTPATIENT
Start: 2024-12-29 | End: 2024-12-29

## 2024-12-29 RX ORDER — LIDOCAINE 50 MG/G
1 PATCH TOPICAL ONCE
Status: DISCONTINUED | OUTPATIENT
Start: 2024-12-29 | End: 2024-12-30 | Stop reason: HOSPADM

## 2024-12-29 RX ORDER — LIDOCAINE 50 MG/G
1 PATCH TOPICAL DAILY
Qty: 9 PATCH | Refills: 0 | Status: SHIPPED | OUTPATIENT
Start: 2024-12-29 | End: 2025-01-07

## 2024-12-29 RX ADMIN — KETOROLAC TROMETHAMINE 15 MG: 30 INJECTION, SOLUTION INTRAMUSCULAR; INTRAVENOUS at 22:52

## 2024-12-29 RX ADMIN — LIDOCAINE 1 PATCH: 50 PATCH TOPICAL at 22:52

## 2024-12-29 RX ADMIN — ACETAMINOPHEN 975 MG: 325 TABLET, FILM COATED ORAL at 22:52

## 2024-12-29 NOTE — Clinical Note
Gavin Frank was seen and treated in our emergency department on 12/29/2024.        Other - See Comments        Diagnosis:     Gavin  may return to work on return date.    He may return on this date: 12/31/2024    To whom it may concern, Stephon Frank has been evaluated our emergency room for an injury which may temporarily limit his ability to perform work.  Adequate time should be allowed for rest and recovery, he may return to work without restrictions on Tuesday, December 31, 2024.     If you have any questions or concerns, please don't hesitate to call.      Derrick Arora, DO    ______________________________           _______________          _______________  Hospital Representative                              Date                                Time

## 2024-12-30 ENCOUNTER — VBI (OUTPATIENT)
Age: 54
End: 2024-12-30

## 2024-12-30 NOTE — TELEPHONE ENCOUNTER
12/30/24 10:48 AM    Patient contacted post ED visit, [facility] department spoke with patient/caregiver and outreach was successful.  Msg sent o clin team to call in pain meds as per the patient.  Thank you.  Gi Sauceda MA  [facility]

## 2024-12-30 NOTE — DISCHARGE INSTRUCTIONS
You have been evaluated in the emergency room for injuries following a fall.  At this time, based on your physical exam, your injuries are non-life-threatening and should resolve without further intervention. You may take Tylenol and ibuprofen in addition to topical lidocaine patches for relief of your pain symptoms.  Return to the emergency room for any severe and worsening pain, loss of sensation or inability to move your leg, inability to walk, lightheadedness or dizziness, chest pain, shortness of breath, or any other concerning signs or symptoms.

## 2024-12-30 NOTE — ED ATTENDING ATTESTATION
12/29/2024  I, Gildardo Camilo MD, saw and evaluated the patient. I have discussed the patient with the resident/non-physician practitioner and agree with the resident's/non-physician practitioner's findings, Plan of Care, and MDM as documented in the resident's/non-physician practitioner's note, except where noted. All available labs and Radiology studies were reviewed.  I was present for key portions of any procedure(s) performed by the resident/non-physician practitioner and I was immediately available to provide assistance.       At this point I agree with the current assessment done in the Emergency Department.  I have conducted an independent evaluation of this patient a history and physical is as follows:    ED Course     Impression fall with contusion to buttocks  Differential diagnosis: Contusion, doubt hematoma, doubt fracture, doubt sprain, doubt strain, doubt intra-abdominal injury doubt SCI    Patient sustained mechanical fall landing on buttocks and staircase with pain and bruising noted.  Patient is able to stand and ambulate mend albeit with mild referred pain to buttocks area.  Denies pain with defecation primary survey intact [place] Coma Scale 15 secondary survey remarkable for tenderness swelling and ecchymosis localized over the buttocks and sacrum.  There is no CT or L-spine tenderness to palpation there is no sacral tenderness pelvis stable to rock patient with normal gait    Patient's injuries appear to be minor at this time do not warrant imaging given low suspicion for fracture or intra-abdominal or pelvic injury.  Plan to treat patient with multimodal analgesia will discharge patient to home follow-up PCP as needed.  Return precautions given      Critical Care Time  Procedures

## 2024-12-31 NOTE — ED PROVIDER NOTES
Time reflects when diagnosis was documented in both MDM as applicable and the Disposition within this note       Time User Action Codes Description Comment    12/29/2024 10:52 PM Derrick Arora Add [S30.0XXA] Hematoma of left buttock           ED Disposition       ED Disposition   Discharge    Condition   Stable    Date/Time   Sun Dec 29, 2024 10:52 PM    Comment   Gavin Frank discharge to home/self care.                   Assessment & Plan       Medical Decision Making  Patient is a 54 y.o. male who presents to the ED with injuries following fall yesterday.    Vital signs remained stable throughout ED course. Exam as listed below.    Differential diagnosis includes but is not limited to superficial abrasion, superficial hematoma, injury of SI joint, stress fracture of the pelvic brim or lower left rib, blunt renal injury, intra-abdominal hematoma.    Plan: Given low risk presentation, patient safe for discharge home.    View ED course above for further discussion on patient workup.     All labs reviewed and utilized in the medical decision making process  All radiology studies independently viewed by me and interpreted by the radiologist.  I reviewed all testing with the patient.       Risk  OTC drugs.  Prescription drug management.             Medications   acetaminophen (TYLENOL) tablet 975 mg (975 mg Oral Given 12/29/24 2252)   ketorolac (TORADOL) injection 15 mg (15 mg Intramuscular Given 12/29/24 2252)       ED Risk Strat Scores                                              History of Present Illness       Chief Complaint   Patient presents with    Fall     Pt fell going down the stairs hitting his lower back yesterday, visible bruise, no loc, no headstrike. Pt states the pain is getting worse        Past Medical History:   Diagnosis Date    Allergic     Anxiety     Arthritis     Asthma     Chronic pain disorder     Back pain    CPAP (continuous positive airway pressure) dependence 02/07/2024    Depression      GERD (gastroesophageal reflux disease)     Hyperlipemia     Hypertension     Obesity     Prostate cancer (HCC)     Sleep apnea       Past Surgical History:   Procedure Laterality Date    HEMORRHOID SURGERY      INSERTION OF FIDUCIAL MARKER (TRANSRECTAL ULTRASOUND GUIDANCE) N/A 2024    Procedure: INSERTION OF FIDUCIAL MARKER ,SPACEOAR;  Surgeon: Benjie Steawrt DO;  Location: [facility];  Service: Urology    VA BX PROSTATE STRTCTC SATURATION SAMPLING IMG GID N/A 2024    Procedure: TRANSPERINEAL MRI FUSION BIOPSY PROSTATE;  Surgeon: Benjie Stewart DO;  Location: [facility];  Service: Urology    TONSILLECTOMY      URETHRAL DILATION  2019      Family History   Problem Relation Age of Onset    Hypertension Mother     Hyperlipidemia Mother     Heart disease Mother     Substance Abuse Mother     Hypertension Father     Heart disease Father     Hyperlipidemia Father     Substance Abuse Father     COPD Sister     Liver disease Sister     Prostate cancer Maternal Uncle       Social History     Tobacco Use    Smoking status: Former     Current packs/day: 0.00     Average packs/day: 0.5 packs/day for 31.0 years (15.5 ttl pk-yrs)     Types: Cigarettes     Start date: 1984     Quit date: 2018     Years since quittin.9     Passive exposure: Past    Smokeless tobacco: Never    Tobacco comments:     No cigarettes vape only   Vaping Use    Vaping status: Every Day    Substances: Nicotine, Flavoring   Substance Use Topics    Alcohol use: Not Currently    Drug use: Never      E-Cigarette/Vaping    E-Cigarette Use Current Every Day User     Comments Vapes daily and cartridge last vaped 2.6.24       E-Cigarette/Vaping Substances    Nicotine Yes     THC No     CBD No     Flavoring Yes     Other No     Unknown No       I have reviewed and agree with the history as documented.     This is a 54-year-old male with past medical history significant for hypertension, hyperlipidemia, class III obesity,  presenting to the emergency room following a fall yesterday with left lower flank pain.  Patient states that yesterday he was walking out of his house and slipped on the icy steps and hit the left side of his lower back on the steps.  Denies any head strike, loss of consciousness, blood thinner use, other injuries.  States that he is here for increased pain and would like pain medications and a work note but does not want any further workup.      Fall  Associated symptoms: no abdominal pain, no back pain, no chest pain, no seizures and no vomiting        Review of Systems   Constitutional:  Negative for chills and fever.   HENT:  Negative for ear pain and sore throat.    Eyes:  Negative for pain and visual disturbance.   Respiratory:  Negative for cough and shortness of breath.    Cardiovascular:  Negative for chest pain and palpitations.   Gastrointestinal:  Negative for abdominal pain and vomiting.   Genitourinary:  Negative for dysuria and hematuria.   Musculoskeletal:  Negative for arthralgias and back pain.   Skin:  Positive for color change. Negative for rash.   Neurological:  Negative for seizures and syncope.   All other systems reviewed and are negative.          Objective       ED Triage Vitals   Temperature Pulse Blood Pressure Respirations SpO2 Patient Position - Orthostatic VS   12/29/24 2159 12/29/24 2159 12/29/24 2200 12/29/24 2159 12/29/24 2159 --   98.8 °F (37.1 °C) 95 (!) 174/87 20 95 %       Temp Source Heart Rate Source BP Location FiO2 (%) Pain Score    12/29/24 2159 12/29/24 2159 -- -- --    Oral Monitor         Vitals      Date and Time Temp Pulse SpO2 Resp BP Pain Score FACES Pain Rating User   12/29/24 2200 -- -- -- -- 174/87 -- -- BK   12/29/24 2159 98.8 °F (37.1 °C) 95 95 % 20 -- -- -- BK            Physical Exam  Vitals and nursing note reviewed.   Constitutional:       General: He is not in acute distress.     Appearance: He is well-developed.   HENT:      Head: Normocephalic and  atraumatic.   Eyes:      Conjunctiva/sclera: Conjunctivae normal.   Cardiovascular:      Rate and Rhythm: Normal rate and regular rhythm.      Heart sounds: No murmur heard.  Pulmonary:      Effort: Pulmonary effort is normal. No respiratory distress.      Breath sounds: Normal breath sounds.   Abdominal:      General: There is no distension.      Palpations: Abdomen is soft. There is no mass.      Tenderness: There is no abdominal tenderness. There is no right CVA tenderness, left CVA tenderness, guarding or rebound.      Hernia: No hernia is present.   Musculoskeletal:         General: No swelling.      Cervical back: Neck supple.      Comments: Circular area of ecchymosis measuring 2 cm above left buttock.  Not fluctuant or pulsatile.    No midline spinal tenderness throughout.  No skull crepitus, hemotympanum, Conley sign, raccoon eyes. Equal and symmetric chest expansion.  No crepitus or pain on palpation of long bones.  No crepitus or pain on rock of pelvis.   Skin:     General: Skin is warm and dry.      Capillary Refill: Capillary refill takes less than 2 seconds.   Neurological:      Mental Status: He is alert.   Psychiatric:         Mood and Affect: Mood normal.         Results Reviewed       None            No orders to display       Procedures    ED Medication and Procedure Management   Prior to Admission Medications   Prescriptions Last Dose Informant Patient Reported? Taking?   BLACK CURRANT SEED OIL PO  Self Yes No   Sig: Take by mouth   Patient not taking: Reported on 2/26/2024   Cyanocobalamin (Vitamin B-12) 2500 MCG SUBL  Self Yes No   Sig: Place under the tongue   Diclofenac Sodium (VOLTAREN) 1 %  Self No No   Sig: Apply 2 g topically 4 (four) times a day   LORazepam (ATIVAN) 2 mg tablet  Self No No   Sig: Take 1 tablet (2 mg total) by mouth once as needed for anxiety (take 1 hour before MRI) for up to 120 doses   Multiple Vitamin (ONE-A-DAY MENS PO)  Self Yes No   Sig: Take by mouth    Semaglutide-Weight Management (Wegovy) 1.7 MG/0.75ML  Self No No   Sig: Inject 1.7 mg under the skin weekly   acetaminophen-codeine (TYLENOL with CODEINE #3) 300-30 MG per tablet   No No   Sig: Take 1 tablet by mouth every 4 (four) hours as needed for moderate pain   azelastine (ASTELIN) 0.1 % nasal spray  Self No No   Si sprays into each nostril 2 (two) times a day Use in each nostril as directed   Patient not taking: Reported on 2024   diltiazem (CARDIZEM CD) 240 mg 24 hr capsule  Self No No   Sig: Take 1 capsule (240 mg total) by mouth daily   ergocalciferol (VITAMIN D2) 50,000 units  Self No No   Sig: Take 1 capsule (50,000 Units total) by mouth once a week   fluticasone (FLONASE) 50 mcg/act nasal spray  Self No No   Si spray into each nostril daily   Patient not taking: Reported on 10/4/2024   fluticasone (FLONASE) 50 mcg/act nasal spray  Self No No   Si spray into each nostril 2 (two) times a day   losartan (COZAAR) 100 MG tablet  Self No No   Sig: Take 1 tablet (100 mg total) by mouth daily   meloxicam (MOBIC) 15 mg tablet  Self No No   Sig: Take 1 tablet (15 mg total) by mouth daily as needed for moderate pain   methocarbamol (ROBAXIN) 500 mg tablet  Self No No   Sig: Take 1 tablet (500 mg total) by mouth 2 (two) times a day   methocarbamol (ROBAXIN) 500 mg tablet  Self No No   Sig: Take 1 tablet (500 mg total) by mouth 2 (two) times a day   methocarbamol (ROBAXIN) 750 mg tablet  Self No No   Sig: Take 1 tablet (750 mg total) by mouth every 8 (eight) hours   Patient not taking: Reported on 2024   metoprolol succinate (TOPROL-XL) 50 mg 24 hr tablet  Self No No   Sig: Take 1 tablet (50 mg total) by mouth daily   montelukast (SINGULAIR) 10 mg tablet  Self No No   Sig: Take 1 tablet (10 mg total) by mouth daily at bedtime   naproxen (Naprosyn) 500 mg tablet  Self No No   Sig: Take 1 tablet (500 mg total) by mouth 2 (two) times a day with meals   olopatadine (PATANOL) 0.1 % ophthalmic  solution  Self No No   Sig: Administer 1 drop to both eyes 2 (two) times a day   ondansetron (ZOFRAN-ODT) 8 mg disintegrating tablet  Self No No   Sig: Take 1 tablet (8 mg total) by mouth every 8 (eight) hours as needed for nausea or vomiting for up to 5 days   pravastatin (PRAVACHOL) 20 mg tablet  Self No No   Sig: Take 1 tablet (20 mg total) by mouth daily   spironolactone (ALDACTONE) 25 mg tablet  Self No No   Sig: Take 1 tablet (25 mg total) by mouth daily   triamcinolone (KENALOG) 0.1 % ointment  Self No No   Sig: Apply topically 2 (two) times a day      Facility-Administered Medications Last Administration Doses Remaining   cyanocobalamin injection 1,000 mcg 7/29/2024 10:09 AM         Discharge Medication List as of 12/29/2024 10:55 PM        CONTINUE these medications which have NOT CHANGED    Details   acetaminophen-codeine (TYLENOL with CODEINE #3) 300-30 MG per tablet Take 1 tablet by mouth every 4 (four) hours as needed for moderate pain, Starting Tue 11/19/2024, Normal      azelastine (ASTELIN) 0.1 % nasal spray 2 sprays into each nostril 2 (two) times a day Use in each nostril as directed, Starting Tue 7/11/2023, Normal      BLACK CURRANT SEED OIL PO Take by mouth, Historical Med      Cyanocobalamin (Vitamin B-12) 2500 MCG SUBL Place under the tongue, Historical Med      Diclofenac Sodium (VOLTAREN) 1 % Apply 2 g topically 4 (four) times a day, Starting Thu 4/25/2024, Normal      diltiazem (CARDIZEM CD) 240 mg 24 hr capsule Take 1 capsule (240 mg total) by mouth daily, Starting Mon 7/29/2024, Until Sat 1/25/2025, Normal      ergocalciferol (VITAMIN D2) 50,000 units Take 1 capsule (50,000 Units total) by mouth once a week, Starting Mon 7/29/2024, Normal      !! fluticasone (FLONASE) 50 mcg/act nasal spray 1 spray into each nostril daily, Starting Wed 5/10/2023, Normal      !! fluticasone (FLONASE) 50 mcg/act nasal spray 1 spray into each nostril 2 (two) times a day, Starting Mon 7/29/2024, Normal       LORazepam (ATIVAN) 2 mg tablet Take 1 tablet (2 mg total) by mouth once as needed for anxiety (take 1 hour before MRI) for up to 120 doses, Starting Mon 7/29/2024, Normal      losartan (COZAAR) 100 MG tablet Take 1 tablet (100 mg total) by mouth daily, Starting Mon 7/29/2024, Normal      meloxicam (MOBIC) 15 mg tablet Take 1 tablet (15 mg total) by mouth daily as needed for moderate pain, Starting Mon 7/29/2024, Normal      !! methocarbamol (ROBAXIN) 500 mg tablet Take 1 tablet (500 mg total) by mouth 2 (two) times a day, Starting Wed 8/14/2024, Normal      !! methocarbamol (ROBAXIN) 500 mg tablet Take 1 tablet (500 mg total) by mouth 2 (two) times a day, Starting Mon 9/23/2024, Normal      !! methocarbamol (ROBAXIN) 750 mg tablet Take 1 tablet (750 mg total) by mouth every 8 (eight) hours, Starting Mon 7/29/2024, Normal      metoprolol succinate (TOPROL-XL) 50 mg 24 hr tablet Take 1 tablet (50 mg total) by mouth daily, Starting Mon 7/29/2024, Normal      montelukast (SINGULAIR) 10 mg tablet Take 1 tablet (10 mg total) by mouth daily at bedtime, Starting Mon 7/29/2024, Normal      Multiple Vitamin (ONE-A-DAY MENS PO) Take by mouth, Historical Med      naproxen (Naprosyn) 500 mg tablet Take 1 tablet (500 mg total) by mouth 2 (two) times a day with meals, Starting Wed 8/14/2024, Normal      olopatadine (PATANOL) 0.1 % ophthalmic solution Administer 1 drop to both eyes 2 (two) times a day, Starting Mon 7/29/2024, Normal      ondansetron (ZOFRAN-ODT) 8 mg disintegrating tablet Take 1 tablet (8 mg total) by mouth every 8 (eight) hours as needed for nausea or vomiting for up to 5 days, Starting Tue 11/7/2023, Until Tue 9/24/2024 at 2359, Normal      pravastatin (PRAVACHOL) 20 mg tablet Take 1 tablet (20 mg total) by mouth daily, Starting Mon 7/29/2024, Normal      Semaglutide-Weight Management (Wegovy) 1.7 MG/0.75ML Inject 1.7 mg under the skin weekly, Normal      spironolactone (ALDACTONE) 25 mg tablet Take 1 tablet  (25 mg total) by mouth daily, Starting Mon 7/29/2024, Normal      triamcinolone (KENALOG) 0.1 % ointment Apply topically 2 (two) times a day, Starting Mon 7/29/2024, Normal      lidocaine (Lidoderm) 5 % Apply 1 patch topically over 12 hours daily for 10 days Remove & Discard patch within 12 hours or as directed by MD, Starting Wed 8/14/2024, Until Sat 8/24/2024, Normal       !! - Potential duplicate medications found. Please discuss with provider.        No discharge procedures on file.  ED SEPSIS DOCUMENTATION   Time reflects when diagnosis was documented in both MDM as applicable and the Disposition within this note       Time User Action Codes Description Comment    12/29/2024 10:52 PM Derrick Arora Add [S30.0XXA] Hematoma of left buttock                  Derrick Arora DO  12/31/24 0141

## 2025-01-13 ENCOUNTER — PATIENT OUTREACH (OUTPATIENT)
Dept: CASE MANAGEMENT | Facility: OTHER | Age: 55
End: 2025-01-13

## 2025-01-13 NOTE — PROGRESS NOTES
OSW called Gavin today for supportive outreach. He stated he is very stressed right now, that one of his aunt's  and is still dealing with fatigue. OSW was beginning to offer support, however he stated he had to leave because his work was calling and asked if he could call OSW again another time. OSW agreeable to same. Will continue to follow.

## 2025-01-21 ENCOUNTER — OFFICE VISIT (OUTPATIENT)
Dept: RADIATION ONCOLOGY | Facility: HOSPITAL | Age: 55
End: 2025-01-21
Attending: RADIOLOGY

## 2025-01-21 DIAGNOSIS — C61 MALIGNANT NEOPLASM OF PROSTATE (HCC): Primary | ICD-10-CM

## 2025-01-21 PROCEDURE — 99024 POSTOP FOLLOW-UP VISIT: CPT | Performed by: RADIOLOGY

## 2025-01-21 NOTE — PROGRESS NOTES
Virtual Brief Visit  Name: Gavin Frank      : 1970      MRN: [id number]  Encounter Provider: Kiko Coffman MD  Encounter Date: 2025   Encounter department: [facility] RADIATION ONCOLOGY    :  Assessment & Plan  Malignant neoplasm of prostate (HCC)  Mr. Frank is a 53 year old man with stage I low risk [id number] prostate adenocarcinoma, [place] score 3+3=6, Maryanne grade group 1 with pre-treatment PSA 4.951 and < 50% positive biopsy cores. Decipher was high risk.  He underwent definitive radiotherapy.  He is without significant treatment related toxicity.  He will return in 2 months with repeat PSA.  He was encouraged to call with questions/concerns in the interim.             History of Present Illness   Oncology History Overview Note   Patient noted with elevated PSA. MRI prostate  [id number] Category 3 - Intermediate (the presence of clinically significant cancer is equivocal). Possible 0.4 cm lesion in the posterior left peripheral zone at apex. Transperineal MRI fusion biopsy  demonstrated multiple cores of Maryanne 6 prostate cancer. Patient considered low risk and remained under surveillance. Decipher testing came back high risk, 0.73. Repeat MRI prostate  unchanged. PSA did continue to rise.  The pt completed a course of radiation on 24. He has his EOT phone call today.      [facility]     Malignant neoplasm of prostate (HCC)   2024 Biopsy    Final Diagnosis   A. Prostate, right anterior medial, biopsy:  - Benign prostatic tissue with inflammation.       B. Prostate, region of interest, biopsy:  -Prostatic adenocarcinoma, involving (3) of (4) cores:              - Maryanne score 3 + 3 = 6 (0 % grade 4), Prognostic Grade Group 1 involving 5 % of 1 core.              - Maryanne score 3 + 3 = 6 (0 % grade 4), Prognostic Grade Group 1 involving 25 % of 1 core.                - [place] score 3 + 3 = 6 (0 % grade 4), Prognostic  Grade Group 1 involving 80 % (discontinuous) of 1 core.                C. Prostate, right base, biopsy:  - Prostatic adenocarcinoma, Maryanne score 3+3=6 (0% pattern 4), Grade Group 1, present in 1 of 1 cores, involving approximately 0.1 cm of length or 5% of total biopsy length.       D. Prostate, right anterior lateral, biopsy:  - Prostatic adenocarcinoma, Maryanne score 3+3=6 (0% pattern 4), Grade Group 1, present in 1 of 1 cores, involving approximately 0.2 cm of length or 15% of total biopsy length.       E. prostate, right posterior medial, biopsy:  - Prostatic adenocarcinoma, [place] score 3+3=6 (0% pattern 4), Grade Group 1, present in 1 of 1 cores, involving approximately 0.2 cm of length or 15% of total biopsy length.       F. Prostate, right posterior lateral, biopsy:  - Benign prostatic tissue.        G. Prostate, left anterior lateral, biopsy:  - Benign prostatic tissue.        H. Prostate, left anterior medial, biopsy:  - Benign prostatic tissue.        I. Prostate, left base, biopsy:  - Benign fibrovascular tissue.       J. Prostate, left posterior medial, biopsy:  - Benign prostatic tissue.        K. Prostate, left posterior lateral, biopsy:  - Benign prostatic tissue.            7/5/2024 Initial Diagnosis    Prostate cancer (HCC)     7/11/2024 -  Cancer Staged    Staging form: Prostate, AJCC 8th Edition  - Clinical stage from 7/11/2024: Stage I (cT1c, cN0, cM0, PSA: 5, Grade Group: 1) - Signed by Kiko Coffman MD on 7/11/2024  Prostate specific antigen (PSA) range: Less than 10  [place] primary pattern: 3  [place] secondary pattern: 3  Maryanne score: 6  Histologic grading system: 5 grade system       10/1/2024 - 12/2/2024 Radiation    Treatment:  Course: C1    Plan ID Energy Fractions Dose per Fraction (cGy) Dose Correction (cGy) Total Dose Delivered (cGy) Elapsed Days   Prostate 10X 44 / 44 180 0 [phone] 62      Treatment dates:  C1: 10/1/2024 - 12/2/2024       Upon interview, he endorses the  above history.  Urinary frequency and dysuria are improved.  He denies hematuria.  He has regular bowel movements.  He is without additional acute concerns.    Visit Time  Total Visit Duration: 5 minutes

## 2025-01-27 ENCOUNTER — OFFICE VISIT (OUTPATIENT)
Age: 55
End: 2025-01-27
Payer: MEDICARE

## 2025-01-27 VITALS
SYSTOLIC BLOOD PRESSURE: 140 MMHG | HEIGHT: 75 IN | TEMPERATURE: 97.8 F | WEIGHT: 315 LBS | HEART RATE: 86 BPM | OXYGEN SATURATION: 98 % | DIASTOLIC BLOOD PRESSURE: 88 MMHG | BODY MASS INDEX: 39.17 KG/M2

## 2025-01-27 DIAGNOSIS — Z00.00 ANNUAL PHYSICAL EXAM: Primary | ICD-10-CM

## 2025-01-27 DIAGNOSIS — I10 PRIMARY HYPERTENSION: ICD-10-CM

## 2025-01-27 DIAGNOSIS — M19.90 ARTHRITIS: ICD-10-CM

## 2025-01-27 DIAGNOSIS — M54.9 BACK PAIN: ICD-10-CM

## 2025-01-27 DIAGNOSIS — E66.01 MORBID OBESITY WITH BMI OF 45.0-49.9, ADULT (HCC): ICD-10-CM

## 2025-01-27 DIAGNOSIS — M54.32 SCIATICA, LEFT SIDE: ICD-10-CM

## 2025-01-27 DIAGNOSIS — E66.813 CLASS 3 SEVERE OBESITY DUE TO EXCESS CALORIES WITH SERIOUS COMORBIDITY AND BODY MASS INDEX (BMI) OF 50.0 TO 59.9 IN ADULT (HCC): ICD-10-CM

## 2025-01-27 DIAGNOSIS — E78.2 MIXED HYPERLIPIDEMIA: ICD-10-CM

## 2025-01-27 DIAGNOSIS — E66.01 CLASS 3 SEVERE OBESITY DUE TO EXCESS CALORIES WITH SERIOUS COMORBIDITY AND BODY MASS INDEX (BMI) OF 50.0 TO 59.9 IN ADULT (HCC): ICD-10-CM

## 2025-01-27 DIAGNOSIS — R21 RASH: ICD-10-CM

## 2025-01-27 DIAGNOSIS — T78.40XS ALLERGY, SEQUELA: ICD-10-CM

## 2025-01-27 PROCEDURE — 99396 PREV VISIT EST AGE 40-64: CPT | Performed by: FAMILY MEDICINE

## 2025-01-27 RX ORDER — ACETAMINOPHEN AND CODEINE PHOSPHATE 300; 30 MG/1; MG/1
1 TABLET ORAL EVERY 4 HOURS PRN
Qty: 30 TABLET | Refills: 0 | Status: SHIPPED | OUTPATIENT
Start: 2025-01-27

## 2025-01-27 RX ORDER — SEMAGLUTIDE 1.7 MG/.75ML
INJECTION, SOLUTION SUBCUTANEOUS
Qty: 3 ML | Refills: 5 | Status: SHIPPED | OUTPATIENT
Start: 2025-01-27

## 2025-01-27 NOTE — PATIENT INSTRUCTIONS
"Patient Education     Routine physical for adults   The Basics   Written by the doctors and editors at [facility]   What is a physical? -- A physical is a routine visit, or \"check-up,\" with your doctor. You might also hear it called a \"wellness visit\" or \"preventive visit.\"  During each visit, the doctor will:   Ask about your physical and mental health   Ask about your habits, behaviors, and lifestyle   Do an exam   Give you vaccines if needed   Talk to you about any medicines you take   Give advice about your health   Answer your questions  Getting regular check-ups is an important part of taking care of your health. It can help your doctor find and treat any problems you have. But it's also important for preventing health problems.  A routine physical is different from a \"sick visit.\" A sick visit is when you see a doctor because of a health concern or problem. Since physicals are scheduled ahead of time, you can think about what you want to ask the doctor.  How often should I get a physical? -- It depends on your age and health. In general, for people age 21 years and older:   If you are younger than 50 years, you might be able to get a physical every 3 years.   If you are 50 years or older, your doctor might recommend a physical every year.  If you have an ongoing health condition, like diabetes or high blood pressure, your doctor will probably want to see you more often.  What happens during a physical? -- In general, each visit will include:   Physical exam - The doctor or nurse will check your height, weight, heart rate, and blood pressure. They will also look at your eyes and ears. They will ask about how you are feeling and whether you have any symptoms that bother you.   Medicines - It's a good idea to bring a list of all the medicines you take to each doctor visit. Your doctor will talk to you about your medicines and answer any questions. Tell them if you are having any side effects that bother you. You " "should also tell them if you are having trouble paying for any of your medicines.   Habits and behaviors - This includes:   Your diet   Your exercise habits   Whether you smoke, drink alcohol, or use drugs   Whether you are sexually active   Whether you feel safe at home  Your doctor will talk to you about things you can do to improve your health and lower your risk of health problems. They will also offer help and support. For example, if you want to quit smoking, they can give you advice and might prescribe medicines. If you want to improve your diet or get more physical activity, they can help you with this, too.   Lab tests, if needed - The tests you get will depend on your age and situation. For example, your doctor might want to check your:   Cholesterol   Blood sugar   Iron level   Vaccines - The recommended vaccines will depend on your age, health, and what vaccines you already had. Vaccines are very important because they can prevent certain serious or deadly infections.   Discussion of screening - \"Screening\" means checking for diseases or other health problems before they cause symptoms. Your doctor can recommend screening based on your age, risk, and preferences. This might include tests to check for:   Cancer, such as breast, prostate, cervical, ovarian, colorectal, prostate, lung, or skin cancer   Sexually transmitted infections, such as chlamydia and gonorrhea   Mental health conditions like depression and anxiety  Your doctor will talk to you about the different types of screening tests. They can help you decide which screenings to have. They can also explain what the results might mean.   Answering questions - The physical is a good time to ask the doctor or nurse questions about your health. If needed, they can refer you to other doctors or specialists, too.  Adults older than 65 years often need other care, too. As you get older, your doctor will talk to you about:   How to prevent falling at " home   Hearing or vision tests   Memory testing   How to take your medicines safely   Making sure that you have the help and support you need at home  All topics are updated as new evidence becomes available and our peer review process is complete.  This topic retrieved from [employer] on: May 02, 2024.  Topic 685869 Version 1.0  Release: 32.4.3 - C32.122  © 2024 UpToDate, Inc. and/or its affiliates. All rights reserved.  Consumer Information Use and Disclaimer   Disclaimer: This generalized information is a limited summary of diagnosis, treatment, and/or medication information. It is not meant to be comprehensive and should be used as a tool to help the user understand and/or assess potential diagnostic and treatment options. It does NOT include all information about conditions, treatments, medications, side effects, or risks that may apply to a specific patient. It is not intended to be medical advice or a substitute for the medical advice, diagnosis, or treatment of a health care provider based on the health care provider's examination and assessment of a patient's specific and unique circumstances. Patients must speak with a health care provider for complete information about their health, medical questions, and treatment options, including any risks or benefits regarding use of medications. This information does not endorse any treatments or medications as safe, effective, or approved for treating a specific patient. Up[employer]Date, Inc. and its affiliates disclaim any warranty or liability relating to this information or the use thereof.The use of this information is governed by the Terms of Use, available at https://www.woltersWeuwer.com/en/know/clinical-effectiveness-terms. 2024© UpToDate, Inc. and its affiliates and/or licensors. All rights reserved.  Copyright   © 2024 Up[employer]Date, Inc. and/or its affiliates. All rights reserved.    Patient Education     Diet and health   The Basics   Written by the doctors and  "editors at [employer]   Why is it important to eat a healthy diet? -- It's important to eat a healthy diet because eating the right foods can keep you healthy now and later in life. It can lower the risk of problems like heart disease, diabetes, high blood pressure, and some types of cancer. It can also help you live longer and improve your quality of life.  What kind of diet is best? -- There is no 1 specific diet that experts recommend for everyone. People choose what foods to eat for many different reasons. These include personal preference, culture, Bahai, allergies or intolerances, and nutritional goals. People also need to consider the cost and availability of different foods.  In general, experts recommend a diet that:   Includes lots of vegetables, fruits, beans, nuts, and whole grains   Limits red and processed meats, unhealthy fats, sugar, salt, and alcohol  What are dietary patterns? -- A dietary \"pattern\" means generally eating certain types of foods while limiting others. Some people need to follow a specific dietary pattern because of their health needs. For example, if you have high blood pressure, your doctor might recommend a diet low in salt.  If you are trying to improve your health in general, choosing a healthy dietary pattern can help. This does not have to mean being very strict about what you eat or avoid. The goal is to think about getting plenty of healthy foods while limiting less healthy foods.  Examples of dietary patterns include:   Mediterranean diet - This involves eating a lot of fruits, vegetables, nuts, and whole grains, and uses olive oil instead of other fats. It also includes some fish, poultry, and dairy products, but not a lot of red meat. Following this diet can help your overall health, and might even lower your risk of having a stroke.   Plant-based diets - These patterns focus on vegetables, fruits, grains, beans, and nuts. They limit or avoid food that comes from " "animals, such as meat and dairy. There are different types of plant-based diets, including vegetarian and vegan.   Low-fat diet - A low-fat diet involves limiting calories from fat. This might help some people keep weight off if that is their goal, but it does not have many other health benefits. If you choose to follow a low-fat diet, it is also important to focus on getting lots of whole grains, legumes, fruits, and vegetables. Limit refined grains and sugar.   Low-cholesterol diet - Cholesterol is found in foods with a lot of saturated fat, like red meat, butter, and cheese. A low-cholesterol diet focuses on limiting the amount of cholesterol that you eat. Limiting the cholesterol in your diet can also help lower the amount of unhealthy fats that you eat.  Which foods are especially healthy? -- Foods that are especially healthy include:   Fruits and vegetables - Eating a diet with lots of fruits and vegetables can help prevent heart disease and stroke. It might also help prevent certain types of cancer. Try to eat fruits and vegetables at each meal and also for snacks. If you don't have fresh fruits and vegetables available, you can eat frozen or canned ones instead. Doctors recommend eating at least 5 servings of fruits or vegetables each day.   Whole grains - Whole-grain foods include 100 percent whole-wheat bread, steel cut oats, and whole-grain pasta. These are healthier than foods made with \"refined\" grains, like white bread and white rice. Eating lots of whole grains instead of refined grains has been shown to help with weight control. It can also lower the risk of several health problems, including colon cancer, heart disease, and diabetes. Doctors recommend that most people try to eat 5 to 8 servings of whole-grain, high-fiber foods each day.   Foods with fiber - Eating foods with a lot of fiber can help prevent heart disease and stroke. If you have type 2 diabetes, it can also help control your blood " "sugar. Foods that have a lot of fiber include vegetables, fruits, beans, nuts, oatmeal, and whole-grain breads and cereals. You can tell how much fiber is in a food by reading the nutrition label (figure 1). Doctors recommend that most people eat about 25 to 34 grams of fiber each day.   Foods with calcium and vitamin D - Babies, children, and adults need calcium and vitamin D to help keep their bones strong. Adults also need calcium and vitamin D to help prevent osteoporosis. Osteoporosis is a condition that causes bones to get \"thin\" and break more easily than usual. Different foods and drinks have calcium and vitamin D in them (figure 2). People who don't get enough calcium and vitamin D in their diet might need to take a supplement. Doctors recommend that most people have 2 to 3 servings of foods with calcium and vitamin D each day.   Foods with protein - Protein helps your muscles and bones stay strong. Healthy foods with a lot of protein include chicken, fish, eggs, beans, nuts, and soy products. Red meat also has a lot of protein, but it also contains fats, which can be unhealthy. Doctors recommend that most people try to eat about 5 servings of protein each day.   Healthy fats - There are different types of fats. Some types of fats are better for your body than others. Healthy fats are \"monounsaturated\" or \"polyunsaturated\" fats. These are found in fatty fish, nuts and nut butters, and avocados. Use plant-based oils when cooking. Examples of these oils include olive, canola, safflower, sunflower, and corn oil. Eating foods with healthy fats, while avoiding or limiting foods with unhealthy fats, might lower the risk of heart disease.   Foods with folate - Folate is a vitamin that is important for pregnant people, since it helps prevent certain birth defects. It is also called \"folic acid.\" Anyone who could get pregnant should get at least 400 micrograms of folic acid daily, whether or not they are actively " "trying to get pregnant. Folate is found in many breakfast cereals, oranges, orange juice, and green leafy vegetables.  What foods should I avoid or limit? -- To eat a healthy diet, there are some things that you should avoid or limit. They include:   Unhealthy fats - \"Trans\" fats are especially unhealthy. They are found in margarines, many fast foods, and some store-bought baked goods. \"Saturated\" fats are found in animal products like meats, egg yolks, butter, cheese, and full-fat milk products. Unhealthy fats can raise your cholesterol level and increase your chance of getting heart disease.   Sugar - To have a healthy diet, it's important to limit or avoid added sugar, sweets, and refined grains. Refined grains are found in white bread, white rice, most pastas, and most packaged \"snack\" foods.  Avoiding sugar-sweetened beverages, like soda and sports drinks, can also help improve your health.  Avoid canned fruits in \"heavy\" syrup.   Red and processed meats - Studies have shown that eating a lot of red meat can increase your risk of certain health problems, including heart disease and cancer. You should limit the amount of red meat that you eat. This is also true for processed meats like sausage, hot dogs, and lazar.  Can I drink alcohol as part of a healthy diet? -- Not drinking alcohol at all is the healthiest choice. Regular drinking can raise a person's chances of getting liver disease and certain types of cancers. In females, even 1 drink a day can increase the risk of getting breast cancer.  If you do choose to drink, most doctors recommend limiting alcohol to no more than:   1 drink a day for females   2 drinks a day for males  The limits are different because, generally, the female body takes longer to break down alcohol.  How many calories do I need each day? -- Calories give your body energy. The number of calories that you need each day depends on your weight, height, age, sex, and how active you " "are.  Your doctor or nurse can tell you about how many calories you should eat each day. You can also work with a dietitian (nutrition expert) to learn more about your dietary needs and options.  What if I am having trouble improving my diet? -- It can be hard to change the way that you eat. Remember that even small changes can improve your health.  Here are some tips that might help:   Try to make fruits and vegetables part of every meal. If you don't have fresh fruits and vegetables, frozen or canned are good options. Look for products without added salt or sugar.   Keep a bowl of fruit out for snacking.   When you can, choose whole grains instead of refined grains. Choose chicken, fish, and beans instead of red meat and cheese.   Try to eat prepared and processed foods less often.   Try flavored seltzer or water instead of soda or juice.   When eating at fast food restaurants, look for healthier items, like broiled chicken or salad.  If you have questions about which foods you should or should not eat, ask your doctor, nurse, or dietitian. The right diet for you will depend, in part, on your health and any medical conditions you have.  All topics are updated as new evidence becomes available and our peer review process is complete.  This topic retrieved from [employer] on: Feb 28, 2024.  Topic 24695 Version 28.0  Release: 32.2.4 - C32.58  © 2024 UpToDate, Inc. and/or its affiliates. All rights reserved.  figure 1: Nutrition label - Fiber     This is an example of a nutrition label. To figure out how much fiber is in a food, look for the line that says \"Dietary Fiber.\" It's also important to look at the serving size. This food has 7 grams of fiber in each serving, and each serving is 1 cup.  Graphic 59405 Version 8.0  figure 2: Foods and drinks with calcium and vitamin D     Foods rich in calcium include ice cream, soy milk, breads, kale, broccoli, milk, cheese, cottage cheese, almonds, yogurt, ready-to-eat cereals, " "beans, and tofu. Foods rich in vitamin D include milk, fortified plant-based \"milks\" (soy, almond), canned tuna fish, cod liver oil, yogurt, ready-to-eat-cereals, cooked salmon, canned sardines, mackerel, and eggs. Some of these foods are rich in both.  Graphic 08339 Version 4.0  Consumer Information Use and Disclaimer   Disclaimer: This generalized information is a limited summary of diagnosis, treatment, and/or medication information. It is not meant to be comprehensive and should be used as a tool to help the user understand and/or assess potential diagnostic and treatment options. It does NOT include all information about conditions, treatments, medications, side effects, or risks that may apply to a specific patient. It is not intended to be medical advice or a substitute for the medical advice, diagnosis, or treatment of a health care provider based on the health care provider's examination and assessment of a patient's specific and unique circumstances. Patients must speak with a health care provider for complete information about their health, medical questions, and treatment options, including any risks or benefits regarding use of medications. This information does not endorse any treatments or medications as safe, effective, or approved for treating a specific patient. Up[employer]Date, Inc. and its affiliates disclaim any warranty or liability relating to this information or the use thereof.The use of this information is governed by the Terms of Use, available at https://www.woltersParents Journeyuwer.com/en/know/clinical-effectiveness-terms. 2024© UpToDate, Inc. and its affiliates and/or licensors. All rights reserved.  Copyright   © 2024 Up[employer]Date, Inc. and/or its affiliates. All rights reserved.    "

## 2025-01-27 NOTE — PROGRESS NOTES
Adult Annual Physical  Name: Gavin Frank      : 1970      MRN: [id number]  Encounter Provider: Ricardo Castillo DO  Encounter Date: 2025   Encounter department: [facility]    Assessment & Plan  Sciatica, left side  Discussed home PT.  Orders:  •  acetaminophen-codeine (TYLENOL with CODEINE #3) 300-30 MG per tablet; Take 1 tablet by mouth every 4 (four) hours as needed for moderate pain    Annual physical exam    Orders:  •  Lipid panel; Future  •  CBC and differential; Future  •  Comprehensive metabolic panel; Future    Morbid obesity with BMI of 45.0-49.9, adult (HCC)  Prior Authorization Clinical Questions for Weight Management Pharmacotherapy                 Class 3 severe obesity due to excess calories with serious comorbidity and body mass index (BMI) of 50.0 to 59.9 in adult ([facility])    Orders:  •  Semaglutide-Weight Management (Wegovy) 1.7 MG/0.75ML; Inject 1.7 mg under the skin weekly      Immunizations and preventive care screenings were discussed with patient today. Appropriate education was printed on patient's after visit summary.    Discussed risks and benefits of prostate cancer screening. We discussed the controversial history of PSA screening for prostate cancer in the [place] as well as the risk of over detection and over treatment of prostate cancer by way of PSA screening.  The patient understands that PSA blood testing is an imperfect way to screen for prostate cancer and that elevated PSA levels in the blood may also be caused by infection, inflammation, prostatic trauma or manipulation, urological procedures, or by benign prostatic enlargement.    The role of the digital rectal examination in prostate cancer screening was also discussed and I discussed with him that there is large interobserver variability in the findings of digital rectal examination.    Counseling:  Alcohol/drug use: discussed moderation in alcohol intake, the recommendations for  healthy alcohol use, and avoidance of illicit drug use.  Dental Health: discussed importance of regular tooth brushing, flossing, and dental visits.  Injury prevention: discussed safety/seat belts, safety helmets, smoke detectors, carbon monoxide detectors, and smoking near bedding or upholstery.  Sexual health: discussed sexually transmitted diseases, partner selection, use of condoms, avoidance of unintended pregnancy, and contraceptive alternatives.  Exercise: the importance of regular exercise/physical activity was discussed. Recommend exercise 3-5 times per week for at least 30 minutes.          History of Present Illness     Adult Annual Physical:  Patient presents for annual physical.     Diet and Physical Activity:  - Diet/Nutrition: poor diet.  - Exercise: no formal exercise.    General Health:  - Sleep: sleeps well.  - Hearing: normal hearing right ear.  - Vision: no vision problems.  - Dental: regular dental visits.     Health:  - History of STDs: no.   - Urinary symptoms: none.     Advanced Care Planning:  - Has an advanced directive?: no    - Has a durable medical POA?: no    - ACP document given to patient?: no      Review of Systems   Constitutional: Negative.    HENT: Negative.     Eyes: Negative.    Respiratory: Negative.     Cardiovascular: Negative.    Gastrointestinal: Negative.    Endocrine: Negative.    Genitourinary: Negative.    Musculoskeletal:  Positive for arthralgias and back pain.   Skin: Negative.    Allergic/Immunologic: Negative.    Neurological: Negative.    Hematological: Negative.    Psychiatric/Behavioral: Negative.     All other systems reviewed and are negative.    Pertinent Medical History   Past Medical History:   Diagnosis Date   • Allergic    • Anxiety    • Arthritis    • Asthma    • Chronic pain disorder     Back pain   • CPAP (continuous positive airway pressure) dependence 02/07/2024   • Depression    • GERD (gastroesophageal reflux disease)    • Hyperlipemia    •  "Hypertension    • Obesity    • Prostate cancer (HCC)    • Sleep apnea          Past Medical History:   Diagnosis Date   • Allergic    • Anxiety    • Arthritis    • Asthma    • Chronic pain disorder     Back pain   • CPAP (continuous positive airway pressure) dependence 02/07/2024   • Depression    • GERD (gastroesophageal reflux disease)    • Hyperlipemia    • Hypertension    • Obesity    • Prostate cancer (HCC)    • Sleep apnea      Past Medical History:   Diagnosis Date   • Allergic    • Anxiety    • Arthritis    • Asthma    • Chronic pain disorder     Back pain   • CPAP (continuous positive airway pressure) dependence 02/07/2024   • Depression    • GERD (gastroesophageal reflux disease)    • Hyperlipemia    • Hypertension    • Obesity    • Prostate cancer (HCC)    • Sleep apnea            Objective   /88 (BP Location: Left arm, Patient Position: Sitting, Cuff Size: Large)   Pulse 86   Temp 97.8 °F (36.6 °C)   Ht 6' 3\" (1.905 m)   Wt (!) 168 kg (371 lb)   SpO2 98%   BMI 46.37 kg/m²     Physical Exam  Vitals and nursing note reviewed.   Constitutional:       Appearance: Normal appearance. He is well-developed. He is obese.   HENT:      Head: Normocephalic.      Nose: Nose normal.   Eyes:      Conjunctiva/sclera: Conjunctivae normal.      Pupils: Pupils are equal, round, and reactive to light.   Cardiovascular:      Rate and Rhythm: Normal rate and regular rhythm.      Pulses: Normal pulses.      Heart sounds: Normal heart sounds.   Pulmonary:      Effort: Pulmonary effort is normal.      Breath sounds: Normal breath sounds.   Abdominal:      General: Abdomen is flat. Bowel sounds are normal.      Palpations: Abdomen is soft.   Musculoskeletal:         General: Normal range of motion.      Cervical back: Normal range of motion and neck supple.   Skin:     General: Skin is warm and dry.   Neurological:      General: No focal deficit present.      Mental Status: He is alert and oriented to person, place, " and time.   Psychiatric:         Mood and Affect: Mood normal.         Behavior: Behavior normal.         Thought Content: Thought content normal.         Judgment: Judgment normal.

## 2025-01-27 NOTE — ASSESSMENT & PLAN NOTE
Discussed home PT.  Orders:  •  acetaminophen-codeine (TYLENOL with CODEINE #3) 300-30 MG per tablet; Take 1 tablet by mouth every 4 (four) hours as needed for moderate pain

## 2025-01-27 NOTE — ASSESSMENT & PLAN NOTE
Orders:  •  Semaglutide-Weight Management (Wegovy) 1.7 MG/0.75ML; Inject 1.7 mg under the skin weekly

## 2025-01-28 RX ORDER — METHOCARBAMOL 750 MG/1
TABLET, FILM COATED ORAL
Qty: 120 TABLET | Refills: 10 | Status: SHIPPED | OUTPATIENT
Start: 2025-01-28

## 2025-01-28 RX ORDER — FLUTICASONE PROPIONATE 50 MCG
SPRAY, SUSPENSION (ML) NASAL
Qty: 16 G | Refills: 10 | Status: SHIPPED | OUTPATIENT
Start: 2025-01-28

## 2025-01-28 RX ORDER — METOPROLOL SUCCINATE 50 MG/1
TABLET, EXTENDED RELEASE ORAL
Qty: 90 TABLET | Refills: 1 | Status: SHIPPED | OUTPATIENT
Start: 2025-01-28

## 2025-01-28 RX ORDER — OLOPATADINE HYDROCHLORIDE 1 MG/ML
SOLUTION/ DROPS OPHTHALMIC
Qty: 5 ML | Refills: 10 | Status: SHIPPED | OUTPATIENT
Start: 2025-01-28

## 2025-01-28 RX ORDER — TRIAMCINOLONE ACETONIDE 1 MG/G
OINTMENT TOPICAL
Qty: 15 G | Refills: 10 | Status: SHIPPED | OUTPATIENT
Start: 2025-01-28

## 2025-01-28 RX ORDER — SEMAGLUTIDE 1.7 MG/.75ML
INJECTION, SOLUTION SUBCUTANEOUS
Qty: 3 ML | Refills: 10 | OUTPATIENT
Start: 2025-01-28

## 2025-01-28 RX ORDER — PRAVASTATIN SODIUM 20 MG
TABLET ORAL
Qty: 90 TABLET | Refills: 1 | Status: SHIPPED | OUTPATIENT
Start: 2025-01-28

## 2025-01-28 RX ORDER — MONTELUKAST SODIUM 10 MG/1
TABLET ORAL
Qty: 90 TABLET | Refills: 1 | Status: SHIPPED | OUTPATIENT
Start: 2025-01-28

## 2025-01-28 RX ORDER — LOSARTAN POTASSIUM 100 MG/1
TABLET ORAL
Qty: 90 TABLET | Refills: 1 | Status: SHIPPED | OUTPATIENT
Start: 2025-01-28

## 2025-01-28 RX ORDER — DILTIAZEM HYDROCHLORIDE 120 MG/1
CAPSULE, COATED, EXTENDED RELEASE ORAL
Qty: 30 CAPSULE | Refills: 10 | Status: SHIPPED | OUTPATIENT
Start: 2025-01-28

## 2025-01-29 ENCOUNTER — TELEPHONE (OUTPATIENT)
Age: 55
End: 2025-01-29

## 2025-01-29 NOTE — TELEPHONE ENCOUNTER
PA for acetaminophen-codeine (TYLENOL with CODEINE #3) 300-30 MG per tablet SUBMITTED to     via    [x]CMM-KEY: [id number]  []Surescripts-Case ID #   []Availity-Auth ID # NDC #   []Faxed to plan   []Other website   []Phone call Case ID #     [x]PA sent as URGENT    All office notes, labs and other pertaining documents and studies sent. Clinical questions answered. Awaiting determination from insurance company.     Turnaround time for your insurance to make a decision on your Prior Authorization can take 7-21 business days.

## 2025-01-30 NOTE — TELEPHONE ENCOUNTER
PA for acetaminophen-codeine (TYLENOL with CODEINE #3) 300-30 MG per tablet DENIED    Reason:(Screenshot if applicable)        Message sent to office clinical pool Yes    Denial letter scanned into Media Yes    Appeal started No (Provider will need to decide if appeal is warranted and send clinical documentation to Prior Authorization Team for initiation.)    **Please follow up with your patient regarding denial and next steps**

## 2025-01-31 NOTE — TELEPHONE ENCOUNTER
Spoke to patient directly and gave  advice to try to Tylenol Extra Strength if no improvement then call back he will need appointment.

## 2025-02-17 ENCOUNTER — PATIENT OUTREACH (OUTPATIENT)
Dept: CASE MANAGEMENT | Facility: OTHER | Age: 55
End: 2025-02-17

## 2025-02-17 NOTE — PROGRESS NOTES
OSW completed chart review today. Since pt has completed all recommended treatments for prostate cancer and has not outreached this writer for several weeks, will close to ongoing calls. Should pt express any psychosocial needs related to his prostate cancer, OSW team can be referred for support.

## 2025-02-19 ENCOUNTER — OFFICE VISIT (OUTPATIENT)
Dept: PODIATRY | Facility: CLINIC | Age: 55
End: 2025-02-19
Payer: MEDICARE

## 2025-02-19 DIAGNOSIS — R73.03 PRE-DIABETES: Primary | ICD-10-CM

## 2025-02-19 DIAGNOSIS — B35.1 ONYCHOMYCOSIS: ICD-10-CM

## 2025-02-19 DIAGNOSIS — M79.675 PAIN IN TOES OF BOTH FEET: ICD-10-CM

## 2025-02-19 DIAGNOSIS — M79.674 PAIN IN TOES OF BOTH FEET: ICD-10-CM

## 2025-02-19 PROCEDURE — RECHECK: Performed by: PODIATRIST

## 2025-02-19 PROCEDURE — 11721 DEBRIDE NAIL 6 OR MORE: CPT | Performed by: PODIATRIST

## 2025-02-20 NOTE — PROGRESS NOTES
Name: Gavin Frank      : 1970      MRN: [id number]  Encounter Provider: Toño Shen DPM  Encounter Date: 2025   [facility]  :  Assessment & Plan  Pre-diabetes         Onychomycosis       Debride mycotic nails and thin the nail plates x 6 with the use of a nail nipper manually and an electric Dremel bur was used to reduce the thickness of the nail beds and smoothed the distal aspect of the nails.   Pain in toes of both feet         Pt was instructed to use lotion once a day on both feet such as cerave, Cetaphil or similar thick style of lotion.     Patient is to increase his water intake slowly until next visit to help with the dry flaky skin that is present on the bottom of both feet.    Discussed proper shoe gear, daily inspections of feet, and general foot health with patient. Patient has Q8  findings and is recommended for at risk foot care every 9-10 weeks.    Patients most recent complete clinical foot exam was on: 2024      Return in about 10 weeks (around 2025).     History of Present Illness   HPI  Gavin Frank is a 54 y.o. male who presents with chief complaint of painful thick nails on both feet.  He is prediabetic.Patient presents for at-risk foot care.  Patient has no acute concerns today.  Patient has significant lower extremity risk due to neuropathy, parasthesia, edema, and trophic skin changes to the lower extremity.   History obtained from: patient    Review of Systems  Medical History Reviewed by provider this encounter:     .  Current Outpatient Medications on File Prior to Visit   Medication Sig Dispense Refill    acetaminophen-codeine (TYLENOL with CODEINE #3) 300-30 MG per tablet Take 1 tablet by mouth every 4 (four) hours as needed for moderate pain 30 tablet 0    Diclofenac Sodium (VOLTAREN) 1 % APPLY TO AFFECTED AREA(S) TOPICALLY AS DIRECTED *NEW PRESCRIPTION REQUEST* 100 g 1    diltiazem (CARDIZEM CD) 120 mg 24 hr  capsule TAKE 1 CAPSULE(S) BY MOUTH 1 TIMES PER DAY *NEW PRESCRIPTION REQUEST* 30 capsule 10    ergocalciferol (VITAMIN D2) 50,000 units Take 1 capsule (50,000 Units total) by mouth once a week 4 capsule 0    fluticasone (FLONASE) 50 mcg/act nasal spray 1 spray into each nostril daily 48 g 1    fluticasone (FLONASE) 50 mcg/act nasal spray 1 spray into each nostril 2 (two) times a day 16 g 3    fluticasone (FLONASE) 50 mcg/act nasal spray INSTILL 1-2 SPRAYS INTO EACH NOSTRIL DAILY AS DIRECTED *NEW PRESCRIPTION REQUEST* 16 g 10    LORazepam (ATIVAN) 2 mg tablet Take 1 tablet (2 mg total) by mouth once as needed for anxiety (take 1 hour before MRI) for up to 120 doses 30 tablet 3    losartan (COZAAR) 100 MG tablet TAKE 1 TABLET(S) BY MOUTH 1 TIMES PER DAY *NEW PRESCRIPTION REQUEST* 90 tablet 1    meloxicam (MOBIC) 15 mg tablet Take 1 tablet (15 mg total) by mouth daily as needed for moderate pain 90 tablet 1    methocarbamol (ROBAXIN) 500 mg tablet Take 1 tablet (500 mg total) by mouth 2 (two) times a day 20 tablet 0    methocarbamol (ROBAXIN) 500 mg tablet Take 1 tablet (500 mg total) by mouth 2 (two) times a day 20 tablet 0    methocarbamol (ROBAXIN) 750 mg tablet TAKE 1 TABLET(S) BY MOUTH 4 TIMES PER *NEW PRESCRIPTION REQUEST* 120 tablet 10    metoprolol succinate (TOPROL-XL) 50 mg 24 hr tablet TAKE 1 TABLET(S) BY MOUTH 1 TIMES PER DAY *NEW PRESCRIPTION REQUEST* 90 tablet 1    montelukast (SINGULAIR) 10 mg tablet TAKE 1 TABLET(S) BY MOUTH 1 TIMES PER DAY *NEW PRESCRIPTION REQUEST* 90 tablet 1    Multiple Vitamin (ONE-A-DAY MENS PO) Take by mouth      naproxen (Naprosyn) 500 mg tablet Take 1 tablet (500 mg total) by mouth 2 (two) times a day with meals 30 tablet 0    olopatadine (PATANOL) 0.1 % ophthalmic solution PLEASE CLARIFY DIRECTIONS, NOT ON MED HISTORY REPORT *NEW PRESCRIPTION REQUEST* 5 mL 10    pravastatin (PRAVACHOL) 20 mg tablet TAKE 1 TABLET(S) BY MOUTH 1 TIMES PER DAY *NEW PRESCRIPTION REQUEST* 90 tablet  1    Semaglutide-Weight Management (Wegovy) 1.7 MG/0.75ML Inject 1.7 mg under the skin weekly 3 mL 5    triamcinolone (KENALOG) 0.1 % ointment APPLY TO AFFECTED AREA(S) TOPICALLY AS DIRECTED *NEW PRESCRIPTION REQUEST* 15 g 10    azelastine (ASTELIN) 0.1 % nasal spray 2 sprays into each nostril 2 (two) times a day Use in each nostril as directed (Patient not taking: Reported on 2025) 30 mL 5    BLACK CURRANT SEED OIL PO Take by mouth (Patient not taking: Reported on 2025)      Cyanocobalamin (Vitamin B-12) 2500 MCG SUBL Place under the tongue (Patient not taking: Reported on 2025)      diltiazem (CARDIZEM CD) 240 mg 24 hr capsule Take 1 capsule (240 mg total) by mouth daily 90 capsule 1    lidocaine (Lidoderm) 5 % Apply 1 patch topically over 12 hours daily for 9 doses Remove & Discard patch within 12 hours or as directed by MD 9 patch 0    ondansetron (ZOFRAN-ODT) 8 mg disintegrating tablet Take 1 tablet (8 mg total) by mouth every 8 (eight) hours as needed for nausea or vomiting for up to 5 days 15 tablet 0     Current Facility-Administered Medications on File Prior to Visit   Medication Dose Route Frequency Provider Last Rate Last Admin    cyanocobalamin injection 1,000 mcg  1,000 mcg Intramuscular Q30 Days    1,000 mcg at 24 1009      Social History     Tobacco Use    Smoking status: Former     Current packs/day: 0.00     Average packs/day: 0.5 packs/day for 31.0 years (15.5 ttl pk-yrs)     Types: Cigarettes     Start date: 1984     Quit date: 2018     Years since quittin.1     Passive exposure: Past    Smokeless tobacco: Never    Tobacco comments:     No cigarettes vape only   Vaping Use    Vaping status: Every Day    Substances: Nicotine, Flavoring   Substance and Sexual Activity    Alcohol use: Not Currently    Drug use: Never    Sexual activity: Yes     Partners: Female     Birth control/protection: None        Objective   There were no vitals taken for this visit.      Physical Exam  ascular status is a faint 1/4 DP PT negative digital hair normal distal cooling immediate capillary refill with slight edema present bilaterally.  Capillary refill is approximately 2 to 3 seconds and edema is +2 pitting.     Derm nails are brittle elongated hypertrophic yellow-white discoloration with subungual debris x 10.  There is an increased thickness and the nails are approximately 2 mm.  There is white-yellow flaky skin present on the plantar aspect of both feet with the right being worse than the left.  There is no signs of fissures or dried blood present within the flaky tissue.  There is slight loss of turgor noted bilaterally secondary to the edema.    Administrative Statements   I have spent a total time of 15 minutes in caring for this patient on the day of the visit/encounter including Risks and benefits of tx options, Instructions for management, Patient and family education, Importance of tx compliance, Counseling / Coordination of care, and Documenting in the medical record.

## 2025-02-20 NOTE — ASSESSMENT & PLAN NOTE
Debride mycotic nails and thin the nail plates x 6 with the use of a nail nipper manually and an electric Dremel bur was used to reduce the thickness of the nail beds and smoothed the distal aspect of the nails.

## 2025-02-24 DIAGNOSIS — S30.0XXA HEMATOMA OF LEFT BUTTOCK: ICD-10-CM

## 2025-02-25 RX ORDER — LIDOCAINE 50 MG/G
PATCH TOPICAL
Qty: 30 PATCH | Refills: 10 | Status: SHIPPED | OUTPATIENT
Start: 2025-02-25

## 2025-02-26 DIAGNOSIS — C61 PROSTATE CA (HCC): ICD-10-CM

## 2025-02-27 RX ORDER — LORAZEPAM 2 MG/1
2 TABLET ORAL DAILY
Qty: 30 TABLET | Refills: 1 | Status: SHIPPED | OUTPATIENT
Start: 2025-02-27

## 2025-02-28 ENCOUNTER — OFFICE VISIT (OUTPATIENT)
Dept: SLEEP CENTER | Facility: CLINIC | Age: 55
End: 2025-02-28
Payer: MEDICARE

## 2025-02-28 VITALS
HEIGHT: 75 IN | HEART RATE: 87 BPM | WEIGHT: 315 LBS | SYSTOLIC BLOOD PRESSURE: 142 MMHG | BODY MASS INDEX: 39.17 KG/M2 | DIASTOLIC BLOOD PRESSURE: 85 MMHG

## 2025-02-28 DIAGNOSIS — E29.1 HYPOGONADISM IN MALE: ICD-10-CM

## 2025-02-28 DIAGNOSIS — I10 PRIMARY HYPERTENSION: ICD-10-CM

## 2025-02-28 DIAGNOSIS — R35.1 NOCTURIA: ICD-10-CM

## 2025-02-28 DIAGNOSIS — E66.01 MORBID OBESITY (HCC): ICD-10-CM

## 2025-02-28 DIAGNOSIS — G47.33 OBSTRUCTIVE SLEEP APNEA: Primary | ICD-10-CM

## 2025-02-28 DIAGNOSIS — J31.0 CHRONIC RHINITIS: ICD-10-CM

## 2025-02-28 PROCEDURE — 99214 OFFICE O/P EST MOD 30 MIN: CPT | Performed by: INTERNAL MEDICINE

## 2025-02-28 NOTE — PROGRESS NOTES
Name: Gavin Frank      : 1970      MRN: [id number]  Encounter Provider: Andrzej Arshad MD  Encounter Date: 2025   Encounter department: [facility] SLEEP MEDICINE [place]  :  Assessment & Plan  Obstructive sleep apnea    Orders:    PAP DME Resupply/Reorder    Primary hypertension         Chronic rhinitis         Nocturia         Morbid obesity (HCC)         Hypogonadism in male          PLAN:   Problems & Comorbidities Addressed this Visit as listed.  Above conditions as reviewed in notes are improved/stable/controlled/resolved.  I reviewed results of prior studies and physiologic data with the patient.   I discussed treatment options with risks and benefits.  Treatment with  PAP is medically necessary and Gavin is agreable to continue use.   Care of equipment, methods to improve comfort using PAP and importance of compliance with therapy were discussed.  Pressure setting: continue 14 cmH2O. Mask type nasal pillows.    Rx provided to replace supplies and Care coordinated with DME provider.   Discussed strategies for weight reduction.    Follow-up is advised in 1 year or sooner if needed to monitor progress, compliance and to adjust therapy.      History of Present Illness   HPI         Follow-Up Note - Sleep Center   Gavin Frank  54 y.o. male  :1970  MRN:[id number]  DOS:2025    CC: I saw this patient for follow-up in clinic today for Sleep disordered breathing, Coexisting Sleep and Medical Problems.. Interval changes: None reported.      Results of a split study in : The diagnostic portion demonstrated: AHI of 33 per hour, and had no during stage REM.  Minimum oxygen saturation was 90 %. During the therapeutic portion of the study, his sleep disordered breathing was adequately remediated with nasal CPAP at  14 cm H2O.    PFSH, Problem List, Medications & Allergies were reviewed in EMR.   He  has a past medical history of Allergic, Anxiety, Arthritis, Asthma, Chronic pain disorder, CPAP  (continuous positive airway pressure) dependence (02/07/2024), Depression, GERD (gastroesophageal reflux disease), Hyperlipemia, Hypertension, Obesity, Prostate cancer (HCC), and Sleep apnea.    He has a current medication list which includes the following prescription(s): acetaminophen-codeine, vitamin b-12, diclofenac sodium, diltiazem, diltiazem, ergocalciferol, fluticasone, fluticasone, lidocaine, lorazepam, losartan, meloxicam, methocarbamol, methocarbamol, methocarbamol, metoprolol succinate, montelukast, multiple vitamin, naproxen, olopatadine, ondansetron, pravastatin, wegovy, triamcinolone, azelastine, black currant seed oil, and fluticasone, and the following Facility-Administered Medications: cyanocobalamin.    PHYSIOLOGICAL DATA REVIEW : Using PAP > 4 hours/night 100%. Estimated ZEV 0.4/hour with pressure of 14cm H2O ;.  INTERPRETATION: Compliance is excellent; Pressure setting is:optimal; ;     SUBJECTIVE: With respect to use of PAP, Gavin  is experiencing some adverse effects: dry nose and occasionally dry mouth..He derives benefit.. Is satisfied with sleep and daytime function.     Sleep Routine: Gavin reports getting (Patient-Rptd) (P) 7 hrs sleep; he has no difficulty initiating or maintaining sleep . He arises spontaneously and feels refreshed.Gavin]reports excessive daytime sleepiness, feels like napping but does not have the opportunity.  He rated himself at Total score: (Patient-Rptd) (P) 5 /24 on the [place] Sleepiness Scale.   Other issues: None reported.     Habits: Reports that he quit smoking about 7 years ago. His smoking use included cigarettes. He started smoking about 41 years ago. He has a 15.5 pack-year smoking history. He has been exposed to tobacco smoke. He has never used smokeless tobacco.,  Reports that he does not currently use alcohol.,  Reports no history of drug use., Caffeine use: limited until  ; Exercise routine: none.      ROS: Significant for over 10 pounds weight  "gain in the past year.  He has nasal symptoms due to chronic rhinitis..    EXAM: /85 (BP Location: Left arm, Patient Position: Sitting, Cuff Size: Large)   Pulse 87   Ht 6' 3\" (1.905 m)   Wt (!) 168 kg (370 lb)   BMI 46.25 kg/m²     Wt Readings from Last 3 Encounters:   02/28/25 (!) 168 kg (370 lb)   01/27/25 (!) 168 kg (371 lb)   10/04/24 (!) 163 kg (359 lb)      Patient is well groomed; well appearing.   CNS: Alert, orientated, speech clear & coherent  Psych: cooperative and in no distress. Mental state: Appears normal.  H&N: EOMI; NC/AT: No facial pressure marks, no rashes.    Skin/Extrem: col & hydration normal; no edema  Resp: Respiratory effort is normal  Physical findings otherwise essentially unchanged from previous.    Sincerely,     Authenticated electronically on 02/28/25   Board Certified Specialist     Portions of the record may have been created with voice recognition software. Occasional wrong word or \"sound a like\" substitutions may have occurred due to the inherent limitations of voice recognition software. There may also be notations and random deletions of words or characters from malfunctioning software. Read the chart carefully and recognize, using context, where substitutions/deletions have occurred.      Sitting and reading: (Patient-Rptd) (P) Slight chance of dozing  Watching TV: (Patient-Rptd) (P) Slight chance of dozing  Sitting, inactive in a public place (e.g. a theatre or a meeting): (Patient-Rptd) (P) Slight chance of dozing  As a passenger in a car for an hour without a break: (Patient-Rptd) (P) Would never doze  Lying down to rest in the afternoon when circumstances permit: (Patient-Rptd) (P) Moderate chance of dozing  Sitting and talking to someone: (Patient-Rptd) (P) Would never doze  Sitting quietly after a lunch without alcohol: (Patient-Rptd) (P) Would never doze  In a car, while stopped for a few minutes in traffic: (Patient-Rptd) (P) Would never doze  Total score: " "(Patient-Rptd) (P) 5     Review of Systems  Pertinent positives/negatives included in HPI and also as noted:       Objective   /85 (BP Location: Left arm, Patient Position: Sitting, Cuff Size: Large)   Pulse 87   Ht 6' 3\" (1.905 m)   Wt (!) 168 kg (370 lb)   BMI 46.25 kg/m²        Physical Exam    Data  Lab Results   Component Value Date    HGB 14.6 08/22/2024    HCT 45.5 08/22/2024    MCV 84 08/22/2024      Lab Results   Component Value Date    CALCIUM 9.4 08/22/2024    K 4.1 08/22/2024    CO2 24 08/22/2024     08/22/2024    BUN 11 08/22/2024    CREATININE 0.84 08/22/2024     No results found for: \"IRON\", \"TIBC\", \"FERRITIN\"  Lab Results   Component Value Date    AST 13 08/22/2024    ALT 19 08/22/2024         "

## 2025-03-03 ENCOUNTER — TELEPHONE (OUTPATIENT)
Dept: SLEEP CENTER | Facility: CLINIC | Age: 55
End: 2025-03-03

## 2025-03-05 LAB
DME PARACHUTE DELIVERY DATE REQUESTED: NORMAL
DME PARACHUTE ITEM DESCRIPTION: NORMAL
DME PARACHUTE ORDER STATUS: NORMAL
DME PARACHUTE SUPPLIER NAME: NORMAL
DME PARACHUTE SUPPLIER PHONE: NORMAL

## 2025-03-06 ENCOUNTER — OFFICE VISIT (OUTPATIENT)
Dept: OTOLARYNGOLOGY | Facility: CLINIC | Age: 55
End: 2025-03-06
Payer: MEDICARE

## 2025-03-06 DIAGNOSIS — J32.4 CHRONIC PANSINUSITIS: Primary | ICD-10-CM

## 2025-03-06 DIAGNOSIS — J34.3 NASAL TURBINATE HYPERTROPHY: ICD-10-CM

## 2025-03-06 DIAGNOSIS — J34.2 DEVIATED NASAL SEPTUM: ICD-10-CM

## 2025-03-06 PROCEDURE — 99214 OFFICE O/P EST MOD 30 MIN: CPT | Performed by: OTOLARYNGOLOGY

## 2025-03-06 PROCEDURE — 31231 NASAL ENDOSCOPY DX: CPT | Performed by: OTOLARYNGOLOGY

## 2025-03-06 RX ORDER — METHYLPREDNISOLONE 4 MG/1
TABLET ORAL
Qty: 1 EACH | Refills: 0 | Status: SHIPPED | OUTPATIENT
Start: 2025-03-06

## 2025-03-06 NOTE — PROGRESS NOTES
Otolaryngology Head and Neck Surgery History and Physical    Chief complaint    Chief Complaint   Patient presents with    Follow-up     Discuss surgery         History of the Present Illness    Independent Historian   N      Relationship  NA    Gavin Frank is a 54 y.o. who presents for re-evaluation.  Patient was at the hospital visiting his niece when he was having some numbness in his right arm and leg.  He went to the emergency room and they immediately treated him as a stroke patient.  He had an MRI at that time that showed obstruction of the left maxillary sinus.  He does report having some issues with feeling of blockage in his left ear.  He does get some yellowish discharge from the left nasal cavity.  No complaints of any headaches or visual issues.  He did not have a stroke previously.  Patient did report that he has been using Flonase for several months.  Did not notice any significant change in his symptoms.    03/06/25  The patient presents today complaining of headache, rhinorrhea and nasal congestion.  The patient using Flonase which has only been helping during the allergy season.  Denies of smoking cigarettes, but uses vape.      Review of Systems    Per HPI remainder noncontributory present complaint    Past Medical History:   Diagnosis Date    Allergic     Anxiety     Arthritis     Asthma     Chronic pain disorder     Back pain    CPAP (continuous positive airway pressure) dependence 02/07/2024    Depression     GERD (gastroesophageal reflux disease)     Hyperlipemia     Hypertension     Obesity     Prostate cancer (HCC)     Sleep apnea        Past Surgical History:   Procedure Laterality Date    HEMORRHOID SURGERY      INSERTION OF FIDUCIAL MARKER (TRANSRECTAL ULTRASOUND GUIDANCE) N/A 9/4/2024    Procedure: INSERTION OF FIDUCIAL MARKER ,SPACEOAR;  Surgeon: Benjie Stewart DO;  Location: [facility];  Service: Urology    NM BX PROSTATE STRTCTC SATURATION SAMPLING IMG GID N/A 2/7/2024     Procedure: TRANSPERINEAL MRI FUSION BIOPSY PROSTATE;  Surgeon: Benjie Stewart DO;  Location: [facility];  Service: Urology    TONSILLECTOMY      URETHRAL DILATION         Social History     Socioeconomic History    Marital status: Single     Spouse name: Not on file    Number of children: Not on file    Years of education: Not on file    Highest education level: Not on file   Occupational History    Occupation: employed   Tobacco Use    Smoking status: Former     Current packs/day: 0.00     Average packs/day: 0.5 packs/day for 31.0 years (15.5 ttl pk-yrs)     Types: Cigarettes     Start date: 1984     Quit date: 2018     Years since quittin.1     Passive exposure: Past    Smokeless tobacco: Never    Tobacco comments:     No cigarettes vape only   Vaping Use    Vaping status: Every Day    Substances: Nicotine, Flavoring   Substance and Sexual Activity    Alcohol use: Not Currently    Drug use: Never    Sexual activity: Yes     Partners: Female     Birth control/protection: None   Other Topics Concern    Not on file   Social History Narrative    Not on file     Social Drivers of Health     Financial Resource Strain: Not on file   Food Insecurity: No Food Insecurity (2023)    Hunger Vital Sign     Worried About Running Out of Food in the Last Year: Never true     Ran Out of Food in the Last Year: Never true   Transportation Needs: No Transportation Needs (2023)    PRAPARE - Transportation     Lack of Transportation (Medical): No     Lack of Transportation (Non-Medical): No   Physical Activity: Not on file   Stress: Not on file   Social Connections: Not on file   Intimate Partner Violence: Not on file   Housing Stability: Low Risk  (2023)    Housing Stability Vital Sign     Unable to Pay for Housing in the Last Year: No     Number of Places Lived in the Last Year: 1     Unstable Housing in the Last Year: No       Family History   Problem Relation Age of Onset    Hypertension Mother      Hyperlipidemia Mother     Heart disease Mother     Substance Abuse Mother     Hypertension Father     Heart disease Father     Hyperlipidemia Father     Substance Abuse Father     COPD Sister     Liver disease Sister     Prostate cancer Maternal Uncle            There were no vitals taken for this visit.      Current Outpatient Medications:     acetaminophen-codeine (TYLENOL with CODEINE #3) 300-30 MG per tablet, Take 1 tablet by mouth every 4 (four) hours as needed for moderate pain, Disp: 30 tablet, Rfl: 0    azelastine (ASTELIN) 0.1 % nasal spray, 2 sprays into each nostril 2 (two) times a day Use in each nostril as directed (Patient not taking: Reported on 2/28/2025), Disp: 30 mL, Rfl: 5    BLACK CURRANT SEED OIL PO, Take by mouth (Patient not taking: Reported on 2/26/2024), Disp: , Rfl:     Cyanocobalamin (Vitamin B-12) 2500 MCG SUBL, Place under the tongue, Disp: , Rfl:     Diclofenac Sodium (VOLTAREN) 1 %, APPLY TO AFFECTED AREA(S) TOPICALLY AS DIRECTED *NEW PRESCRIPTION REQUEST*, Disp: 100 g, Rfl: 1    diltiazem (CARDIZEM CD) 120 mg 24 hr capsule, TAKE 1 CAPSULE(S) BY MOUTH 1 TIMES PER DAY *NEW PRESCRIPTION REQUEST*, Disp: 30 capsule, Rfl: 10    diltiazem (CARDIZEM CD) 240 mg 24 hr capsule, Take 1 capsule (240 mg total) by mouth daily, Disp: 90 capsule, Rfl: 1    ergocalciferol (VITAMIN D2) 50,000 units, Take 1 capsule (50,000 Units total) by mouth once a week, Disp: 4 capsule, Rfl: 0    fluticasone (FLONASE) 50 mcg/act nasal spray, 1 spray into each nostril daily, Disp: 48 g, Rfl: 1    fluticasone (FLONASE) 50 mcg/act nasal spray, 1 spray into each nostril 2 (two) times a day, Disp: 16 g, Rfl: 3    fluticasone (FLONASE) 50 mcg/act nasal spray, INSTILL 1-2 SPRAYS INTO EACH NOSTRIL DAILY AS DIRECTED *NEW PRESCRIPTION REQUEST*, Disp: 16 g, Rfl: 10    lidocaine (LIDODERM) 5 %, APPLY 1 PATCH TOPICALLY EVERY 12 HOURS AS DIRECTED AS NEEDED *12 HOURS ON, 12 HOURS OFF*, Disp: 30 patch, Rfl: 10    LORazepam  (ATIVAN) 2 mg tablet, TAKE 1 TABLET BY MOUTH EVERY DAY, Disp: 30 tablet, Rfl: 1    losartan (COZAAR) 100 MG tablet, TAKE 1 TABLET(S) BY MOUTH 1 TIMES PER DAY *NEW PRESCRIPTION REQUEST*, Disp: 90 tablet, Rfl: 1    meloxicam (MOBIC) 15 mg tablet, Take 1 tablet (15 mg total) by mouth daily as needed for moderate pain, Disp: 90 tablet, Rfl: 1    methocarbamol (ROBAXIN) 500 mg tablet, Take 1 tablet (500 mg total) by mouth 2 (two) times a day, Disp: 20 tablet, Rfl: 0    methocarbamol (ROBAXIN) 500 mg tablet, Take 1 tablet (500 mg total) by mouth 2 (two) times a day, Disp: 20 tablet, Rfl: 0    methocarbamol (ROBAXIN) 750 mg tablet, TAKE 1 TABLET(S) BY MOUTH 4 TIMES PER *NEW PRESCRIPTION REQUEST*, Disp: 120 tablet, Rfl: 10    metoprolol succinate (TOPROL-XL) 50 mg 24 hr tablet, TAKE 1 TABLET(S) BY MOUTH 1 TIMES PER DAY *NEW PRESCRIPTION REQUEST*, Disp: 90 tablet, Rfl: 1    montelukast (SINGULAIR) 10 mg tablet, TAKE 1 TABLET(S) BY MOUTH 1 TIMES PER DAY *NEW PRESCRIPTION REQUEST*, Disp: 90 tablet, Rfl: 1    Multiple Vitamin (ONE-A-DAY MENS PO), Take by mouth, Disp: , Rfl:     naproxen (Naprosyn) 500 mg tablet, Take 1 tablet (500 mg total) by mouth 2 (two) times a day with meals, Disp: 30 tablet, Rfl: 0    olopatadine (PATANOL) 0.1 % ophthalmic solution, PLEASE CLARIFY DIRECTIONS, NOT ON MED HISTORY REPORT *NEW PRESCRIPTION REQUEST*, Disp: 5 mL, Rfl: 10    ondansetron (ZOFRAN-ODT) 8 mg disintegrating tablet, Take 1 tablet (8 mg total) by mouth every 8 (eight) hours as needed for nausea or vomiting for up to 5 days, Disp: 15 tablet, Rfl: 0    pravastatin (PRAVACHOL) 20 mg tablet, TAKE 1 TABLET(S) BY MOUTH 1 TIMES PER DAY *NEW PRESCRIPTION REQUEST*, Disp: 90 tablet, Rfl: 1    Semaglutide-Weight Management (Wegovy) 1.7 MG/0.75ML, Inject 1.7 mg under the skin weekly, Disp: 3 mL, Rfl: 5    triamcinolone (KENALOG) 0.1 % ointment, APPLY TO AFFECTED AREA(S) TOPICALLY AS DIRECTED *NEW PRESCRIPTION REQUEST*, Disp: 15 g, Rfl:  10    Current Facility-Administered Medications:     cyanocobalamin injection 1,000 mcg, 1,000 mcg, Intramuscular, Q30 Days, , 1,000 mcg at 07/29/24 1009     Physical Exam  Constitutional:       Appearance: Normal appearance. He is obese.   HENT:      Head: Normocephalic and atraumatic.      Right Ear: Tympanic membrane, ear canal and external ear normal.      Left Ear: Tympanic membrane, ear canal and external ear normal.      Nose:      Comments: Mild left NSD     Mouth/Throat:      Mouth: Mucous membranes are moist.   Eyes:      Extraocular Movements: Extraocular movements intact.   Cardiovascular:      Rate and Rhythm: Normal rate and regular rhythm.   Pulmonary:      Effort: Pulmonary effort is normal.      Breath sounds: Normal breath sounds.   Abdominal:      Palpations: Abdomen is soft.   Musculoskeletal:      Cervical back: Normal range of motion and neck supple.   Neurological:      General: No focal deficit present.      Mental Status: He is alert and oriented to person, place, and time.   Psychiatric:         Mood and Affect: Mood normal.         Behavior: Behavior normal.         Procedure:  4/4/24  Sinus endoscopy    Due to the patient's complaints of chronic nasal discharge it was recommended that  endoscopy carried out of the nasal cavity and sinuses.  The patient was in agreement and gave verbal consent       Performed by  Samuel Balderrama MD      Authorized by Samuel Balderrama MD       Universal Protocol Risks and benefits: risks, benefits and alternatives were discussed  Consent given by: patient  Patient understanding: patient states understanding of the procedure being performed                 Anesthesia    Local anesthesia used: yes  Local anesthetic: 4% lidocaine/Afrin.          03/06/25 - Dr. Martinez    Flexible nasal endoscopic examination:  The nasal cavities were decongested with lidocaine and oxymetazoline spray.  Bilateral nasal endoscopy was performed as follows:     Flexible endoscopic  examination showed left deviated nasal septum, bilateral enlarged inferior turbinates, no polyps, thick cloudy mucous on middle meatus, unable to be visualize  superior meatus and sphenoethmoid recess due to DNS.   The patient tolerated the procedure well.           Pertinent Notes / Tests / Data reviewed  CT head 6/25/2023  MRI brain 6/26/2023      Data with independent Interpretation    CT scan 3/21/2024 films interpreted and reviewed with the patient.  Left septal deviation.  Pansinusitis was noted with left frontal, ethmoid, maxillary sinus completely opacified.  Right frontal sinus absent.        Assessment and plan:    1. Chronic pansinusitis        2. Deviated nasal septum        3. Nasal turbinate hypertrophy              Patient with persistent complete opacification of left frontal maxillary and ethmoid sinuses.  Patient with purulent secretions in the middle meatus going over the inferior turbinate posteriorly.  Patient extremely difficult to scope in the office because of tenderness.  Patient does have septal deviation to the left and the perpendicular plate of the ethmoid which makes it tight to get into the middle meatus region.  In discussion with the patient since she has had purulent infection for almost 10 months I think this needs to be addressed surgically to open up the sinus get it drained culture anything that is there and then treat him going forward.  He has been using Flonase without any improvement.  I think he is going to need to have septal surgery in order to facilitate improving his nasal airway and also to gain access into the sinuses.  We discussed with the patient that we be working around the orbit with I showed him the anatomy.  Discussed the risk of CSF leak and injury to the lamina papyracea given the anatomy.    Encouraged to use humidifier.  Patient uses nasal CPAP.  Advised to not use CPAP 10 days after surgery.    Disclosure: Voice to text software was used in the  "preparation of this document and could have resulted in translational errors.      Occasional wrong word or \"sound a like\" substitutions may have occurred due to the inherent limitations of voice recognition software.  Read the chart carefully and recognize, using context, where substitutions have occurred.  "

## 2025-03-22 ENCOUNTER — APPOINTMENT (OUTPATIENT)
Dept: LAB | Facility: HOSPITAL | Age: 55
End: 2025-03-22

## 2025-03-22 DIAGNOSIS — Z00.00 ANNUAL PHYSICAL EXAM: ICD-10-CM

## 2025-03-25 ENCOUNTER — RESULTS FOLLOW-UP (OUTPATIENT)
Age: 55
End: 2025-03-25

## 2025-03-25 ENCOUNTER — APPOINTMENT (OUTPATIENT)
Dept: LAB | Facility: HOSPITAL | Age: 55
End: 2025-03-25
Payer: MEDICARE

## 2025-03-25 ENCOUNTER — TELEPHONE (OUTPATIENT)
Age: 55
End: 2025-03-25

## 2025-03-25 DIAGNOSIS — C61 PROSTATE CANCER (HCC): ICD-10-CM

## 2025-03-25 DIAGNOSIS — C61 MALIGNANT NEOPLASM OF PROSTATE (HCC): ICD-10-CM

## 2025-03-25 DIAGNOSIS — C61 MALIGNANT NEOPLASM OF PROSTATE (HCC): Primary | ICD-10-CM

## 2025-03-25 LAB
ALBUMIN SERPL BCG-MCNC: 3.8 G/DL (ref 3.5–5)
ALP SERPL-CCNC: 98 U/L (ref 34–104)
ALT SERPL W P-5'-P-CCNC: 18 U/L (ref 7–52)
ANION GAP SERPL CALCULATED.3IONS-SCNC: 4 MMOL/L (ref 4–13)
AST SERPL W P-5'-P-CCNC: 13 U/L (ref 13–39)
BASOPHILS # BLD AUTO: 0.03 THOUSANDS/ÂΜL (ref 0–0.1)
BASOPHILS NFR BLD AUTO: 1 % (ref 0–1)
BILIRUB SERPL-MCNC: 0.41 MG/DL (ref 0.2–1)
BUN SERPL-MCNC: 10 MG/DL (ref 5–25)
CALCIUM SERPL-MCNC: 9.4 MG/DL (ref 8.4–10.2)
CHLORIDE SERPL-SCNC: 103 MMOL/L (ref 96–108)
CHOLEST SERPL-MCNC: 180 MG/DL (ref ?–200)
CO2 SERPL-SCNC: 30 MMOL/L (ref 21–32)
CREAT SERPL-MCNC: 0.8 MG/DL (ref 0.6–1.3)
EOSINOPHIL # BLD AUTO: 0.15 THOUSAND/ÂΜL (ref 0–0.61)
EOSINOPHIL NFR BLD AUTO: 3 % (ref 0–6)
ERYTHROCYTE [DISTWIDTH] IN BLOOD BY AUTOMATED COUNT: 15.4 % (ref 11.6–15.1)
GFR SERPL CREATININE-BSD FRML MDRD: 99 ML/MIN/1.73SQ M
GLUCOSE SERPL-MCNC: 88 MG/DL (ref 65–140)
HCT VFR BLD AUTO: 43.2 % (ref 36.5–49.3)
HDLC SERPL-MCNC: 44 MG/DL
HGB BLD-MCNC: 13.9 G/DL (ref 12–17)
IMM GRANULOCYTES # BLD AUTO: 0.03 THOUSAND/UL (ref 0–0.2)
IMM GRANULOCYTES NFR BLD AUTO: 1 % (ref 0–2)
LDLC SERPL CALC-MCNC: 112 MG/DL (ref 0–100)
LYMPHOCYTES # BLD AUTO: 1.5 THOUSANDS/ÂΜL (ref 0.6–4.47)
LYMPHOCYTES NFR BLD AUTO: 25 % (ref 14–44)
MCH RBC QN AUTO: 26.7 PG (ref 26.8–34.3)
MCHC RBC AUTO-ENTMCNC: 32.2 G/DL (ref 31.4–37.4)
MCV RBC AUTO: 83 FL (ref 82–98)
MONOCYTES # BLD AUTO: 0.64 THOUSAND/ÂΜL (ref 0.17–1.22)
MONOCYTES NFR BLD AUTO: 11 % (ref 4–12)
NEUTROPHILS # BLD AUTO: 3.62 THOUSANDS/ÂΜL (ref 1.85–7.62)
NEUTS SEG NFR BLD AUTO: 59 % (ref 43–75)
NONHDLC SERPL-MCNC: 136 MG/DL
NRBC BLD AUTO-RTO: 0 /100 WBCS
PLATELET # BLD AUTO: 306 THOUSANDS/UL (ref 149–390)
PMV BLD AUTO: 9.4 FL (ref 8.9–12.7)
POTASSIUM SERPL-SCNC: 4.3 MMOL/L (ref 3.5–5.3)
PROT SERPL-MCNC: 7.5 G/DL (ref 6.4–8.4)
PSA SERPL-MCNC: 4.08 NG/ML (ref 0–4)
RBC # BLD AUTO: 5.2 MILLION/UL (ref 3.88–5.62)
SODIUM SERPL-SCNC: 137 MMOL/L (ref 135–147)
TRIGL SERPL-MCNC: 118 MG/DL (ref ?–150)
WBC # BLD AUTO: 5.97 THOUSAND/UL (ref 4.31–10.16)

## 2025-03-25 PROCEDURE — 36415 COLL VENOUS BLD VENIPUNCTURE: CPT

## 2025-03-25 PROCEDURE — 80061 LIPID PANEL: CPT

## 2025-03-25 PROCEDURE — 85025 COMPLETE CBC W/AUTO DIFF WBC: CPT

## 2025-03-25 PROCEDURE — 80053 COMPREHEN METABOLIC PANEL: CPT

## 2025-03-25 PROCEDURE — 84153 ASSAY OF PSA TOTAL: CPT

## 2025-03-25 NOTE — TELEPHONE ENCOUNTER
Provider: Dr. Coffman    Lab called requesting PSA order be placed secondary to patient having rad/onc f/u.     PSA order placed.

## 2025-03-26 ENCOUNTER — OFFICE VISIT (OUTPATIENT)
Dept: URGENT CARE | Age: 55
End: 2025-03-26
Payer: MEDICARE

## 2025-03-26 VITALS
WEIGHT: 315 LBS | TEMPERATURE: 99.3 F | HEART RATE: 96 BPM | RESPIRATION RATE: 18 BRPM | BODY MASS INDEX: 47.22 KG/M2 | SYSTOLIC BLOOD PRESSURE: 178 MMHG | DIASTOLIC BLOOD PRESSURE: 94 MMHG | OXYGEN SATURATION: 96 %

## 2025-03-26 DIAGNOSIS — R68.89 FLU-LIKE SYMPTOMS: Primary | ICD-10-CM

## 2025-03-26 PROCEDURE — 99214 OFFICE O/P EST MOD 30 MIN: CPT | Performed by: PHYSICIAN ASSISTANT

## 2025-03-26 PROCEDURE — 87636 SARSCOV2 & INF A&B AMP PRB: CPT | Performed by: PHYSICIAN ASSISTANT

## 2025-03-26 RX ORDER — OSELTAMIVIR PHOSPHATE 75 MG/1
75 CAPSULE ORAL EVERY 12 HOURS SCHEDULED
Qty: 10 CAPSULE | Refills: 0 | Status: SHIPPED | OUTPATIENT
Start: 2025-03-26 | End: 2025-03-31

## 2025-03-26 NOTE — PROGRESS NOTES
[facility]        NAME: Gavin Frank is a 54 y.o. male  : 1970    MRN: [id number]  DATE: 2025  TIME: 2:23 PM    Assessment and Plan   Flu-like symptoms [R68.89]  1. Flu-like symptoms  Covid/Flu- Office Collect Normal    oseltamivir (TAMIFLU) 75 mg capsule      Flulike symptoms.  Recommend empiric Tamiflu.  COVID flu testing performed.  Patient instructed to stop Tamiflu if flu is negative.  Other symptomatic treatments were also discussed.    Medical Decision Making     PROBLEM: 1 acute illness with systemic symptoms    DATA: Independent Historian Involved: yes, wife assisting with history, COVID/flu testing performed     RISK: Prescription drug management    TIME: 15 minutes      Patient Instructions     Patient Instructions   Nearly all of upper respiratory infections in adults are the result of viruses. These viruses include COVID, flu, RSV, adenoviruses and other coronaviruses. Antibiotics do not treat viruses and are not recommended or indicated at this time.   Tamiflu as prescribed, discontinue if Flu testing is negative.   Recommend Coricidin HBP for sinus congestion along with saline rinses and fluticasone nasal spray.   Recommend Mucinex DM for cough.   Also may consider over the counter allergy medications such as Allegra-D and Zyrtec.   If symptoms persist for 7+ days, follow-up with primary care provider or return to clinic to discuss appropriateness of antibiotics.   Vaccination is important to help prevent the spread of disease and infants. Vaccines are available for COVID and influenza for ages 6 months and older. Please discuss scheduling vaccines with your PCP.    If symptoms worsen or new symptoms develop, report to the emergency department immediately      Follow up with PCP in 3-5 days.  Proceed to  ER if symptoms worsen.    If tests have been performed at [facility], our office will contact you with results if changes need to be made to the care plan discussed with you at  the visit. You can review your full results on [facility].     Chief Complaint     Chief Complaint   Patient presents with    Cough    Chills    Generalized Body Aches    Fever    Sore Throat     Body ache, cough, sore throat, chills. Nausea, lost appetite, dizzy and fever started yesterday.    Abdominal Pain    Nausea         History of Present Illness       54-year-old male presents with complaint of fever Tmax 100, movement, runny nose, sinus congestion, nausea, decreased appetite.  Patient also notes a mild cough and constipation.  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    Cough  This is a new problem. The current episode started yesterday. The problem has been rapidly worsening. The problem occurs every few minutes. The cough is Productive of sputum. Associated symptoms include chills, a fever, headaches, myalgias, nasal congestion, postnasal drip, rhinorrhea, a sore throat, sweats and wheezing. Pertinent negatives include no heartburn, hemoptysis, shortness of breath or weight loss. Nothing aggravates the symptoms. Risk factors for lung disease include smoking/tobacco exposure.   Generalized Body Aches  Associated symptoms include headaches, rhinorrhea, a sore throat, a fever, coughing, wheezing, abdominal pain and nausea. Pertinent negatives include no weight loss or shortness of breath.   Fever  Associated symptoms include abdominal pain, chills, coughing, a fever, headaches, myalgias, nausea and a sore throat.   Sore Throat   Associated symptoms include abdominal pain, coughing and headaches. Pertinent negatives include no shortness of breath.   Abdominal Pain  Associated symptoms include a fever, headaches, myalgias and nausea. Pertinent negatives include no weight loss.   Nausea  Associated symptoms include abdominal pain, chills, coughing, a fever, headaches, myalgias, nausea and a sore throat.       Review of Systems   Review of Systems    Constitutional:  Positive for chills and fever. Negative for weight loss.   HENT:  Positive for postnasal drip, rhinorrhea and sore throat.    Respiratory:  Positive for cough and wheezing. Negative for hemoptysis and shortness of breath.    Gastrointestinal:  Positive for abdominal pain and nausea. Negative for heartburn.   Musculoskeletal:  Positive for myalgias.   Neurological:  Positive for headaches.         Current Medications       Current Outpatient Medications:     oseltamivir (TAMIFLU) 75 mg capsule, Take 1 capsule (75 mg total) by mouth every 12 (twelve) hours for 5 days, Disp: 10 capsule, Rfl: 0    acetaminophen-codeine (TYLENOL with CODEINE #3) 300-30 MG per tablet, Take 1 tablet by mouth every 4 (four) hours as needed for moderate pain, Disp: 30 tablet, Rfl: 0    azelastine (ASTELIN) 0.1 % nasal spray, 2 sprays into each nostril 2 (two) times a day Use in each nostril as directed (Patient not taking: Reported on 2/28/2025), Disp: 30 mL, Rfl: 5    BLACK CURRANT SEED OIL PO, Take by mouth (Patient not taking: Reported on 2/26/2024), Disp: , Rfl:     Cyanocobalamin (Vitamin B-12) 2500 MCG SUBL, Place under the tongue, Disp: , Rfl:     Diclofenac Sodium (VOLTAREN) 1 %, APPLY TO AFFECTED AREA(S) TOPICALLY AS DIRECTED *NEW PRESCRIPTION REQUEST*, Disp: 100 g, Rfl: 1    diltiazem (CARDIZEM CD) 120 mg 24 hr capsule, TAKE 1 CAPSULE(S) BY MOUTH 1 TIMES PER DAY *NEW PRESCRIPTION REQUEST*, Disp: 30 capsule, Rfl: 10    diltiazem (CARDIZEM CD) 240 mg 24 hr capsule, Take 1 capsule (240 mg total) by mouth daily, Disp: 90 capsule, Rfl: 1    ergocalciferol (VITAMIN D2) 50,000 units, Take 1 capsule (50,000 Units total) by mouth once a week, Disp: 4 capsule, Rfl: 0    fluticasone (FLONASE) 50 mcg/act nasal spray, 1 spray into each nostril daily, Disp: 48 g, Rfl: 1    fluticasone (FLONASE) 50 mcg/act nasal spray, 1 spray into each nostril 2 (two) times a day, Disp: 16 g, Rfl: 3    fluticasone (FLONASE) 50 mcg/act nasal  spray, INSTILL 1-2 SPRAYS INTO EACH NOSTRIL DAILY AS DIRECTED *NEW PRESCRIPTION REQUEST*, Disp: 16 g, Rfl: 10    lidocaine (LIDODERM) 5 %, APPLY 1 PATCH TOPICALLY EVERY 12 HOURS AS DIRECTED AS NEEDED *12 HOURS ON, 12 HOURS OFF*, Disp: 30 patch, Rfl: 10    LORazepam (ATIVAN) 2 mg tablet, TAKE 1 TABLET BY MOUTH EVERY DAY, Disp: 30 tablet, Rfl: 1    losartan (COZAAR) 100 MG tablet, TAKE 1 TABLET(S) BY MOUTH 1 TIMES PER DAY *NEW PRESCRIPTION REQUEST*, Disp: 90 tablet, Rfl: 1    meloxicam (MOBIC) 15 mg tablet, Take 1 tablet (15 mg total) by mouth daily as needed for moderate pain, Disp: 90 tablet, Rfl: 1    methocarbamol (ROBAXIN) 500 mg tablet, Take 1 tablet (500 mg total) by mouth 2 (two) times a day, Disp: 20 tablet, Rfl: 0    methocarbamol (ROBAXIN) 500 mg tablet, Take 1 tablet (500 mg total) by mouth 2 (two) times a day, Disp: 20 tablet, Rfl: 0    methocarbamol (ROBAXIN) 750 mg tablet, TAKE 1 TABLET(S) BY MOUTH 4 TIMES PER *NEW PRESCRIPTION REQUEST*, Disp: 120 tablet, Rfl: 10    methylPREDNISolone 4 MG tablet therapy pack, Use as directed on package, Disp: 1 each, Rfl: 0    metoprolol succinate (TOPROL-XL) 50 mg 24 hr tablet, TAKE 1 TABLET(S) BY MOUTH 1 TIMES PER DAY *NEW PRESCRIPTION REQUEST*, Disp: 90 tablet, Rfl: 1    montelukast (SINGULAIR) 10 mg tablet, TAKE 1 TABLET(S) BY MOUTH 1 TIMES PER DAY *NEW PRESCRIPTION REQUEST*, Disp: 90 tablet, Rfl: 1    Multiple Vitamin (ONE-A-DAY MENS PO), Take by mouth, Disp: , Rfl:     naproxen (Naprosyn) 500 mg tablet, Take 1 tablet (500 mg total) by mouth 2 (two) times a day with meals, Disp: 30 tablet, Rfl: 0    olopatadine (PATANOL) 0.1 % ophthalmic solution, PLEASE CLARIFY DIRECTIONS, NOT ON MED HISTORY REPORT *NEW PRESCRIPTION REQUEST*, Disp: 5 mL, Rfl: 10    ondansetron (ZOFRAN-ODT) 8 mg disintegrating tablet, Take 1 tablet (8 mg total) by mouth every 8 (eight) hours as needed for nausea or vomiting for up to 5 days, Disp: 15 tablet, Rfl: 0    pravastatin (PRAVACHOL) 20 mg  tablet, TAKE 1 TABLET(S) BY MOUTH 1 TIMES PER DAY *NEW PRESCRIPTION REQUEST*, Disp: 90 tablet, Rfl: 1    Semaglutide-Weight Management (Wegovy) 1.7 MG/0.75ML, Inject 1.7 mg under the skin weekly, Disp: 3 mL, Rfl: 5    triamcinolone (KENALOG) 0.1 % ointment, APPLY TO AFFECTED AREA(S) TOPICALLY AS DIRECTED *NEW PRESCRIPTION REQUEST*, Disp: 15 g, Rfl: 10    Current Facility-Administered Medications:     cyanocobalamin injection 1,000 mcg, 1,000 mcg, Intramuscular, Q30 Days, , 1,000 mcg at 07/29/24 1009    Current Allergies     Allergies as of 03/26/2025 - Reviewed 03/26/2025   Allergen Reaction Noted    Contrast dye [iodinated contrast media] Vomiting 01/29/2024            The following portions of the patient's history were reviewed and updated as appropriate: allergies, current medications, past family history, past medical history, past social history, past surgical history and problem list.     Past Medical History:   Diagnosis Date    Allergic     Anxiety     Arthritis     Asthma     Chronic pain disorder     Back pain    CPAP (continuous positive airway pressure) dependence 02/07/2024    Depression     GERD (gastroesophageal reflux disease)     Hyperlipemia     Hypertension     Obesity     Prostate cancer (HCC)     Sleep apnea        Past Surgical History:   Procedure Laterality Date    HEMORRHOID SURGERY      INSERTION OF FIDUCIAL MARKER (TRANSRECTAL ULTRASOUND GUIDANCE) N/A 9/4/2024    Procedure: INSERTION OF FIDUCIAL MARKER ,SPACEOAR;  Surgeon: Benjie Stewart DO;  Location: [facility];  Service: Urology    DC BX PROSTATE STRTCTC SATURATION SAMPLING IMG GID N/A 2/7/2024    Procedure: TRANSPERINEAL MRI FUSION BIOPSY PROSTATE;  Surgeon: Benjie Stewart DO;  Location: [facility];  Service: Urology    TONSILLECTOMY      URETHRAL DILATION  2019       Family History   Problem Relation Age of Onset    Hypertension Mother     Hyperlipidemia Mother     Heart disease Mother     Substance Abuse Mother      Hypertension Father     Heart disease Father     Hyperlipidemia Father     Substance Abuse Father     COPD Sister     Liver disease Sister     Prostate cancer Maternal Uncle          Medications have been verified.        Objective   BP (!) 178/94   Pulse 96   Temp 99.3 °F (37.4 °C) (Oral)   Resp 18   Wt (!) 171 kg (377 lb 12.8 oz)   SpO2 96%   BMI 47.22 kg/m²   No LMP for male patient.       Physical Exam     Physical Exam  Vitals and nursing note reviewed.   Constitutional:       General: He is not in acute distress.     Appearance: Normal appearance. He is not ill-appearing or toxic-appearing.   HENT:      Head: Normocephalic and atraumatic.      Jaw: No trismus.      Right Ear: Hearing, tympanic membrane, ear canal and external ear normal. There is no impacted cerumen. No foreign body.      Left Ear: Hearing, tympanic membrane, ear canal and external ear normal. There is no impacted cerumen. No foreign body.      Nose: Mucosal edema, congestion and rhinorrhea present. No nasal deformity. Rhinorrhea is clear.      Right Nostril: No foreign body, epistaxis or occlusion.      Left Nostril: No foreign body, epistaxis or occlusion.      Right Turbinates: Not enlarged, swollen or pale.      Left Turbinates: Not enlarged, swollen or pale.      Mouth/Throat:      Lips: Pink. No lesions.      Mouth: Mucous membranes are moist. No injury, oral lesions or angioedema.      Dentition: Normal dentition.      Tongue: No lesions. Tongue does not deviate from midline.      Palate: No mass and lesions.      Pharynx: Uvula midline. Postnasal drip present. No pharyngeal swelling, oropharyngeal exudate, posterior oropharyngeal erythema or uvula swelling.      Tonsils: No tonsillar exudate or tonsillar abscesses.   Eyes:      General: Lids are normal. Gaze aligned appropriately. No allergic shiner.     Extraocular Movements: Extraocular movements intact.   Cardiovascular:      Rate and Rhythm: Normal rate and regular rhythm.     "  Heart sounds: Normal heart sounds, S1 normal and S2 normal.   Pulmonary:      Effort: Pulmonary effort is normal.      Breath sounds: Normal breath sounds.      Comments: Patient speaking in full sentences with no increased respiratory effort.   No audible wheezing or stridor.   Lymphadenopathy:      Cervical: No cervical adenopathy.   Skin:     General: Skin is warm and dry.   Neurological:      Mental Status: He is alert and oriented to person, place, and time.      Coordination: Coordination is intact.      Gait: Gait is intact.   Psychiatric:         Attention and Perception: Attention and perception normal.         Mood and Affect: Mood and affect normal.         Speech: Speech normal.         Behavior: Behavior is cooperative.               Note: Portions of this record may have been created with voice recognition software. Occasional wrong word or \"sound a like\" substitutions may have occurred due to the inherent limitations of voice recognition software. Please read the chart carefully and recognize, using context, where substitutions have occurred.*      "

## 2025-03-26 NOTE — PATIENT INSTRUCTIONS
Nearly all of upper respiratory infections in adults are the result of viruses. These viruses include COVID, flu, RSV, adenoviruses and other coronaviruses. Antibiotics do not treat viruses and are not recommended or indicated at this time.   Tamiflu as prescribed, discontinue if Flu testing is negative.   Recommend Coricidin HBP for sinus congestion along with saline rinses and fluticasone nasal spray.   Recommend Mucinex DM for cough.   Also may consider over the counter allergy medications such as Allegra-D and Zyrtec.   If symptoms persist for 7+ days, follow-up with primary care provider or return to clinic to discuss appropriateness of antibiotics.   Vaccination is important to help prevent the spread of disease and infants. Vaccines are available for COVID and influenza for ages 6 months and older. Please discuss scheduling vaccines with your PCP.    If symptoms worsen or new symptoms develop, report to the emergency department immediately

## 2025-03-26 NOTE — LETTER
March 26, 2025     Patient: Gavin Frank   YOB: 1970   Date of Visit: 3/26/2025       To Whom It May Concern:    It is my medical opinion that Gavin Frank should remain out of work until fever free and off fever lowering medications for 24 hours. .    If you have any questions or concerns, please don't hesitate to call.         Sincerely,        Katie Spangler PA-C    CC: No Recipients

## 2025-03-27 ENCOUNTER — DOCUMENTATION (OUTPATIENT)
Dept: ADMINISTRATIVE | Facility: OTHER | Age: 55
End: 2025-03-27

## 2025-03-27 ENCOUNTER — TELEPHONE (OUTPATIENT)
Dept: URGENT CARE | Age: 55
End: 2025-03-27

## 2025-03-27 VITALS — SYSTOLIC BLOOD PRESSURE: 178 MMHG | DIASTOLIC BLOOD PRESSURE: 94 MMHG

## 2025-03-27 DIAGNOSIS — U07.1 COVID-19: Primary | ICD-10-CM

## 2025-03-27 LAB
FLUAV RNA RESP QL NAA+PROBE: NEGATIVE
FLUBV RNA RESP QL NAA+PROBE: NEGATIVE
SARS-COV-2 RNA RESP QL NAA+PROBE: POSITIVE

## 2025-03-27 RX ORDER — NIRMATRELVIR AND RITONAVIR 300-100 MG
3 KIT ORAL 2 TIMES DAILY
Qty: 30 TABLET | Refills: 0 | Status: SHIPPED | OUTPATIENT
Start: 2025-03-27 | End: 2025-04-01

## 2025-03-27 NOTE — PROGRESS NOTES
Urgent Care BP  Received: Yesterday  Katie Spangler PA-C  P Patient Reported Team         Blood pressure elevated  Appointment department: [facility]  Appointment provider: Katie Spangler PA-C  Blood pressure  03/26/25 1347 (!) 178/94  03/26/25 1332 (!) 185/96  Inactive  Date User Comment   03/26/25 1347 Katie Spangler PA-C [[id number]] None

## 2025-03-27 NOTE — PROGRESS NOTES
UROLOGY FOLLOW-UP ENCOUNTER    Gavin Frank is a 54 y.o. male with prostate cancer    Pertinent non-urologic PMH: HLD, HTN, SHANDA, prediabetes    Pertinent non-urologic PSH: no abdominal or pelvic surgeries    Anticoagulation: none    PSA 5.33 on 5/18/23, 4.7 on 10/31/23     MRI prostate 11/16/23:  1. PI-RADSv2.1 Category 3 - Intermediate (the presence of clinically significant cancer is equivocal). Possible 0.4 cm lesion in the posterior left peripheral zone at apex  2. No extraprostatic tumor, seminal vesicle invasion, pelvic lymphadenopathy, or pelvic osseous metastatic disease.  3. Calculated prostate volume of 41 cc.    OR MRI fusion TP bx 2/7/24: G 3+3 in BIB, RB, RAL, RPM  A. Prostate, right anterior medial, biopsy:  - Benign prostatic tissue with inflammation.       B. Prostate, region of interest, biopsy:  -Prostatic adenocarcinoma, involving (3) of (4) cores:              - [place] score 3 + 3 = 6 (0 % grade 4), Prognostic Grade Group 1 involving 5 % of 1 core.              - Maryanne score 3 + 3 = 6 (0 % grade 4), Prognostic Grade Group 1 involving 25 % of 1 core.                - [place] score 3 + 3 = 6 (0 % grade 4), Prognostic Grade Group 1 involving 80 % (discontinuous) of 1 core.                C. Prostate, right base, biopsy:  - Prostatic adenocarcinoma, Maryanne score 3+3=6 (0% pattern 4), Grade Group 1, present in 1 of 1 cores, involving approximately 0.1 cm of length or 5% of total biopsy length.       D. Prostate, right anterior lateral, biopsy:  - Prostatic adenocarcinoma, [place] score 3+3=6 (0% pattern 4), Grade Group 1, present in 1 of 1 cores, involving approximately 0.2 cm of length or 15% of total biopsy length.       E. prostate, right posterior medial, biopsy:  - Prostatic adenocarcinoma, Maryanne score 3+3=6 (0% pattern 4), Grade Group 1, present in 1 of 1 cores, involving approximately 0.2 cm of length or 15% of total biopsy length.       F. Prostate, right posterior lateral, biopsy:  -  Benign prostatic tissue.        G. Prostate, left anterior lateral, biopsy:  - Benign prostatic tissue.        H. Prostate, left anterior medial, biopsy:  - Benign prostatic tissue.        I. Prostate, left base, biopsy:  - Benign fibrovascular tissue.       J. Prostate, left posterior medial, biopsy:  - Benign prostatic tissue.        K. Prostate, left posterior lateral, biopsy:  - Benign prostatic tissue.            Prostate MRI 5/9/2024: PI-RADS 3 small left apical peripheral zone lesion without any significant change compared to previous; negative EPE/SVI/LAD/bony metastasis; prostate 46 g    PSA 4.95 on 6/24/2024    SpaceOAR and fiducial markers on 9/4/24    XRT fall 2024    PSA 4.084 on 3/25/2025    PVR 13 cc on 4/1/25    Urine dip 4/1/2025: Negative    Office 4/1/25: urinary symptoms resolved since completing radiation, good sexual function    Assessment and plan:     Prostate cancer    Patient was originally diagnosed with [place] 3+3 prostate cancer in 2024.  Unfortunately he had decipher testing which did come back as high risk.  He also had high volume [place] 6 disease.  At that time I talked him about definitive treatment with surgery versus radiation.  Due to his desire to really avoid sexual side effects, he preferred radiation.  He completed his radiation in September 2024.    Since then, his voiding symptoms are improved.  He also still has a sexual function.    Unfortunately, he did get a PSA a few weeks ago and it came back over 4.  I explained to him that I was very concerned that he had recurrent or persistent cancer.    He is scheduled to see Dr. Coffman of radiation oncology on 4/24/2025.  I explained him that I will reach out to Dr. Coffman to let him know about the high PSA and see if he wants additional imaging or additional labs before his next appointment.    Explained to the patient that he would very likely need additional treatment whether it be ADT or additional radiation.  I explained  "to the patient that the ADT would be potentially very beneficial for his cancer control, however, he really would like to avoid this if at all possible due to his concern with the side effects.    I will tentatively see him in 6 months.        PLAN  -Scheduled to see radiation oncology on 4/24/25. Will follow up their recs regarding need for additional imaging, ADT or further radiation.  -I will tentatively plan on seeing him in 6 months.  He understands that this may move up or down based on his appointment with radiation oncology.          Portions of the above record have been created with voice recognition software.  Occasional wrong word or \"sound alike\" substitution may have occurred due to the inherent limitations of voice recognition software.  Read the chart carefully and recognize, using context, where substitution may have occurred.      Benjie Stewart,         Chief Complaint     Prostate cancer      History of Present Illness     See summary above    No fevers or chills        The following portions of the patient's history were reviewed and updated as appropriate: allergies, current medications, past family history, past medical history, past social history, past surgical history and problem list.        AUA SYMPTOM SCORE      Flowsheet Row Most Recent Value   AUA SYMPTOM SCORE    How often have you had a sensation of not emptying your bladder completely after you finished urinating? 2 (P)    How often have you had to urinate again less than two hours after you finished urinating? 3 (P)    How often have you found you stopped and started again several times when you urinate? 1 (P)    How often have you found it difficult to postpone urination? 0 (P)    How often have you had a weak urinary stream? 0 (P)    How often have you had to push or strain to begin urination? 1 (P)    How many times did you most typically get up to urinate from the time you went to bed at night until the time you got up " "in the morning? 2 (P)    Quality of Life: If you were to spend the rest of your life with your urinary condition just the way it is now, how would you feel about that? 3 (P)    AUA SYMPTOM SCORE 9 (P)             Review of Systems     Neg for fevers, chills, nausea, vomiting, blood in urine    Allergies     Allergies   Allergen Reactions    Contrast Dye [Iodinated Contrast Media] Vomiting       Physical Exam     NAD, NCAT, no CVAT, ab soft and nontender, nonlabored breathing    Vital Signs  Vitals:    04/01/25 1031   BP: 164/100   BP Location: Left arm   Patient Position: Sitting   Cuff Size: Standard   Pulse: 83   SpO2: 97%   Weight: (!) 166 kg (367 lb)   Height: 6' 3\" (1.905 m)         Current Medications       Current Outpatient Medications:     acetaminophen-codeine (TYLENOL with CODEINE #3) 300-30 MG per tablet, Take 1 tablet by mouth every 4 (four) hours as needed for moderate pain, Disp: 30 tablet, Rfl: 0    amoxicillin-clavulanate (AUGMENTIN) 875-125 mg per tablet, Take 1 tablet by mouth, Disp: , Rfl:     Cyanocobalamin (Vitamin B-12) 2500 MCG SUBL, Place under the tongue, Disp: , Rfl:     Diclofenac Sodium (VOLTAREN) 1 %, APPLY TO AFFECTED AREA(S) TOPICALLY AS DIRECTED *NEW PRESCRIPTION REQUEST*, Disp: 100 g, Rfl: 1    diltiazem (CARDIZEM CD) 120 mg 24 hr capsule, TAKE 1 CAPSULE(S) BY MOUTH 1 TIMES PER DAY *NEW PRESCRIPTION REQUEST*, Disp: 30 capsule, Rfl: 10    ergocalciferol (VITAMIN D2) 50,000 units, Take 1 capsule (50,000 Units total) by mouth once a week, Disp: 4 capsule, Rfl: 0    fluticasone (FLONASE) 50 mcg/act nasal spray, 1 spray into each nostril 2 (two) times a day, Disp: 16 g, Rfl: 3    fluticasone (FLONASE) 50 mcg/act nasal spray, INSTILL 1-2 SPRAYS INTO EACH NOSTRIL DAILY AS DIRECTED *NEW PRESCRIPTION REQUEST*, Disp: 16 g, Rfl: 10    lidocaine (LIDODERM) 5 %, APPLY 1 PATCH TOPICALLY EVERY 12 HOURS AS DIRECTED AS NEEDED *12 HOURS ON, 12 HOURS OFF*, Disp: 30 patch, Rfl: 10    LORazepam (ATIVAN) " 2 mg tablet, TAKE 1 TABLET BY MOUTH EVERY DAY, Disp: 30 tablet, Rfl: 1    losartan (COZAAR) 100 MG tablet, TAKE 1 TABLET(S) BY MOUTH 1 TIMES PER DAY *NEW PRESCRIPTION REQUEST*, Disp: 90 tablet, Rfl: 1    meloxicam (MOBIC) 15 mg tablet, Take 1 tablet (15 mg total) by mouth daily as needed for moderate pain, Disp: 90 tablet, Rfl: 1    methocarbamol (ROBAXIN) 500 mg tablet, Take 1 tablet (500 mg total) by mouth 2 (two) times a day, Disp: 20 tablet, Rfl: 0    methocarbamol (ROBAXIN) 500 mg tablet, Take 1 tablet (500 mg total) by mouth 2 (two) times a day, Disp: 20 tablet, Rfl: 0    methocarbamol (ROBAXIN) 750 mg tablet, TAKE 1 TABLET(S) BY MOUTH 4 TIMES PER *NEW PRESCRIPTION REQUEST*, Disp: 120 tablet, Rfl: 10    methylPREDNISolone 4 MG tablet therapy pack, Use as directed on package, Disp: 1 each, Rfl: 0    metoprolol succinate (TOPROL-XL) 50 mg 24 hr tablet, TAKE 1 TABLET(S) BY MOUTH 1 TIMES PER DAY *NEW PRESCRIPTION REQUEST*, Disp: 90 tablet, Rfl: 1    montelukast (SINGULAIR) 10 mg tablet, TAKE 1 TABLET(S) BY MOUTH 1 TIMES PER DAY *NEW PRESCRIPTION REQUEST*, Disp: 90 tablet, Rfl: 1    Multiple Vitamin (ONE-A-DAY MENS PO), Take by mouth, Disp: , Rfl:     naproxen (Naprosyn) 500 mg tablet, Take 1 tablet (500 mg total) by mouth 2 (two) times a day with meals, Disp: 30 tablet, Rfl: 0    nirmatrelvir & ritonavir (Paxlovid, 300/100,) tablet therapy pack, Take 3 tablets by mouth 2 (two) times a day for 5 days Take 2 nirmatrelvir tablets + 1 ritonavir tablet together per dose, Disp: 30 tablet, Rfl: 0    olopatadine (PATANOL) 0.1 % ophthalmic solution, PLEASE CLARIFY DIRECTIONS, NOT ON MED HISTORY REPORT *NEW PRESCRIPTION REQUEST*, Disp: 5 mL, Rfl: 10    pravastatin (PRAVACHOL) 20 mg tablet, TAKE 1 TABLET(S) BY MOUTH 1 TIMES PER DAY *NEW PRESCRIPTION REQUEST*, Disp: 90 tablet, Rfl: 1    Semaglutide-Weight Management (Wegovy) 1.7 MG/0.75ML, Inject 1.7 mg under the skin weekly, Disp: 3 mL, Rfl: 5    triamcinolone (KENALOG) 0.1  % ointment, APPLY TO AFFECTED AREA(S) TOPICALLY AS DIRECTED *NEW PRESCRIPTION REQUEST*, Disp: 15 g, Rfl: 10    azelastine (ASTELIN) 0.1 % nasal spray, 2 sprays into each nostril 2 (two) times a day Use in each nostril as directed (Patient not taking: Reported on 2/28/2025), Disp: 30 mL, Rfl: 5    BLACK CURRANT SEED OIL PO, Take by mouth (Patient not taking: Reported on 2/26/2024), Disp: , Rfl:     diltiazem (CARDIZEM CD) 240 mg 24 hr capsule, Take 1 capsule (240 mg total) by mouth daily, Disp: 90 capsule, Rfl: 1    fluticasone (FLONASE) 50 mcg/act nasal spray, 1 spray into each nostril daily (Patient not taking: Reported on 4/1/2025), Disp: 48 g, Rfl: 1    ondansetron (ZOFRAN-ODT) 8 mg disintegrating tablet, Take 1 tablet (8 mg total) by mouth every 8 (eight) hours as needed for nausea or vomiting for up to 5 days, Disp: 15 tablet, Rfl: 0    Current Facility-Administered Medications:     cyanocobalamin injection 1,000 mcg, 1,000 mcg, Intramuscular, Q30 Days, , 1,000 mcg at 07/29/24 1009      Active Problems     Patient Active Problem List   Diagnosis    Hypertension    Arthritis    Hyperlipemia    Class 3 severe obesity due to excess calories with serious comorbidity and body mass index (BMI) of 50.0 to 59.9 in adult (HCC)    Benign essential hypertension    Obstructive sleep apnea    Benign prostatic hyperplasia with urinary frequency    H/O urethral stricture    Vitamin D deficiency    Elevated PSA    Ingrown nail    Onychomycosis    Asthma    Stroke-like symptoms    Back pain    Prediabetes    CPAP (continuous positive airway pressure) dependence    Malignant neoplasm of prostate (HCC)    Sciatica, left side         Past Medical History     Past Medical History:   Diagnosis Date    Allergic     Anxiety     Arthritis     Asthma     Chronic pain disorder     Back pain    CPAP (continuous positive airway pressure) dependence 02/07/2024    Depression     GERD (gastroesophageal reflux disease)     Hyperlipemia      Hypertension     Obesity     Prostate cancer (HCC)     Sleep apnea          Surgical History     Past Surgical History:   Procedure Laterality Date    HEMORRHOID SURGERY      INSERTION OF FIDUCIAL MARKER (TRANSRECTAL ULTRASOUND GUIDANCE) N/A 2024    Procedure: INSERTION OF FIDUCIAL MARKER ,SPACEOAR;  Surgeon: Benjie Stewart DO;  Location: [facility];  Service: Urology    CO BX PROSTATE STRTCTC SATURATION SAMPLING IMG GID N/A 2024    Procedure: TRANSPERINEAL MRI FUSION BIOPSY PROSTATE;  Surgeon: Benjie Stewart DO;  Location: [facility];  Service: Urology    TONSILLECTOMY      URETHRAL DILATION  2019         Family History     Family History   Problem Relation Age of Onset    Hypertension Mother     Hyperlipidemia Mother     Heart disease Mother     Substance Abuse Mother     Hypertension Father     Heart disease Father     Hyperlipidemia Father     Substance Abuse Father     COPD Sister     Liver disease Sister     Prostate cancer Maternal Uncle          Social History     Social History     Social History     Tobacco Use   Smoking Status Former    Current packs/day: 0.00    Average packs/day: 0.5 packs/day for 31.0 years (15.5 ttl pk-yrs)    Types: Cigarettes    Start date: 1984    Quit date: 2018    Years since quittin.2    Passive exposure: Past   Smokeless Tobacco Never   Tobacco Comments    No cigarettes vape only         Pertinent Lab Values     Lab Results   Component Value Date    CREATININE 0.80 2025       Lab Results   Component Value Date    PSA 4.084 (H) 2025    PSA 4.951 (H) 2024    PSA 4.7 (H) 10/31/2023               Pertinent Imaging     The patient's images were reviewed by me personally and also in real time with them in the examination room using our PACS imaging system.     MRI prostate 23:  1. PI-RADSv2.1 Category 3 - Intermediate (the presence of clinically significant cancer is equivocal). Possible 0.4 cm lesion in the posterior left  peripheral zone at apex  2. No extraprostatic tumor, seminal vesicle invasion, pelvic lymphadenopathy, or pelvic osseous metastatic disease.  3. Calculated prostate volume of 41 cc.      Pertinent Pathology     OR MRI fusion TP bx 2/7/24: G 3+3 in BIB, RB, RAL, RPM  A. Prostate, right anterior medial, biopsy:  - Benign prostatic tissue with inflammation.       B. Prostate, region of interest, biopsy:  -Prostatic adenocarcinoma, involving (3) of (4) cores:              - [place] score 3 + 3 = 6 (0 % grade 4), Prognostic Grade Group 1 involving 5 % of 1 core.              - Maryanne score 3 + 3 = 6 (0 % grade 4), Prognostic Grade Group 1 involving 25 % of 1 core.                - Maryanne score 3 + 3 = 6 (0 % grade 4), Prognostic Grade Group 1 involving 80 % (discontinuous) of 1 core.                C. Prostate, right base, biopsy:  - Prostatic adenocarcinoma, [place]anne score 3+3=6 (0% pattern 4), Grade Group 1, present in 1 of 1 cores, involving approximately 0.1 cm of length or 5% of total biopsy length.       D. Prostate, right anterior lateral, biopsy:  - Prostatic adenocarcinoma, [place] score 3+3=6 (0% pattern 4), Grade Group 1, present in 1 of 1 cores, involving approximately 0.2 cm of length or 15% of total biopsy length.       E. prostate, right posterior medial, biopsy:  - Prostatic adenocarcinoma, [place] score 3+3=6 (0% pattern 4), Grade Group 1, present in 1 of 1 cores, involving approximately 0.2 cm of length or 15% of total biopsy length.       F. Prostate, right posterior lateral, biopsy:  - Benign prostatic tissue.        G. Prostate, left anterior lateral, biopsy:  - Benign prostatic tissue.        H. Prostate, left anterior medial, biopsy:  - Benign prostatic tissue.        I. Prostate, left base, biopsy:  - Benign fibrovascular tissue.       J. Prostate, left posterior medial, biopsy:  - Benign prostatic tissue.        K. Prostate, left posterior lateral, biopsy:  - Benign prostatic tissue.          I  have spent a total time of 20 minutes in caring for this patient on the day of the visit/encounter including Diagnostic results, Prognosis, Risks and benefits of tx options, Instructions for management, Patient and family education, Importance of tx compliance, Impressions, Counseling / Coordination of care, Documenting in the medical record, Reviewing/placing orders in the medical record (including tests, medications, and/or procedures), and Obtaining or reviewing history  .

## 2025-03-27 NOTE — PROGRESS NOTES
03/27/25 9:40 AM    Patient was called after the Urgent Care visit ; a message was left for the patient to return the call    Thank you.  Shivani Kennedy MA  [facility]

## 2025-03-27 NOTE — PROGRESS NOTES
03/27/25 9:54 AM    Patient was called after the Urgent Care visit . Patient does not have a home BP machine to re-take BP.    Patient returned my call, states he has blood pressure medication that he has not taken yet but will be taking it. Patient also states he does not currently have an at home blood pressure machine to re-check his blood pressure.     Thank you.  Shivani Kennedy MA  [facility]

## 2025-03-27 NOTE — TELEPHONE ENCOUNTER
Discussed COVID/flu results with patient.  His flu was negative, he will stop Tamiflu.  Currently, his main symptoms are nasal congestion and cough.  His COVID was positive, based on his comorbidities, he qualifies for Paxlovid to prevent severe COVID, he is on day 2-3 of symptoms.  He will hold his statin while taking Paxlovid.  All questions answered, to follow-up with PCP if his symptoms are not fully resolving.

## 2025-04-01 ENCOUNTER — HOSPITAL ENCOUNTER (OUTPATIENT)
Dept: RADIOLOGY | Facility: HOSPITAL | Age: 55
Discharge: HOME | End: 2025-04-01
Payer: MEDICARE

## 2025-04-01 ENCOUNTER — OFFICE VISIT (OUTPATIENT)
Dept: UROLOGY | Facility: CLINIC | Age: 55
End: 2025-04-01
Payer: MEDICARE

## 2025-04-01 VITALS
HEIGHT: 75 IN | DIASTOLIC BLOOD PRESSURE: 100 MMHG | OXYGEN SATURATION: 97 % | WEIGHT: 315 LBS | HEART RATE: 83 BPM | SYSTOLIC BLOOD PRESSURE: 164 MMHG | BODY MASS INDEX: 39.17 KG/M2

## 2025-04-01 DIAGNOSIS — C61 PROSTATE CANCER (HCC): Primary | ICD-10-CM

## 2025-04-01 LAB
POST-VOID RESIDUAL VOLUME, ML POC: 13 ML
SL AMB  POCT GLUCOSE, UA: NORMAL
SL AMB LEUKOCYTE ESTERASE,UA: NORMAL
SL AMB POCT BILIRUBIN,UA: NORMAL
SL AMB POCT BLOOD,UA: NORMAL
SL AMB POCT CLARITY,UA: CLEAR
SL AMB POCT COLOR,UA: YELLOW
SL AMB POCT KETONES,UA: NORMAL
SL AMB POCT NITRITE,UA: NORMAL
SL AMB POCT PH,UA: 6
SL AMB POCT SPECIFIC GRAVITY,UA: 1.02
SL AMB POCT URINE PROTEIN: 30
SL AMB POCT UROBILINOGEN: 1

## 2025-04-01 PROCEDURE — 81003 URINALYSIS AUTO W/O SCOPE: CPT | Performed by: UROLOGY

## 2025-04-01 PROCEDURE — 70486 CT MAXILLOFACIAL W/O DYE: CPT

## 2025-04-01 PROCEDURE — 51798 US URINE CAPACITY MEASURE: CPT | Performed by: UROLOGY

## 2025-04-01 PROCEDURE — 99213 OFFICE O/P EST LOW 20 MIN: CPT | Performed by: UROLOGY

## 2025-04-01 NOTE — Clinical Note
Cristian ellis Saw mr harding today He had xrt in fall 2024 Unfortunately his psa was over 4 on 3/25 He sees you in a few weeks I explained to him that he may need additional imaging before he sees you on follow up. Explained that if PSA persisted he would likely need additional treatment one way or the other. Let me know if any questions or concerns -zena

## 2025-04-10 ENCOUNTER — OFFICE VISIT (OUTPATIENT)
Dept: OTOLARYNGOLOGY | Facility: CLINIC | Age: 55
End: 2025-04-10
Payer: MEDICARE

## 2025-04-10 DIAGNOSIS — J32.4 CHRONIC PANSINUSITIS: Primary | ICD-10-CM

## 2025-04-10 DIAGNOSIS — J34.2 DEVIATED NASAL SEPTUM: ICD-10-CM

## 2025-04-10 PROCEDURE — 99214 OFFICE O/P EST MOD 30 MIN: CPT | Performed by: OTOLARYNGOLOGY

## 2025-04-10 RX ORDER — CEPHALEXIN 500 MG/1
500 CAPSULE ORAL EVERY 8 HOURS SCHEDULED
Qty: 30 CAPSULE | Refills: 0 | Status: SHIPPED | OUTPATIENT
Start: 2025-04-10 | End: 2025-04-20

## 2025-04-10 RX ORDER — PREDNISONE 10 MG/1
50 TABLET ORAL
Qty: 45 TABLET | Refills: 0 | Status: SHIPPED | OUTPATIENT
Start: 2025-04-10

## 2025-04-10 NOTE — PROGRESS NOTES
Otolaryngology Head and Neck Surgery History and Physical      Assessment and plan:    1. Chronic pansinusitis  cephalexin (KEFLEX) 500 mg capsule    predniSONE 10 mg tablet      2. Deviated nasal septum  cephalexin (KEFLEX) 500 mg capsule    predniSONE 10 mg tablet                Patient with persistent complete opacification of left frontal maxillary and ethmoid sinuses as documented on new CT scan.   Endoscopy with purulent discharge from left middle meatus going over the inferior turbinate posteriorly.    Minimal septal deviation to the left   No response to antibiotic treatment.  He has been using Flonase without any improvement.  Image guided endoscopic sinus surgery left frontal left ethmoid left maxillary sinus is indicated.    We discussed with the patient that we be working around the orbit with I showed him the anatomy.  Discussed the risk of CSF leak and injury to the lamina papyracea given the anatomy.  Risks, benefits and alternatives of surgery discussed in detail. Expected perioperative course and care also reviewed. Questions answered as needed. Patient decides to proceed with surgery and signes informed consent.   Prescribed cephalexin and steroid taper to be started 3 days prior to surgery.  Encouraged to use humidifier.  Patient uses nasal CPAP.  Advised to not use CPAP 10 days after surgery.       History of the Present Illness    Independent Historian   LISSETTE Rossilissette Frank is a 54 y.o. initially seen by Dr. Subramanian on 4/4/24 was at the hospital visiting his niece when he was having some numbness in his right arm and leg.  He went to the emergency room and they immediately treated him as a stroke patient.  He had an MRI at that time that showed obstruction of the left maxillary sinus.  He does report having some issues with feeling of blockage in his left ear.  He does get some yellowish discharge from the left nasal cavity.  No complaints of any headaches or visual issues.  He  did not have a stroke previously.  Patient did report that he has been using Flonase for several months.  Did not notice any significant change in his symptoms.  He was seen on follow-up visit on 3/6/2025 endoscopy revealed purulent discharge on right middle meatus.     04/10/25  The patient presents today complaining of headache, rhinorrhea and nasal congestion.  The patient using Flonase which has only been helping during the allergy season.  Denies of smoking cigarettes, but uses vape.  Patient had CT scan with image guided protocol in preparation for sinus surgery.      Review of Systems    Per HPI remainder noncontributory present complaint    Past Medical History:   Diagnosis Date    Allergic     Anxiety     Arthritis     Asthma     Chronic pain disorder     Back pain    CPAP (continuous positive airway pressure) dependence 02/07/2024    Depression     GERD (gastroesophageal reflux disease)     Hyperlipemia     Hypertension     Obesity     Prostate cancer (HCC)     Sleep apnea        Past Surgical History:   Procedure Laterality Date    HEMORRHOID SURGERY      INSERTION OF FIDUCIAL MARKER (TRANSRECTAL ULTRASOUND GUIDANCE) N/A 9/4/2024    Procedure: INSERTION OF FIDUCIAL MARKER ,SPACEOAR;  Surgeon: Benjie Stewart DO;  Location: [facility];  Service: Urology    DC BX PROSTATE STRTCTC SATURATION SAMPLING IMG GID N/A 2/7/2024    Procedure: TRANSPERINEAL MRI FUSION BIOPSY PROSTATE;  Surgeon: Benjie Stewart DO;  Location: [facility];  Service: Urology    TONSILLECTOMY      URETHRAL DILATION  2019       Social History     Socioeconomic History    Marital status: Single     Spouse name: Not on file    Number of children: Not on file    Years of education: Not on file    Highest education level: Not on file   Occupational History    Occupation: employed   Tobacco Use    Smoking status: Former     Current packs/day: 0.00     Average packs/day: 0.5 packs/day for 31.0 years (15.5 ttl pk-yrs)     Types:  Cigarettes     Start date: 1984     Quit date: 2018     Years since quittin.2     Passive exposure: Past    Smokeless tobacco: Never    Tobacco comments:     No cigarettes vape only   Vaping Use    Vaping status: Every Day    Substances: Nicotine, Flavoring   Substance and Sexual Activity    Alcohol use: Not Currently    Drug use: Never    Sexual activity: Yes     Partners: Female     Birth control/protection: None   Other Topics Concern    Not on file   Social History Narrative    Not on file     Social Drivers of Health     Financial Resource Strain: Not on file   Food Insecurity: No Food Insecurity (2023)    Hunger Vital Sign     Worried About Running Out of Food in the Last Year: Never true     Ran Out of Food in the Last Year: Never true   Transportation Needs: No Transportation Needs (2023)    PRAPARE - Transportation     Lack of Transportation (Medical): No     Lack of Transportation (Non-Medical): No   Physical Activity: Not on file   Stress: Not on file   Social Connections: Not on file   Intimate Partner Violence: Not on file   Housing Stability: Low Risk  (2023)    Housing Stability Vital Sign     Unable to Pay for Housing in the Last Year: No     Number of Places Lived in the Last Year: 1     Unstable Housing in the Last Year: No       Family History   Problem Relation Age of Onset    Hypertension Mother     Hyperlipidemia Mother     Heart disease Mother     Substance Abuse Mother     Hypertension Father     Heart disease Father     Hyperlipidemia Father     Substance Abuse Father     COPD Sister     Liver disease Sister     Prostate cancer Maternal Uncle            There were no vitals taken for this visit.      Current Outpatient Medications:     cephalexin (KEFLEX) 500 mg capsule, Take 1 capsule (500 mg total) by mouth every 8 (eight) hours for 10 days, Disp: 30 capsule, Rfl: 0    predniSONE 10 mg tablet, Take 5 tablets (50 mg total) by mouth daily after breakfast 5 tabls PO  in morning  days 1-3, 4 tabl days 4-6, 3 tabls days 7-9, 2 tabls days 10-12, 1 tabl PO in morning  days 13-15, Disp: 45 tablet, Rfl: 0    acetaminophen-codeine (TYLENOL with CODEINE #3) 300-30 MG per tablet, Take 1 tablet by mouth every 4 (four) hours as needed for moderate pain, Disp: 30 tablet, Rfl: 0    amoxicillin-clavulanate (AUGMENTIN) 875-125 mg per tablet, Take 1 tablet by mouth, Disp: , Rfl:     azelastine (ASTELIN) 0.1 % nasal spray, 2 sprays into each nostril 2 (two) times a day Use in each nostril as directed (Patient not taking: Reported on 2/28/2025), Disp: 30 mL, Rfl: 5    BLACK CURRANT SEED OIL PO, Take by mouth (Patient not taking: Reported on 2/26/2024), Disp: , Rfl:     Cyanocobalamin (Vitamin B-12) 2500 MCG SUBL, Place under the tongue, Disp: , Rfl:     Diclofenac Sodium (VOLTAREN) 1 %, APPLY TO AFFECTED AREA(S) TOPICALLY AS DIRECTED *NEW PRESCRIPTION REQUEST*, Disp: 100 g, Rfl: 1    diltiazem (CARDIZEM CD) 120 mg 24 hr capsule, TAKE 1 CAPSULE(S) BY MOUTH 1 TIMES PER DAY *NEW PRESCRIPTION REQUEST*, Disp: 30 capsule, Rfl: 10    diltiazem (CARDIZEM CD) 240 mg 24 hr capsule, Take 1 capsule (240 mg total) by mouth daily, Disp: 90 capsule, Rfl: 1    ergocalciferol (VITAMIN D2) 50,000 units, Take 1 capsule (50,000 Units total) by mouth once a week, Disp: 4 capsule, Rfl: 0    fluticasone (FLONASE) 50 mcg/act nasal spray, 1 spray into each nostril daily (Patient not taking: Reported on 4/1/2025), Disp: 48 g, Rfl: 1    fluticasone (FLONASE) 50 mcg/act nasal spray, 1 spray into each nostril 2 (two) times a day, Disp: 16 g, Rfl: 3    fluticasone (FLONASE) 50 mcg/act nasal spray, INSTILL 1-2 SPRAYS INTO EACH NOSTRIL DAILY AS DIRECTED *NEW PRESCRIPTION REQUEST*, Disp: 16 g, Rfl: 10    lidocaine (LIDODERM) 5 %, APPLY 1 PATCH TOPICALLY EVERY 12 HOURS AS DIRECTED AS NEEDED *12 HOURS ON, 12 HOURS OFF*, Disp: 30 patch, Rfl: 10    LORazepam (ATIVAN) 2 mg tablet, TAKE 1 TABLET BY MOUTH EVERY DAY, Disp: 30 tablet,  Rfl: 1    losartan (COZAAR) 100 MG tablet, TAKE 1 TABLET(S) BY MOUTH 1 TIMES PER DAY *NEW PRESCRIPTION REQUEST*, Disp: 90 tablet, Rfl: 1    meloxicam (MOBIC) 15 mg tablet, Take 1 tablet (15 mg total) by mouth daily as needed for moderate pain, Disp: 90 tablet, Rfl: 1    methocarbamol (ROBAXIN) 500 mg tablet, Take 1 tablet (500 mg total) by mouth 2 (two) times a day, Disp: 20 tablet, Rfl: 0    methocarbamol (ROBAXIN) 500 mg tablet, Take 1 tablet (500 mg total) by mouth 2 (two) times a day, Disp: 20 tablet, Rfl: 0    methocarbamol (ROBAXIN) 750 mg tablet, TAKE 1 TABLET(S) BY MOUTH 4 TIMES PER *NEW PRESCRIPTION REQUEST*, Disp: 120 tablet, Rfl: 10    methylPREDNISolone 4 MG tablet therapy pack, Use as directed on package, Disp: 1 each, Rfl: 0    metoprolol succinate (TOPROL-XL) 50 mg 24 hr tablet, TAKE 1 TABLET(S) BY MOUTH 1 TIMES PER DAY *NEW PRESCRIPTION REQUEST*, Disp: 90 tablet, Rfl: 1    montelukast (SINGULAIR) 10 mg tablet, TAKE 1 TABLET(S) BY MOUTH 1 TIMES PER DAY *NEW PRESCRIPTION REQUEST*, Disp: 90 tablet, Rfl: 1    Multiple Vitamin (ONE-A-DAY MENS PO), Take by mouth, Disp: , Rfl:     naproxen (Naprosyn) 500 mg tablet, Take 1 tablet (500 mg total) by mouth 2 (two) times a day with meals, Disp: 30 tablet, Rfl: 0    olopatadine (PATANOL) 0.1 % ophthalmic solution, PLEASE CLARIFY DIRECTIONS, NOT ON MED HISTORY REPORT *NEW PRESCRIPTION REQUEST*, Disp: 5 mL, Rfl: 10    ondansetron (ZOFRAN-ODT) 8 mg disintegrating tablet, Take 1 tablet (8 mg total) by mouth every 8 (eight) hours as needed for nausea or vomiting for up to 5 days, Disp: 15 tablet, Rfl: 0    pravastatin (PRAVACHOL) 20 mg tablet, TAKE 1 TABLET(S) BY MOUTH 1 TIMES PER DAY *NEW PRESCRIPTION REQUEST*, Disp: 90 tablet, Rfl: 1    Semaglutide-Weight Management (Wegovy) 1.7 MG/0.75ML, Inject 1.7 mg under the skin weekly, Disp: 3 mL, Rfl: 5    triamcinolone (KENALOG) 0.1 % ointment, APPLY TO AFFECTED AREA(S) TOPICALLY AS DIRECTED *NEW PRESCRIPTION REQUEST*,  "Disp: 15 g, Rfl: 10    Current Facility-Administered Medications:     cyanocobalamin injection 1,000 mcg, 1,000 mcg, Intramuscular, Q30 Days, , 1,000 mcg at 07/29/24 1009                 Flexible nasal endoscopic examination 3/6/25  The nasal cavities were decongested with lidocaine and oxymetazoline spray.  Bilateral nasal endoscopy was performed as follows:     Flexible endoscopic examination showed left deviated nasal septum, bilateral enlarged inferior turbinates, no polyps, thick cloudy mucous on middle meatus, unable to be visualize  superior meatus and sphenoethmoid recess due to DNS.     The patient tolerated the procedure well.         Pertinent Notes / Tests / Data reviewed  CT head 6/25/2023  MRI brain 6/26/2023      Data with independent Interpretation    CT scan 3/21/2024 films interpreted and reviewed with the patient.  Left septal deviation.  Pansinusitis was noted with left frontal, ethmoid, maxillary sinus completely opacified.  Right frontal sinus absent.    CT scan with image guided protocol 4/1/25 complete opacification of left frontal, left ethmoid, and left maxillary sinus, middle meatus appears to have a soft tissue density (polyp?).  Minimal septal deviation.  Right sinuses completely healthy, right frontal sinus absent.    Disclosure: Voice to text software was used in the preparation of this document and could have resulted in translational errors.      Occasional wrong word or \"sound a like\" substitutions may have occurred due to the inherent limitations of voice recognition software.  Read the chart carefully and recognize, using context, where substitutions have occurred.  "

## 2025-04-22 DIAGNOSIS — M19.90 ARTHRITIS: ICD-10-CM

## 2025-04-22 DIAGNOSIS — C61 PROSTATE CA (HCC): ICD-10-CM

## 2025-04-23 RX ORDER — LORAZEPAM 2 MG/1
2 TABLET ORAL DAILY
Qty: 30 TABLET | Refills: 2 | Status: SHIPPED | OUTPATIENT
Start: 2025-04-23

## 2025-04-24 ENCOUNTER — OFFICE VISIT (OUTPATIENT)
Dept: RADIATION ONCOLOGY | Facility: HOSPITAL | Age: 55
End: 2025-04-24
Attending: RADIOLOGY
Payer: MEDICARE

## 2025-04-24 VITALS
HEART RATE: 100 BPM | OXYGEN SATURATION: 97 % | SYSTOLIC BLOOD PRESSURE: 160 MMHG | DIASTOLIC BLOOD PRESSURE: 90 MMHG | RESPIRATION RATE: 16 BRPM | TEMPERATURE: 97.6 F

## 2025-04-24 DIAGNOSIS — C61 MALIGNANT NEOPLASM OF PROSTATE (HCC): Primary | ICD-10-CM

## 2025-04-24 PROCEDURE — 99211 OFF/OP EST MAY X REQ PHY/QHP: CPT | Performed by: RADIOLOGY

## 2025-04-24 PROCEDURE — 99213 OFFICE O/P EST LOW 20 MIN: CPT | Performed by: RADIOLOGY

## 2025-04-24 NOTE — ASSESSMENT & PLAN NOTE
Mr. Frank is a 54 year old man with stage I [id number] prostate adenocarcinoma status post definitive radiation treatment.    PSA is downtrending.  He is without significant treatment-related toxicity.  Going forward, he will continue follow up with urology and this office.  He will return in 3 months with PSA prior.  He was encouraged to call with questions/concerns in the interim.

## 2025-04-24 NOTE — PROGRESS NOTES
Gavin Frank 1970 is a 54 y.o. male with stage I low risk [id number] prostate adenocarcinoma, Chatham score 3+3=6, Chatham grade group 1 with pre-treatment PSA 4.951 and < 50% positive biopsy cores. Decipher was high risk.  He underwent definitive radiotherapy completing on 12/2/2024. He was last seen on 1/21/2025. His recent PSA is still over 4.      4/1/2025 [place]/Urology: urinary symptoms resolved since completing radiation, good sexual function. Urine dip 4/1/2025: Negative. PSA still over 4.  Will likely need additional tx,  possibly ADT. Will follow up after recommendations from [facility].      Component      Latest Ref Rng 3/21/2022 4/21/2022 5/18/2023 10/31/2023 6/24/2024 3/25/2025   PSA      0.000 - 4.000 ng/mL 4.6 (H)  3.0  5.33 (H)  4.7 (H)  4.951 (H)  4.084 (H)    PSA       4.8 (H)            Legend:  (H) High      9/5/2025 F/U Uro      Follow up visit     Oncology History Overview Note   with stage I low risk [id number] prostate adenocarcinoma, Maryanne score 3+3=6, Chatham grade group 1 with pre-treatment PSA 4.951 and < 50% positive biopsy cores. Decipher was high risk.  He underwent definitive radiotherapy completing on 12/2/2024. He was last seen on 1/21/2025. His recent PSA is still over 4.      4/1/2025 [place]/Urology: urinary symptoms resolved since completing radiation, good sexual function. Urine dip 4/1/2025: Negative. PSA still over 4.  Will likely need additional tx,  possibly ADT. Will follow up after recommendations from [facility].      Component      Latest Ref Rng 3/21/2022 4/21/2022 5/18/2023 10/31/2023 6/24/2024 3/25/2025   PSA      0.000 - 4.000 ng/mL 4.6 (H)  3.0  5.33 (H)  4.7 (H)  4.951 (H)  4.084 (H)    PSA       4.8 (H)            Legend:  (H) High      9/5/2025 F/U Uro       Malignant neoplasm of prostate (HCC)   2/7/2024 Biopsy    Final Diagnosis   A. Prostate, right anterior medial, biopsy:  - Benign prostatic tissue with inflammation.       B. Prostate, region of interest,  biopsy:  -Prostatic adenocarcinoma, involving (3) of (4) cores:              - Maryanne score 3 + 3 = 6 (0 % grade 4), Prognostic Grade Group 1 involving 5 % of 1 core.              - Saint Anthony score 3 + 3 = 6 (0 % grade 4), Prognostic Grade Group 1 involving 25 % of 1 core.                - Saint Anthony score 3 + 3 = 6 (0 % grade 4), Prognostic Grade Group 1 involving 80 % (discontinuous) of 1 core.                C. Prostate, right base, biopsy:  - Prostatic adenocarcinoma, Saint Anthony score 3+3=6 (0% pattern 4), Grade Group 1, present in 1 of 1 cores, involving approximately 0.1 cm of length or 5% of total biopsy length.       D. Prostate, right anterior lateral, biopsy:  - Prostatic adenocarcinoma, Saint Anthony score 3+3=6 (0% pattern 4), Grade Group 1, present in 1 of 1 cores, involving approximately 0.2 cm of length or 15% of total biopsy length.       E. prostate, right posterior medial, biopsy:  - Prostatic adenocarcinoma, Maryanne score 3+3=6 (0% pattern 4), Grade Group 1, present in 1 of 1 cores, involving approximately 0.2 cm of length or 15% of total biopsy length.       F. Prostate, right posterior lateral, biopsy:  - Benign prostatic tissue.        G. Prostate, left anterior lateral, biopsy:  - Benign prostatic tissue.        H. Prostate, left anterior medial, biopsy:  - Benign prostatic tissue.        I. Prostate, left base, biopsy:  - Benign fibrovascular tissue.       J. Prostate, left posterior medial, biopsy:  - Benign prostatic tissue.        K. Prostate, left posterior lateral, biopsy:  - Benign prostatic tissue.            7/5/2024 Initial Diagnosis    Prostate cancer (HCC)     7/11/2024 -  Cancer Staged    Staging form: Prostate, AJCC 8th Edition  - Clinical stage from 7/11/2024: Stage I (cT1c, cN0, cM0, PSA: 5, Grade Group: 1) - Signed by Kiko Coffman MD on 7/11/2024  Prostate specific antigen (PSA) range: Less than 10  Maryanne primary pattern: 3  Saint Anthony secondary pattern: 3  Saint Anthony score:  6  Histologic grading system: 5 grade system       10/1/2024 - 12/2/2024 Radiation    Treatment:  Course: C1    Plan ID Energy Fractions Dose per Fraction (cGy) Dose Correction (cGy) Total Dose Delivered (cGy) Elapsed Days   Prostate 10X 44 / 44 180 0 7,920 62      Treatment dates:  C1: 10/1/2024 - 12/2/2024         Review of Systems:  Review of Systems   Constitutional:  Positive for fatigue.   HENT: Negative.     Respiratory: Negative.     Cardiovascular: Negative.    Gastrointestinal: Negative.    Genitourinary:  Positive for frequency and urgency.   Musculoskeletal:  Positive for arthralgias (knees) and back pain (lower - sciatic nerve).   Neurological:  Positive for headaches.   Hematological: Negative.    Psychiatric/Behavioral: Negative.         Clinical Trial: no    IPSS Questionnaire (AUA-7):  Over the past month…    1)  How often have you had a sensation of not emptying your bladder completely after you finish urinating?  3 - About half the time   2)  How often have you had to urinate again less than two hours after you finished urinating? 5 - Almost always   3)  How often have you found you stopped and started again several times when you urinated?  2 - Less than half the time   4) How difficult have you found it to postpone urination?  2 - Less than half the time   5) How often have you had a weak urinary stream?  2 - Less than half the time   6) How often have you had to push or strain to begin urination?  3 - About half the time   7) How many times did you most typically get up to urinate from the time you went to bed until the time you got up in the morning?  2 - 2 times   Total Score:  19       Teaching    Health Maintenance   Topic Date Due    Zoster Vaccine (1 of 2) Never done    Pneumococcal Vaccine: Pediatrics (0 to 5 Years) and At-Risk Patients (6 to 64 Years) (1 of 2 - PCV) Never done    BMI: Followup Plan  07/11/2024    Influenza Vaccine (1) 09/01/2024    COVID-19 Vaccine (3 - 2024-25 season)  09/01/2024    DTaP,Tdap,and Td Vaccines (2 - Td or Tdap) 11/18/2024    Annual Physical  01/27/2026    Depression Screening  03/26/2026    BMI: Adult  04/01/2026    Colorectal Cancer Screening  03/18/2032    HIV Screening  Completed    Hepatitis C Screening  Completed    Meningococcal B Vaccine  Aged Out    RSV Vaccine age 0-20 Months  Aged Out    HIB Vaccine  Aged Out    IPV Vaccine  Aged Out    Hepatitis A Vaccine  Aged Out    Meningococcal ACWY Vaccine  Aged Out    HPV Vaccine  Aged Out     Patient Active Problem List   Diagnosis    Hypertension    Arthritis    Hyperlipemia    Class 3 severe obesity due to excess calories with serious comorbidity and body mass index (BMI) of 50.0 to 59.9 in adult    Benign essential hypertension    Obstructive sleep apnea    Benign prostatic hyperplasia with urinary frequency    H/O urethral stricture    Vitamin D deficiency    Elevated PSA    Ingrown nail    Onychomycosis    Asthma    Stroke-like symptoms    Back pain    Prediabetes    CPAP (continuous positive airway pressure) dependence    Malignant neoplasm of prostate (HCC)    Sciatica, left side     Past Medical History:   Diagnosis Date    Allergic     Anxiety     Arthritis     Asthma     Chronic pain disorder     Back pain    CPAP (continuous positive airway pressure) dependence 02/07/2024    Depression     GERD (gastroesophageal reflux disease)     Hyperlipemia     Hypertension     Obesity     Prostate cancer (HCC)     Sleep apnea      Past Surgical History:   Procedure Laterality Date    HEMORRHOID SURGERY      INSERTION OF FIDUCIAL MARKER (TRANSRECTAL ULTRASOUND GUIDANCE) N/A 9/4/2024    Procedure: INSERTION OF FIDUCIAL MARKER ,SPACEOAR;  Surgeon: Benjie Stewart DO;  Location: [facility];  Service: Urology    UT BX PROSTATE STRTCTC SATURATION SAMPLING IMG GID N/A 2/7/2024    Procedure: TRANSPERINEAL MRI FUSION BIOPSY PROSTATE;  Surgeon: Benjie Stewart DO;  Location: [facility];  Service: Urology     TONSILLECTOMY      URETHRAL DILATION  2019     Family History   Problem Relation Age of Onset    Hypertension Mother     Hyperlipidemia Mother     Heart disease Mother     Substance Abuse Mother     Hypertension Father     Heart disease Father     Hyperlipidemia Father     Substance Abuse Father     COPD Sister     Liver disease Sister     Prostate cancer Maternal Uncle      Social History     Socioeconomic History    Marital status: Single     Spouse name: Not on file    Number of children: Not on file    Years of education: Not on file    Highest education level: Not on file   Occupational History    Occupation: employed   Tobacco Use    Smoking status: Former     Current packs/day: 0.00     Average packs/day: 0.5 packs/day for 31.0 years (15.5 ttl pk-yrs)     Types: Cigarettes     Start date: 1984     Quit date: 2018     Years since quittin.2     Passive exposure: Past    Smokeless tobacco: Never    Tobacco comments:     No cigarettes vape only   Vaping Use    Vaping status: Every Day    Substances: Nicotine, Flavoring   Substance and Sexual Activity    Alcohol use: Not Currently    Drug use: Never    Sexual activity: Yes     Partners: Female     Birth control/protection: None   Other Topics Concern    Not on file   Social History Narrative    Not on file     Social Drivers of Health     Financial Resource Strain: Not on file   Food Insecurity: No Food Insecurity (2023)    Hunger Vital Sign     Worried About Running Out of Food in the Last Year: Never true     Ran Out of Food in the Last Year: Never true   Transportation Needs: No Transportation Needs (2023)    PRAPARE - Transportation     Lack of Transportation (Medical): No     Lack of Transportation (Non-Medical): No   Physical Activity: Not on file   Stress: Not on file   Social Connections: Not on file   Intimate Partner Violence: Not on file   Housing Stability: Low Risk  (2023)    Housing Stability Vital Sign     Unable to Pay  for Housing in the Last Year: No     Number of Places Lived in the Last Year: 1     Unstable Housing in the Last Year: No       Current Outpatient Medications:     amoxicillin-clavulanate (AUGMENTIN) 875-125 mg per tablet, Take 1 tablet by mouth, Disp: , Rfl:     Cyanocobalamin (Vitamin B-12) 2500 MCG SUBL, Place under the tongue, Disp: , Rfl:     Diclofenac Sodium (VOLTAREN) 1 %, APPLY TO AFFECTED AREA TOPICALLY 2 GRAMS FOUR TIMES A DAY AS DIRECTED, Disp: 100 g, Rfl: 1    diltiazem (CARDIZEM CD) 120 mg 24 hr capsule, TAKE 1 CAPSULE(S) BY MOUTH 1 TIMES PER DAY *NEW PRESCRIPTION REQUEST*, Disp: 30 capsule, Rfl: 10    ergocalciferol (VITAMIN D2) 50,000 units, Take 1 capsule (50,000 Units total) by mouth once a week, Disp: 4 capsule, Rfl: 0    fluticasone (FLONASE) 50 mcg/act nasal spray, 1 spray into each nostril 2 (two) times a day, Disp: 16 g, Rfl: 3    fluticasone (FLONASE) 50 mcg/act nasal spray, INSTILL 1-2 SPRAYS INTO EACH NOSTRIL DAILY AS DIRECTED *NEW PRESCRIPTION REQUEST*, Disp: 16 g, Rfl: 10    lidocaine (LIDODERM) 5 %, APPLY 1 PATCH TOPICALLY EVERY 12 HOURS AS DIRECTED AS NEEDED *12 HOURS ON, 12 HOURS OFF*, Disp: 30 patch, Rfl: 10    LORazepam (ATIVAN) 2 mg tablet, TAKE 1 TABLET BY MOUTH EVERY DAY, Disp: 30 tablet, Rfl: 2    losartan (COZAAR) 100 MG tablet, TAKE 1 TABLET(S) BY MOUTH 1 TIMES PER DAY *NEW PRESCRIPTION REQUEST*, Disp: 90 tablet, Rfl: 1    meloxicam (MOBIC) 15 mg tablet, Take 1 tablet (15 mg total) by mouth daily as needed for moderate pain, Disp: 90 tablet, Rfl: 1    methocarbamol (ROBAXIN) 500 mg tablet, Take 1 tablet (500 mg total) by mouth 2 (two) times a day, Disp: 20 tablet, Rfl: 0    methocarbamol (ROBAXIN) 500 mg tablet, Take 1 tablet (500 mg total) by mouth 2 (two) times a day, Disp: 20 tablet, Rfl: 0    methocarbamol (ROBAXIN) 750 mg tablet, TAKE 1 TABLET(S) BY MOUTH 4 TIMES PER *NEW PRESCRIPTION REQUEST*, Disp: 120 tablet, Rfl: 10    methylPREDNISolone 4 MG tablet therapy pack, Use  as directed on package, Disp: 1 each, Rfl: 0    metoprolol succinate (TOPROL-XL) 50 mg 24 hr tablet, TAKE 1 TABLET(S) BY MOUTH 1 TIMES PER DAY *NEW PRESCRIPTION REQUEST*, Disp: 90 tablet, Rfl: 1    Multiple Vitamin (ONE-A-DAY MENS PO), Take by mouth, Disp: , Rfl:     naproxen (Naprosyn) 500 mg tablet, Take 1 tablet (500 mg total) by mouth 2 (two) times a day with meals, Disp: 30 tablet, Rfl: 0    olopatadine (PATANOL) 0.1 % ophthalmic solution, PLEASE CLARIFY DIRECTIONS, NOT ON MED HISTORY REPORT *NEW PRESCRIPTION REQUEST*, Disp: 5 mL, Rfl: 10    pravastatin (PRAVACHOL) 20 mg tablet, TAKE 1 TABLET(S) BY MOUTH 1 TIMES PER DAY *NEW PRESCRIPTION REQUEST*, Disp: 90 tablet, Rfl: 1    predniSONE 10 mg tablet, Take 5 tablets (50 mg total) by mouth daily after breakfast 5 tabls PO in morning  days 1-3, 4 tabl days 4-6, 3 tabls days 7-9, 2 tabls days 10-12, 1 tabl PO in morning  days 13-15, Disp: 45 tablet, Rfl: 0    Semaglutide-Weight Management (Wegovy) 1.7 MG/0.75ML, Inject 1.7 mg under the skin weekly, Disp: 3 mL, Rfl: 5    triamcinolone (KENALOG) 0.1 % ointment, APPLY TO AFFECTED AREA(S) TOPICALLY AS DIRECTED *NEW PRESCRIPTION REQUEST*, Disp: 15 g, Rfl: 10    acetaminophen-codeine (TYLENOL with CODEINE #3) 300-30 MG per tablet, Take 1 tablet by mouth every 4 (four) hours as needed for moderate pain, Disp: 30 tablet, Rfl: 0    azelastine (ASTELIN) 0.1 % nasal spray, 2 sprays into each nostril 2 (two) times a day Use in each nostril as directed (Patient not taking: Reported on 2/28/2025), Disp: 30 mL, Rfl: 5    BLACK CURRANT SEED OIL PO, Take by mouth (Patient not taking: Reported on 2/26/2024), Disp: , Rfl:     diltiazem (CARDIZEM CD) 240 mg 24 hr capsule, Take 1 capsule (240 mg total) by mouth daily, Disp: 90 capsule, Rfl: 1    fluticasone (FLONASE) 50 mcg/act nasal spray, 1 spray into each nostril daily (Patient not taking: Reported on 4/1/2025), Disp: 48 g, Rfl: 1    montelukast (SINGULAIR) 10 mg tablet, TAKE 1  TABLET(S) BY MOUTH 1 TIMES PER DAY *NEW PRESCRIPTION REQUEST*, Disp: 90 tablet, Rfl: 1    ondansetron (ZOFRAN-ODT) 8 mg disintegrating tablet, Take 1 tablet (8 mg total) by mouth every 8 (eight) hours as needed for nausea or vomiting for up to 5 days, Disp: 15 tablet, Rfl: 0    Current Facility-Administered Medications:     cyanocobalamin injection 1,000 mcg, 1,000 mcg, Intramuscular, Q30 Days, , 1,000 mcg at 07/29/24 1009  Allergies   Allergen Reactions    Contrast Dye [Iodinated Contrast Media] Vomiting     Vitals:    04/24/25 1500   BP: 160/90   Pulse: 100   Resp: 16   Temp: 97.6 °F (36.4 °C)   SpO2: 97%      Pain Score: 0-No pain

## 2025-04-24 NOTE — PROGRESS NOTES
Follow-up Visit   Name: Gavin Frank      : 1970      MRN: [id number]  Encounter Provider: Kiko Coffman MD  Encounter Date: 2025   Encounter department: [facility] RADIATION ONCOLOGY  :  Assessment & Plan  Malignant neoplasm of prostate (HCC)  Mr. Frank is a 54 year old man with stage I [id number] prostate adenocarcinoma status post definitive radiation treatment.    PSA is downtrending.  He is without significant treatment-related toxicity.  Going forward, he will continue follow up with urology and this office.  He will return in 3 months with PSA prior.  He was encouraged to call with questions/concerns in the interim.           History of Present Illness   Mr. Frank is a 54 year old man with the above history.         2025 [place]/Urology: urinary symptoms resolved since completing radiation, good sexual function. Urine dip 2025: Negative. PSA still over 4.  Will likely need additional tx,  possibly ADT. Will follow up after recommendations from [facility].        Component      Latest Ref Rng 3/21/2022 2022 2023 10/31/2023 2024 3/25/2025   PSA      0.000 - 4.000 ng/mL 4.6 (H)  3.0  5.33 (H)  4.7 (H)  4.951 (H)  4.084 (H)    PSA       4.8 (H)                 Legend:  (H) High        2025 F/U Uro    Upon interview, he denies bothersome bowel or urinary symptoms.  He is without additional acute concerns.  Oncology History   Cancer Staging   Malignant neoplasm of prostate (HCC)  Staging form: Prostate, AJCC 8th Edition  - Clinical stage from 2024: Stage I (cT1c, cN0, cM0, PSA: 5, Grade Group: 1) - Signed by Kiko Coffman MD on 2024  Prostate specific antigen (PSA) range: Less than 10  Maryanne primary pattern: 3  Maryanne secondary pattern: 3  Maryanne score: 6  Histologic grading system: 5 grade system  Oncology History   Malignant neoplasm of prostate (HCC)   2024 Biopsy    Final Diagnosis   A. Prostate, right anterior medial, biopsy:  -  Benign prostatic tissue with inflammation.       B. Prostate, region of interest, biopsy:  -Prostatic adenocarcinoma, involving (3) of (4) cores:              - [place] score 3 + 3 = 6 (0 % grade 4), Prognostic Grade Group 1 involving 5 % of 1 core.              - [place] score 3 + 3 = 6 (0 % grade 4), Prognostic Grade Group 1 involving 25 % of 1 core.                - Maryanne score 3 + 3 = 6 (0 % grade 4), Prognostic Grade Group 1 involving 80 % (discontinuous) of 1 core.                C. Prostate, right base, biopsy:  - Prostatic adenocarcinoma, [place] score 3+3=6 (0% pattern 4), Grade Group 1, present in 1 of 1 cores, involving approximately 0.1 cm of length or 5% of total biopsy length.       D. Prostate, right anterior lateral, biopsy:  - Prostatic adenocarcinoma, [place] score 3+3=6 (0% pattern 4), Grade Group 1, present in 1 of 1 cores, involving approximately 0.2 cm of length or 15% of total biopsy length.       E. prostate, right posterior medial, biopsy:  - Prostatic adenocarcinoma, Maryanne score 3+3=6 (0% pattern 4), Grade Group 1, present in 1 of 1 cores, involving approximately 0.2 cm of length or 15% of total biopsy length.       F. Prostate, right posterior lateral, biopsy:  - Benign prostatic tissue.        G. Prostate, left anterior lateral, biopsy:  - Benign prostatic tissue.        H. Prostate, left anterior medial, biopsy:  - Benign prostatic tissue.        I. Prostate, left base, biopsy:  - Benign fibrovascular tissue.       J. Prostate, left posterior medial, biopsy:  - Benign prostatic tissue.        K. Prostate, left posterior lateral, biopsy:  - Benign prostatic tissue.            7/5/2024 Initial Diagnosis    Prostate cancer (HCC)     7/11/2024 -  Cancer Staged    Staging form: Prostate, AJCC 8th Edition  - Clinical stage from 7/11/2024: Stage I (cT1c, cN0, cM0, PSA: 5, Grade Group: 1) - Signed by Kiko Coffman MD on 7/11/2024  Prostate specific antigen (PSA) range: Less than  10  Maryanne primary pattern: 3  Maryanne secondary pattern: 3  Maryanne score: 6  Histologic grading system: 5 grade system       10/1/2024 - 12/2/2024 Radiation    Treatment:  Course: C1    Plan ID Energy Fractions Dose per Fraction (cGy) Dose Correction (cGy) Total Dose Delivered (cGy) Elapsed Days   Prostate 10X 44 / 44 180 0 7,920 62      Treatment dates:  C1: 10/1/2024 - 12/2/2024        Review of Systems Refer to nursing note.      Current Outpatient Medications:     amoxicillin-clavulanate (AUGMENTIN) 875-125 mg per tablet, Take 1 tablet by mouth, Disp: , Rfl:     Cyanocobalamin (Vitamin B-12) 2500 MCG SUBL, Place under the tongue, Disp: , Rfl:     Diclofenac Sodium (VOLTAREN) 1 %, APPLY TO AFFECTED AREA TOPICALLY 2 GRAMS FOUR TIMES A DAY AS DIRECTED, Disp: 100 g, Rfl: 1    diltiazem (CARDIZEM CD) 120 mg 24 hr capsule, TAKE 1 CAPSULE(S) BY MOUTH 1 TIMES PER DAY *NEW PRESCRIPTION REQUEST*, Disp: 30 capsule, Rfl: 10    ergocalciferol (VITAMIN D2) 50,000 units, Take 1 capsule (50,000 Units total) by mouth once a week, Disp: 4 capsule, Rfl: 0    fluticasone (FLONASE) 50 mcg/act nasal spray, 1 spray into each nostril 2 (two) times a day, Disp: 16 g, Rfl: 3    fluticasone (FLONASE) 50 mcg/act nasal spray, INSTILL 1-2 SPRAYS INTO EACH NOSTRIL DAILY AS DIRECTED *NEW PRESCRIPTION REQUEST*, Disp: 16 g, Rfl: 10    lidocaine (LIDODERM) 5 %, APPLY 1 PATCH TOPICALLY EVERY 12 HOURS AS DIRECTED AS NEEDED *12 HOURS ON, 12 HOURS OFF*, Disp: 30 patch, Rfl: 10    LORazepam (ATIVAN) 2 mg tablet, TAKE 1 TABLET BY MOUTH EVERY DAY, Disp: 30 tablet, Rfl: 2    losartan (COZAAR) 100 MG tablet, TAKE 1 TABLET(S) BY MOUTH 1 TIMES PER DAY *NEW PRESCRIPTION REQUEST*, Disp: 90 tablet, Rfl: 1    meloxicam (MOBIC) 15 mg tablet, Take 1 tablet (15 mg total) by mouth daily as needed for moderate pain, Disp: 90 tablet, Rfl: 1    methocarbamol (ROBAXIN) 500 mg tablet, Take 1 tablet (500 mg total) by mouth 2 (two) times a day, Disp: 20 tablet, Rfl:  0    methocarbamol (ROBAXIN) 500 mg tablet, Take 1 tablet (500 mg total) by mouth 2 (two) times a day, Disp: 20 tablet, Rfl: 0    methocarbamol (ROBAXIN) 750 mg tablet, TAKE 1 TABLET(S) BY MOUTH 4 TIMES PER *NEW PRESCRIPTION REQUEST*, Disp: 120 tablet, Rfl: 10    methylPREDNISolone 4 MG tablet therapy pack, Use as directed on package, Disp: 1 each, Rfl: 0    metoprolol succinate (TOPROL-XL) 50 mg 24 hr tablet, TAKE 1 TABLET(S) BY MOUTH 1 TIMES PER DAY *NEW PRESCRIPTION REQUEST*, Disp: 90 tablet, Rfl: 1    Multiple Vitamin (ONE-A-DAY MENS PO), Take by mouth, Disp: , Rfl:     naproxen (Naprosyn) 500 mg tablet, Take 1 tablet (500 mg total) by mouth 2 (two) times a day with meals, Disp: 30 tablet, Rfl: 0    olopatadine (PATANOL) 0.1 % ophthalmic solution, PLEASE CLARIFY DIRECTIONS, NOT ON MED HISTORY REPORT *NEW PRESCRIPTION REQUEST*, Disp: 5 mL, Rfl: 10    pravastatin (PRAVACHOL) 20 mg tablet, TAKE 1 TABLET(S) BY MOUTH 1 TIMES PER DAY *NEW PRESCRIPTION REQUEST*, Disp: 90 tablet, Rfl: 1    predniSONE 10 mg tablet, Take 5 tablets (50 mg total) by mouth daily after breakfast 5 tabls PO in morning  days 1-3, 4 tabl days 4-6, 3 tabls days 7-9, 2 tabls days 10-12, 1 tabl PO in morning  days 13-15, Disp: 45 tablet, Rfl: 0    Semaglutide-Weight Management (Wegovy) 1.7 MG/0.75ML, Inject 1.7 mg under the skin weekly, Disp: 3 mL, Rfl: 5    triamcinolone (KENALOG) 0.1 % ointment, APPLY TO AFFECTED AREA(S) TOPICALLY AS DIRECTED *NEW PRESCRIPTION REQUEST*, Disp: 15 g, Rfl: 10    acetaminophen-codeine (TYLENOL with CODEINE #3) 300-30 MG per tablet, Take 1 tablet by mouth every 4 (four) hours as needed for moderate pain, Disp: 30 tablet, Rfl: 0    azelastine (ASTELIN) 0.1 % nasal spray, 2 sprays into each nostril 2 (two) times a day Use in each nostril as directed (Patient not taking: Reported on 2/28/2025), Disp: 30 mL, Rfl: 5    BLACK CURRANT SEED OIL PO, Take by mouth (Patient not taking: Reported on 2/26/2024), Disp: , Rfl:      diltiazem (CARDIZEM CD) 240 mg 24 hr capsule, Take 1 capsule (240 mg total) by mouth daily, Disp: 90 capsule, Rfl: 1    fluticasone (FLONASE) 50 mcg/act nasal spray, 1 spray into each nostril daily (Patient not taking: Reported on 4/1/2025), Disp: 48 g, Rfl: 1    montelukast (SINGULAIR) 10 mg tablet, TAKE 1 TABLET(S) BY MOUTH 1 TIMES PER DAY *NEW PRESCRIPTION REQUEST*, Disp: 90 tablet, Rfl: 1    ondansetron (ZOFRAN-ODT) 8 mg disintegrating tablet, Take 1 tablet (8 mg total) by mouth every 8 (eight) hours as needed for nausea or vomiting for up to 5 days, Disp: 15 tablet, Rfl: 0    Current Facility-Administered Medications:     cyanocobalamin injection 1,000 mcg, 1,000 mcg, Intramuscular, Q30 Days, , 1,000 mcg at 07/29/24 1009        Objective   /90   Pulse 100   Temp 97.6 °F (36.4 °C)   Resp 16   SpO2 97%     Pain Screening:  Pain Score: 0-No pain  ECOG  0  Physical Exam  HENT:      Head: Normocephalic and atraumatic.   Eyes:      General: No scleral icterus.     Extraocular Movements: Extraocular movements intact.   Cardiovascular:      Rate and Rhythm: Normal rate.   Pulmonary:      Effort: Pulmonary effort is normal.   Abdominal:      General: Abdomen is flat. There is no distension.   Genitourinary:     Comments: Deferred  Musculoskeletal:         General: Normal range of motion.      Cervical back: Normal range of motion.   Skin:     General: Skin is warm and dry.   Neurological:      General: No focal deficit present.      Mental Status: He is alert.   Psychiatric:         Mood and Affect: Mood normal.            Administrative Statements   I have spent a total time of 20 minutes in caring for this patient on the day of the visit/encounter including Diagnostic results, Prognosis, Patient and family education, Impressions, Counseling / Coordination of care, Documenting in the medical record, Reviewing/placing orders in the medical record (including tests, medications, and/or procedures), and Obtaining  "or reviewing history  .  Portions of the record may have been created with voice recognition software.  Occasional wrong word or \"sound a like\" substitutions may have occurred due to the inherent limitations of voice recognition software.  Read the chart carefully and recognize, using context, where substitutions have occurred.  "

## 2025-05-05 NOTE — PRE-PROCEDURE INSTRUCTIONS
Pre-Surgery Instructions:   Medication Instructions    Cyanocobalamin (Vitamin B-12) 2500 MCG SUBL Stop taking 7 days prior to surgery.    diltiazem (CARDIZEM CD) 120 mg 24 hr capsule Take day of surgery.    ergocalciferol (VITAMIN D2) 50,000 units Takes on Wednesdays- ok to continue    fluticasone (FLONASE) 50 mcg/act nasal spray Hold day of surgery.    LORazepam (ATIVAN) 2 mg tablet Uses PRN- OK to take day of surgery    losartan (COZAAR) 100 MG tablet Hold day of surgery.    meloxicam (MOBIC) 15 mg tablet Stop taking 3 days prior to surgery.    methocarbamol (ROBAXIN) 750 mg tablet Uses PRN- OK to take day of surgery    metoprolol succinate (TOPROL-XL) 50 mg 24 hr tablet Take day of surgery.    montelukast (SINGULAIR) 10 mg tablet Take day of surgery.    Multiple Vitamin (ONE-A-DAY MENS PO) Stop taking 7 days prior to surgery.    naproxen (Naprosyn) 500 mg tablet Stop taking 3 days prior to surgery.    olopatadine (PATANOL) 0.1 % ophthalmic solution Uses PRN- OK to take day of surgery    pravastatin (PRAVACHOL) 20 mg tablet Take day of surgery.    Semaglutide-Weight Management (Wegovy) 1.7 MG/0.75ML Stop taking 7 days prior to surgery.   Medication instructions for day of surgery reviewed. Please take all instructed medications with only a sip of water.       You will receive a call one business day prior to surgery with an arrival time and hospital directions. If your surgery is scheduled on a Monday, the hospital will be calling you on the Friday prior to your surgery. If you have not heard from anyone by 8pm, please call the hospital supervisor through the hospital  at [phone]. ([place] [phone] or [place] [phone]).    Do not eat or drink anything after midnight the night before your surgery, including candy, mints, lifesavers, or chewing gum. Do not drink alcohol 24hrs before your surgery. Try not to smoke at least 24hrs before your surgery.       Follow the pre surgery showering  instructions as listed in the “My Surgical Experience Booklet” or otherwise provided by your surgeon's office. Do not use a blade to shave the surgical area 1 week before surgery. It is okay to use a clean electric clippers up to 24 hours before surgery. Do not apply any lotions, creams, including makeup, cologne, deodorant, or perfumes after showering on the day of your surgery. Do not use dry shampoo, hair spray, hair gel, or any type of hair products.     No contact lenses, eye make-up, or artificial eyelashes. Remove nail polish, including gel polish, and any artificial, gel, or acrylic nails if possible. Remove all jewelry including rings and body piercing jewelry.     Wear causal clothing that is easy to take on and off. Consider your type of surgery.    Keep any valuables, jewelry, piercings at home. Please bring any specially ordered equipment (sling, braces) if indicated.    Arrange for a responsible person to drive you to and from the hospital on the day of your surgery. Please confirm the visitor policy for the day of your procedure when you receive your phone call with an arrival time.     Call the surgeon's office with any new illnesses, exposures, or additional questions prior to surgery.    Please reference your “My Surgical Experience Booklet” for additional information to prepare for your upcoming surgery.

## 2025-05-06 ENCOUNTER — ANESTHESIA EVENT (OUTPATIENT)
Dept: PERIOP | Facility: AMBULARY SURGERY CENTER | Age: 55
End: 2025-05-06
Payer: MEDICARE

## 2025-05-12 ENCOUNTER — HOSPITAL ENCOUNTER (OUTPATIENT)
Facility: AMBULARY SURGERY CENTER | Age: 55
Setting detail: OUTPATIENT SURGERY
Discharge: HOME | End: 2025-05-12
Attending: OTOLARYNGOLOGY | Admitting: OTOLARYNGOLOGY
Payer: MEDICARE

## 2025-05-12 ENCOUNTER — HOSPITAL ENCOUNTER (OUTPATIENT)
Facility: HOSPITAL | Age: 55
Setting detail: OBSERVATION
Discharge: HOME | End: 2025-05-13
Attending: OTOLARYNGOLOGY | Admitting: OTOLARYNGOLOGY
Payer: MEDICARE

## 2025-05-12 ENCOUNTER — APPOINTMENT (OUTPATIENT)
Dept: CT IMAGING | Facility: HOSPITAL | Age: 55
End: 2025-05-12
Payer: MEDICARE

## 2025-05-12 ENCOUNTER — ANESTHESIA (OUTPATIENT)
Dept: PERIOP | Facility: AMBULARY SURGERY CENTER | Age: 55
End: 2025-05-12
Payer: MEDICARE

## 2025-05-12 VITALS
HEART RATE: 80 BPM | WEIGHT: 315 LBS | BODY MASS INDEX: 39.17 KG/M2 | SYSTOLIC BLOOD PRESSURE: 165 MMHG | RESPIRATION RATE: 23 BRPM | HEIGHT: 75 IN | DIASTOLIC BLOOD PRESSURE: 82 MMHG | TEMPERATURE: 97.2 F | OXYGEN SATURATION: 97 %

## 2025-05-12 DIAGNOSIS — J34.2 DEVIATED NASAL SEPTUM: ICD-10-CM

## 2025-05-12 DIAGNOSIS — J32.4 CHRONIC PANSINUSITIS: ICD-10-CM

## 2025-05-12 DIAGNOSIS — Z98.890 S/P FESS (FUNCTIONAL ENDOSCOPIC SINUS SURGERY): Primary | ICD-10-CM

## 2025-05-12 PROCEDURE — 87185 SC STD ENZYME DETCJ PER NZM: CPT | Performed by: OTOLARYNGOLOGY

## 2025-05-12 PROCEDURE — 87077 CULTURE AEROBIC IDENTIFY: CPT | Performed by: OTOLARYNGOLOGY

## 2025-05-12 PROCEDURE — 88305 TISSUE EXAM BY PATHOLOGIST: CPT | Performed by: STUDENT IN AN ORGANIZED HEALTH CARE EDUCATION/TRAINING PROGRAM

## 2025-05-12 PROCEDURE — 31257 NSL/SINS NDSC TOT W/SPHENDT: CPT | Performed by: OTOLARYNGOLOGY

## 2025-05-12 PROCEDURE — 99284 EMERGENCY DEPT VISIT MOD MDM: CPT

## 2025-05-12 PROCEDURE — 87205 SMEAR GRAM STAIN: CPT | Performed by: OTOLARYNGOLOGY

## 2025-05-12 PROCEDURE — 87075 CULTR BACTERIA EXCEPT BLOOD: CPT | Performed by: OTOLARYNGOLOGY

## 2025-05-12 PROCEDURE — 88311 DECALCIFY TISSUE: CPT | Performed by: STUDENT IN AN ORGANIZED HEALTH CARE EDUCATION/TRAINING PROGRAM

## 2025-05-12 PROCEDURE — 31276 NSL/SINS NDSC FRNT TISS RMVL: CPT | Performed by: OTOLARYNGOLOGY

## 2025-05-12 PROCEDURE — 87070 CULTURE OTHR SPECIMN AEROBIC: CPT | Performed by: OTOLARYNGOLOGY

## 2025-05-12 PROCEDURE — 67715 CANTHOTOMY: CPT | Performed by: OPHTHALMOLOGY

## 2025-05-12 PROCEDURE — C2625 STENT, NON-COR, TEM W/DEL SY: HCPCS | Performed by: OTOLARYNGOLOGY

## 2025-05-12 PROCEDURE — 70480 CT ORBIT/EAR/FOSSA W/O DYE: CPT

## 2025-05-12 PROCEDURE — 61782 SCAN PROC CRANIAL EXTRA: CPT | Performed by: OTOLARYNGOLOGY

## 2025-05-12 PROCEDURE — 31256 EXPLORATION MAXILLARY SINUS: CPT | Performed by: OTOLARYNGOLOGY

## 2025-05-12 PROCEDURE — 96375 TX/PRO/DX INJ NEW DRUG ADDON: CPT

## 2025-05-12 PROCEDURE — 96374 THER/PROPH/DIAG INJ IV PUSH: CPT

## 2025-05-12 DEVICE — PROPEL MINI SINUS IMPLANT
Type: IMPLANTABLE DEVICE | Site: NOSE | Status: FUNCTIONAL
Brand: PROPEL MINI

## 2025-05-12 RX ORDER — IBUPROFEN 600 MG/1
600 TABLET, FILM COATED ORAL EVERY 6 HOURS PRN
Status: DISCONTINUED | OUTPATIENT
Start: 2025-05-12 | End: 2025-05-12 | Stop reason: HOSPADM

## 2025-05-12 RX ORDER — CEFAZOLIN SODIUM 1 G/50ML
1000 SOLUTION INTRAVENOUS ONCE
Status: COMPLETED | OUTPATIENT
Start: 2025-05-12 | End: 2025-05-12

## 2025-05-12 RX ORDER — DORZOLAMIDE HYDROCHLORIDE AND TIMOLOL MALEATE 20; 5 MG/ML; MG/ML
1 SOLUTION/ DROPS OPHTHALMIC 2 TIMES DAILY
Status: DISCONTINUED | OUTPATIENT
Start: 2025-05-12 | End: 2025-05-13 | Stop reason: HOSPADM

## 2025-05-12 RX ORDER — PROPOFOL 10 MG/ML
INJECTION, EMULSION INTRAVENOUS AS NEEDED
Status: DISCONTINUED | OUTPATIENT
Start: 2025-05-12 | End: 2025-05-12

## 2025-05-12 RX ORDER — DIPHENHYDRAMINE HYDROCHLORIDE 50 MG/ML
12.5 INJECTION, SOLUTION INTRAMUSCULAR; INTRAVENOUS ONCE AS NEEDED
Status: DISCONTINUED | OUTPATIENT
Start: 2025-05-12 | End: 2025-05-12 | Stop reason: HOSPADM

## 2025-05-12 RX ORDER — PHENYLEPHRINE HCL IN 0.9% NACL 1 MG/10 ML
SYRINGE (ML) INTRAVENOUS AS NEEDED
Status: DISCONTINUED | OUTPATIENT
Start: 2025-05-12 | End: 2025-05-12

## 2025-05-12 RX ORDER — PHENYLEPHRINE HYDROCHLORIDE 100 MG/ML
SOLUTION/ DROPS OPHTHALMIC AS NEEDED
Status: DISCONTINUED | OUTPATIENT
Start: 2025-05-12 | End: 2025-05-12 | Stop reason: HOSPADM

## 2025-05-12 RX ORDER — LIDOCAINE HYDROCHLORIDE AND EPINEPHRINE 10; 10 MG/ML; UG/ML
INJECTION, SOLUTION INFILTRATION; PERINEURAL AS NEEDED
Status: DISCONTINUED | OUTPATIENT
Start: 2025-05-12 | End: 2025-05-12 | Stop reason: HOSPADM

## 2025-05-12 RX ORDER — METOPROLOL SUCCINATE 50 MG/1
50 TABLET, EXTENDED RELEASE ORAL DAILY
Status: DISCONTINUED | OUTPATIENT
Start: 2025-05-13 | End: 2025-05-13 | Stop reason: HOSPADM

## 2025-05-12 RX ORDER — MAGNESIUM HYDROXIDE 1200 MG/15ML
LIQUID ORAL AS NEEDED
Status: DISCONTINUED | OUTPATIENT
Start: 2025-05-12 | End: 2025-05-12 | Stop reason: HOSPADM

## 2025-05-12 RX ORDER — OXYMETAZOLINE HYDROCHLORIDE 0.05 G/100ML
SPRAY NASAL AS NEEDED
Status: DISCONTINUED | OUTPATIENT
Start: 2025-05-12 | End: 2025-05-12 | Stop reason: HOSPADM

## 2025-05-12 RX ORDER — PREDNISONE 20 MG/1
20 TABLET ORAL ONCE
Status: COMPLETED | OUTPATIENT
Start: 2025-05-12 | End: 2025-05-12

## 2025-05-12 RX ORDER — OXYCODONE HYDROCHLORIDE 5 MG/1
5 TABLET ORAL EVERY 6 HOURS PRN
Refills: 0 | Status: DISCONTINUED | OUTPATIENT
Start: 2025-05-12 | End: 2025-05-13 | Stop reason: HOSPADM

## 2025-05-12 RX ORDER — CEFAZOLIN SODIUM 3 G/50ML
3000 SOLUTION INTRAVENOUS ONCE
Status: DISCONTINUED | OUTPATIENT
Start: 2025-05-12 | End: 2025-05-12 | Stop reason: SDUPTHER

## 2025-05-12 RX ORDER — POLYETHYLENE GLYCOL 3350 17 G/17G
17 POWDER, FOR SOLUTION ORAL DAILY
Status: DISCONTINUED | OUTPATIENT
Start: 2025-05-13 | End: 2025-05-13 | Stop reason: HOSPADM

## 2025-05-12 RX ORDER — ACETAMINOPHEN 325 MG/1
975 TABLET ORAL EVERY 6 HOURS PRN
Status: DISCONTINUED | OUTPATIENT
Start: 2025-05-12 | End: 2025-05-12 | Stop reason: HOSPADM

## 2025-05-12 RX ORDER — ACETAMINOPHEN 10 MG/ML
1000 INJECTION, SOLUTION INTRAVENOUS ONCE
Status: COMPLETED | OUTPATIENT
Start: 2025-05-12 | End: 2025-05-12

## 2025-05-12 RX ORDER — ONDANSETRON 2 MG/ML
4 INJECTION INTRAMUSCULAR; INTRAVENOUS ONCE AS NEEDED
Status: DISCONTINUED | OUTPATIENT
Start: 2025-05-12 | End: 2025-05-12 | Stop reason: HOSPADM

## 2025-05-12 RX ORDER — SODIUM CHLORIDE, SODIUM LACTATE, POTASSIUM CHLORIDE, CALCIUM CHLORIDE 600; 310; 30; 20 MG/100ML; MG/100ML; MG/100ML; MG/100ML
INJECTION, SOLUTION INTRAVENOUS CONTINUOUS PRN
Status: DISCONTINUED | OUTPATIENT
Start: 2025-05-12 | End: 2025-05-12

## 2025-05-12 RX ORDER — GLYCOPYRROLATE 0.2 MG/ML
INJECTION INTRAMUSCULAR; INTRAVENOUS AS NEEDED
Status: DISCONTINUED | OUTPATIENT
Start: 2025-05-12 | End: 2025-05-12

## 2025-05-12 RX ORDER — MAGNESIUM HYDROXIDE/ALUMINUM HYDROXICE/SIMETHICONE 120; 1200; 1200 MG/30ML; MG/30ML; MG/30ML
30 SUSPENSION ORAL EVERY 6 HOURS PRN
Status: DISCONTINUED | OUTPATIENT
Start: 2025-05-12 | End: 2025-05-13 | Stop reason: HOSPADM

## 2025-05-12 RX ORDER — PRAVASTATIN SODIUM 20 MG
20 TABLET ORAL
Status: DISCONTINUED | OUTPATIENT
Start: 2025-05-12 | End: 2025-05-13 | Stop reason: HOSPADM

## 2025-05-12 RX ORDER — HYDROMORPHONE HCL/PF 1 MG/ML
0.5 SYRINGE (ML) INJECTION EVERY 6 HOURS PRN
Status: DISCONTINUED | OUTPATIENT
Start: 2025-05-12 | End: 2025-05-13 | Stop reason: HOSPADM

## 2025-05-12 RX ORDER — TROPICAMIDE 10 MG/ML
SOLUTION/ DROPS OPHTHALMIC AS NEEDED
Status: DISCONTINUED | OUTPATIENT
Start: 2025-05-12 | End: 2025-05-12 | Stop reason: HOSPADM

## 2025-05-12 RX ORDER — DEXAMETHASONE SODIUM PHOSPHATE 10 MG/ML
INJECTION, SOLUTION INTRAMUSCULAR; INTRAVENOUS AS NEEDED
Status: DISCONTINUED | OUTPATIENT
Start: 2025-05-12 | End: 2025-05-12

## 2025-05-12 RX ORDER — LOSARTAN POTASSIUM 50 MG/1
100 TABLET ORAL DAILY
Status: DISCONTINUED | OUTPATIENT
Start: 2025-05-13 | End: 2025-05-13 | Stop reason: HOSPADM

## 2025-05-12 RX ORDER — DORZOLAMIDE HYDROCHLORIDE AND TIMOLOL MALEATE 20; 5 MG/ML; MG/ML
1 SOLUTION/ DROPS OPHTHALMIC 2 TIMES DAILY
Status: CANCELLED | OUTPATIENT
Start: 2025-05-12

## 2025-05-12 RX ORDER — MANNITOL 250 MG/ML
INJECTION, SOLUTION INTRAVENOUS AS NEEDED
Status: DISCONTINUED | OUTPATIENT
Start: 2025-05-12 | End: 2025-05-12

## 2025-05-12 RX ORDER — SODIUM CHLORIDE/ALOE VERA
GEL (GRAM) NASAL AS NEEDED
Status: DISCONTINUED | OUTPATIENT
Start: 2025-05-12 | End: 2025-05-12 | Stop reason: HOSPADM

## 2025-05-12 RX ORDER — CEFAZOLIN SODIUM 2 G/50ML
2000 SOLUTION INTRAVENOUS ONCE
Status: COMPLETED | OUTPATIENT
Start: 2025-05-12 | End: 2025-05-12

## 2025-05-12 RX ORDER — METOPROLOL TARTRATE 1 MG/ML
INJECTION, SOLUTION INTRAVENOUS AS NEEDED
Status: DISCONTINUED | OUTPATIENT
Start: 2025-05-12 | End: 2025-05-12

## 2025-05-12 RX ORDER — ONDANSETRON 2 MG/ML
INJECTION INTRAMUSCULAR; INTRAVENOUS AS NEEDED
Status: DISCONTINUED | OUTPATIENT
Start: 2025-05-12 | End: 2025-05-12

## 2025-05-12 RX ORDER — METHOCARBAMOL 500 MG/1
500 TABLET, FILM COATED ORAL 2 TIMES DAILY
Status: DISCONTINUED | OUTPATIENT
Start: 2025-05-12 | End: 2025-05-13 | Stop reason: HOSPADM

## 2025-05-12 RX ORDER — DORZOLAMIDE HYDROCHLORIDE AND TIMOLOL MALEATE 20; 5 MG/ML; MG/ML
1 SOLUTION/ DROPS OPHTHALMIC 2 TIMES DAILY
Status: DISCONTINUED | OUTPATIENT
Start: 2025-05-12 | End: 2025-05-12 | Stop reason: HOSPADM

## 2025-05-12 RX ORDER — DEXTROSE MONOHYDRATE AND SODIUM CHLORIDE 5; .45 G/100ML; G/100ML
125 INJECTION, SOLUTION INTRAVENOUS CONTINUOUS
Status: DISCONTINUED | OUTPATIENT
Start: 2025-05-12 | End: 2025-05-13 | Stop reason: HOSPADM

## 2025-05-12 RX ORDER — ROCURONIUM BROMIDE 10 MG/ML
INJECTION, SOLUTION INTRAVENOUS AS NEEDED
Status: DISCONTINUED | OUTPATIENT
Start: 2025-05-12 | End: 2025-05-12

## 2025-05-12 RX ORDER — TETRACAINE HYDROCHLORIDE 5 MG/ML
2 SOLUTION OPHTHALMIC ONCE
Status: COMPLETED | OUTPATIENT
Start: 2025-05-12 | End: 2025-05-12

## 2025-05-12 RX ORDER — FENTANYL CITRATE/PF 50 MCG/ML
50 SYRINGE (ML) INJECTION
Status: DISCONTINUED | OUTPATIENT
Start: 2025-05-12 | End: 2025-05-12 | Stop reason: HOSPADM

## 2025-05-12 RX ORDER — LIDOCAINE HYDROCHLORIDE 20 MG/ML
INJECTION, SOLUTION EPIDURAL; INFILTRATION; INTRACAUDAL; PERINEURAL AS NEEDED
Status: DISCONTINUED | OUTPATIENT
Start: 2025-05-12 | End: 2025-05-12

## 2025-05-12 RX ORDER — FENTANYL CITRATE 50 UG/ML
INJECTION, SOLUTION INTRAMUSCULAR; INTRAVENOUS AS NEEDED
Status: DISCONTINUED | OUTPATIENT
Start: 2025-05-12 | End: 2025-05-12

## 2025-05-12 RX ORDER — ONDANSETRON 2 MG/ML
4 INJECTION INTRAMUSCULAR; INTRAVENOUS EVERY 6 HOURS PRN
Status: DISCONTINUED | OUTPATIENT
Start: 2025-05-12 | End: 2025-05-13 | Stop reason: HOSPADM

## 2025-05-12 RX ORDER — DILTIAZEM HYDROCHLORIDE 120 MG/1
120 CAPSULE, COATED, EXTENDED RELEASE ORAL DAILY
Status: DISCONTINUED | OUTPATIENT
Start: 2025-05-13 | End: 2025-05-13 | Stop reason: HOSPADM

## 2025-05-12 RX ORDER — ACETAMINOPHEN 325 MG/1
650 TABLET ORAL EVERY 6 HOURS PRN
Status: DISCONTINUED | OUTPATIENT
Start: 2025-05-12 | End: 2025-05-13 | Stop reason: HOSPADM

## 2025-05-12 RX ORDER — PANTOPRAZOLE SODIUM 40 MG/1
40 TABLET, DELAYED RELEASE ORAL
Status: DISCONTINUED | OUTPATIENT
Start: 2025-05-13 | End: 2025-05-13 | Stop reason: HOSPADM

## 2025-05-12 RX ORDER — MIDAZOLAM HYDROCHLORIDE 2 MG/2ML
INJECTION, SOLUTION INTRAMUSCULAR; INTRAVENOUS AS NEEDED
Status: DISCONTINUED | OUTPATIENT
Start: 2025-05-12 | End: 2025-05-12

## 2025-05-12 RX ORDER — OXYCODONE HYDROCHLORIDE 5 MG/1
5 TABLET ORAL EVERY 6 HOURS PRN
Refills: 0 | Status: DISCONTINUED | OUTPATIENT
Start: 2025-05-12 | End: 2025-05-12 | Stop reason: HOSPADM

## 2025-05-12 RX ORDER — HYDRALAZINE HYDROCHLORIDE 20 MG/ML
INJECTION INTRAMUSCULAR; INTRAVENOUS AS NEEDED
Status: DISCONTINUED | OUTPATIENT
Start: 2025-05-12 | End: 2025-05-12

## 2025-05-12 RX ADMIN — NICARDIPINE HYDROCHLORIDE 250 MCG: 2.5 INJECTION, SOLUTION INTRAVENOUS at 15:14

## 2025-05-12 RX ADMIN — ROCURONIUM 50 MG: 50 INJECTION, SOLUTION INTRAVENOUS at 13:02

## 2025-05-12 RX ADMIN — PROPOFOL 50 MG: 10 INJECTION, EMULSION INTRAVENOUS at 15:53

## 2025-05-12 RX ADMIN — OXYCODONE HYDROCHLORIDE 5 MG: 5 TABLET ORAL at 23:03

## 2025-05-12 RX ADMIN — METOROPROLOL TARTRATE 2.5 MG: 5 INJECTION, SOLUTION INTRAVENOUS at 14:46

## 2025-05-12 RX ADMIN — FENTANYL CITRATE 25 MCG: 50 INJECTION INTRAMUSCULAR; INTRAVENOUS at 16:07

## 2025-05-12 RX ADMIN — HYDRALAZINE HYDROCHLORIDE 5 MG: 20 INJECTION, SOLUTION INTRAMUSCULAR; INTRAVENOUS at 15:03

## 2025-05-12 RX ADMIN — SUGAMMADEX 200 MG: 100 INJECTION, SOLUTION INTRAVENOUS at 15:49

## 2025-05-12 RX ADMIN — NICARDIPINE HYDROCHLORIDE 250 MCG: 2.5 INJECTION, SOLUTION INTRAVENOUS at 15:16

## 2025-05-12 RX ADMIN — METHOCARBAMOL 500 MG: 500 TABLET ORAL at 18:15

## 2025-05-12 RX ADMIN — GLYCOPYRROLATE 0.2 MG: 0.2 INJECTION INTRAMUSCULAR; INTRAVENOUS at 13:02

## 2025-05-12 RX ADMIN — SUGAMMADEX 200 MG: 100 INJECTION, SOLUTION INTRAVENOUS at 15:50

## 2025-05-12 RX ADMIN — CEFAZOLIN SODIUM 1000 MG: 1 SOLUTION INTRAVENOUS at 13:13

## 2025-05-12 RX ADMIN — CEFAZOLIN SODIUM 2000 MG: 2 SOLUTION INTRAVENOUS at 13:10

## 2025-05-12 RX ADMIN — Medication 100 MCG: at 15:37

## 2025-05-12 RX ADMIN — ONDANSETRON 4 MG: 2 INJECTION INTRAMUSCULAR; INTRAVENOUS at 18:09

## 2025-05-12 RX ADMIN — FENTANYL CITRATE 50 MCG: 50 INJECTION INTRAMUSCULAR; INTRAVENOUS at 16:20

## 2025-05-12 RX ADMIN — FENTANYL CITRATE 25 MCG: 50 INJECTION INTRAMUSCULAR; INTRAVENOUS at 16:01

## 2025-05-12 RX ADMIN — DEXAMETHASONE SODIUM PHOSPHATE 10 MG: 10 INJECTION INTRAMUSCULAR; INTRAVENOUS at 13:03

## 2025-05-12 RX ADMIN — PRAVASTATIN SODIUM 20 MG: 20 TABLET ORAL at 18:15

## 2025-05-12 RX ADMIN — MANNITOL 12.5 G: 250 INJECTION, SOLUTION INTRAVENOUS at 15:42

## 2025-05-12 RX ADMIN — HYDROMORPHONE HYDROCHLORIDE 0.5 MG: 1 INJECTION, SOLUTION INTRAMUSCULAR; INTRAVENOUS; SUBCUTANEOUS at 18:11

## 2025-05-12 RX ADMIN — ACETAMINOPHEN 1000 MG: 10 INJECTION INTRAVENOUS at 16:21

## 2025-05-12 RX ADMIN — MIDAZOLAM 2 MG: 1 INJECTION INTRAMUSCULAR; INTRAVENOUS at 12:58

## 2025-05-12 RX ADMIN — MANNITOL 12.5 G: 250 INJECTION, SOLUTION INTRAVENOUS at 15:17

## 2025-05-12 RX ADMIN — PROPOFOL 200 MG: 10 INJECTION, EMULSION INTRAVENOUS at 13:02

## 2025-05-12 RX ADMIN — DORZOLAMIDE HYDROCHLORIDE AND TIMOLOL MALEATE 1 DROP: 20; 5 SOLUTION OPHTHALMIC at 20:37

## 2025-05-12 RX ADMIN — SODIUM CHLORIDE, SODIUM LACTATE, POTASSIUM CHLORIDE, AND CALCIUM CHLORIDE: .6; .31; .03; .02 INJECTION, SOLUTION INTRAVENOUS at 12:52

## 2025-05-12 RX ADMIN — PREDNISONE 20 MG: 20 TABLET ORAL at 20:36

## 2025-05-12 RX ADMIN — ONDANSETRON 4 MG: 2 INJECTION INTRAMUSCULAR; INTRAVENOUS at 13:03

## 2025-05-12 RX ADMIN — TETRACAINE HYDROCHLORIDE 2 DROP: 5 SOLUTION OPHTHALMIC at 17:14

## 2025-05-12 RX ADMIN — FENTANYL CITRATE 100 MCG: 50 INJECTION INTRAMUSCULAR; INTRAVENOUS at 13:02

## 2025-05-12 RX ADMIN — METOROPROLOL TARTRATE 2.5 MG: 5 INJECTION, SOLUTION INTRAVENOUS at 14:34

## 2025-05-12 RX ADMIN — NICARDIPINE HYDROCHLORIDE 250 MCG: 2.5 INJECTION, SOLUTION INTRAVENOUS at 15:19

## 2025-05-12 RX ADMIN — HYDRALAZINE HYDROCHLORIDE 5 MG: 20 INJECTION, SOLUTION INTRAMUSCULAR; INTRAVENOUS at 15:06

## 2025-05-12 RX ADMIN — LIDOCAINE HYDROCHLORIDE 100 MG: 20 INJECTION, SOLUTION EPIDURAL; INFILTRATION; INTRACAUDAL at 13:02

## 2025-05-12 RX ADMIN — HYDRALAZINE HYDROCHLORIDE 5 MG: 20 INJECTION, SOLUTION INTRAMUSCULAR; INTRAVENOUS at 15:13

## 2025-05-12 RX ADMIN — ACETAMINOPHEN 650 MG: 325 TABLET, FILM COATED ORAL at 20:36

## 2025-05-12 RX ADMIN — FENTANYL CITRATE 100 MCG: 50 INJECTION INTRAMUSCULAR; INTRAVENOUS at 13:42

## 2025-05-12 NOTE — ED NOTES
Patient co pelvic pain and discomfort from [place]. Provider aware.      Kianna Barrow RN  05/12/25 [id number]

## 2025-05-12 NOTE — ANESTHESIA PREPROCEDURE EVALUATION
Procedure:  FUNCTIONAL ENDOSCOPIC SINUS SURGERY (FESS) IMAGED GUIDED (Nose)  FUNCTIONAL ENDOSCOPIC SINUS SURGERY (FESS) (Nose)  ETHMOIDECTOMY (Left: Nose)    Relevant Problems   CARDIO   (+) Benign essential hypertension   (+) Hyperlipemia   (+) Hypertension      /RENAL   (+) Malignant neoplasm of prostate (HCC)      MUSCULOSKELETAL   (+) Arthritis   (+) Back pain      PULMONARY   (+) Asthma   (+) Obstructive sleep apnea        Physical Exam    Airway    Mallampati score: II  TM Distance: >3 FB  Neck ROM: full     Dental   No notable dental hx     Cardiovascular  Cardiovascular exam normal    Pulmonary  Pulmonary exam normal     Other Findings        Anesthesia Plan  ASA Score- 3     Anesthesia Type- general with ASA Monitors.         Additional Monitors:     Airway Plan: ETT.           Plan Factors-Exercise tolerance (METS): >4 METS.    Chart reviewed.   Existing labs reviewed. Patient summary reviewed.    Patient is not a current smoker.      Obstructive sleep apnea risk education given perioperatively.        Induction- intravenous.    Postoperative Plan- Plan for postoperative opioid use.     Perioperative Resuscitation Plan - Level 1 - Full Code.       Informed Consent- Anesthetic plan and risks discussed with patient.  I personally reviewed this patient with the CRNA. Discussed and agreed on the Anesthesia Plan with the CRNA..      NPO Status:  Vitals Value Taken Time   Date of last liquid 05/11/25 05/12/25 1004   Time of last liquid 2300 05/12/25 1004   Date of last solid 05/11/25 05/12/25 1004   Time of last solid 2300 05/12/25 1004

## 2025-05-12 NOTE — H&P
Surgery Pre-op note/Updated History and Physical    Date of service: 5/12/202510:01 AM      No changes from most recent clinic H&P note. Patient to OR for FESS.      PMH: Reviewed  PSH: Reviewed  Social history: Reviewed  Medications: Reviewed  ROS: as above      There were no vitals filed for this visit.    ENT: No changes from most recent clinic visit  Chest/Heart/Lungs: Breathing, perfused, unremarkable  Abd: Unremarkable  Ext: Unremarkable        The procedure was discussed with the patient/guardian, including risks, benefits, and alternatives, and all questions were answered. Consent signed and in the chart.    Yohan Haddad MD  Otolaryngology - Head and Neck Surgery  5/12/2025 10:01 AM

## 2025-05-12 NOTE — ANESTHESIA POSTPROCEDURE EVALUATION
Post-Op Assessment Note    CV Status:  Stable  Pain Score: 6    Pain management: adequate       Mental Status:  Awake and alert   Hydration Status:  Stable   PONV Controlled:  None   Airway Patency:  Patent     Post Op Vitals Reviewed: Yes    No anethesia notable event occurred.    Staff: Anesthesiologist, CRNA           Last Filed PACU Vitals:  Vitals Value Taken Time   Temp 97.2 °F (36.2 °C) 05/12/25 1630   Pulse 78 05/12/25 1635   /82 05/12/25 1630   Resp 18 05/12/25 1635   SpO2 98 % 05/12/25 1635   Vitals shown include unfiled device data.    Modified Siddharth:     Vitals Value Taken Time   Activity 2 05/12/25 1615   Respiration 2 05/12/25 1615   Circulation 2 05/12/25 1615   Consciousness 2 05/12/25 1615   Oxygen Saturation 2 05/12/25 1615     Modified Siddharth Score: 10

## 2025-05-12 NOTE — QUICK NOTE
ENT hand off note for transfer:    Spoke to Alex Cruz DO from [facility] ED, he is aware of pending transfer of pt for ovn post op obs.    Pt is POD0 from L FESS (maxillary antrostomy, total ethmoidectomy, sphenoidotomy, frontal sinusotomy) c/b poss retrobulbar hematoma, s/p intra-op lat canthotomy, cantholysis. Pt also received 25g IV mannitol. Intra-op ophtho eval revealed decreased eye pressure post surgical release, optic n perfusion intact. Abx eye ointment and eye patch were applied. Pt is currently clinically stable in PACU.    Yohan Haddad MD  PGY-3  Otorhinolaryngology - Head & Neck Surgery

## 2025-05-12 NOTE — DISCHARGE INSTR - AVS FIRST PAGE
POST-OPERATIVE CARE INSTRUCTION SHEET      ABOUT POST-OPERATIVE CARE VISITS    Post-operative visits are an indispensable part of the surgery, since they help promote healing and prevent persistent or recurrent disease. You should plan on remaining within the [facility] area for at least one day following surgery. You will usually be seen within 7-10 days post-operatively for debridement (removal of crusts from your nose).    You should anticipate 2 office visits over the first 4 weeks after surgery. Continued debridement will be done at these visits, and persistent inflammation or scar tissue will be removed under local anesthesia. Although chances of complications from these manipulations are rare, the potential risks are the same as the surgery itself.     WHAT YOU CAN EXPECT TO EXPERIENCE    You can expect some bleeding from your nose for several days after the surgery and again after each office debridement. When bleeding occurs down the front of your nose or into the back of your throat, you should tilt your head back while sitting up and breathe gently through your nose. If bleeding persists for an extended period of time notify our office.  Over-the-counter Afrin nose spray (oxymetazoline) can be used to open your nose  and reduce bleeding during the first two nights after surgery if needed.    As the sinuses begin to clear themselves, you can expect to have some thick brown drainage from your nose. This is mucus and old blood and does not indicate an infection.    You may experience some discomfort post-operatively due to manipulation and inflammation. Take your pain medication as directed. Often extra-strength Tylenol is sufficient. You may wish to take medication for pain prior to your post-operative visits (particularly early on, when the nose is most sensitive). If the medication is sedating, be sure to have someone available to drive you.      SOME IMPORTANT POST-OPERATIVE DO'S AND DO NOT'S    DO  NOT blow your nose until you have been given permission to do so.    DO NOT bend, lift or strain for at least one week after surgery. These activities will promote bleeding from your nose. You should not plan on participating in rigorous activity until healing is completed.    DO NOT suppress the need to cough or sneeze, but cough/sneeze with your mouth open.    DO NOT resume use of any aspirin-containing products or other blood thinners until after discussing this with us. Typically, you can restart after 7-10 days.    DO use nasal saline spray (without decongestant) every hour while you are awake beginning the day after surgery.  This helps moisten your nose and prevents large crusts from forming.  The preferred brand is Simply Saline due to lack of a preservative which is irritating to some people.    DO use nasal saline irrigation 4-6 times daily beginning on post-operative day three if there is no nasal packing.    DO continue your steroids (Medrol or Prednisone) and antibiotics (if they have been prescribed for you). Diarrhea from antibiotic usage can lead to a serious health problem. This can often be prevented by taking probiotics daily, which is found in yogurt with active cultures or as tablets in a health food store. If you should experience diarrhea, stop the antibiotic and notify us. Further evaluation may be required.    DO notify us for any of the following: temperature elevations above 100.5 F, clear watery drainage from your nose, changes in vision, swelling of the eyes, worsening headache or neck stiffness.                                Contact our office for an emergency or go to the emergency room nearest to you.

## 2025-05-12 NOTE — NURSING NOTE
Pt transferred to [facility] ER from  PACU. PT awake alert and oriented, C/o pain left eye, no active bleeding noted and vss. MD explained reason to tx to upgraded facility. Pt stated he understood however not totally oriented post. anesthesia effect. Family members notified of transfer and pt taken via ambulance from PACU. Report given to accepting faciltiy by ENT PA

## 2025-05-12 NOTE — ANESTHESIA POSTPROCEDURE EVALUATION
Post-Op Assessment Note    CV Status:  Stable  Pain Score: 6    Pain management: adequate       Mental Status:  Sleepy   Hydration Status:  Euvolemic   PONV Controlled:  Controlled   Airway Patency:  Patent     Post Op Vitals Reviewed: Yes    No anethesia notable event occurred.    Staff: Anesthesiologist, CRNA           Last Filed PACU Vitals:  Vitals Value Taken Time   Temp 97.9 °F (36.6 °C) 05/12/25 1613   Pulse 77 05/12/25 1613   /73 05/12/25 1613   Resp 12 05/12/25 1613   SpO2 98 % 05/12/25 1613

## 2025-05-12 NOTE — OP NOTE
OPERATIVE REPORT  PATIENT NAME: Gavin Frank    :  1970  MRN: [id number]  Pt Location: [facility] OR [facility]OM 04    SURGERY DATE: 2025    Surgeons and Role:  Panel 1:     * Rajesh Martinez MD - Primary     * Yohan Haddad MD - Assisting  Panel 2:     * Xiao Jauregui MD - Primary    Preop Diagnosis:  Chronic pansinusitis [J32.4]  Deviated nasal septum [J34.2]    Post-Op Diagnosis Codes:     * Chronic pansinusitis [J32.4]     * Deviated nasal septum [J34.2]    Procedure(s):  FUNCTIONAL ENDOSCOPIC SINUS SURGERY (FESS) IMAGED GUIDED  FUNCTIONAL ENDOSCOPIC SINUS SURGERY (FESS)  Left - ETHMOIDECTOMY. LATERAL CANTHOTOMY  Left - EXAM UNDER ANESTHESIA (EUA). LATERAL CANTHOTOMY    Specimen(s):  ID Type Source Tests Collected by Time Destination   1 : BIOPSY OF LEFT ETHMOID BULLA Tissue Nose TISSUE EXAM Rajesh Martinez MD 2025 1403    A : LEFT MIDDLE MEATUS Tissue Nose ANAEROBIC CULTURE AND GRAM STAIN, CULTURE, TISSUE AND GRAM STAIN Rajesh Martinez MD 2025 1337    B : contents of left maxillary sinus Tissue Soft Tissue, Other ANAEROBIC CULTURE AND GRAM STAIN, CULTURE, TISSUE AND GRAM STAIN, WOUND CULTURE Rajesh Martinez MD 2025 1356        Estimated Blood Loss:   Minimal    Drains:  Urethral Catheter Non-latex 16 Fr. (Active)   Number of days: 0       Anesthesia Type:   General    Operative Indications:  Chronic pansinusitis [J32.4]  Deviated nasal septum [J34.2]  Pt has a retrobulbar hemorrhage in the left eye.    Operative Findings:  Same as above      Complications:   None    Procedure and Technique:    SURGEONS NARRATIVE:   Prior to the procedure, the patient, Gavin Frank  , medical record [id number]  , date of birth 1970  was identified in the operating  holding area. The left lateral canthal region was clamped with a mosquito and wescotts / straight scissors to make a cantholysis and canthotomy; Pressure around the globe was released and IOP was checked and found to be 30; DFE was done and found to have C:D 0.9  and retinal heme (most likely chronic from hypertensive retinopathy); Pt to f/u outpatient.      The attending physician Dr. Xiao Jauregui was present throughout the entire procedure and directly performed the entire procedure.     I was present for the entire procedure.     I was present for the entire procedure.    Patient Disposition:  PACU              SIGNATURE: Xiao Jauregui MD  DATE: May 12, 2025  TIME: 4:16 PM

## 2025-05-13 ENCOUNTER — OFFICE VISIT (OUTPATIENT)
Age: 55
End: 2025-05-13
Payer: MEDICARE

## 2025-05-13 VITALS
HEART RATE: 75 BPM | SYSTOLIC BLOOD PRESSURE: 163 MMHG | DIASTOLIC BLOOD PRESSURE: 91 MMHG | OXYGEN SATURATION: 94 % | RESPIRATION RATE: 18 BRPM | TEMPERATURE: 98.6 F

## 2025-05-13 DIAGNOSIS — H40.1133 PRIMARY OPEN ANGLE GLAUCOMA (POAG) OF BOTH EYES, SEVERE STAGE: ICD-10-CM

## 2025-05-13 DIAGNOSIS — H35.62 RETINAL HEMORRHAGE OF LEFT EYE: Primary | ICD-10-CM

## 2025-05-13 DIAGNOSIS — H43.823 VITREOMACULAR ADHESION OF BOTH EYES: ICD-10-CM

## 2025-05-13 PROBLEM — Z98.890 S/P FESS (FUNCTIONAL ENDOSCOPIC SINUS SURGERY): Status: ACTIVE | Noted: 2025-05-13

## 2025-05-13 PROCEDURE — 92133 CPTRZD OPH DX IMG PST SGM ON: CPT | Performed by: OPHTHALMOLOGY

## 2025-05-13 PROCEDURE — 92250 FUNDUS PHOTOGRAPHY W/I&R: CPT | Performed by: OPHTHALMOLOGY

## 2025-05-13 PROCEDURE — 99214 OFFICE O/P EST MOD 30 MIN: CPT | Performed by: OPHTHALMOLOGY

## 2025-05-13 PROCEDURE — 92134 CPTRZ OPH DX IMG PST SGM RTA: CPT | Performed by: OPHTHALMOLOGY

## 2025-05-13 RX ORDER — PREDNISONE 10 MG/1
TABLET ORAL
Qty: 45 TABLET | Refills: 0 | Status: SHIPPED | OUTPATIENT
Start: 2025-05-13

## 2025-05-13 RX ORDER — CEPHALEXIN 500 MG/1
500 CAPSULE ORAL EVERY 6 HOURS SCHEDULED
Qty: 12 CAPSULE | Refills: 0 | Status: SHIPPED | OUTPATIENT
Start: 2025-05-13 | End: 2025-05-16

## 2025-05-13 RX ORDER — DORZOLAMIDE HYDROCHLORIDE AND TIMOLOL MALEATE 20; 5 MG/ML; MG/ML
1 SOLUTION/ DROPS OPHTHALMIC 2 TIMES DAILY
Qty: 1 ML | Refills: 0 | Status: SHIPPED | OUTPATIENT
Start: 2025-05-13 | End: 2025-05-20

## 2025-05-13 RX ORDER — DORZOLAMIDE HYDROCHLORIDE AND TIMOLOL MALEATE 20; 5 MG/ML; MG/ML
1 SOLUTION/ DROPS OPHTHALMIC 2 TIMES DAILY
Qty: 10 ML | Refills: 0 | Status: SHIPPED | OUTPATIENT
Start: 2025-05-13

## 2025-05-13 RX ADMIN — POLYETHYLENE GLYCOL 3350 17 G: 17 POWDER, FOR SOLUTION ORAL at 08:36

## 2025-05-13 RX ADMIN — OXYCODONE HYDROCHLORIDE 5 MG: 5 TABLET ORAL at 05:26

## 2025-05-13 RX ADMIN — DORZOLAMIDE HYDROCHLORIDE AND TIMOLOL MALEATE 1 DROP: 20; 5 SOLUTION OPHTHALMIC at 08:38

## 2025-05-13 RX ADMIN — LOSARTAN POTASSIUM 100 MG: 50 TABLET, FILM COATED ORAL at 08:36

## 2025-05-13 RX ADMIN — DILTIAZEM HYDROCHLORIDE 120 MG: 120 CAPSULE, COATED, EXTENDED RELEASE ORAL at 08:36

## 2025-05-13 RX ADMIN — HYDROMORPHONE HYDROCHLORIDE 0.5 MG: 1 INJECTION, SOLUTION INTRAMUSCULAR; INTRAVENOUS; SUBCUTANEOUS at 08:37

## 2025-05-13 RX ADMIN — AMPICILLIN AND SULBACTAM 3 G: 10; 5 INJECTION, POWDER, FOR SOLUTION INTRAVENOUS at 00:04

## 2025-05-13 RX ADMIN — METHOCARBAMOL 500 MG: 500 TABLET ORAL at 08:36

## 2025-05-13 RX ADMIN — METOPROLOL SUCCINATE 50 MG: 50 TABLET, EXTENDED RELEASE ORAL at 08:36

## 2025-05-13 RX ADMIN — PANTOPRAZOLE SODIUM 40 MG: 40 TABLET, DELAYED RELEASE ORAL at 06:23

## 2025-05-13 RX ADMIN — AMPICILLIN AND SULBACTAM 3 G: 10; 5 INJECTION, POWDER, FOR SOLUTION INTRAVENOUS at 06:23

## 2025-05-13 NOTE — PROGRESS NOTES
"Name: Gavin Frank      : 1970      MRN: [id number]  Encounter Provider: Xiao Jauregui MD  Encounter Date: 2025   Encounter department: [facility] OPHTHALMOLOGY  :  Assessment & Plan  Retinal hemorrhage of left eye  Recommend monitoring; Pt has intraretinal heme; No signs of CRVO at this time or macular edema.  Orders:    Fundus Photos - OU - Both Eyes    OCT, Retina - OU - Both Eyes    Primary open angle glaucoma (POAG) of both eyes, severe stage  Pt has increased C:D; Will start cosopt OU BID;   Orders:    OCT, Optic Nerve - OU - Both Eyes    Vitreomacular adhesion of both eyes  Pt has syneresis; No retinal tears, breaks, or detachments on dilated examination; Recommend monitoring; Retinal detachment precautions were discussed with the patient. The patient was instructed to call or return immediately for an increase in flashes of light, floaters (dark spots in vision), or curtaining of the visual field.                  Gavin Frank is a 54 y.o. male who presents for hemorrhage after a sinus procedure.    C/o blurry vision, \"fuzzy\" OS.  C/o pain, \"feels like I got punched\", swelled.     Reports he did not get any eye drops    History obtained from: patient    Review of Systems  Medical History Reviewed by provider this encounter:  Tobacco  Allergies  Meds  Problems  Med Hx  Surg Hx  Fam Hx     .     Past Ocular History:  Ocular Meds/Drops:   none    Base Eye Exam       Visual Acuity (Snellen - Linear)         Right Left    Dist sc 20/25 20/20              Tonometry (Pneumo-tonometer, 10:23 AM)         Right Left    Pressure 13 17              Neuro/Psych       Oriented x3: Yes              Dilation       Both eyes: 2.5% Phenylephrine @ 10:19 AM              Dilation #2       Both eyes: 1% Tropicamide @ 10:19 AM                  Slit Lamp and Fundus Exam       External Exam         Right Left    External Normal Normal              Slit Lamp Exam         Right Left    Lids/Lashes Normal Normal "    Conjunctiva/Sclera White and quiet White and quiet    Cornea Clear Clear    Anterior Chamber Deep and quiet Deep and quiet    Iris Round and reactive Round and reactive    Lens trace NSC trace NSC    Anterior Vitreous No PVD, clear, no cell No PVD, clear, no cell              Fundus Exam         Right Left    Disc sharp, no pallor sharp, no pallor    C/D Ratio 0.8 0.9    Macula Good foveal reflex one dot heme    Vessels normal in caliber normal in caliber    Periphery Flat, no retinal tear or detachment, no masses Flat, no retinal tear or detachment, no masses;few dot heme;                      IMAGING:  Fundus Photos - OU - Both Eyes          Right Eye  Disc findings include increased cup to disc ratio. Macula findings include normal observations. Vessel findings include normal observations. Periphery findings include normal observations.     Left Eye  Disc findings include increased cup to disc ratio. Macula findings include normal observations. Vessel findings include normal observations. Periphery findings include hemorrhage.          OCT, Retina - OU - Both Eyes          Right Eye  Quality was good. Findings include (Vitreomacular adhesion).     Left Eye  Quality was good. Findings include (Vitreomacular adhesion).          OCT, Optic Nerve - OU - Both Eyes          Right Eye  Reliability was good. Temporal thickness was showing abnormal thinning. Superior thickness was normal. Nasal thickness was normal. Inferior thickness was normal.     Left Eye  Reliability was good. Temporal thickness was showing abnormal thinning. Superior thickness was normal. Nasal thickness was normal. Inferior thickness was normal.

## 2025-05-13 NOTE — PROGRESS NOTES
OTOLARYNGOLOGY PROGRESS NOTE    Date of Service: 5/13/2025 7:49 AM    ASSESSMENT  Patient is a 54 y.o. male w/ acute and chronic problems as below, who is POD1 from L FESS (maxillary antrostomy w/ removal of content, total ethmoidectomy, sphenoidotomy, frontal sinusotomy) on 5/12/25, c/b poss ant ethmoid a injury, s/p intra-op lat canthotomy, cantholysis w/ ophtho assistance, received mannitol as well. Pt is stable post op.    PLAN:  -pt is clinically stable, plan for dc home this AM  -ophtho in office FU this AM upon dc, appreciate ophtho input and assistance  -continue Cosopt eye drop BID  -ok for NS spray, no irrigation  -avoid nose blowing, sneezing (if cannot avoid, sneeze w/ mouth open)  -avoid exercise, heavy lifting, Valsalva  -may utilize stool softener PRN  -avoid CPAP usage until ENT FU for re-eval  -continue keflex x3d post dc for surgical prophylaxis  -prednisone taper: 50mg x3d, 40mg x3d, 30mg x3d, 20mg x3d, 10mg x3d  -ENT FU in 1wk    ---------------------------------------------------------------------------------------------------------------    HPI  Patient is a 54 y.o. male w/ PMH of HTN, DM, HLD, s/p L FESS (maxillary antrostomy w/ removal of content, total ethmoidectomy, sphenoidotomy, frontal sinusotomy) on 5/12/25, c/b poss ant ethmoid a injury, s/p intra-op lat canthotomy, cantholysis. Pt also received 25g IV mannitol. Intra-op ophtho eval revealed decreased eye pressure post surgical release, optic n perfusion intact. Abx eye ointment and eye patch were applied. Pt was transferred to [facility] for ovn obs from PACU.    Overnight Events: NAEO, AVSS besides HTN, didn't sleep as well w/o CPAP, no sig pain, no epistaxis, spit up old blood clot from mouth intermittently, tolerating PO intake    CT orbit (5/12/25):  1. Mild left proptosis. Contusion and blood products in the medial superior aspect of the orbit adjacent to the nasolacrimal duct.  2. Disruption of the left lamina  appreciated and fracture of the medial wall of the left orbit.  3. Opacification of the left paranasal sinuses.    PHYSICAL EXAM:  Vitals:    05/13/25 0653   BP: 163/91   Pulse: 75   Resp:    Temp: 98.6 °F (37 °C)   SpO2: 94%      General: No acute distress, resting in bed  HEENT: no bleed obs from nose/oral cavity, eye patch on (L globe more tense than R but softer than intra-op. Lat canthotomy incision obs, healing, no bleed. L vision blurry, extraocular muscle movement intact but more limited on L than R. PERRLA. L chemosis)  Neurology: No focal deficits  Lungs: Breathing easy, unlabored and even on room air  Cardio: Well perfused      Intake/Output Summary (Last 24 hours) at 5/13/2025 0749  Last data filed at 5/13/2025 0658  Gross per 24 hour   Intake 700 ml   Output 2750 ml   Net -2050 ml       Patient Active Problem List   Diagnosis    Hypertension    Arthritis    Hyperlipemia    Class 3 severe obesity due to excess calories with serious comorbidity and body mass index (BMI) of 50.0 to 59.9 in adult    Benign essential hypertension    Obstructive sleep apnea    Benign prostatic hyperplasia with urinary frequency    H/O urethral stricture    Vitamin D deficiency    Elevated PSA    Ingrown nail    Onychomycosis    Asthma    Stroke-like symptoms    Back pain    Prediabetes    CPAP (continuous positive airway pressure) dependence    Malignant neoplasm of prostate (HCC)    Sciatica, left side

## 2025-05-13 NOTE — OP NOTE
OPERATIVE REPORT  PATIENT NAME: Gavin Frank    :  1970  MRN: [id number]  Pt [facility]    SURGERY DATE: 2025    Surgeons and Role:  Panel 1:     * Rajesh Martinez MD - Primary     * Yohan Haddad MD - Assisting  Panel 2:     * Xiao Jauregui MD - Primary    Preop Diagnosis:  Chronic pansinusitis [J32.4]  Deviated nasal septum [J34.2]    Post-Op Diagnosis Codes:     * Chronic pansinusitis [J32.4]     * Deviated nasal septum [J34.2]    Procedure(s):  FUNCTIONAL ENDOSCOPIC SINUS SURGERY (FESS) IMAGED GUIDED  FUNCTIONAL ENDOSCOPIC SINUS SURGERY (FESS)  Left - ETHMOIDECTOMY  Left - EXAM UNDER ANESTHESIA (EUA). LATERAL CANTHOTOMY    Specimen(s):  ID Type Source Tests Collected by Time Destination   1 : BIOPSY OF LEFT ETHMOID BULLA Tissue Nose TISSUE EXAM Rajesh Martinez MD 2025 1403    A : LEFT MIDDLE MEATUS Tissue Nose ANAEROBIC CULTURE AND GRAM STAIN, CULTURE, TISSUE AND GRAM STAIN Rajesh Martinez MD 2025 1337    B : contents of left maxillary sinus Tissue Soft Tissue, Other ANAEROBIC CULTURE AND GRAM STAIN, CULTURE, TISSUE AND GRAM STAIN (Canceled), WOUND CULTURE Rajesh Martinez MD 2025 1356        Estimated Blood Loss:   Minimal    Drains:  [REMOVED] Urethral Catheter Non-latex 16 Fr. (Removed)   Collection Container Standard drainage bag 25 1615   Securement Method Securing device (Describe) 25 1615   Number of days: 0       Anesthesia Type:   General    Operative Indications:  Chronic pansinusitis [J32.4]  Deviated nasal septum [J34.2]      Operative Findings:  Left nasal cavity with severe edema of mucosa, middle meatus with abundant drainage of purulent discharge.  No evident polyp at time of the exam.  Left maxillary sinus with abundant purulent content, mucosa erythematous with widespread cobblestoning.  Polypoid edema along inferior and anterior face of the left ethmoid bulla.  Posterior ethmoid cells and sphenoid with no evidence of active infection, no retained fluid,  only focal areas of polypoid edema and small polyps noticed.  Agger Nasi cells with inflammation, erythema and tendency to bleed.  Frontal sinus with narrow axis, no polyps, no active discharge from the frontal sinus.  During the final portion of the surgery as the frontal sinus ostium is enlarged, bleeding is noticed on the anterior ethmoid area concurrent with quick proptosis and tension buildup on the left orbit.      Complications:   Patient developed orbital bleed with left orbit elevated pressure requiring decompression via lateral canthotomy and cantholysis.  For proper decompression ophthalmology surgeon was requested.    Procedure and Technique:  Patient was met in holding area, positively identified and risks, benefits and alternatives discussed, Patient confirmed informed consent.     The patient was transferred onto the operating table in the supine position. Appropriate monitoring devices were put in place, general anesthesia was administered by anesthesiologist and patient intubated without complications, tubetaped in midline. The image guided headset tracking device was placed in position as well as the [employer] tracking magnet.  The patient was prepped and draped in the usual clean fashion. Before proceeding further, a time-out was taken during which the patient’s identification and planned surgical procedure were confirmed.     Surface registration was performed and proper calibration obtained and confirmed with surface landmarks.  Examination with a speculum confirmed significant obstruction bilaterally, pledgets with Afrin were placed bilaterally to obtain decongestion. The pledgets were removed, significant purulent discharge was noticed on the middle meatus, samples were obtained for culture.  Surgery started with the 0 degree endoscope, left middle turbinate  injected with 1% lidocaine with epinephrine 1:100 000.  Posterior lateral wall also injected with 1% lidocaine with epinephrine 1:100  000 using a long spinal needle.  Pledgets with Afrin were repositioned. After waiting for vasoconstrictor and anesthesia to take effect, the pledgets were removed, the middle turbinate was then medialized bluntly with the [place].  Attention was then directed to the lateral wall of the nose, a ball-tipped seeker was used to luxate the inferior portion of the uncinate.  At this point significant additional amount of purulent discharge was noticed.  Using a 30 degree endoscope for visualization, the curved olive-tipped suction image guided cannula was used to clear the purulent discharge from the area and facilitate locating the maxillary ostium as a source of discharge.  The suction cannula was then used to enter the maxillary sinus and content with retrieved in a Luken's trap to be sent for microbiology.  The maxillary sinus ostium was then enlarged with Gabriele-Cut forceps as well as subsequently using the microdebrider and completing the inferior portion of the uncinectomy.  The maxillary sinus was additionally examined with both 30 and 70 degree endoscope, maxillary sinus mucosa was noticed to be cobblestoned and erythematous, purulent discharge present anteriorly and inferiorly.  Maxillary sinus was then copiously irrigated with sterile saline solution until return was clear.    Surgery then continued with degree endoscope, the ethmoid bulla was identified, and the anterior and inferior walls of the ethmoid bulla were noted to have significant polypoid edema.  A biopsy was obtained with Gabriele-Cut forceps, ethmoidectomy was then completed with sequential removal of bony partitions starting with the ethmoid bulla and going through the basal lamella to the posterior ethmoid with the microdebrider until the anterior wall of the sphenoid was reached.  This was confirmed both visually and with image guidance.  The anterior wall of the sphenoid was then opened completing a trans ethmoidal sphenoidotomy, the ethmoid sinus did  not contain any discharge, but an elongated polyp was noticed along the roof into the [place] cell.  The roof of the ethmoid was identified and the remaining bony partitions along the roof were removed with the debrider from posterior to anterior until the frontal recess was reached.  At this point a 30 degree endoscope was used for visualization of the roof frontal recess area.  The anterior ethmoid area was noticed to have erythema and tendency to bleed.  The frontal recess was noticed to have edematous polypoid mucosa.  Under direct visualization the frontal sinus ostium was identified and the curved image guided cannula advanced into the sinus confirming patency.  No evident discharge was noticed on the frontal sinus.  Additional debridement was done of the agar Nasi cells to remove edematous mucosa, enlarge the frontal recess and facilitate a more direct approach to the frontal sinus.  This is associated to additional bleeding.  Pledgets with Afrin are then gently placed in the area.  After waiting for vasoconstriction to take effect the pledgets were then visualization of the area reveals a narrow frontal ostium additional pledgets are placed in preparation for placement of a mini propel drug-eluting stent.  At this point of the surgery examination of the eyes reveals quick progressive proptosis.  Pledgets are removed, the orbit is noticed to be under significant pressure.  Lateral canthotomy is attempted but does not provide proper decompression.  At this point an ophthalmologist is called for assistance to obtain a proper decompression and facilitate evaluation of the eye.  Fortunately an ophthalmologist is available within minutes and proceeds with canthotomy an canthal lysis, a separate description of this portion of the procedure will be done by the ophthalmologist.      No additional intervention was done within the sinuses as the maxillary antrostomy, ethmoidectomy and frontal sinusotomy have been  completed.    At the completion of the orbital decompression via the canthotomy and canthal lysis the pressure had reduced.  Ophthalmologist did an evaluation with dilated pupil and confirmed proper perfusion of the optic nerve.    An eye patch was applied.    All counts were correct at the end of the case. Of note during a portion of the procedure the blood pressure was noticed to be significantly elevated and difficult to control.    Patient was handed back to the anesthesiologist who proceeded to wean the patient from anesthesia.  Patient was extubated, awakened, and taken to the recovery room in stable condition.        I was present for the entire procedure.    Patient Disposition:  PACU         SIGNATURE: Rajesh Martinez MD  DATE: May 13, 2025  TIME: 2:04 AM

## 2025-05-13 NOTE — PLAN OF CARE
Problem: PAIN - ADULT  Goal: Verbalizes/displays adequate comfort level or baseline comfort level  Description: Interventions:- Encourage patient to monitor pain and request assistance- Assess pain using appropriate pain scale- Administer analgesics based on type and severity of pain and evaluate response- Implement non-pharmacological measures as appropriate and evaluate response- Consider cultural and social influences on pain and pain management- Notify physician/advanced practitioner if interventions unsuccessful or patient reports new pain  Outcome: Progressing     Problem: INFECTION - ADULT  Goal: Absence or prevention of progression during hospitalization  Description: INTERVENTIONS:- Assess and monitor for signs and symptoms of infection- Monitor lab/diagnostic results- Monitor all insertion sites, i.e. indwelling lines, tubes, and drains- Monitor endotracheal if appropriate and nasal secretions for changes in amount and color- [place] appropriate cooling/warming therapies per order- Administer medications as ordered- Instruct and encourage patient and family to use good hand hygiene technique- Identify and instruct in appropriate isolation precautions for identified infection/condition  Outcome: Progressing     Problem: DISCHARGE PLANNING  Goal: Discharge to home or other facility with appropriate resources  Description: INTERVENTIONS:- Identify barriers to discharge w/patient and caregiver- Arrange for needed discharge resources and transportation as appropriate- Identify discharge learning needs (meds, wound care, etc.)- Arrange for interpretive services to assist at discharge as needed- Refer to Case Management Department for coordinating discharge planning if the patient needs post-hospital services based on physician/advanced practitioner order or complex needs related to functional status, cognitive ability, or social support system  Outcome: Progressing     Problem: Knowledge Deficit  Goal:  Patient/family/caregiver demonstrates understanding of disease process, treatment plan, medications, and discharge instructions  Description: Complete learning assessment and assess knowledge base.Interventions:- Provide teaching at level of understanding- Provide teaching via preferred learning methods  Outcome: Progressing

## 2025-05-13 NOTE — PLAN OF CARE
Problem: PAIN - ADULT  Goal: Verbalizes/displays adequate comfort level or baseline comfort level  Description: Interventions:- Encourage patient to monitor pain and request assistance- Assess pain using appropriate pain scale- Administer analgesics based on type and severity of pain and evaluate response- Implement non-pharmacological measures as appropriate and evaluate response- Consider cultural and social influences on pain and pain management- Notify physician/advanced practitioner if interventions unsuccessful or patient reports new pain  Outcome: Progressing     Problem: INFECTION - ADULT  Goal: Absence or prevention of progression during hospitalization  Description: INTERVENTIONS:- Assess and monitor for signs and symptoms of infection- Monitor lab/diagnostic results- Monitor all insertion sites, i.e. indwelling lines, tubes, and drains- Monitor endotracheal if appropriate and nasal secretions for changes in amount and color- [place] appropriate cooling/warming therapies per order- Administer medications as ordered- Instruct and encourage patient and family to use good hand hygiene technique- Identify and instruct in appropriate isolation precautions for identified infection/condition  Outcome: Progressing     Problem: DISCHARGE PLANNING  Goal: Discharge to home or other facility with appropriate resources  Description: INTERVENTIONS:- Identify barriers to discharge w/patient and caregiver- Arrange for needed discharge resources and transportation as appropriate- Identify discharge learning needs (meds, wound care, etc.)- Arrange for interpretive services to assist at discharge as needed- Refer to Case Management Department for coordinating discharge planning if the patient needs post-hospital services based on physician/advanced practitioner order or complex needs related to functional status, cognitive ability, or social support system  Outcome: Progressing     Problem: Knowledge Deficit  Goal:  Patient/family/caregiver demonstrates understanding of disease process, treatment plan, medications, and discharge instructions  Description: Complete learning assessment and assess knowledge base.Interventions:- Provide teaching at level of understanding- Provide teaching via preferred learning methods  Outcome: Progressing

## 2025-05-13 NOTE — DISCHARGE INSTR - AVS FIRST PAGE
POST-OPERATIVE CARE INSTRUCTION SHEET      ABOUT POST-OPERATIVE CARE VISITS    Post-operative visits are an indispensable part of the surgery, since they help promote healing and prevent persistent or recurrent disease. You should plan on remaining within the [facility] area for at least one day following surgery. You will usually be seen within 7-10 days post-operatively for debridement (removal of crusts from your nose).    You should anticipate 2 office visits over the first 4 weeks after surgery. Continued debridement will be done at these visits, and persistent inflammation or scar tissue will be removed under local anesthesia. Although chances of complications from these manipulations are rare, the potential risks are the same as the surgery itself.     WHAT YOU CAN EXPECT TO EXPERIENCE    You can expect some bleeding from your nose for several days after the surgery and again after each office debridement. When bleeding occurs down the front of your nose or into the back of your throat, you should tilt your head back while sitting up and breathe gently through your nose. If bleeding persists for an extended period of time notify our office.  Over-the-counter Afrin nose spray (oxymetazoline) can be used to open your nose  and reduce bleeding during the first two nights after surgery if needed.    As the sinuses begin to clear themselves, you can expect to have some thick brown drainage from your nose. This is mucus and old blood and does not indicate an infection.    You may experience some discomfort post-operatively due to manipulation and inflammation. Take your pain medication as directed. Often extra-strength Tylenol is sufficient. You may wish to take medication for pain prior to your post-operative visits (particularly early on, when the nose is most sensitive). If the medication is sedating, be sure to have someone available to drive you.      SOME IMPORTANT POST-OPERATIVE DO'S AND DO NOT'S    DO  NOT blow your nose until you have been given permission to do so.    DO NOT bend, lift or strain for at least one week after surgery. These activities will promote bleeding from your nose. You should not plan on participating in rigorous activity until healing is completed. You may use stool softener as needed.    DO NOT suppress the need to cough or sneeze, but cough/sneeze with your mouth open.    DO NOT resume use of any aspirin-containing products or other blood thinners until after discussing this with us. Typically, you can restart after 7-10 days.    DO NOT use CPAP until you are seen in the ENT office for re-evaluation.    DO use nasal saline spray (NOT irrigation) twice a day to gently clean blood/clot from your nose.    DO take your prescribed steroid taper and antibiotics. Diarrhea from antibiotic usage can lead to a serious health problem. This can often be prevented by taking probiotics daily, which is found in yogurt with active cultures or as tablets in a health food store. If you should experience diarrhea, stop the antibiotic and notify us. Further evaluation may be required.    DO notify us for any of the following: temperature elevations above 100.5 F, clear watery drainage from your nose, worsening headache or neck stiffness.                                Contact our office for an emergency or go to the emergency room nearest to you.      Eye care:  - follow ophthalmologist recommendations  - continue Cosopt eye drop 1 drop in left eye twice a day (unless instructed not to use)  - eye patch for protection as needed

## 2025-05-14 ENCOUNTER — PROCEDURE VISIT (OUTPATIENT)
Dept: PODIATRY | Facility: CLINIC | Age: 55
End: 2025-05-14
Payer: MEDICARE

## 2025-05-14 ENCOUNTER — TRANSITIONAL CARE MANAGEMENT (OUTPATIENT)
Age: 55
End: 2025-05-14

## 2025-05-14 VITALS — HEIGHT: 75 IN | WEIGHT: 315 LBS | BODY MASS INDEX: 39.17 KG/M2

## 2025-05-14 DIAGNOSIS — R73.03 PRE-DIABETES: ICD-10-CM

## 2025-05-14 DIAGNOSIS — M79.674 PAIN IN TOES OF BOTH FEET: ICD-10-CM

## 2025-05-14 DIAGNOSIS — R60.1 GENERALIZED EDEMA: ICD-10-CM

## 2025-05-14 DIAGNOSIS — B35.1 ONYCHOMYCOSIS: Primary | ICD-10-CM

## 2025-05-14 DIAGNOSIS — M79.675 PAIN IN TOES OF BOTH FEET: ICD-10-CM

## 2025-05-14 LAB
BACTERIA SPEC ANAEROBE CULT: ABNORMAL
BACTERIA SPEC ANAEROBE CULT: ABNORMAL
BACTERIA TISS AEROBE CULT: ABNORMAL
BACTERIA TISS AEROBE CULT: ABNORMAL
BACTERIA WND AEROBE CULT: ABNORMAL
BACTERIA WND AEROBE CULT: ABNORMAL
GRAM STN SPEC: ABNORMAL

## 2025-05-14 PROCEDURE — 11721 DEBRIDE NAIL 6 OR MORE: CPT | Performed by: PODIATRIST

## 2025-05-14 NOTE — ASSESSMENT & PLAN NOTE
Debride mycotic nails and thin the nail plates x 8 with the use of a nail nipper manually and an electric Dremel bur was used to reduce the thickness of the nail beds and smoothed the distal aspect of the nails.

## 2025-05-14 NOTE — PROGRESS NOTES
Name: Gavin Frank      : 1970      MRN: [id number]  Encounter Provider: Toño Shen DPM  Encounter Date: 2025   Encounter department: [facility] PODIATRY [place]  :  Assessment & Plan  Onychomycosis       Debride mycotic nails and thin the nail plates x 8 with the use of a nail nipper manually and an electric Dremel bur was used to reduce the thickness of the nail beds and smoothed the distal aspect of the nails.   Pre-diabetes         Pain in toes of both feet         Generalized edema         Pt was instructed to use lotion once a day on both feet such as cerave, Cetaphil or similar thick style of lotion.     Discussed proper shoe gear, daily inspections of feet, and general foot health with patient. Patient has Q8  findings and is recommended for at risk foot care every 9-10 weeks.    Patients most recent complete clinical foot exam was on: 2025    Return in about 10 weeks (around 2025).     History of Present Illness   HPI  Gavin Frank is a 54 y.o. male who presents with chief complaint of painful thick nails on both feet.  He is prediabetic whose last sugar was 88 done on 3/25/2025.  Patient had sinus surgery done the other day and his left eye was injured during the surgery.  He is currently wearing an eye patch on the left side.  History obtained from: patient    Review of Systems  Medical History Reviewed by provider this encounter:     .  Medications Ordered Prior to Encounter[1]   Social History     Tobacco Use    Smoking status: Former     Current packs/day: 0.00     Average packs/day: 0.5 packs/day for 31.0 years (15.5 ttl pk-yrs)     Types: Cigarettes     Start date: 1984     Quit date: 2018     Years since quittin.3     Passive exposure: Past    Smokeless tobacco: Never    Tobacco comments:     No cigarettes vape only   Vaping Use    Vaping status: Every Day    Substances: Nicotine, Flavoring   Substance and Sexual Activity    Alcohol use: Not Currently     "Drug use: Never    Sexual activity: Yes     Partners: Female     Birth control/protection: None        Objective   Ht 6' 3\" (1.905 m) Comment: verbal  Wt (!) 165 kg (363 lb)   BMI 45.37 kg/m²      Physical Exam  Vascular status is a faint 1/4 DP PT negative digital hair normal distal cooling immediate capillary refill with edema present bilaterally.  Capillary refill is approximately 2 to 3 seconds and edema is +1 pitting.     Derm nails are brittle elongated hypertrophic yellow-white discoloration with subungual debris x 8.  There is an increased thickness and the nails are approximately 1-2 mm.  There is white-yellow flaky skin present on the plantar aspect of both feet with the right being worse than the left.  There is no signs of fissures or dried blood present within the flaky tissue.  There is slight loss of turgor noted bilaterally secondary to the edema.  There is early formation of callus noted on the medial aspect of the right foot around the heel and arch area.       [1]   Current Outpatient Medications on File Prior to Visit   Medication Sig Dispense Refill    cephalexin (KEFLEX) 500 mg capsule Take 1 capsule (500 mg total) by mouth every 6 (six) hours for 3 days 12 capsule 0    diltiazem (CARDIZEM CD) 120 mg 24 hr capsule TAKE 1 CAPSULE(S) BY MOUTH 1 TIMES PER DAY *NEW PRESCRIPTION REQUEST* 30 capsule 10    dorzolamide-timolol (COSOPT) 2-0.5 % ophthalmic solution Administer 1 drop into the left eye 2 (two) times a day for 7 days 1 mL 0    dorzolamide-timolol (COSOPT) 2-0.5 % ophthalmic solution Administer 1 drop to both eyes 2 (two) times a day 10 mL 0    LORazepam (ATIVAN) 2 mg tablet TAKE 1 TABLET BY MOUTH EVERY DAY 30 tablet 2    losartan (COZAAR) 100 MG tablet TAKE 1 TABLET(S) BY MOUTH 1 TIMES PER DAY *NEW PRESCRIPTION REQUEST* 90 tablet 1    meloxicam (MOBIC) 15 mg tablet Take 1 tablet (15 mg total) by mouth daily as needed for moderate pain 90 tablet 1    methocarbamol (ROBAXIN) 500 mg tablet " Take 1 tablet (500 mg total) by mouth 2 (two) times a day 20 tablet 0    metoprolol succinate (TOPROL-XL) 50 mg 24 hr tablet TAKE 1 TABLET(S) BY MOUTH 1 TIMES PER DAY *NEW PRESCRIPTION REQUEST* 90 tablet 1    montelukast (SINGULAIR) 10 mg tablet TAKE 1 TABLET(S) BY MOUTH 1 TIMES PER DAY *NEW PRESCRIPTION REQUEST* 90 tablet 1    Multiple Vitamin (ONE-A-DAY MENS PO) Take by mouth      naloxone (NARCAN) 4 mg/0.1 mL nasal spray Administer 1 spray into a nostril. If no response after 2-3 minutes, give another dose in the other nostril using a new spray. 1 each 1    naproxen (Naprosyn) 500 mg tablet Take 1 tablet (500 mg total) by mouth 2 (two) times a day with meals 30 tablet 0    oxyCODONE-acetaminophen (Percocet) 5-325 mg per tablet Take 1 tablet by mouth every 4 (four) hours as needed for moderate pain Max Daily Amount: 6 tablets 15 tablet 0    pravastatin (PRAVACHOL) 20 mg tablet TAKE 1 TABLET(S) BY MOUTH 1 TIMES PER DAY *NEW PRESCRIPTION REQUEST* 90 tablet 1    predniSONE 10 mg tablet 50 mg x 3 days; 40 mg x 3 days; 30 mg x 3 days; 20 mg x 3 days; 10 mg x 3 days 45 tablet 0    Semaglutide-Weight Management (Wegovy) 1.7 MG/0.75ML Inject 1.7 mg under the skin weekly 3 mL 5    triamcinolone (KENALOG) 0.1 % ointment APPLY TO AFFECTED AREA(S) TOPICALLY AS DIRECTED *NEW PRESCRIPTION REQUEST* 15 g 10     Current Facility-Administered Medications on File Prior to Visit   Medication Dose Route Frequency Provider Last Rate Last Admin    cyanocobalamin injection 1,000 mcg  1,000 mcg Intramuscular Q30 Days    1,000 mcg at 07/29/24 5074

## 2025-05-14 NOTE — SPEECH THERAPY NOTE
Eliquis was discontinued due to risk of ICH with brain mets.  Currently on hold in case surgical procedure is required.     Speech Language/Pathology  Speech/Language Pathology  Assessment    Patient Name: Juany Cheng  GCBZR'P Date: 2023     Problem List  Principal Problem:    Stroke-like symptoms  Active Problems:    Hyperlipemia    Obesity, morbid, BMI 40.0-49.9 (HCC)    Benign essential hypertension    Obstructive sleep apnea    Benign prostatic hyperplasia with urinary frequency    Asthma    Back pain    Past Medical History  Past Medical History:   Diagnosis Date   • Allergic    • Anxiety    • Arthritis    • Asthma    • CPAP (continuous positive airway pressure) dependence    • Depression    • Hyperlipemia    • Hypertension    • Obesity      Past Surgical History  Past Surgical History:   Procedure Laterality Date   • HEMORRHOID SURGERY     • TONSILLECTOMY     • URETHRAL DILATION          Bedside Swallow Evaluation:    Summary:  Pt presented w/ oral and pharyngeal stages of swallow WNL. He was positioned fully upright and alert. ST initially stopped by mid morning pt had completed 100% of regular and thin liquid breakfast. Upon ST return he accepted trials of ruth crackers and thin liquids via straw w/ single and successive sips. Mastication, bolus control, formation, and transfer were WNL. Swallows were prompt. Laryngeal rise appeared adequate. No overt s/s of aspiration. Pt reported occasional difficulties swallowing at home. When prompted to expand pt stated that he coughs up phlegm in the morning. Denied recent weightloss or difficulty swallowing medications. Pt was oriented to time/place and bio information including , age, and address. 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 Recommendations:  Diet: Regular   Liquid: Thin   Meds:as tolerated   Positioning:Upright  Strategies: slow rate, alternate liquids with solids, swallow prior to additional po  Pt to take PO/Meds only when fully alert and upright. Oral care  Reflux precautions  Eval only, No f/u tx indicated. Consider consult w/:  Nutrition    H&P/Admit info/ pertinent provider notes: (PMH noted above)  Isaac Lazo is a 46 yo M presenting with with 4 days of R arm, R leg numbness as well as slight weakness of R leg. Reports symptoms began overnight and symptoms in R arm/R leg happened simultaneously. The numbness/weakness is unchanged since onset. No headache/neck pain at onset. Mild headache noted today diffusely. No visual changes. No facial numbness/weakness or difficulty with speech. No dizziness. Normal coordination noted. Special Studies:  CTA head and neck with and without contrast (06/25/2023) Impression  1. Patent major vasculature of the [place] without high-grade stenosis. No aneurysm. 2.  No stenosis or dissection of cervical carotid arteries. 3.  Unfortunately cannot reliably evaluate the origin, V1, and proximal V2 segments of the bilateral vertebral arteries due to artifactually distorted image quality in the lower neck and suboptimal contrast bolus timing. The remainder of cervical   vertebral arteries are unremarkable. 4.  Redemonstrated right mastoid effusion and fluid/debris within the right middle ear. Correlate for otomastoiditis. CT head without contrast (06/25/2023) Impression   1. No acute intracranial abnormality. 2.  Right mastoid effusion and fluid/debris within the right middle ear. Correlate for otomastoiditis. 3.  Left sided sinusitis as described.   Previous MBS:none      Patient's goal: "I really hope I get off for the next couple of days"    Did the pt report pain? no     Reason for consult:  R/o aspiration  Determine safest and least restrictive diet  New neuro event  Stroke protocol    Precautions: none    Food Allergies:  None known   Current Diet: Regular and thin liquids   Premorbid diet: Regular and thin liquids   O2 requirement: Room air   Social/Prior living Lives w/ significant other   Voice/Speech: WNL,    Follows commands: Basic    Cognitive status: alert Oral [facility] exam:  Dentition: WNL full natural   Lips (VII):WNL   Tongue (XII):WNL   Mandible (V):WNL   Face/oral sensation (V):WNL   Velum (X):WNL   Secretion management: WNL   Volitional cough:WNL   Oral care     Items administered:  Debborah Weldon cracker, and thin liquids via straw w/ single and successive sips    Oral stage:  Lip closure: WNL   Mastication:WNL   Bolus formation:WNL   Bolus control:WNL   Transfer:WNL   Oral residue: n/a  Pocketing: n/a    Pharyngeal stage:  Swallow promptness:WNL   Laryngeal rise: adequate  No overt s/s aspiration    Esophageal stage:  No s/s reported  H/o GERD    Results d/w:  Pt,

## 2025-05-15 LAB
BACTERIA SPEC ANAEROBE CULT: ABNORMAL
BACTERIA SPEC ANAEROBE CULT: ABNORMAL

## 2025-05-15 PROCEDURE — 88311 DECALCIFY TISSUE: CPT | Performed by: STUDENT IN AN ORGANIZED HEALTH CARE EDUCATION/TRAINING PROGRAM

## 2025-05-15 PROCEDURE — 88305 TISSUE EXAM BY PATHOLOGIST: CPT | Performed by: STUDENT IN AN ORGANIZED HEALTH CARE EDUCATION/TRAINING PROGRAM

## 2025-05-19 DIAGNOSIS — J32.4 CHRONIC PANSINUSITIS: Primary | ICD-10-CM

## 2025-05-20 ENCOUNTER — OFFICE VISIT (OUTPATIENT)
Age: 55
End: 2025-05-20
Payer: MEDICARE

## 2025-05-20 VITALS
HEIGHT: 75 IN | WEIGHT: 315 LBS | SYSTOLIC BLOOD PRESSURE: 166 MMHG | DIASTOLIC BLOOD PRESSURE: 116 MMHG | HEART RATE: 67 BPM | BODY MASS INDEX: 39.17 KG/M2 | TEMPERATURE: 98.1 F | OXYGEN SATURATION: 98 %

## 2025-05-20 DIAGNOSIS — J32.4 CHRONIC PANSINUSITIS: Primary | ICD-10-CM

## 2025-05-20 DIAGNOSIS — I10 PRIMARY HYPERTENSION: ICD-10-CM

## 2025-05-20 DIAGNOSIS — C61 MALIGNANT NEOPLASM OF PROSTATE (HCC): ICD-10-CM

## 2025-05-20 PROCEDURE — 99496 TRANSJ CARE MGMT HIGH F2F 7D: CPT | Performed by: FAMILY MEDICINE

## 2025-05-20 RX ORDER — DILTIAZEM HYDROCHLORIDE 120 MG/1
120 CAPSULE, COATED, EXTENDED RELEASE ORAL DAILY
Qty: 100 CAPSULE | Refills: 1 | Status: SHIPPED | OUTPATIENT
Start: 2025-05-20

## 2025-05-20 RX ORDER — LOSARTAN POTASSIUM 100 MG/1
100 TABLET ORAL DAILY
Qty: 100 TABLET | Refills: 1 | Status: SHIPPED | OUTPATIENT
Start: 2025-05-20

## 2025-05-20 RX ORDER — METOPROLOL SUCCINATE 50 MG/1
50 TABLET, EXTENDED RELEASE ORAL DAILY
Qty: 100 TABLET | Refills: 1 | Status: SHIPPED | OUTPATIENT
Start: 2025-05-20

## 2025-05-20 NOTE — PROGRESS NOTES
Transition of Care Visit:  Name: Gavin Frank      : 1970      MRN: [id number]  Encounter Provider: Ricardo Castillo DO  Encounter Date: 2025   Encounter department: [facility] PRIMARY CARE    Assessment & Plan  Chronic pansinusitis         Primary hypertension  His blood pressure is elevated today.  He is under some increased stress.  He is not sleeping well.  He does have pain.  Will follow-up at the next regular visit hopefully the blood pressure will be better at that time.  He has not been taking his blood pressure medicines for the last couple days.  Orders:  •  diltiazem (CARDIZEM CD) 120 mg 24 hr capsule; Take 1 capsule (120 mg total) by mouth daily  •  losartan (COZAAR) 100 MG tablet; Take 1 tablet (100 mg total) by mouth daily  •  metoprolol succinate (TOPROL-XL) 50 mg 24 hr tablet; Take 1 tablet (50 mg total) by mouth daily    Malignant neoplasm of prostate (HCC)  Follow-up with urology.            History of Present Illness     Transitional Care Management Review:   Gavin Frank is a 54 y.o. male here for TCM follow up.     During the TCM phone call patient stated:  TCM Call (since 2025)     Date and time call was made  2025  9:12 AM    Hospital care reviewed  Records reviewed    Patient was hospitialized at  [facility]    Date of Admission  25    Date of discharge  25    Diagnosis  S/P FUNCTINAL ENDOSCOPIC SINUS SURGERY    Disposition  Home    Were the patients medications reviewed and updated  Yes    Current Symptoms  Swelling  Eye Pain      TCM Call (since 2025)     Post hospital issues  None    Did you obtain your prescribed medications  Yes    I have advised the patient to call PCP with any new or worsening symptoms  AZIZA Khan    Living Arrangements  Spouse or Significiant other    Support System  Partner    The type of support provided  Emotional; Financial; Physical    Do you have social support  Yes, as much as I need     "    Patient presents with:  Transition of Care Management: 5/12 - 5/13 @ [facility] S/[facility]. Patient noted he is feeling \"horrible\". States he went in for sinus surgery and now his left eye is \"messed up\". Peripheral vision is blurred. Eye in general is very red and inflammed. Eye is painful.   No other questions concerns or problems (CS)    During his sinus surgery he had a complication affecting his left eye.  He is following up with ophthalmology and ophthalmologic surgeon to correct that.  He still has pain in that eye.      Review of Systems   Constitutional:  Positive for activity change and appetite change. Negative for fever.   HENT:  Positive for sinus pain.    Eyes:  Positive for pain.   Respiratory: Negative.     Cardiovascular: Negative.    Psychiatric/Behavioral:  Positive for sleep disturbance.      Objective   BP (!) 166/116 (BP Location: Left arm, Patient Position: Sitting, Cuff Size: Large)   Pulse 67   Temp 98.1 °F (36.7 °C) (Temporal)   Ht 6' 3\" (1.905 m)   Wt (!) 165 kg (364 lb)   SpO2 98%   BMI 45.50 kg/m²     Physical Exam  Vitals and nursing note reviewed.   Constitutional:       Appearance: Normal appearance. He is well-developed.   HENT:      Head: Normocephalic.      Nose: Nose normal.     Eyes:      Conjunctiva/sclera: Conjunctivae normal.      Pupils: Pupils are equal, round, and reactive to light.       Cardiovascular:      Rate and Rhythm: Normal rate and regular rhythm.      Pulses: Normal pulses.      Heart sounds: Normal heart sounds.   Pulmonary:      Effort: Pulmonary effort is normal.      Breath sounds: Normal breath sounds.   Abdominal:      General: Abdomen is flat. Bowel sounds are normal.      Palpations: Abdomen is soft.     Musculoskeletal:         General: Normal range of motion.      Cervical back: Normal range of motion and neck supple.     Skin:     General: Skin is warm and dry.     Neurological:      General: No focal deficit present.      Mental Status: He is " alert and oriented to person, place, and time.     Psychiatric:         Mood and Affect: Mood normal.         Behavior: Behavior normal.         Thought Content: Thought content normal.         Judgment: Judgment normal.       Medications have been reviewed by provider in current encounter

## 2025-05-20 NOTE — ASSESSMENT & PLAN NOTE
His blood pressure is elevated today.  He is under some increased stress.  He is not sleeping well.  He does have pain.  Will follow-up at the next regular visit hopefully the blood pressure will be better at that time.  He has not been taking his blood pressure medicines for the last couple days.  Orders:  •  diltiazem (CARDIZEM CD) 120 mg 24 hr capsule; Take 1 capsule (120 mg total) by mouth daily  •  losartan (COZAAR) 100 MG tablet; Take 1 tablet (100 mg total) by mouth daily  •  metoprolol succinate (TOPROL-XL) 50 mg 24 hr tablet; Take 1 tablet (50 mg total) by mouth daily

## 2025-05-30 ENCOUNTER — OFFICE VISIT (OUTPATIENT)
Age: 55
End: 2025-05-30
Payer: MEDICARE

## 2025-05-30 DIAGNOSIS — H40.053 PRIMARY OCULAR HYPERTENSION OF BOTH EYES: Primary | ICD-10-CM

## 2025-05-30 DIAGNOSIS — H47.233 OPTIC CUPPING OF BOTH EYES: ICD-10-CM

## 2025-05-30 PROCEDURE — 76514 ECHO EXAM OF EYE THICKNESS: CPT | Performed by: OPHTHALMOLOGY

## 2025-05-30 PROCEDURE — 99214 OFFICE O/P EST MOD 30 MIN: CPT | Performed by: OPHTHALMOLOGY

## 2025-05-30 PROCEDURE — 92133 CPTRZD OPH DX IMG PST SGM ON: CPT | Performed by: OPHTHALMOLOGY

## 2025-05-30 PROCEDURE — 92020 GONIOSCOPY: CPT | Performed by: OPHTHALMOLOGY

## 2025-05-30 RX ORDER — BIMATOPROST 0.1 MG/ML
SOLUTION/ DROPS OPHTHALMIC
Qty: 2.5 ML | Refills: 12 | Status: SHIPPED | OUTPATIENT
Start: 2025-05-30 | End: 2025-06-04

## 2025-05-30 RX ORDER — ERYTHROMYCIN 5 MG/G
OINTMENT OPHTHALMIC
Qty: 3.5 G | Refills: 12 | Status: SHIPPED | OUTPATIENT
Start: 2025-05-30

## 2025-05-30 NOTE — PROGRESS NOTES
Name: Gavin Frank      : 1970      MRN: [id number]  Encounter Provider: Félix Balbuena DO  Encounter Date: 2025   Encounter department: [facility] OPHTHALMOLOGY  :  Assessment & Plan  Primary ocular hypertension of both eyes  -thin cct ou  -iop>target os   -discussed diagnosis of glaucoma and treatment options  -no clear indication of optic neuropathy caused by orbital hemorrhage that occurred on 25    Plan:  -use combigan os every morning (or it might be cosopt according to med records)  -start lumig os qhs  -medication instruction sheet reviewed with and provided to pt   Return in about 4 weeks (around 2025) for zeta 24-2 ou + RNFL OCT OS.      Orders:  •  OCT, Optic Nerve - OU - Both Eyes  •  bimatoprost (Lumigan) 0.01 % ophthalmic drops; Instill 1 drop in left eye every night  •  erythromycin (ILOTYCIN) ophthalmic ointment; Apply a 1-2mm strip of ointment to lateral eyelid skin twice a day (on left)    Optic cupping of both eyes  -suspect physiologic cupping OD  -suspect at least moderate POAG OS    Return in about 4 weeks (around 2025) for zeta 24-2 ou + RNFL OCT OS.                 Gavin Frank is a 54 y.o. male who presents for glaucoma eval referred by Dr. Jauregui.    C/o blurry vision peripherally on his left side. States he has to turn his head to see better.    States there is mild pain present still.     Denies ever being dxed with glaucoma  Denies family hx of glaucoma.    History obtained from: patient    Review of Systems  Medical History Reviewed by provider this encounter:       HPI     Glaucoma     Additional comments: C/o blurry vision peripherally on his left side. States he has to turn his head to see better.    POH:  POAG OU  Retinal hemorrhage OS  POSH:  none  FOH:  none  OC MEDS:  Combigan bid ou -ld 9pm           Comments    C/o blurry vision peripherally OS.  Had sinus surgery OS with orbital hemorrhage occurring intra-op:  needed urgent c/c.    States he  has to turn his head to see better with os.     POH: POAG OU - pt denies, however  Orbital hemorrhage OS (5-12-25)    POSH: none   FOH: none    OC MEDS: Combigan bid os -ld 9pm (started after the recent sinus procedure)  He uses bp meds            Last edited by Félix Balbuena DO on 5/30/2025  8:19 PM.               Base Eye Exam     Visual Acuity (Snellen - Linear)       Right Left    Dist sc 20/25 -1 20/25          Tonometry (Applanation, 2:22 PM)       Right Left    Pressure 23 23          Pachymetry (5/30/2025)       Right Left    Thickness 474 463          Gonioscopy       Right Left    Temporal ss ss    Nasal ss ss    Superior ss ss    Inferior ss ss   Decreased coop ou           Pupils       Pupils APD    Right PERRL None    Left PERRL None   dc           Visual Fields       Left Right     Full Full          Extraocular Movement       Right Left     Full, Ortho Full, Ortho          Neuro/Psych     Oriented x3: Yes          Dilation     Both eyes: m+n @ 2:23 PM            Slit Lamp and Fundus Exam     External Exam       Right Left    External Normal Normal    Healed incision - lat canthus os          Slit Lamp Exam       Right Left    Lids/Lashes Normal Normal    Conjunctiva/Sclera White and quiet White and quiet, mild ST JANESSA    Cornea Clear Clear    Anterior Chamber Deep and quiet Deep and quiet    Iris Round and reactive Round and reactive    Lens trace NSC trace NSC    Anterior Vitreous No PVD, clear, no cell No PVD, clear, no cell          Fundus Exam       Right Left    Disc sharp, no pallor +1pallor, deep cup    C/D Ratio 0.75-0.8 0.85-0.9    Macula flat/no heme / good foveal reflex flat/no heme / good foveal reflex    Vessels normal in caliber normal in caliber    Periphery flat, no retinal tear or detachment flat, no retinal tear or detachment                  IMAGING:  OCT, Optic Nerve - OU - Both Eyes        Right Eye  Reliability was good.     Left Eye  Reliability was good.     Notes  RNFL:  5-13-25  OD:  G97 (normal)  OS:  G72 (ST and IT thinning)    RNFL: Today:  5-30-25  OD: G93 (normal)  OS: unreliable    GCL:  Today  GCL OD is ess normal  GCL OS shows diffuse depression (same on 5/30/25 as it was on 5/13/25)

## 2025-06-04 ENCOUNTER — TELEPHONE (OUTPATIENT)
Age: 55
End: 2025-06-04

## 2025-06-04 DIAGNOSIS — H40.1133 PRIMARY OPEN ANGLE GLAUCOMA (POAG) OF BOTH EYES, SEVERE STAGE: Primary | ICD-10-CM

## 2025-06-04 RX ORDER — LATANOPROST 50 UG/ML
1 SOLUTION/ DROPS OPHTHALMIC
Qty: 2.5 ML | Refills: 12 | Status: SHIPPED | OUTPATIENT
Start: 2025-06-04

## 2025-06-04 NOTE — TELEPHONE ENCOUNTER
Spoke with pt. Due to Lumigan not being covered by insurance Dr. Balbuena sent Latanoprost to pharmacy. Reviewed instructions with pt.

## 2025-06-07 ENCOUNTER — APPOINTMENT (OUTPATIENT)
Dept: URGENT CARE | Age: 55
End: 2025-06-07
Payer: MEDICARE

## 2025-06-07 ENCOUNTER — OFFICE VISIT (OUTPATIENT)
Dept: URGENT CARE | Age: 55
End: 2025-06-07
Payer: MEDICARE

## 2025-06-07 VITALS
SYSTOLIC BLOOD PRESSURE: 170 MMHG | TEMPERATURE: 97.7 F | DIASTOLIC BLOOD PRESSURE: 100 MMHG | OXYGEN SATURATION: 97 % | HEART RATE: 91 BPM | RESPIRATION RATE: 18 BRPM

## 2025-06-07 DIAGNOSIS — J02.9 SORE THROAT: ICD-10-CM

## 2025-06-07 DIAGNOSIS — J02.0 STREP THROAT: Primary | ICD-10-CM

## 2025-06-07 LAB — S PYO AG THROAT QL: POSITIVE

## 2025-06-07 PROCEDURE — 87880 STREP A ASSAY W/OPTIC: CPT

## 2025-06-07 PROCEDURE — 99213 OFFICE O/P EST LOW 20 MIN: CPT

## 2025-06-07 RX ORDER — AMOXICILLIN 500 MG/1
500 CAPSULE ORAL EVERY 12 HOURS SCHEDULED
Qty: 20 CAPSULE | Refills: 0 | Status: SHIPPED | OUTPATIENT
Start: 2025-06-07 | End: 2025-06-17

## 2025-06-07 NOTE — PATIENT INSTRUCTIONS
Rapid strep performed in office found to be positive.   Please begin antibiotics as directed.   Discard old toothbrush after 72 hours to prevent reinfection.   Alternate Tylenol and Motrin as needed for pain/fever.   Follow up with PCP if no relief in symptoms within 7-10 days.

## 2025-06-07 NOTE — PROGRESS NOTES
[facility]  Name: Gavin Frank      : 1970      MRN: [id number]  Encounter Provider: APRIL Blake  Encounter Date: 2025   Encounter department: [facility]  :  Rapid strep performed in office found to be positive.   Please begin antibiotics as directed.   Discard old toothbrush after 72 hours to prevent reinfection.   Alternate Tylenol and Motrin as needed for pain/fever.   Follow up with PCP if no relief in symptoms within 7-10 days.    Assessment & Plan  Sore throat    Orders:    POCT rapid ANTIGEN strepA    Strep throat    Orders:    amoxicillin (AMOXIL) 500 mg capsule; Take 1 capsule (500 mg total) by mouth every 12 (twelve) hours for 10 days        Patient Instructions  Patient Education     Strep Throat ED   General Information   You came to the Emergency Department (ED) for a sore throat. The staff did tests and found that you have strep throat, which is an infection caused by bacteria. Most of the time, you will need antibiotics to treat strep throat. The antibiotics can help prevent other problems that strep throat can sometimes cause. Often, you will feel better in a few days but will need to finish all  of your antibiotics.  What care is needed at home?   Call your regular doctor to let them know you were in the ED. Make a follow-up appointment if you were told to.  Try different liquids to be sure you take in enough fluids.  You may want to take drugs like ibuprofen or acetaminophen for pain.  You can also try these things to soothe throat pain:  Suck on ice chips or a freezer pop.  Sip warm tea or soup.  Older children or adults may want to gargle with warm saltwater a few times each day.  Older children or adults may want to suck on hard candy or a lollipop.  Wash your hands often. This will help keep others healthy.  Stay at home for 24 hours after starting antibiotics. Sick children should stay at home until the doctor says it is OK for them to return to  school or . This will help prevent the infection from spreading to other people.  When do I need to get emergency help?   Call for an ambulance right away if:   You have trouble breathing or swallowing.  Your neck, tongue, or throat is swollen and is making it hard to breathe.  Return to the ED if:   You are drooling because you cannot swallow your saliva.  You have very bad pain in your throat and cannot eat or drink anything.  You can't open your mouth all the way.  You have a stiff neck.  You have signs of severe fluid loss, such as:  No urine for more than 8 hours.  You feel very lightheaded or like you are going to pass out.  You feel weak like you are going to fall.  You are not able to keep any fluids down.  When do I need to call the doctor?   There are large, painful lumps in your neck.  You have symptoms 2 or more weeks after your strep throat like:  Joint pain or swelling.  Rash.  Blood in your urine or you are urinating less than normal.  Swelling of your face, hands, feet, ankles, or abdomen.  You develop early signs of fluid loss, such as:  Dark-colored urine.  Dry mouth.  Muscle cramps.  Lack of energy.  Feeling light-headed when you get up.  You have new or worsening symptoms.  Last Reviewed Date   2021-05-07  Consumer Information Use and Disclaimer   This generalized information is a limited summary of diagnosis, treatment, and/or medication information. It is not meant to be comprehensive and should be used as a tool to help the user understand and/or assess potential diagnostic and treatment options. It does NOT include all information about conditions, treatments, medications, side effects, or risks that may apply to a specific patient. It is not intended to be medical advice or a substitute for the medical advice, diagnosis, or treatment of a health care provider based on the health care provider's examination and assessment of a patient’s specific and unique circumstances. Patients must  speak with a health care provider for complete information about their health, medical questions, and treatment options, including any risks or benefits regarding use of medications. This information does not endorse any treatments or medications as safe, effective, or approved for treating a specific patient. [employer] and its affiliates disclaim any warranty or liability relating to this information or the use thereof. The use of this information is governed by the Terms of Use, available at https://www.[employer]Sc[employer].Unifysquare/en/know/clinical-effectiveness-terms   Copyright   Follow up with PCP in 3-5 days.  Proceed to  ER if symptoms worsen.    If tests are performed, our office will contact you with results only if changes need to made to the care plan discussed with you at the visit. [facility].    Chief Complaint:   Chief Complaint   Patient presents with    Generalized Body Aches    Sore Throat     Chest congestion with sore throat. Chills and sweats last night.     Nausea     History of Present Illness   Sore Throat   This is a new problem. The current episode started yesterday. The problem has been unchanged. Neither side of throat is experiencing more pain than the other. There has been no fever. Associated symptoms include congestion. Pertinent negatives include no abdominal pain, coughing, diarrhea, drooling, ear discharge, ear pain, headaches, hoarse voice, plugged ear sensation, neck pain, shortness of breath, stridor, swollen glands, trouble swallowing or vomiting. He has had no exposure to strep or mono. He has tried nothing for the symptoms. The treatment provided no relief.         Review of Systems   Constitutional:  Positive for chills. Negative for fatigue and fever.   HENT:  Positive for congestion and sore throat. Negative for drooling, ear discharge, ear pain, hoarse voice, postnasal drip, rhinorrhea, sinus pressure, sinus pain, sneezing and trouble  swallowing.    Eyes: Negative.  Negative for pain, discharge, redness and itching.   Respiratory: Negative.  Negative for apnea, cough, choking, chest tightness, shortness of breath and stridor.    Cardiovascular: Negative.  Negative for chest pain and palpitations.   Gastrointestinal:  Positive for nausea. Negative for abdominal pain, diarrhea and vomiting.   Endocrine: Negative.  Negative for polydipsia, polyphagia and polyuria.   Genitourinary: Negative.  Negative for decreased urine volume and flank pain.   Musculoskeletal: Negative.  Negative for arthralgias, back pain, myalgias, neck pain and neck stiffness.   Skin: Negative.  Negative for color change and rash.   Allergic/Immunologic: Negative.  Negative for environmental allergies.   Neurological: Negative.  Negative for dizziness, facial asymmetry, light-headedness, numbness and headaches.   Hematological: Negative.  Negative for adenopathy.   Psychiatric/Behavioral: Negative.       Past Medical History   Past Medical History[1]  Past Surgical History[2]  Family History[3]  he reports that he quit smoking about 7 years ago. His smoking use included cigarettes. He started smoking about 41 years ago. He has a 15.5 pack-year smoking history. He has been exposed to tobacco smoke. He has never used smokeless tobacco. He reports that he does not currently use alcohol. He reports that he does not use drugs.  Current Outpatient Medications   Medication Instructions    amoxicillin (AMOXIL) 500 mg, Oral, Every 12 hours scheduled    diltiazem (CARDIZEM CD) 120 mg, Oral, Daily    dorzolamide-timolol (COSOPT) 2-0.5 % ophthalmic solution 1 drop, Left Eye, 2 times daily    dorzolamide-timolol (COSOPT) 2-0.5 % ophthalmic solution 1 drop, Both Eyes, 2 times daily    erythromycin (ILOTYCIN) ophthalmic ointment Apply a 1-2mm strip of ointment to lateral eyelid skin twice a day (on left)    latanoprost (XALATAN) 0.005 % ophthalmic solution 1 drop, Left Eye, Daily at bedtime     LORazepam (ATIVAN) 2 mg, Oral, Daily    losartan (COZAAR) 100 mg, Oral, Daily    meloxicam (MOBIC) 15 mg, Oral, Daily PRN    methocarbamol (ROBAXIN) 500 mg, Oral, 2 times daily    metoprolol succinate (TOPROL-XL) 50 mg, Oral, Daily    montelukast (SINGULAIR) 10 mg tablet TAKE 1 TABLET(S) BY MOUTH 1 TIMES PER DAY *NEW PRESCRIPTION REQUEST*    Multiple Vitamin (ONE-A-DAY MENS PO) Take by mouth    naloxone (NARCAN) 4 mg/0.1 mL nasal spray Administer 1 spray into a nostril. If no response after 2-3 minutes, give another dose in the other nostril using a new spray.    naproxen (NAPROSYN) 500 mg, Oral, 2 times daily with meals    oxyCODONE-acetaminophen (Percocet) 5-325 mg per tablet 1 tablet, Oral, Every 4 hours PRN    pravastatin (PRAVACHOL) 20 mg tablet TAKE 1 TABLET(S) BY MOUTH 1 TIMES PER DAY *NEW PRESCRIPTION REQUEST*    predniSONE 10 mg tablet 50 mg x 3 days; 40 mg x 3 days; 30 mg x 3 days; 20 mg x 3 days; 10 mg x 3 days    Semaglutide-Weight Management (Wegovy) 1.7 MG/0.75ML Inject 1.7 mg under the skin weekly    triamcinolone (KENALOG) 0.1 % ointment APPLY TO AFFECTED AREA(S) TOPICALLY AS DIRECTED *NEW PRESCRIPTION REQUEST*   Allergies[4]     Objective   /100   Pulse 91   Temp 97.7 °F (36.5 °C)   Resp 18   SpO2 97%      Physical Exam  Vitals and nursing note reviewed.   Constitutional:       General: He is not in acute distress.     Appearance: He is well-developed. He is not ill-appearing, toxic-appearing or diaphoretic.      Interventions: He is not intubated.  HENT:      Head: Normocephalic and atraumatic.      Right Ear: Hearing, tympanic membrane, ear canal and external ear normal. Tympanic membrane is not erythematous, retracted or bulging.      Left Ear: Hearing, tympanic membrane, ear canal and external ear normal. Tympanic membrane is not erythematous, retracted or bulging.      Mouth/Throat:      Pharynx: Uvula midline. Pharyngeal swelling and posterior oropharyngeal erythema present. No  "oropharyngeal exudate, uvula swelling or postnasal drip.      Tonsils: No tonsillar exudate or tonsillar abscesses. 3+ on the right. 3+ on the left.     Eyes:      Conjunctiva/sclera: Conjunctivae normal.     Neck:      Thyroid: No thyroid mass, thyromegaly or thyroid tenderness.     Cardiovascular:      Rate and Rhythm: Normal rate and regular rhythm.      Heart sounds: Normal heart sounds, S1 normal and S2 normal. Heart sounds not distant. No murmur heard.  Pulmonary:      Effort: Pulmonary effort is normal. No tachypnea, bradypnea, accessory muscle usage, prolonged expiration, respiratory distress or retractions. He is not intubated.      Breath sounds: Normal breath sounds. No stridor, decreased air movement or transmitted upper airway sounds. No decreased breath sounds, wheezing, rhonchi or rales.   Abdominal:      Palpations: Abdomen is soft.      Tenderness: There is no abdominal tenderness.     Musculoskeletal:         General: No swelling.      Cervical back: Full passive range of motion without pain, normal range of motion and neck supple. No muscular tenderness. Normal range of motion.   Lymphadenopathy:      Cervical: No cervical adenopathy.      Right cervical: No superficial cervical adenopathy.     Left cervical: No superficial cervical adenopathy.     Skin:     General: Skin is warm and dry.      Capillary Refill: Capillary refill takes less than 2 seconds.     Neurological:      Mental Status: He is alert.     Psychiatric:         Mood and Affect: Mood normal.         Portions of the record may have been created with voice recognition software.  Occasional wrong word or \"sound a like\" substitutions may have occurred due to the inherent limitations of voice recognition software.  Read the chart carefully and recognize, using context, where substitutions have occurred.       [1]   Past Medical History:  Diagnosis Date    Allergic     Anxiety     Arthritis     Asthma     Bunion     Callus     Cancer " (HCC) 09/24    Chronic pain disorder     Back pain    CPAP (continuous positive airway pressure) dependence 02/07/2024    Depression     Environmental and seasonal allergies 1996    GERD (gastroesophageal reflux disease)     Headache(784.0) 2023    HL (hearing loss) 2023    Right ear    Hyperlipemia     Hypertension     Ingrown toenail 11/24    Nasal congestion 2023    Runny nose coughing up mucus    Obesity     Prostate cancer (HCC)     Sinus problem 2000    Sinusitis 2023    Sinus infection    Sleep apnea    [2]   Past Surgical History:  Procedure Laterality Date    COLONOSCOPY      EXAMINATION UNDER ANESTHESIA Left 5/12/2025    Procedure: EXAM UNDER ANESTHESIA (EUA), LATERAL CANTHOTOMY;  Surgeon: Xiao Jauregui MD;  [facility]: [facility];  Service: Ophthalmology    HEMORRHOID SURGERY      INSERTION OF FIDUCIAL MARKER (TRANSRECTAL ULTRASOUND GUIDANCE) N/A 09/04/2024    Procedure: INSERTION OF FIDUCIAL MARKER ,SPACEOAR;  Surgeon: Benjie Stewart DO;  Location: [facility];  Service: Urology    HI BX PROSTATE STRTCTC SATURATION SAMPLING IMG GID N/A 02/07/2024    Procedure: TRANSPERINEAL MRI FUSION BIOPSY PROSTATE;  Surgeon: Benjie Stewart DO;  Location: [facility];  Service: Urology    HI NASAL/SINUS ENDOSCOPY W/MAXILLARY ANTROSTOMY N/A 5/12/2025    Procedure: FUNCTIONAL ENDOSCOPIC SINUS SURGERY (FESS);  Surgeon: Rajesh Martinez MD;  Location: [facility];  Service: ENT    HI NASAL/SINUS NDSC W/PARTIAL ETHMOIDECTOMY Left 5/12/2025    Procedure: ETHMOIDECTOMY;  Surgeon: Rajesh Martinez MD;  [facility]: [facility];  Service: ENT    HI NASAL/SINUS NDSC W/RMVL TISS FROM FRONTAL SINUS N/A 5/12/2025    Procedure: FUNCTIONAL ENDOSCOPIC SINUS SURGERY (FESS) IMAGED GUIDED;  Surgeon: Rajesh Martinez MD;  [facility]: [facility];  Service: ENT    TONSILLECTOMY      URETHRAL DILATION  2019   [3]   Family History  Problem Relation Name Age of Onset    Hypertension Mother More Villa     Hyperlipidemia Mother  More Belwmire     Heart disease Mother More Villa     Substance Abuse Mother More Villa     Sleep apnea Mother More Villa     Hypertension Father Gavin Belisaire     Heart disease Father Gavin Belwmire     Hyperlipidemia Father Gavin Belisaire     Substance Abuse Father Gavin Belisaire     Sleep apnea Father Gavin Gamboaire     COPD Sister      Liver disease Sister      Prostate cancer Maternal Uncle Yohan Thomson     Sleep apnea Sister Caro Amado     Sleep apnea Brother Juan C Allen     Sleep apnea Sister Caro Amado     Sleep apnea Brother Juan C Allen    [4]   Allergies  Allergen Reactions    Contrast Dye [Iodinated Contrast Media] Vomiting

## 2025-06-07 NOTE — LETTER
June 7, 2025     Patient: Gavin Frank   YOB: 1970   Date of Visit: 6/7/2025       To Whom it May Concern:    Gavin Frank was seen in my clinic on 6/7/2025. He may return on 06/08/2025.     If you have any questions or concerns, please don't hesitate to call.         Sincerely,          APRIL Blake        CC: No Recipients

## 2025-06-09 DIAGNOSIS — H40.053 PRIMARY OCULAR HYPERTENSION OF BOTH EYES: Primary | ICD-10-CM

## 2025-06-09 RX ORDER — DORZOLAMIDE HYDROCHLORIDE AND TIMOLOL MALEATE 20; 5 MG/ML; MG/ML
SOLUTION/ DROPS OPHTHALMIC
Qty: 5 ML | Refills: 12 | Status: SHIPPED | OUTPATIENT
Start: 2025-06-09 | End: 2025-06-11 | Stop reason: SDUPTHER

## 2025-06-11 DIAGNOSIS — H40.053 PRIMARY OCULAR HYPERTENSION OF BOTH EYES: ICD-10-CM

## 2025-06-11 RX ORDER — DORZOLAMIDE HYDROCHLORIDE AND TIMOLOL MALEATE 20; 5 MG/ML; MG/ML
SOLUTION/ DROPS OPHTHALMIC
Qty: 5 ML | Refills: 12 | Status: SHIPPED | OUTPATIENT
Start: 2025-06-11

## 2025-06-20 DIAGNOSIS — M19.90 ARTHRITIS: Primary | ICD-10-CM

## 2025-06-30 ENCOUNTER — TELEPHONE (OUTPATIENT)
Age: 55
End: 2025-06-30

## 2025-06-30 DIAGNOSIS — E66.813 CLASS 3 SEVERE OBESITY DUE TO EXCESS CALORIES WITH SERIOUS COMORBIDITY AND BODY MASS INDEX (BMI) OF 50.0 TO 59.9 IN ADULT: Primary | ICD-10-CM

## 2025-06-30 DIAGNOSIS — R73.03 PREDIABETES: ICD-10-CM

## 2025-06-30 DIAGNOSIS — I10 PRIMARY HYPERTENSION: ICD-10-CM

## 2025-06-30 DIAGNOSIS — I10 BENIGN ESSENTIAL HYPERTENSION: ICD-10-CM

## 2025-06-30 DIAGNOSIS — G47.33 OBSTRUCTIVE SLEEP APNEA: ICD-10-CM

## 2025-06-30 RX ORDER — SEMAGLUTIDE 2.4 MG/.75ML
INJECTION, SOLUTION SUBCUTANEOUS
Qty: 3 ML | Refills: 0 | Status: SHIPPED | OUTPATIENT
Start: 2025-06-30

## 2025-06-30 NOTE — TELEPHONE ENCOUNTER
"Please initiate auth for Wegovy 2.4mg that was sent to pharmacy today. Inject 2.4mg once per week. Dispense 3ml. Dx: E66.813, Z68.43, R73.03, I10.    T/F diet/exercise since 12/2023. Having difficulty exercising more recently due to sciatic pain and arthritis pain. He has also been dieting - on a low carb / calorie diet (less than 2000 jose per day).   Stable = 4/2024  Starting BMI: 47.87, Ht: 6'3\", Wt: 383lb as of 4/25/24  Current BMI: 45.50, Ht: 6'3\", Wt: 364lb as of 5/20/25  4.96% weight loss.   *He was taking 1.7mg from 7/2024 - present*    Prescribed by Dr Castillo  "

## 2025-06-30 NOTE — TELEPHONE ENCOUNTER
I attempted to contact patient to discuss meds (wegovy) however he indicated he needed to call back.    I wish to discuss wegovy because he is due for an auth renewal. I want to clarify the dose he is on. Appears he is at the 1.7mg and has been for some time. Any particular reason he has been only at that dose (considered a maintenance dose) for some time now or can he go up to the next dose of 2.4mg? Either way would require a new auth but wondering about the dose and other information below:      Current weight?     Any issues with the medication?     Still unable to exercise (noted in 9/30/24 OV note)?           If he is exercising, what is he doing?     Dieting? What is he doing?

## 2025-06-30 NOTE — TELEPHONE ENCOUNTER
Patient returned a call from the office and provided the following responses to the questions asked in the encounter below:    I want to clarify the dose he is on. Appears he is at the 1.7mg and has been for some time. Any particular reason he has been only at that dose (considered a maintenance dose) for some time now or can he go up to the next dose of 2.4mg? Either way would require a new auth but wondering about the dose and other information below: Patient confirmed that he is currently on the 1.7 mg but stated that he would like to titrate up to the 2.4 mg.    Current weight? 364 (pt stated that he was just in the office on 5/20 - weight should be noted in chart)    Any issues with the medication? Pt reports no issues w/medication.    Still unable to exercise (noted in 9/30/24 OV note)? Pt reports unable to exercise due to sciatic nerve and arthritis in knee, back and foot            If he is exercising, what is he doing? N/A    Dieting? Yes, reports that he is doing the best he can.    What is he doing? Patient reports eating low carb, low calorie (under 2, 000 calories)      Please contact patient for additional questions or concerns. [phone]

## 2025-07-21 ENCOUNTER — TELEPHONE (OUTPATIENT)
Facility: HOSPITAL | Age: 55
End: 2025-07-21

## 2025-07-21 NOTE — TELEPHONE ENCOUNTER
Spoke to patient to have PSA drawn for visit on 7/23/25. Aware to go to any [facility] lab. He then states he thought appointment was 7/29. Aware that appointment is Wednesday 7/23/25 at . Asked if he would like appointment rescheduled. He said yes. Will have  reach out to rescheduled appointment. Aware that he will still need PSA. He verbalized understanding.

## 2025-07-22 DIAGNOSIS — C61 PROSTATE CA (HCC): ICD-10-CM

## 2025-07-23 RX ORDER — LORAZEPAM 2 MG/1
2 TABLET ORAL DAILY
Qty: 30 TABLET | Refills: 3 | Status: SHIPPED | OUTPATIENT
Start: 2025-07-23

## 2025-07-30 DIAGNOSIS — E66.813 CLASS 3 SEVERE OBESITY DUE TO EXCESS CALORIES WITH SERIOUS COMORBIDITY AND BODY MASS INDEX (BMI) OF 50.0 TO 59.9 IN ADULT: Primary | ICD-10-CM

## 2025-07-30 RX ORDER — SEMAGLUTIDE 1.7 MG/.75ML
INJECTION, SOLUTION SUBCUTANEOUS
Qty: 9 ML | Refills: 11 | Status: SHIPPED | OUTPATIENT
Start: 2025-07-30

## 2025-08-01 ENCOUNTER — APPOINTMENT (OUTPATIENT)
Dept: LAB | Facility: HOSPITAL | Age: 55
End: 2025-08-01
Payer: MEDICARE

## 2025-08-01 DIAGNOSIS — C61 MALIGNANT NEOPLASM OF PROSTATE (HCC): ICD-10-CM

## 2025-08-01 LAB — PSA SERPL-MCNC: 3.17 NG/ML (ref 0–4)

## 2025-08-01 PROCEDURE — 84153 ASSAY OF PSA TOTAL: CPT

## 2025-08-01 PROCEDURE — 36415 COLL VENOUS BLD VENIPUNCTURE: CPT

## 2025-08-11 ENCOUNTER — OFFICE VISIT (OUTPATIENT)
Facility: HOSPITAL | Age: 55
End: 2025-08-11
Attending: RADIOLOGY
Payer: MEDICARE

## (undated) DEVICE — NEEDLE 25G X 1 1/2

## (undated) DEVICE — TUBING 1895522 5PK STRAIGHTSHOT TO XPS: Brand: STRAIGHTSHOT®

## (undated) DEVICE — CHLORAPREP HI-LITE 26ML ORANGE

## (undated) DEVICE — BLADE 1884006EM RAD40 4MM M4 ROTATE ROHS: Brand: FUSION®

## (undated) DEVICE — BLADE 1884004HR TRICUT 5PK M4 4MM ROTATE: Brand: TRICUT

## (undated) DEVICE — PREP PAD BNS: Brand: CONVERTORS

## (undated) DEVICE — NEEDLE BLUNT 18 G X 1 1/2 W FILTER

## (undated) DEVICE — MAX-CORE® DISPOSABLE CORE BIOPSY INSTRUMENT, 18G X 25CM: Brand: MAX-CORE

## (undated) DEVICE — MAYO STAND COVER: Brand: CONVERTORS

## (undated) DEVICE — ULTRASOUND GEL STERILE FOIL PK

## (undated) DEVICE — GAUZE SPONGES,16 PLY: Brand: CURITY

## (undated) DEVICE — COVER PROBE ECLIPSE

## (undated) DEVICE — UTILITY MARKER,BLACK WITH LABELS: Brand: DEVON

## (undated) DEVICE — ANTI-FOG SOLUTION WITH FOAM PAD: Brand: DEVON

## (undated) DEVICE — INSTRUMENT TRACKER 9733533XOM ENT 1PK

## (undated) DEVICE — SYRINGE 10ML LL

## (undated) DEVICE — GLOVE PI ULTRA TOUCH SZ.7.0

## (undated) DEVICE — NEEDLE SPINAL 22G X 3.5IN  QUINCKE

## (undated) DEVICE — CUP MEDICINE 1OZ 5000/CS 50/PLT: Brand: MEDEGEN MEDICAL PRODUCTS, LLC

## (undated) DEVICE — SPECIMEN CONTAINER STERILE PEEL PACK

## (undated) DEVICE — 60 ML SYRINGE,TOOMEY TYPE: Brand: MONOJECT

## (undated) DEVICE — NEEDLE 25GA X 1 IN SAFETY GLIDE

## (undated) DEVICE — RENTAL PROBE ULTRASOUND DROP IN BK5000

## (undated) DEVICE — SYSTEM TRANSPERINEAL ACCESS PRECISIONPOINT

## (undated) DEVICE — RAZOR STERILE

## (undated) DEVICE — TELFA NON-ADHERENT ABSORBENT DRESSING: Brand: TELFA

## (undated) DEVICE — HEAVY DUTY TABLE COVER: Brand: CONVERTORS

## (undated) DEVICE — PATIENT TRACKER 9734887XOM NON-INVASIVE

## (undated) DEVICE — BLADE 1884080EM TRICUT 4MMX13CM M4 ROHS: Brand: FUSION®

## (undated) DEVICE — 3M™ TEGADERM™ TRANSPARENT FILM DRESSING FRAME STYLE, 1628, 6 IN X 8 IN (15 CM X 20 CM), 10/CT 8CT/CASE: Brand: 3M™ TEGADERM™

## (undated) DEVICE — TUBING SUCTION 5MM X 12 FT

## (undated) DEVICE — GLOVE SRG BIOGEL ORTHOPEDIC 7

## (undated) DEVICE — STERILE BETHLEHEM NASAL PACK: Brand: CARDINAL HEALTH

## (undated) DEVICE — DRESSING MEPORE FILM ADHESIVE 4 X 5IN

## (undated) DEVICE — GLOVE SRG BIOGEL 7

## (undated) DEVICE — STANDARD SURGICAL GOWN, L: Brand: CONVERTORS

## (undated) DEVICE — DECANTER: Brand: UNBRANDED

## (undated) DEVICE — GLOVE INDICATOR PI UNDERGLOVE SZ 8 BLUE

## (undated) DEVICE — HEMOSTAT POWDER ADSORB SURGICEL 3GM

## (undated) DEVICE — NEURO PATTIES 1/2 X 1 1/2